# Patient Record
Sex: MALE | Race: WHITE | NOT HISPANIC OR LATINO | Employment: FULL TIME | URBAN - METROPOLITAN AREA
[De-identification: names, ages, dates, MRNs, and addresses within clinical notes are randomized per-mention and may not be internally consistent; named-entity substitution may affect disease eponyms.]

---

## 2020-01-01 ENCOUNTER — ANESTHESIA EVENT (OUTPATIENT)
Dept: SURGERY | Facility: HOSPITAL | Age: 55
DRG: 834 | End: 2020-01-01
Payer: COMMERCIAL

## 2020-01-01 ENCOUNTER — TELEPHONE (OUTPATIENT)
Dept: HEMATOLOGY/ONCOLOGY | Facility: CLINIC | Age: 55
End: 2020-01-01

## 2020-01-01 ENCOUNTER — OFFICE VISIT (OUTPATIENT)
Dept: HEMATOLOGY/ONCOLOGY | Facility: CLINIC | Age: 55
End: 2020-01-01
Payer: COMMERCIAL

## 2020-01-01 ENCOUNTER — SPECIALTY PHARMACY (OUTPATIENT)
Dept: PHARMACY | Facility: CLINIC | Age: 55
End: 2020-01-01

## 2020-01-01 ENCOUNTER — LAB VISIT (OUTPATIENT)
Dept: LAB | Facility: HOSPITAL | Age: 55
End: 2020-01-01
Attending: NURSE PRACTITIONER
Payer: COMMERCIAL

## 2020-01-01 ENCOUNTER — INFUSION (OUTPATIENT)
Dept: INFUSION THERAPY | Facility: HOSPITAL | Age: 55
End: 2020-01-01
Payer: COMMERCIAL

## 2020-01-01 ENCOUNTER — TELEPHONE (OUTPATIENT)
Dept: INFECTIOUS DISEASES | Facility: CLINIC | Age: 55
End: 2020-01-01

## 2020-01-01 ENCOUNTER — DOCUMENTATION ONLY (OUTPATIENT)
Dept: TRANSFUSION MEDICINE | Facility: HOSPITAL | Age: 55
End: 2020-01-01
Payer: COMMERCIAL

## 2020-01-01 ENCOUNTER — EXTERNAL HOME HEALTH (OUTPATIENT)
Dept: HOME HEALTH SERVICES | Facility: HOSPITAL | Age: 55
End: 2020-01-01
Payer: COMMERCIAL

## 2020-01-01 ENCOUNTER — HOSPITAL ENCOUNTER (INPATIENT)
Facility: HOSPITAL | Age: 55
LOS: 29 days | Discharge: HOME HEALTH | DRG: 834 | End: 2020-10-15
Attending: INTERNAL MEDICINE | Admitting: INTERNAL MEDICINE
Payer: COMMERCIAL

## 2020-01-01 ENCOUNTER — OFFICE VISIT (OUTPATIENT)
Dept: OPTOMETRY | Facility: CLINIC | Age: 55
End: 2020-01-01
Payer: COMMERCIAL

## 2020-01-01 ENCOUNTER — INFUSION (OUTPATIENT)
Dept: INFUSION THERAPY | Facility: HOSPITAL | Age: 55
End: 2020-01-01
Attending: INTERNAL MEDICINE
Payer: COMMERCIAL

## 2020-01-01 ENCOUNTER — PROCEDURE VISIT (OUTPATIENT)
Dept: HEMATOLOGY/ONCOLOGY | Facility: CLINIC | Age: 55
End: 2020-01-01
Payer: COMMERCIAL

## 2020-01-01 ENCOUNTER — OFFICE VISIT (OUTPATIENT)
Dept: OPHTHALMOLOGY | Facility: CLINIC | Age: 55
End: 2020-01-01
Payer: COMMERCIAL

## 2020-01-01 ENCOUNTER — OFFICE VISIT (OUTPATIENT)
Dept: INFECTIOUS DISEASES | Facility: CLINIC | Age: 55
End: 2020-01-01
Payer: COMMERCIAL

## 2020-01-01 ENCOUNTER — ANESTHESIA (OUTPATIENT)
Dept: SURGERY | Facility: HOSPITAL | Age: 55
DRG: 834 | End: 2020-01-01
Payer: COMMERCIAL

## 2020-01-01 ENCOUNTER — TELEPHONE (OUTPATIENT)
Dept: CARDIOLOGY | Facility: CLINIC | Age: 55
End: 2020-01-01

## 2020-01-01 ENCOUNTER — LAB VISIT (OUTPATIENT)
Dept: LAB | Facility: HOSPITAL | Age: 55
End: 2020-01-01
Payer: COMMERCIAL

## 2020-01-01 ENCOUNTER — TELEPHONE (OUTPATIENT)
Dept: PHARMACY | Facility: CLINIC | Age: 55
End: 2020-01-01

## 2020-01-01 ENCOUNTER — INFUSION (OUTPATIENT)
Dept: INFUSION THERAPY | Facility: HOSPITAL | Age: 55
End: 2020-01-01
Attending: NURSE PRACTITIONER
Payer: COMMERCIAL

## 2020-01-01 ENCOUNTER — DOCUMENTATION ONLY (OUTPATIENT)
Dept: HEMATOLOGY/ONCOLOGY | Facility: CLINIC | Age: 55
End: 2020-01-01

## 2020-01-01 ENCOUNTER — TELEPHONE (OUTPATIENT)
Dept: TRANSPLANT | Facility: HOSPITAL | Age: 55
End: 2020-01-01

## 2020-01-01 VITALS
SYSTOLIC BLOOD PRESSURE: 139 MMHG | HEART RATE: 70 BPM | RESPIRATION RATE: 18 BRPM | HEART RATE: 73 BPM | DIASTOLIC BLOOD PRESSURE: 81 MMHG | SYSTOLIC BLOOD PRESSURE: 140 MMHG | HEART RATE: 91 BPM | WEIGHT: 273.38 LBS | OXYGEN SATURATION: 97 % | RESPIRATION RATE: 18 BRPM | TEMPERATURE: 99 F | HEART RATE: 78 BPM | OXYGEN SATURATION: 99 % | TEMPERATURE: 98 F | DIASTOLIC BLOOD PRESSURE: 74 MMHG | RESPIRATION RATE: 17 BRPM | DIASTOLIC BLOOD PRESSURE: 83 MMHG | OXYGEN SATURATION: 95 % | DIASTOLIC BLOOD PRESSURE: 77 MMHG | HEIGHT: 75 IN | TEMPERATURE: 98 F | BODY MASS INDEX: 33.99 KG/M2 | RESPIRATION RATE: 16 BRPM | TEMPERATURE: 99 F | SYSTOLIC BLOOD PRESSURE: 120 MMHG | SYSTOLIC BLOOD PRESSURE: 130 MMHG | OXYGEN SATURATION: 100 %

## 2020-01-01 VITALS
OXYGEN SATURATION: 100 % | HEART RATE: 72 BPM | BODY MASS INDEX: 34.76 KG/M2 | OXYGEN SATURATION: 98 % | OXYGEN SATURATION: 100 % | BODY MASS INDEX: 33.71 KG/M2 | RESPIRATION RATE: 16 BRPM | RESPIRATION RATE: 18 BRPM | HEIGHT: 74 IN | DIASTOLIC BLOOD PRESSURE: 76 MMHG | SYSTOLIC BLOOD PRESSURE: 142 MMHG | SYSTOLIC BLOOD PRESSURE: 125 MMHG | WEIGHT: 276.69 LBS | WEIGHT: 276.81 LBS | HEIGHT: 75 IN | RESPIRATION RATE: 18 BRPM | TEMPERATURE: 99 F | SYSTOLIC BLOOD PRESSURE: 124 MMHG | WEIGHT: 279.56 LBS | BODY MASS INDEX: 35.51 KG/M2 | HEIGHT: 76 IN | TEMPERATURE: 99 F | HEART RATE: 78 BPM | TEMPERATURE: 98 F | DIASTOLIC BLOOD PRESSURE: 82 MMHG | DIASTOLIC BLOOD PRESSURE: 84 MMHG | HEART RATE: 72 BPM

## 2020-01-01 VITALS
TEMPERATURE: 98 F | HEART RATE: 67 BPM | RESPIRATION RATE: 17 BRPM | SYSTOLIC BLOOD PRESSURE: 134 MMHG | OXYGEN SATURATION: 100 % | HEART RATE: 71 BPM | SYSTOLIC BLOOD PRESSURE: 124 MMHG | DIASTOLIC BLOOD PRESSURE: 72 MMHG | DIASTOLIC BLOOD PRESSURE: 77 MMHG | OXYGEN SATURATION: 99 % | RESPIRATION RATE: 18 BRPM | TEMPERATURE: 98 F

## 2020-01-01 VITALS
HEIGHT: 75 IN | HEART RATE: 92 BPM | DIASTOLIC BLOOD PRESSURE: 79 MMHG | SYSTOLIC BLOOD PRESSURE: 143 MMHG | OXYGEN SATURATION: 100 % | BODY MASS INDEX: 33.43 KG/M2 | RESPIRATION RATE: 18 BRPM | TEMPERATURE: 99 F | WEIGHT: 268.88 LBS

## 2020-01-01 VITALS
SYSTOLIC BLOOD PRESSURE: 118 MMHG | OXYGEN SATURATION: 100 % | RESPIRATION RATE: 16 BRPM | HEART RATE: 96 BPM | OXYGEN SATURATION: 100 % | SYSTOLIC BLOOD PRESSURE: 110 MMHG | HEIGHT: 76 IN | TEMPERATURE: 98 F | WEIGHT: 269.38 LBS | HEART RATE: 85 BPM | TEMPERATURE: 99 F | DIASTOLIC BLOOD PRESSURE: 71 MMHG | BODY MASS INDEX: 32.05 KG/M2 | HEIGHT: 76 IN | SYSTOLIC BLOOD PRESSURE: 97 MMHG | DIASTOLIC BLOOD PRESSURE: 63 MMHG | HEIGHT: 76 IN | TEMPERATURE: 98 F | BODY MASS INDEX: 32.85 KG/M2 | RESPIRATION RATE: 17 BRPM | RESPIRATION RATE: 16 BRPM | DIASTOLIC BLOOD PRESSURE: 58 MMHG | HEART RATE: 85 BPM | SYSTOLIC BLOOD PRESSURE: 104 MMHG | HEIGHT: 76 IN | WEIGHT: 269.81 LBS | WEIGHT: 262.38 LBS | BODY MASS INDEX: 32.8 KG/M2 | DIASTOLIC BLOOD PRESSURE: 61 MMHG | HEART RATE: 80 BPM | WEIGHT: 263.19 LBS | OXYGEN SATURATION: 100 % | TEMPERATURE: 98 F | BODY MASS INDEX: 31.95 KG/M2

## 2020-01-01 VITALS
BODY MASS INDEX: 33.43 KG/M2 | SYSTOLIC BLOOD PRESSURE: 113 MMHG | HEART RATE: 93 BPM | DIASTOLIC BLOOD PRESSURE: 68 MMHG | TEMPERATURE: 98 F | OXYGEN SATURATION: 100 % | HEIGHT: 75 IN | WEIGHT: 271.81 LBS | DIASTOLIC BLOOD PRESSURE: 77 MMHG | RESPIRATION RATE: 16 BRPM | OXYGEN SATURATION: 99 % | TEMPERATURE: 98 F | HEART RATE: 78 BPM | SYSTOLIC BLOOD PRESSURE: 137 MMHG | RESPIRATION RATE: 17 BRPM | WEIGHT: 268.88 LBS | HEIGHT: 75 IN | BODY MASS INDEX: 33.8 KG/M2

## 2020-01-01 VITALS
OXYGEN SATURATION: 100 % | DIASTOLIC BLOOD PRESSURE: 52 MMHG | RESPIRATION RATE: 18 BRPM | HEIGHT: 74 IN | BODY MASS INDEX: 33.31 KG/M2 | BODY MASS INDEX: 35.51 KG/M2 | TEMPERATURE: 98 F | DIASTOLIC BLOOD PRESSURE: 61 MMHG | HEART RATE: 84 BPM | HEART RATE: 83 BPM | TEMPERATURE: 98 F | WEIGHT: 273.56 LBS | HEIGHT: 76 IN | RESPIRATION RATE: 18 BRPM | SYSTOLIC BLOOD PRESSURE: 102 MMHG | WEIGHT: 276.69 LBS | SYSTOLIC BLOOD PRESSURE: 93 MMHG

## 2020-01-01 VITALS
DIASTOLIC BLOOD PRESSURE: 77 MMHG | OXYGEN SATURATION: 99 % | WEIGHT: 262.38 LBS | SYSTOLIC BLOOD PRESSURE: 134 MMHG | RESPIRATION RATE: 16 BRPM | BODY MASS INDEX: 31.95 KG/M2 | TEMPERATURE: 98 F | HEART RATE: 71 BPM | HEIGHT: 76 IN

## 2020-01-01 VITALS
BODY MASS INDEX: 34.46 KG/M2 | DIASTOLIC BLOOD PRESSURE: 85 MMHG | WEIGHT: 277.13 LBS | SYSTOLIC BLOOD PRESSURE: 145 MMHG | RESPIRATION RATE: 16 BRPM | OXYGEN SATURATION: 100 % | HEART RATE: 82 BPM | HEIGHT: 75 IN

## 2020-01-01 VITALS
HEIGHT: 75 IN | SYSTOLIC BLOOD PRESSURE: 138 MMHG | WEIGHT: 276.69 LBS | HEART RATE: 78 BPM | RESPIRATION RATE: 17 BRPM | TEMPERATURE: 98 F | OXYGEN SATURATION: 100 % | DIASTOLIC BLOOD PRESSURE: 83 MMHG | BODY MASS INDEX: 34.4 KG/M2

## 2020-01-01 VITALS
RESPIRATION RATE: 18 BRPM | DIASTOLIC BLOOD PRESSURE: 92 MMHG | SYSTOLIC BLOOD PRESSURE: 148 MMHG | OXYGEN SATURATION: 99 % | HEART RATE: 70 BPM | TEMPERATURE: 98 F

## 2020-01-01 VITALS
RESPIRATION RATE: 16 BRPM | DIASTOLIC BLOOD PRESSURE: 70 MMHG | SYSTOLIC BLOOD PRESSURE: 128 MMHG | OXYGEN SATURATION: 99 % | TEMPERATURE: 98 F | HEART RATE: 61 BPM

## 2020-01-01 VITALS
HEART RATE: 80 BPM | RESPIRATION RATE: 16 BRPM | OXYGEN SATURATION: 99 % | TEMPERATURE: 98 F | DIASTOLIC BLOOD PRESSURE: 73 MMHG | SYSTOLIC BLOOD PRESSURE: 131 MMHG

## 2020-01-01 VITALS
RESPIRATION RATE: 16 BRPM | BODY MASS INDEX: 34.46 KG/M2 | DIASTOLIC BLOOD PRESSURE: 77 MMHG | HEART RATE: 76 BPM | SYSTOLIC BLOOD PRESSURE: 132 MMHG | TEMPERATURE: 98 F | OXYGEN SATURATION: 99 % | WEIGHT: 277.13 LBS | HEIGHT: 75 IN

## 2020-01-01 VITALS
HEART RATE: 75 BPM | DIASTOLIC BLOOD PRESSURE: 80 MMHG | SYSTOLIC BLOOD PRESSURE: 126 MMHG | HEART RATE: 78 BPM | SYSTOLIC BLOOD PRESSURE: 141 MMHG | OXYGEN SATURATION: 100 % | SYSTOLIC BLOOD PRESSURE: 139 MMHG | HEART RATE: 73 BPM | RESPIRATION RATE: 18 BRPM | BODY MASS INDEX: 33.29 KG/M2 | HEIGHT: 76 IN | TEMPERATURE: 98 F | TEMPERATURE: 98 F | RESPIRATION RATE: 18 BRPM | DIASTOLIC BLOOD PRESSURE: 79 MMHG | DIASTOLIC BLOOD PRESSURE: 84 MMHG | TEMPERATURE: 98 F | WEIGHT: 273.38 LBS | RESPIRATION RATE: 18 BRPM | OXYGEN SATURATION: 99 %

## 2020-01-01 VITALS
BODY MASS INDEX: 35.72 KG/M2 | OXYGEN SATURATION: 99 % | SYSTOLIC BLOOD PRESSURE: 143 MMHG | DIASTOLIC BLOOD PRESSURE: 89 MMHG | HEIGHT: 74 IN | TEMPERATURE: 98 F | WEIGHT: 278.31 LBS | RESPIRATION RATE: 16 BRPM | HEART RATE: 68 BPM

## 2020-01-01 VITALS
RESPIRATION RATE: 18 BRPM | OXYGEN SATURATION: 100 % | HEART RATE: 70 BPM | DIASTOLIC BLOOD PRESSURE: 80 MMHG | DIASTOLIC BLOOD PRESSURE: 31 MMHG | RESPIRATION RATE: 18 BRPM | SYSTOLIC BLOOD PRESSURE: 138 MMHG | TEMPERATURE: 99 F | TEMPERATURE: 98 F | HEART RATE: 61 BPM | SYSTOLIC BLOOD PRESSURE: 159 MMHG

## 2020-01-01 VITALS
OXYGEN SATURATION: 99 % | HEART RATE: 73 BPM | RESPIRATION RATE: 16 BRPM | TEMPERATURE: 99 F | DIASTOLIC BLOOD PRESSURE: 83 MMHG | SYSTOLIC BLOOD PRESSURE: 135 MMHG

## 2020-01-01 VITALS — RESPIRATION RATE: 18 BRPM | DIASTOLIC BLOOD PRESSURE: 62 MMHG | SYSTOLIC BLOOD PRESSURE: 105 MMHG | HEART RATE: 63 BPM

## 2020-01-01 DIAGNOSIS — D61.810 PANCYTOPENIA DUE TO ANTINEOPLASTIC CHEMOTHERAPY: ICD-10-CM

## 2020-01-01 DIAGNOSIS — I10 ESSENTIAL HYPERTENSION: ICD-10-CM

## 2020-01-01 DIAGNOSIS — C92.01 ACUTE MYELOID LEUKEMIA IN REMISSION: Primary | ICD-10-CM

## 2020-01-01 DIAGNOSIS — D64.9 ANEMIA: Primary | ICD-10-CM

## 2020-01-01 DIAGNOSIS — K57.92 DIVERTICULITIS: Primary | ICD-10-CM

## 2020-01-01 DIAGNOSIS — I48.21 PERMANENT ATRIAL FIBRILLATION: ICD-10-CM

## 2020-01-01 DIAGNOSIS — E11.9 TYPE 2 DIABETES MELLITUS WITHOUT COMPLICATION, WITHOUT LONG-TERM CURRENT USE OF INSULIN: ICD-10-CM

## 2020-01-01 DIAGNOSIS — C92.00 ACUTE MYELOID LEUKEMIA NOT HAVING ACHIEVED REMISSION: Primary | ICD-10-CM

## 2020-01-01 DIAGNOSIS — Z76.82 BONE MARROW TRANSPLANT CANDIDATE: ICD-10-CM

## 2020-01-01 DIAGNOSIS — H53.8 BLURRY VISION, RIGHT EYE: ICD-10-CM

## 2020-01-01 DIAGNOSIS — C92.00 ACUTE MYELOID LEUKEMIA NOT HAVING ACHIEVED REMISSION: ICD-10-CM

## 2020-01-01 DIAGNOSIS — T45.1X5A PANCYTOPENIA DUE TO ANTINEOPLASTIC CHEMOTHERAPY: ICD-10-CM

## 2020-01-01 DIAGNOSIS — Z76.82 STEM CELL TRANSPLANT CANDIDATE: Primary | ICD-10-CM

## 2020-01-01 DIAGNOSIS — J94.8 NEOPLASTIC PLEURAL EFFUSION: ICD-10-CM

## 2020-01-01 DIAGNOSIS — C92.00 AML (ACUTE MYELOID LEUKEMIA): ICD-10-CM

## 2020-01-01 DIAGNOSIS — D63.0 ANEMIA IN NEOPLASTIC DISEASE: Primary | ICD-10-CM

## 2020-01-01 DIAGNOSIS — Z51.11 ENCOUNTER FOR ANTINEOPLASTIC CHEMOTHERAPY: Primary | ICD-10-CM

## 2020-01-01 DIAGNOSIS — E80.6 HYPERBILIRUBINEMIA: ICD-10-CM

## 2020-01-01 DIAGNOSIS — I48.91 ATRIAL FIBRILLATION, UNSPECIFIED TYPE: ICD-10-CM

## 2020-01-01 DIAGNOSIS — I95.9 HYPOTENSION, UNSPECIFIED HYPOTENSION TYPE: ICD-10-CM

## 2020-01-01 DIAGNOSIS — H35.00 RETINOPATHY OF BOTH EYES: ICD-10-CM

## 2020-01-01 DIAGNOSIS — C92.01 ACUTE MYELOID LEUKEMIA IN REMISSION: ICD-10-CM

## 2020-01-01 DIAGNOSIS — N17.9 AKI (ACUTE KIDNEY INJURY): ICD-10-CM

## 2020-01-01 DIAGNOSIS — D61.818 PANCYTOPENIA: Primary | ICD-10-CM

## 2020-01-01 DIAGNOSIS — D63.0 ANEMIA IN NEOPLASTIC DISEASE: ICD-10-CM

## 2020-01-01 DIAGNOSIS — B95.7 COAGULASE NEGATIVE STAPHYLOCOCCUS BACTEREMIA: ICD-10-CM

## 2020-01-01 DIAGNOSIS — D75.9 CYTOPENIA: ICD-10-CM

## 2020-01-01 DIAGNOSIS — T45.1X5A CHEMOTHERAPY-INDUCED NEUTROPENIA: ICD-10-CM

## 2020-01-01 DIAGNOSIS — I48.0 PAROXYSMAL ATRIAL FIBRILLATION: Primary | ICD-10-CM

## 2020-01-01 DIAGNOSIS — D64.9 ANEMIA, UNSPECIFIED TYPE: Primary | ICD-10-CM

## 2020-01-01 DIAGNOSIS — T45.1X5A PANCYTOPENIA DUE TO ANTINEOPLASTIC CHEMOTHERAPY: Primary | ICD-10-CM

## 2020-01-01 DIAGNOSIS — H35.371 EPIRETINAL MEMBRANE, RIGHT: Primary | ICD-10-CM

## 2020-01-01 DIAGNOSIS — R78.81 COAGULASE NEGATIVE STAPHYLOCOCCUS BACTEREMIA: ICD-10-CM

## 2020-01-01 DIAGNOSIS — B95.7 COAGULASE NEGATIVE STAPHYLOCOCCUS BACTEREMIA: Primary | ICD-10-CM

## 2020-01-01 DIAGNOSIS — T45.1X5A ANEMIA DUE TO CHEMOTHERAPY: Primary | ICD-10-CM

## 2020-01-01 DIAGNOSIS — D61.810 PANCYTOPENIA DUE TO ANTINEOPLASTIC CHEMOTHERAPY: Primary | ICD-10-CM

## 2020-01-01 DIAGNOSIS — D61.82 MYELOPHTHISIC ANEMIA: ICD-10-CM

## 2020-01-01 DIAGNOSIS — I48.91 ATRIAL FIBRILLATION, UNSPECIFIED TYPE: Primary | ICD-10-CM

## 2020-01-01 DIAGNOSIS — D69.6 THROMBOCYTOPENIA: Primary | ICD-10-CM

## 2020-01-01 DIAGNOSIS — H43.11 VITREOUS HEMORRHAGE OF RIGHT EYE: Primary | ICD-10-CM

## 2020-01-01 DIAGNOSIS — H25.041 POSTERIOR SUBCAPSULAR AGE-RELATED CATARACT OF RIGHT EYE: ICD-10-CM

## 2020-01-01 DIAGNOSIS — C80.1 MALIGNANCY: ICD-10-CM

## 2020-01-01 DIAGNOSIS — Z76.82 STEM CELL TRANSPLANT CANDIDATE: ICD-10-CM

## 2020-01-01 DIAGNOSIS — C95.00 ACUTE LEUKEMIA: ICD-10-CM

## 2020-01-01 DIAGNOSIS — H35.371 EPIRETINAL MEMBRANE (ERM) OF RIGHT EYE: ICD-10-CM

## 2020-01-01 DIAGNOSIS — D61.818 PANCYTOPENIA: ICD-10-CM

## 2020-01-01 DIAGNOSIS — D64.81 ANEMIA DUE TO CHEMOTHERAPY: ICD-10-CM

## 2020-01-01 DIAGNOSIS — D75.9 CYTOPENIA: Primary | ICD-10-CM

## 2020-01-01 DIAGNOSIS — E78.5 HYPERLIPIDEMIA, UNSPECIFIED HYPERLIPIDEMIA TYPE: ICD-10-CM

## 2020-01-01 DIAGNOSIS — D64.81 ANEMIA DUE TO CHEMOTHERAPY: Primary | ICD-10-CM

## 2020-01-01 DIAGNOSIS — D70.1 CHEMOTHERAPY-INDUCED NEUTROPENIA: ICD-10-CM

## 2020-01-01 DIAGNOSIS — T45.1X5A ANEMIA DUE TO CHEMOTHERAPY: ICD-10-CM

## 2020-01-01 DIAGNOSIS — Z12.11 SCREENING FOR COLON CANCER: Primary | ICD-10-CM

## 2020-01-01 DIAGNOSIS — E61.2 MAGNESIUM DEFICIENCY: ICD-10-CM

## 2020-01-01 DIAGNOSIS — H43.11 VITREOUS HEMORRHAGE, RIGHT: ICD-10-CM

## 2020-01-01 DIAGNOSIS — C95.00 ACUTE LEUKEMIA NOT HAVING ACHIEVED REMISSION: ICD-10-CM

## 2020-01-01 DIAGNOSIS — R78.81 COAGULASE NEGATIVE STAPHYLOCOCCUS BACTEREMIA: Primary | ICD-10-CM

## 2020-01-01 DIAGNOSIS — H43.11 VITREOUS HEMORRHAGE OF RIGHT EYE: ICD-10-CM

## 2020-01-01 DIAGNOSIS — I48.91 ATRIAL FIBRILLATION: ICD-10-CM

## 2020-01-01 DIAGNOSIS — H35.61 RETINAL HEMORRHAGE, RIGHT: ICD-10-CM

## 2020-01-01 LAB
ABO + RH BLD: NORMAL
ACID FAST MOD KINY STN SPEC: NORMAL
ALBUMIN FLD-MCNC: 1.9 G/DL
ALBUMIN SERPL BCP-MCNC: 2.2 G/DL (ref 3.5–5.2)
ALBUMIN SERPL BCP-MCNC: 2.4 G/DL (ref 3.5–5.2)
ALBUMIN SERPL BCP-MCNC: 2.5 G/DL (ref 3.5–5.2)
ALBUMIN SERPL BCP-MCNC: 2.6 G/DL (ref 3.5–5.2)
ALBUMIN SERPL BCP-MCNC: 2.7 G/DL (ref 3.5–5.2)
ALBUMIN SERPL BCP-MCNC: 2.8 G/DL (ref 3.5–5.2)
ALBUMIN SERPL BCP-MCNC: 2.8 G/DL (ref 3.5–5.2)
ALBUMIN SERPL BCP-MCNC: 2.9 G/DL (ref 3.5–5.2)
ALBUMIN SERPL BCP-MCNC: 2.9 G/DL (ref 3.5–5.2)
ALBUMIN SERPL BCP-MCNC: 3 G/DL (ref 3.5–5.2)
ALBUMIN SERPL BCP-MCNC: 3.1 G/DL (ref 3.5–5.2)
ALBUMIN SERPL BCP-MCNC: 3.2 G/DL (ref 3.5–5.2)
ALBUMIN SERPL BCP-MCNC: 3.2 G/DL (ref 3.5–5.2)
ALBUMIN SERPL BCP-MCNC: 3.4 G/DL (ref 3.5–5.2)
ALBUMIN SERPL BCP-MCNC: 3.5 G/DL (ref 3.5–5.2)
ALBUMIN SERPL BCP-MCNC: 3.6 G/DL (ref 3.5–5.2)
ALBUMIN SERPL BCP-MCNC: 3.7 G/DL (ref 3.5–5.2)
ALBUMIN SERPL BCP-MCNC: 3.8 G/DL (ref 3.5–5.2)
ALP SERPL-CCNC: 100 U/L (ref 55–135)
ALP SERPL-CCNC: 105 U/L (ref 55–135)
ALP SERPL-CCNC: 107 U/L (ref 55–135)
ALP SERPL-CCNC: 107 U/L (ref 55–135)
ALP SERPL-CCNC: 109 U/L (ref 55–135)
ALP SERPL-CCNC: 110 U/L (ref 55–135)
ALP SERPL-CCNC: 46 U/L (ref 55–135)
ALP SERPL-CCNC: 48 U/L (ref 55–135)
ALP SERPL-CCNC: 49 U/L (ref 55–135)
ALP SERPL-CCNC: 49 U/L (ref 55–135)
ALP SERPL-CCNC: 51 U/L (ref 55–135)
ALP SERPL-CCNC: 51 U/L (ref 55–135)
ALP SERPL-CCNC: 52 U/L (ref 55–135)
ALP SERPL-CCNC: 53 U/L (ref 55–135)
ALP SERPL-CCNC: 54 U/L (ref 55–135)
ALP SERPL-CCNC: 55 U/L (ref 55–135)
ALP SERPL-CCNC: 56 U/L (ref 55–135)
ALP SERPL-CCNC: 58 U/L (ref 55–135)
ALP SERPL-CCNC: 58 U/L (ref 55–135)
ALP SERPL-CCNC: 62 U/L (ref 55–135)
ALP SERPL-CCNC: 63 U/L (ref 55–135)
ALP SERPL-CCNC: 64 U/L (ref 55–135)
ALP SERPL-CCNC: 64 U/L (ref 55–135)
ALP SERPL-CCNC: 66 U/L (ref 55–135)
ALP SERPL-CCNC: 66 U/L (ref 55–135)
ALP SERPL-CCNC: 67 U/L (ref 55–135)
ALP SERPL-CCNC: 68 U/L (ref 55–135)
ALP SERPL-CCNC: 69 U/L (ref 55–135)
ALP SERPL-CCNC: 76 U/L (ref 55–135)
ALP SERPL-CCNC: 77 U/L (ref 55–135)
ALP SERPL-CCNC: 81 U/L (ref 55–135)
ALP SERPL-CCNC: 84 U/L (ref 55–135)
ALP SERPL-CCNC: 86 U/L (ref 55–135)
ALP SERPL-CCNC: 87 U/L (ref 55–135)
ALP SERPL-CCNC: 89 U/L (ref 55–135)
ALP SERPL-CCNC: 89 U/L (ref 55–135)
ALP SERPL-CCNC: 91 U/L (ref 55–135)
ALP SERPL-CCNC: 92 U/L (ref 55–135)
ALP SERPL-CCNC: 92 U/L (ref 55–135)
ALP SERPL-CCNC: 93 U/L (ref 55–135)
ALP SERPL-CCNC: 93 U/L (ref 55–135)
ALP SERPL-CCNC: 94 U/L (ref 55–135)
ALP SERPL-CCNC: 95 U/L (ref 55–135)
ALP SERPL-CCNC: 96 U/L (ref 55–135)
ALP SERPL-CCNC: 97 U/L (ref 55–135)
ALP SERPL-CCNC: 98 U/L (ref 55–135)
ALP SERPL-CCNC: 98 U/L (ref 55–135)
ALT SERPL W/O P-5'-P-CCNC: 10 U/L (ref 10–44)
ALT SERPL W/O P-5'-P-CCNC: 12 U/L (ref 10–44)
ALT SERPL W/O P-5'-P-CCNC: 13 U/L (ref 10–44)
ALT SERPL W/O P-5'-P-CCNC: 14 U/L (ref 10–44)
ALT SERPL W/O P-5'-P-CCNC: 15 U/L (ref 10–44)
ALT SERPL W/O P-5'-P-CCNC: 16 U/L (ref 10–44)
ALT SERPL W/O P-5'-P-CCNC: 17 U/L (ref 10–44)
ALT SERPL W/O P-5'-P-CCNC: 18 U/L (ref 10–44)
ALT SERPL W/O P-5'-P-CCNC: 19 U/L (ref 10–44)
ALT SERPL W/O P-5'-P-CCNC: 21 U/L (ref 10–44)
ALT SERPL W/O P-5'-P-CCNC: 21 U/L (ref 10–44)
ALT SERPL W/O P-5'-P-CCNC: 22 U/L (ref 10–44)
ALT SERPL W/O P-5'-P-CCNC: 23 U/L (ref 10–44)
ALT SERPL W/O P-5'-P-CCNC: 24 U/L (ref 10–44)
ALT SERPL W/O P-5'-P-CCNC: 25 U/L (ref 10–44)
ALT SERPL W/O P-5'-P-CCNC: 26 U/L (ref 10–44)
ALT SERPL W/O P-5'-P-CCNC: 29 U/L (ref 10–44)
ALT SERPL W/O P-5'-P-CCNC: 31 U/L (ref 10–44)
ALT SERPL W/O P-5'-P-CCNC: 33 U/L (ref 10–44)
ALT SERPL W/O P-5'-P-CCNC: 36 U/L (ref 10–44)
ALT SERPL W/O P-5'-P-CCNC: 40 U/L (ref 10–44)
ALT SERPL W/O P-5'-P-CCNC: 46 U/L (ref 10–44)
ALT SERPL W/O P-5'-P-CCNC: 48 U/L (ref 10–44)
ALT SERPL W/O P-5'-P-CCNC: 49 U/L (ref 10–44)
ALT SERPL W/O P-5'-P-CCNC: 49 U/L (ref 10–44)
ALT SERPL W/O P-5'-P-CCNC: 57 U/L (ref 10–44)
ALT SERPL W/O P-5'-P-CCNC: 57 U/L (ref 10–44)
ALT SERPL W/O P-5'-P-CCNC: 59 U/L (ref 10–44)
ALT SERPL W/O P-5'-P-CCNC: 60 U/L (ref 10–44)
ALT SERPL W/O P-5'-P-CCNC: 65 U/L (ref 10–44)
ALT SERPL W/O P-5'-P-CCNC: 8 U/L (ref 10–44)
ALT SERPL W/O P-5'-P-CCNC: 9 U/L (ref 10–44)
AML FISH ADDITIONAL INFORMATION (BM): NORMAL
AML FISH ADDITIONAL INFORMATION (BM): NORMAL
AML FISH DISCLAIMER (BM): NORMAL
AML FISH DISCLAIMER (BM): NORMAL
AML FISH REASON FOR REFERRAL (BM): NORMAL
AML FISH REASON FOR REFERRAL (BM): NORMAL
AML FISH RELEASED BY (BM): NORMAL
AML FISH RELEASED BY (BM): NORMAL
AML FISH RESULT (BM): NORMAL
AML FISH RESULT (BM): NORMAL
AML FISH RESULT SUMMARY (BM): NORMAL
AML FISH RESULT SUMMARY (BM): NORMAL
AML FISH RESULT TABLE (BM): NORMAL
AML FISH RESULT TABLE (BM): NORMAL
AMYLASE, BODY FLUID: 13 U/L
ANION GAP SERPL CALC-SCNC: 10 MMOL/L (ref 8–16)
ANION GAP SERPL CALC-SCNC: 11 MMOL/L (ref 8–16)
ANION GAP SERPL CALC-SCNC: 11 MMOL/L (ref 8–16)
ANION GAP SERPL CALC-SCNC: 12 MMOL/L (ref 8–16)
ANION GAP SERPL CALC-SCNC: 4 MMOL/L (ref 8–16)
ANION GAP SERPL CALC-SCNC: 5 MMOL/L (ref 8–16)
ANION GAP SERPL CALC-SCNC: 5 MMOL/L (ref 8–16)
ANION GAP SERPL CALC-SCNC: 6 MMOL/L (ref 8–16)
ANION GAP SERPL CALC-SCNC: 7 MMOL/L (ref 8–16)
ANION GAP SERPL CALC-SCNC: 8 MMOL/L (ref 8–16)
ANION GAP SERPL CALC-SCNC: 9 MMOL/L (ref 8–16)
ANISOCYTOSIS BLD QL SMEAR: ABNORMAL
ANISOCYTOSIS BLD QL SMEAR: SLIGHT
ANNOTATION COMMENT IMP: NORMAL
APPEARANCE FLD: ABNORMAL
APTT BLDCRRT: 23.7 SEC (ref 21–32)
APTT BLDCRRT: 24.8 SEC (ref 21–32)
APTT BLDCRRT: 25.4 SEC (ref 21–32)
APTT BLDCRRT: 25.4 SEC (ref 21–32)
APTT BLDCRRT: 25.9 SEC (ref 21–32)
APTT BLDCRRT: 26.2 SEC (ref 21–32)
APTT BLDCRRT: 26.2 SEC (ref 21–32)
APTT BLDCRRT: 26.5 SEC (ref 21–32)
APTT BLDCRRT: 26.5 SEC (ref 21–32)
APTT BLDCRRT: 26.8 SEC (ref 21–32)
APTT BLDCRRT: 26.8 SEC (ref 21–32)
APTT BLDCRRT: 27.3 SEC (ref 21–32)
APTT BLDCRRT: 27.3 SEC (ref 21–32)
APTT BLDCRRT: 27.4 SEC (ref 21–32)
APTT BLDCRRT: 27.4 SEC (ref 21–32)
APTT BLDCRRT: 27.5 SEC (ref 21–32)
APTT BLDCRRT: 27.6 SEC (ref 21–32)
APTT BLDCRRT: 27.7 SEC (ref 21–32)
APTT BLDCRRT: 28.2 SEC (ref 21–32)
APTT BLDCRRT: 28.5 SEC (ref 21–32)
APTT BLDCRRT: 28.5 SEC (ref 21–32)
APTT BLDCRRT: 29.1 SEC (ref 21–32)
APTT BLDCRRT: 29.4 SEC (ref 21–32)
APTT BLDCRRT: 30.4 SEC (ref 21–32)
APTT BLDCRRT: 30.8 SEC (ref 21–32)
APTT BLDCRRT: 33.2 SEC (ref 21–32)
APTT BLDCRRT: 37.3 SEC (ref 21–32)
ASCENDING AORTA: 3.6 CM
ASCENDING AORTA: 3.92 CM
AST SERPL-CCNC: 10 U/L (ref 10–40)
AST SERPL-CCNC: 11 U/L (ref 10–40)
AST SERPL-CCNC: 12 U/L (ref 10–40)
AST SERPL-CCNC: 13 U/L (ref 10–40)
AST SERPL-CCNC: 14 U/L (ref 10–40)
AST SERPL-CCNC: 14 U/L (ref 10–40)
AST SERPL-CCNC: 15 U/L (ref 10–40)
AST SERPL-CCNC: 17 U/L (ref 10–40)
AST SERPL-CCNC: 17 U/L (ref 10–40)
AST SERPL-CCNC: 18 U/L (ref 10–40)
AST SERPL-CCNC: 18 U/L (ref 10–40)
AST SERPL-CCNC: 22 U/L (ref 10–40)
AST SERPL-CCNC: 23 U/L (ref 10–40)
AST SERPL-CCNC: 24 U/L (ref 10–40)
AST SERPL-CCNC: 25 U/L (ref 10–40)
AST SERPL-CCNC: 29 U/L (ref 10–40)
AST SERPL-CCNC: 30 U/L (ref 10–40)
AST SERPL-CCNC: 33 U/L (ref 10–40)
AST SERPL-CCNC: 39 U/L (ref 10–40)
AST SERPL-CCNC: 53 U/L (ref 10–40)
AST SERPL-CCNC: 58 U/L (ref 10–40)
AST SERPL-CCNC: 6 U/L (ref 10–40)
AST SERPL-CCNC: 7 U/L (ref 10–40)
AST SERPL-CCNC: 8 U/L (ref 10–40)
AST SERPL-CCNC: 9 U/L (ref 10–40)
AUER BODIES BLD QL SMEAR: PRESENT
AV INDEX (PROSTH): 0.62
AV INDEX (PROSTH): 0.85
AV MEAN GRADIENT: 6 MMHG
AV MEAN GRADIENT: 8 MMHG
AV PEAK GRADIENT: 10 MMHG
AV PEAK GRADIENT: 14 MMHG
AV VALVE AREA: 3.44 CM2
AV VALVE AREA: 4.4 CM2
AV VELOCITY RATIO: 0.69
AV VELOCITY RATIO: 0.81
BACTERIA #/AREA URNS AUTO: NORMAL /HPF
BACTERIA BLD CULT: ABNORMAL
BACTERIA BLD CULT: NORMAL
BACTERIA FLD CULT: NORMAL
BASO STIPL BLD QL SMEAR: ABNORMAL
BASOPHILS # BLD AUTO: 0 K/UL (ref 0–0.2)
BASOPHILS # BLD AUTO: 0.01 K/UL (ref 0–0.2)
BASOPHILS # BLD AUTO: 0.01 K/UL (ref 0–0.2)
BASOPHILS # BLD AUTO: ABNORMAL K/UL (ref 0–0.2)
BASOPHILS NFR BLD: 0 % (ref 0–1.9)
BASOPHILS NFR BLD: 1.5 % (ref 0–1.9)
BASOPHILS NFR BLD: 1.6 % (ref 0–1.9)
BILIRUB DIRECT SERPL-MCNC: 0.4 MG/DL (ref 0.1–0.3)
BILIRUB SERPL-MCNC: 0.5 MG/DL (ref 0.1–1)
BILIRUB SERPL-MCNC: 0.5 MG/DL (ref 0.1–1)
BILIRUB SERPL-MCNC: 0.6 MG/DL (ref 0.1–1)
BILIRUB SERPL-MCNC: 0.7 MG/DL (ref 0.1–1)
BILIRUB SERPL-MCNC: 0.8 MG/DL (ref 0.1–1)
BILIRUB SERPL-MCNC: 0.9 MG/DL (ref 0.1–1)
BILIRUB SERPL-MCNC: 1 MG/DL (ref 0.1–1)
BILIRUB SERPL-MCNC: 1.1 MG/DL (ref 0.1–1)
BILIRUB SERPL-MCNC: 1.2 MG/DL (ref 0.1–1)
BILIRUB SERPL-MCNC: 1.3 MG/DL (ref 0.1–1)
BILIRUB SERPL-MCNC: 1.4 MG/DL (ref 0.1–1)
BILIRUB SERPL-MCNC: 1.5 MG/DL (ref 0.1–1)
BILIRUB SERPL-MCNC: 1.5 MG/DL (ref 0.1–1)
BILIRUB SERPL-MCNC: 1.9 MG/DL (ref 0.1–1)
BILIRUB SERPL-MCNC: 1.9 MG/DL (ref 0.1–1)
BILIRUB SERPL-MCNC: 2 MG/DL (ref 0.1–1)
BILIRUB UR QL STRIP: NEGATIVE
BLASTS NFR BLD MANUAL: 0 %
BLASTS NFR BLD MANUAL: 28 %
BLASTS NFR BLD MANUAL: 29.9 %
BLASTS NFR BLD MANUAL: 33 %
BLASTS NFR BLD MANUAL: 33.3 %
BLASTS NFR BLD MANUAL: 38 %
BLASTS NFR BLD MANUAL: 39.4 %
BLASTS NFR BLD MANUAL: 41 %
BLASTS NFR BLD MANUAL: 51 %
BLASTS NFR BLD MANUAL: 56 %
BLASTS NFR BLD MANUAL: 6.7 %
BLASTS NFR BLD MANUAL: 7.1 %
BLASTS NFR BLD MANUAL: 71 %
BLASTS NFR BLD MANUAL: 78 %
BLASTS NFR BLD MANUAL: 88 %
BLD GP AB SCN CELLS X3 SERPL QL: NORMAL
BLD PROD TYP BPU: NORMAL
BLOOD UNIT EXPIRATION DATE: NORMAL
BLOOD UNIT TYPE CODE: 5100
BLOOD UNIT TYPE CODE: 600
BLOOD UNIT TYPE CODE: 6200
BLOOD UNIT TYPE CODE: 8400
BLOOD UNIT TYPE CODE: 8400
BLOOD UNIT TYPE CODE: 9500
BLOOD UNIT TYPE: NORMAL
BNP SERPL-MCNC: 203 PG/ML (ref 0–99)
BODY FLD TYPE: ABNORMAL
BODY FLUID SOURCE AMYLASE: NORMAL
BODY FLUID SOURCE, LDH: NORMAL
BODY SITE - BONE MARROW: NORMAL
BSA FOR ECHO PROCEDURE: 2.57 M2
BSA FOR ECHO PROCEDURE: 2.67 M2
BSA FOR ECHO PROCEDURE: 2.67 M2
BUN SERPL-MCNC: 10 MG/DL (ref 6–20)
BUN SERPL-MCNC: 10 MG/DL (ref 6–20)
BUN SERPL-MCNC: 11 MG/DL (ref 6–20)
BUN SERPL-MCNC: 12 MG/DL (ref 6–20)
BUN SERPL-MCNC: 12 MG/DL (ref 6–20)
BUN SERPL-MCNC: 13 MG/DL (ref 6–20)
BUN SERPL-MCNC: 14 MG/DL (ref 6–20)
BUN SERPL-MCNC: 15 MG/DL (ref 6–20)
BUN SERPL-MCNC: 16 MG/DL (ref 6–20)
BUN SERPL-MCNC: 17 MG/DL (ref 6–20)
BUN SERPL-MCNC: 17 MG/DL (ref 6–20)
BUN SERPL-MCNC: 18 MG/DL (ref 6–20)
BUN SERPL-MCNC: 19 MG/DL (ref 6–20)
BUN SERPL-MCNC: 19 MG/DL (ref 6–20)
BUN SERPL-MCNC: 20 MG/DL (ref 6–20)
BUN SERPL-MCNC: 21 MG/DL (ref 6–20)
BUN SERPL-MCNC: 22 MG/DL (ref 6–20)
BUN SERPL-MCNC: 24 MG/DL (ref 6–20)
BUN SERPL-MCNC: 25 MG/DL (ref 6–20)
BUN SERPL-MCNC: 25 MG/DL (ref 6–20)
BUN SERPL-MCNC: 27 MG/DL (ref 6–20)
BUN SERPL-MCNC: 28 MG/DL (ref 6–20)
BUN SERPL-MCNC: 29 MG/DL (ref 6–20)
BUN SERPL-MCNC: 29 MG/DL (ref 6–20)
BUN SERPL-MCNC: 30 MG/DL (ref 6–20)
BUN SERPL-MCNC: 36 MG/DL (ref 6–20)
BUN SERPL-MCNC: 39 MG/DL (ref 6–20)
BUN SERPL-MCNC: 45 MG/DL (ref 6–20)
BUN SERPL-MCNC: 9 MG/DL (ref 6–20)
CALCIUM SERPL-MCNC: 7.2 MG/DL (ref 8.7–10.5)
CALCIUM SERPL-MCNC: 7.2 MG/DL (ref 8.7–10.5)
CALCIUM SERPL-MCNC: 7.3 MG/DL (ref 8.7–10.5)
CALCIUM SERPL-MCNC: 7.3 MG/DL (ref 8.7–10.5)
CALCIUM SERPL-MCNC: 7.4 MG/DL (ref 8.7–10.5)
CALCIUM SERPL-MCNC: 7.4 MG/DL (ref 8.7–10.5)
CALCIUM SERPL-MCNC: 7.5 MG/DL (ref 8.7–10.5)
CALCIUM SERPL-MCNC: 7.6 MG/DL (ref 8.7–10.5)
CALCIUM SERPL-MCNC: 7.7 MG/DL (ref 8.7–10.5)
CALCIUM SERPL-MCNC: 7.8 MG/DL (ref 8.7–10.5)
CALCIUM SERPL-MCNC: 7.8 MG/DL (ref 8.7–10.5)
CALCIUM SERPL-MCNC: 7.9 MG/DL (ref 8.7–10.5)
CALCIUM SERPL-MCNC: 8 MG/DL (ref 8.7–10.5)
CALCIUM SERPL-MCNC: 8.1 MG/DL (ref 8.7–10.5)
CALCIUM SERPL-MCNC: 8.2 MG/DL (ref 8.7–10.5)
CALCIUM SERPL-MCNC: 8.3 MG/DL (ref 8.7–10.5)
CALCIUM SERPL-MCNC: 8.4 MG/DL (ref 8.7–10.5)
CALCIUM SERPL-MCNC: 8.5 MG/DL (ref 8.7–10.5)
CALCIUM SERPL-MCNC: 8.6 MG/DL (ref 8.7–10.5)
CALCIUM SERPL-MCNC: 8.7 MG/DL (ref 8.7–10.5)
CALCIUM SERPL-MCNC: 9 MG/DL (ref 8.7–10.5)
CALCIUM SERPL-MCNC: 9.2 MG/DL (ref 8.7–10.5)
CAOX CRY UR QL COMP ASSIST: NORMAL
CHLORIDE SERPL-SCNC: 100 MMOL/L (ref 95–110)
CHLORIDE SERPL-SCNC: 101 MMOL/L (ref 95–110)
CHLORIDE SERPL-SCNC: 102 MMOL/L (ref 95–110)
CHLORIDE SERPL-SCNC: 103 MMOL/L (ref 95–110)
CHLORIDE SERPL-SCNC: 104 MMOL/L (ref 95–110)
CHLORIDE SERPL-SCNC: 105 MMOL/L (ref 95–110)
CHLORIDE SERPL-SCNC: 106 MMOL/L (ref 95–110)
CHLORIDE SERPL-SCNC: 107 MMOL/L (ref 95–110)
CHLORIDE SERPL-SCNC: 108 MMOL/L (ref 95–110)
CHLORIDE SERPL-SCNC: 109 MMOL/L (ref 95–110)
CHLORIDE SERPL-SCNC: 109 MMOL/L (ref 95–110)
CHOLEST FLD-MCNC: 44 MG/DL
CHROM BANDING METHOD: NORMAL
CHROMOSOME ANALYSIS BM ADDITIONAL INFORMATION: NORMAL
CHROMOSOME ANALYSIS BM RELEASED BY: NORMAL
CHROMOSOME ANALYSIS BM RESULT SUMMARY: NORMAL
CLARITY UR REFRACT.AUTO: ABNORMAL
CLINICAL CYTOGENETICIST REVIEW: NORMAL
CLINICAL DIAGNOSIS - BONE MARROW: NORMAL
CO2 SERPL-SCNC: 21 MMOL/L (ref 23–29)
CO2 SERPL-SCNC: 21 MMOL/L (ref 23–29)
CO2 SERPL-SCNC: 22 MMOL/L (ref 23–29)
CO2 SERPL-SCNC: 23 MMOL/L (ref 23–29)
CO2 SERPL-SCNC: 24 MMOL/L (ref 23–29)
CO2 SERPL-SCNC: 25 MMOL/L (ref 23–29)
CO2 SERPL-SCNC: 26 MMOL/L (ref 23–29)
CO2 SERPL-SCNC: 27 MMOL/L (ref 23–29)
CO2 SERPL-SCNC: 28 MMOL/L (ref 23–29)
CO2 SERPL-SCNC: 29 MMOL/L (ref 23–29)
CODING SYSTEM: NORMAL
COLOR FLD: ABNORMAL
COLOR UR AUTO: YELLOW
COMMENT: NORMAL
CREAT SERPL-MCNC: 0.7 MG/DL (ref 0.5–1.4)
CREAT SERPL-MCNC: 0.8 MG/DL (ref 0.5–1.4)
CREAT SERPL-MCNC: 0.9 MG/DL (ref 0.5–1.4)
CREAT SERPL-MCNC: 1 MG/DL (ref 0.5–1.4)
CREAT SERPL-MCNC: 1.1 MG/DL (ref 0.5–1.4)
CREAT SERPL-MCNC: 1.2 MG/DL (ref 0.5–1.4)
CREAT SERPL-MCNC: 1.3 MG/DL (ref 0.5–1.4)
CREAT SERPL-MCNC: 1.4 MG/DL (ref 0.5–1.4)
CREAT SERPL-MCNC: 1.5 MG/DL (ref 0.5–1.4)
CREAT SERPL-MCNC: 1.6 MG/DL (ref 0.5–1.4)
CREAT SERPL-MCNC: 1.6 MG/DL (ref 0.5–1.4)
CREAT SERPL-MCNC: 1.8 MG/DL (ref 0.5–1.4)
CREAT SERPL-MCNC: 1.9 MG/DL (ref 0.5–1.4)
CREAT SERPL-MCNC: 2 MG/DL (ref 0.5–1.4)
CREAT SERPL-MCNC: 2 MG/DL (ref 0.5–1.4)
CREAT SERPL-MCNC: 2.1 MG/DL (ref 0.5–1.4)
CREAT SERPL-MCNC: 2.2 MG/DL (ref 0.5–1.4)
CREAT SERPL-MCNC: 2.3 MG/DL (ref 0.5–1.4)
CREAT SERPL-MCNC: 2.3 MG/DL (ref 0.5–1.4)
CREAT UR-MCNC: 56 MG/DL (ref 23–375)
CV ECHO LV RWT: 0.27 CM
CV ECHO LV RWT: 0.44 CM
DACRYOCYTES BLD QL SMEAR: ABNORMAL
DIFFERENTIAL METHOD: ABNORMAL
DIGOXIN SERPL-MCNC: 0.1 NG/ML (ref 0.8–2)
DIGOXIN SERPL-MCNC: 0.2 NG/ML (ref 0.8–2)
DIGOXIN SERPL-MCNC: 0.3 NG/ML (ref 0.8–2)
DIGOXIN SERPL-MCNC: 0.4 NG/ML (ref 0.8–2)
DIGOXIN SERPL-MCNC: 0.5 NG/ML (ref 0.8–2)
DIGOXIN SERPL-MCNC: 0.6 NG/ML (ref 0.8–2)
DIGOXIN SERPL-MCNC: 0.9 NG/ML (ref 0.8–2)
DIGOXIN SERPL-MCNC: 1 NG/ML (ref 0.8–2)
DIGOXIN SERPL-MCNC: 1.3 NG/ML (ref 0.8–2)
DISPENSE STATUS: NORMAL
DNA/RNA EXTRACT AND HOLD RESULT: NORMAL
DNA/RNA EXTRACT AND HOLD RESULT: NORMAL
DNA/RNA EXTRACTION: NORMAL
DNA/RNA EXTRACTION: NORMAL
DOP CALC AO PEAK VEL: 1.62 M/S
DOP CALC AO PEAK VEL: 1.85 M/S
DOP CALC AO VTI: 28.79 CM
DOP CALC AO VTI: 30.36 CM
DOP CALC LVOT AREA: 5.2 CM2
DOP CALC LVOT AREA: 5.5 CM2
DOP CALC LVOT DIAMETER: 2.57 CM
DOP CALC LVOT DIAMETER: 2.65 CM
DOP CALC LVOT PEAK VEL: 1.11 M/S
DOP CALC LVOT PEAK VEL: 1.49 M/S
DOP CALC LVOT STROKE VOLUME: 104.52 CM3
DOP CALC LVOT STROKE VOLUME: 126.67 CM3
DOP CALCLVOT PEAK VEL VTI: 18.96 CM
DOP CALCLVOT PEAK VEL VTI: 24.43 CM
ECHO LV POSTERIOR WALL: 0.9 CM (ref 0.6–1.1)
ECHO LV POSTERIOR WALL: 1.28 CM (ref 0.6–1.1)
EOSINOPHIL # BLD AUTO: 0 K/UL (ref 0–0.5)
EOSINOPHIL # BLD AUTO: ABNORMAL K/UL (ref 0–0.5)
EOSINOPHIL NFR BLD: 0 % (ref 0–8)
EOSINOPHIL NFR BLD: 0.9 % (ref 0–8)
EOSINOPHIL NFR BLD: 1 % (ref 0–8)
EOSINOPHIL NFR BLD: 1 % (ref 0–8)
ERYTHROCYTE [DISTWIDTH] IN BLOOD BY AUTOMATED COUNT: 11.9 % (ref 11.5–14.5)
ERYTHROCYTE [DISTWIDTH] IN BLOOD BY AUTOMATED COUNT: 12.1 % (ref 11.5–14.5)
ERYTHROCYTE [DISTWIDTH] IN BLOOD BY AUTOMATED COUNT: 12.1 % (ref 11.5–14.5)
ERYTHROCYTE [DISTWIDTH] IN BLOOD BY AUTOMATED COUNT: 12.2 % (ref 11.5–14.5)
ERYTHROCYTE [DISTWIDTH] IN BLOOD BY AUTOMATED COUNT: 12.2 % (ref 11.5–14.5)
ERYTHROCYTE [DISTWIDTH] IN BLOOD BY AUTOMATED COUNT: 12.3 % (ref 11.5–14.5)
ERYTHROCYTE [DISTWIDTH] IN BLOOD BY AUTOMATED COUNT: 12.4 % (ref 11.5–14.5)
ERYTHROCYTE [DISTWIDTH] IN BLOOD BY AUTOMATED COUNT: 12.4 % (ref 11.5–14.5)
ERYTHROCYTE [DISTWIDTH] IN BLOOD BY AUTOMATED COUNT: 12.5 % (ref 11.5–14.5)
ERYTHROCYTE [DISTWIDTH] IN BLOOD BY AUTOMATED COUNT: 12.9 % (ref 11.5–14.5)
ERYTHROCYTE [DISTWIDTH] IN BLOOD BY AUTOMATED COUNT: 13 % (ref 11.5–14.5)
ERYTHROCYTE [DISTWIDTH] IN BLOOD BY AUTOMATED COUNT: 13.2 % (ref 11.5–14.5)
ERYTHROCYTE [DISTWIDTH] IN BLOOD BY AUTOMATED COUNT: 13.4 % (ref 11.5–14.5)
ERYTHROCYTE [DISTWIDTH] IN BLOOD BY AUTOMATED COUNT: 13.6 % (ref 11.5–14.5)
ERYTHROCYTE [DISTWIDTH] IN BLOOD BY AUTOMATED COUNT: 13.7 % (ref 11.5–14.5)
ERYTHROCYTE [DISTWIDTH] IN BLOOD BY AUTOMATED COUNT: 14 % (ref 11.5–14.5)
ERYTHROCYTE [DISTWIDTH] IN BLOOD BY AUTOMATED COUNT: 14 % (ref 11.5–14.5)
ERYTHROCYTE [DISTWIDTH] IN BLOOD BY AUTOMATED COUNT: 14.1 % (ref 11.5–14.5)
ERYTHROCYTE [DISTWIDTH] IN BLOOD BY AUTOMATED COUNT: 14.3 % (ref 11.5–14.5)
ERYTHROCYTE [DISTWIDTH] IN BLOOD BY AUTOMATED COUNT: 14.4 % (ref 11.5–14.5)
ERYTHROCYTE [DISTWIDTH] IN BLOOD BY AUTOMATED COUNT: 14.6 % (ref 11.5–14.5)
ERYTHROCYTE [DISTWIDTH] IN BLOOD BY AUTOMATED COUNT: 14.8 % (ref 11.5–14.5)
ERYTHROCYTE [DISTWIDTH] IN BLOOD BY AUTOMATED COUNT: 14.9 % (ref 11.5–14.5)
ERYTHROCYTE [DISTWIDTH] IN BLOOD BY AUTOMATED COUNT: 15.2 % (ref 11.5–14.5)
ERYTHROCYTE [DISTWIDTH] IN BLOOD BY AUTOMATED COUNT: 15.8 % (ref 11.5–14.5)
ERYTHROCYTE [DISTWIDTH] IN BLOOD BY AUTOMATED COUNT: 15.9 % (ref 11.5–14.5)
ERYTHROCYTE [DISTWIDTH] IN BLOOD BY AUTOMATED COUNT: 16.3 % (ref 11.5–14.5)
ERYTHROCYTE [DISTWIDTH] IN BLOOD BY AUTOMATED COUNT: 16.3 % (ref 11.5–14.5)
ERYTHROCYTE [DISTWIDTH] IN BLOOD BY AUTOMATED COUNT: 16.6 % (ref 11.5–14.5)
ERYTHROCYTE [DISTWIDTH] IN BLOOD BY AUTOMATED COUNT: 16.8 % (ref 11.5–14.5)
ERYTHROCYTE [DISTWIDTH] IN BLOOD BY AUTOMATED COUNT: 16.9 % (ref 11.5–14.5)
ERYTHROCYTE [DISTWIDTH] IN BLOOD BY AUTOMATED COUNT: 16.9 % (ref 11.5–14.5)
ERYTHROCYTE [DISTWIDTH] IN BLOOD BY AUTOMATED COUNT: 17.2 % (ref 11.5–14.5)
ERYTHROCYTE [DISTWIDTH] IN BLOOD BY AUTOMATED COUNT: 17.3 % (ref 11.5–14.5)
ERYTHROCYTE [DISTWIDTH] IN BLOOD BY AUTOMATED COUNT: 17.5 % (ref 11.5–14.5)
ERYTHROCYTE [DISTWIDTH] IN BLOOD BY AUTOMATED COUNT: 17.8 % (ref 11.5–14.5)
ERYTHROCYTE [DISTWIDTH] IN BLOOD BY AUTOMATED COUNT: 18.1 % (ref 11.5–14.5)
ERYTHROCYTE [DISTWIDTH] IN BLOOD BY AUTOMATED COUNT: 18.2 % (ref 11.5–14.5)
ERYTHROCYTE [DISTWIDTH] IN BLOOD BY AUTOMATED COUNT: 18.3 % (ref 11.5–14.5)
ERYTHROCYTE [DISTWIDTH] IN BLOOD BY AUTOMATED COUNT: 18.3 % (ref 11.5–14.5)
ERYTHROCYTE [DISTWIDTH] IN BLOOD BY AUTOMATED COUNT: 18.6 % (ref 11.5–14.5)
ERYTHROCYTE [DISTWIDTH] IN BLOOD BY AUTOMATED COUNT: 18.7 % (ref 11.5–14.5)
ERYTHROCYTE [DISTWIDTH] IN BLOOD BY AUTOMATED COUNT: 19.2 % (ref 11.5–14.5)
ERYTHROCYTE [DISTWIDTH] IN BLOOD BY AUTOMATED COUNT: 20.4 % (ref 11.5–14.5)
ERYTHROCYTE [DISTWIDTH] IN BLOOD BY AUTOMATED COUNT: 20.9 % (ref 11.5–14.5)
ERYTHROCYTE [DISTWIDTH] IN BLOOD BY AUTOMATED COUNT: 23.1 % (ref 11.5–14.5)
EST. GFR  (AFRICAN AMERICAN): 35.6 ML/MIN/1.73 M^2
EST. GFR  (AFRICAN AMERICAN): 35.6 ML/MIN/1.73 M^2
EST. GFR  (AFRICAN AMERICAN): 37.6 ML/MIN/1.73 M^2
EST. GFR  (AFRICAN AMERICAN): 39.8 ML/MIN/1.73 M^2
EST. GFR  (AFRICAN AMERICAN): 42.2 ML/MIN/1.73 M^2
EST. GFR  (AFRICAN AMERICAN): 42.2 ML/MIN/1.73 M^2
EST. GFR  (AFRICAN AMERICAN): 44.9 ML/MIN/1.73 M^2
EST. GFR  (AFRICAN AMERICAN): 47.9 ML/MIN/1.73 M^2
EST. GFR  (AFRICAN AMERICAN): 55.2 ML/MIN/1.73 M^2
EST. GFR  (AFRICAN AMERICAN): 55.2 ML/MIN/1.73 M^2
EST. GFR  (AFRICAN AMERICAN): 59.7 ML/MIN/1.73 M^2
EST. GFR  (AFRICAN AMERICAN): >60 ML/MIN/1.73 M^2
EST. GFR  (NON AFRICAN AMERICAN): 30.8 ML/MIN/1.73 M^2
EST. GFR  (NON AFRICAN AMERICAN): 30.8 ML/MIN/1.73 M^2
EST. GFR  (NON AFRICAN AMERICAN): 32.5 ML/MIN/1.73 M^2
EST. GFR  (NON AFRICAN AMERICAN): 34.4 ML/MIN/1.73 M^2
EST. GFR  (NON AFRICAN AMERICAN): 36.5 ML/MIN/1.73 M^2
EST. GFR  (NON AFRICAN AMERICAN): 36.5 ML/MIN/1.73 M^2
EST. GFR  (NON AFRICAN AMERICAN): 38.8 ML/MIN/1.73 M^2
EST. GFR  (NON AFRICAN AMERICAN): 41.4 ML/MIN/1.73 M^2
EST. GFR  (NON AFRICAN AMERICAN): 47.8 ML/MIN/1.73 M^2
EST. GFR  (NON AFRICAN AMERICAN): 47.8 ML/MIN/1.73 M^2
EST. GFR  (NON AFRICAN AMERICAN): 51.7 ML/MIN/1.73 M^2
EST. GFR  (NON AFRICAN AMERICAN): 56.2 ML/MIN/1.73 M^2
EST. GFR  (NON AFRICAN AMERICAN): >60 ML/MIN/1.73 M^2
ESTIMATED AVG GLUCOSE: 183 MG/DL (ref 68–131)
EXHR SPECIMEN TYPE: NORMAL
EXHR SPECIMEN TYPE: NORMAL
FAMLB SPECIMEN: NORMAL
FAMLB SPECIMEN: NORMAL
FIBRINOGEN PPP-MCNC: 219 MG/DL (ref 182–366)
FIBRINOGEN PPP-MCNC: 253 MG/DL (ref 182–366)
FIBRINOGEN PPP-MCNC: 270 MG/DL (ref 182–366)
FIBRINOGEN PPP-MCNC: 283 MG/DL (ref 182–366)
FIBRINOGEN PPP-MCNC: 293 MG/DL (ref 182–366)
FIBRINOGEN PPP-MCNC: 293 MG/DL (ref 182–366)
FIBRINOGEN PPP-MCNC: 299 MG/DL (ref 182–366)
FIBRINOGEN PPP-MCNC: 315 MG/DL (ref 182–366)
FIBRINOGEN PPP-MCNC: 321 MG/DL (ref 182–366)
FIBRINOGEN PPP-MCNC: 341 MG/DL (ref 182–366)
FIBRINOGEN PPP-MCNC: 348 MG/DL (ref 182–366)
FIBRINOGEN PPP-MCNC: 387 MG/DL (ref 182–366)
FIBRINOGEN PPP-MCNC: 416 MG/DL (ref 182–366)
FIBRINOGEN PPP-MCNC: 423 MG/DL (ref 182–366)
FIBRINOGEN PPP-MCNC: 433 MG/DL (ref 182–366)
FIBRINOGEN PPP-MCNC: 434 MG/DL (ref 182–366)
FIBRINOGEN PPP-MCNC: 434 MG/DL (ref 182–366)
FIBRINOGEN PPP-MCNC: 439 MG/DL (ref 182–366)
FIBRINOGEN PPP-MCNC: 445 MG/DL (ref 182–366)
FIBRINOGEN PPP-MCNC: 451 MG/DL (ref 182–366)
FIBRINOGEN PPP-MCNC: 451 MG/DL (ref 182–366)
FIBRINOGEN PPP-MCNC: 457 MG/DL (ref 182–366)
FIBRINOGEN PPP-MCNC: 464 MG/DL (ref 182–366)
FIBRINOGEN PPP-MCNC: 506 MG/DL (ref 182–366)
FIBRINOGEN PPP-MCNC: 530 MG/DL (ref 182–366)
FIBRINOGEN PPP-MCNC: 530 MG/DL (ref 182–366)
FIBRINOGEN PPP-MCNC: 608 MG/DL (ref 182–366)
FIBRINOGEN PPP-MCNC: 643 MG/DL (ref 182–366)
FIBRINOGEN PPP-MCNC: 655 MG/DL (ref 182–366)
FINAL PATHOLOGIC DIAGNOSIS: ABNORMAL
FINAL PATHOLOGIC DIAGNOSIS: NORMAL
FLOW CYTOMETRY ANTIBODIES ANALYZED - BONE MARROW: NORMAL
FLOW CYTOMETRY ANTIBODIES ANALYZED - FLUID: NORMAL
FLOW CYTOMETRY COMMENT - BONE MARROW: NORMAL
FLOW CYTOMETRY COMMENT - FLUID: NORMAL
FLOW CYTOMETRY INTERPRETATION - BONE MARROW: NORMAL
FLOW CYTOMETRY INTERPRETATION - FLUID: NORMAL
FLT3 RESULT: NORMAL
FLUID TYPE: NORMAL
FRACTIONAL SHORTENING: 30 % (ref 28–44)
FRACTIONAL SHORTENING: 31 % (ref 28–44)
FUNGUS SPEC CULT: NORMAL
GLUCOSE FLD-MCNC: 159 MG/DL
GLUCOSE SERPL-MCNC: 111 MG/DL (ref 70–110)
GLUCOSE SERPL-MCNC: 111 MG/DL (ref 70–110)
GLUCOSE SERPL-MCNC: 116 MG/DL (ref 70–110)
GLUCOSE SERPL-MCNC: 117 MG/DL (ref 70–110)
GLUCOSE SERPL-MCNC: 119 MG/DL (ref 70–110)
GLUCOSE SERPL-MCNC: 121 MG/DL (ref 70–110)
GLUCOSE SERPL-MCNC: 121 MG/DL (ref 70–110)
GLUCOSE SERPL-MCNC: 122 MG/DL (ref 70–110)
GLUCOSE SERPL-MCNC: 122 MG/DL (ref 70–110)
GLUCOSE SERPL-MCNC: 123 MG/DL (ref 70–110)
GLUCOSE SERPL-MCNC: 126 MG/DL (ref 70–110)
GLUCOSE SERPL-MCNC: 127 MG/DL (ref 70–110)
GLUCOSE SERPL-MCNC: 128 MG/DL (ref 70–110)
GLUCOSE SERPL-MCNC: 129 MG/DL (ref 70–110)
GLUCOSE SERPL-MCNC: 132 MG/DL (ref 70–110)
GLUCOSE SERPL-MCNC: 132 MG/DL (ref 70–110)
GLUCOSE SERPL-MCNC: 133 MG/DL (ref 70–110)
GLUCOSE SERPL-MCNC: 133 MG/DL (ref 70–110)
GLUCOSE SERPL-MCNC: 134 MG/DL (ref 70–110)
GLUCOSE SERPL-MCNC: 135 MG/DL (ref 70–110)
GLUCOSE SERPL-MCNC: 135 MG/DL (ref 70–110)
GLUCOSE SERPL-MCNC: 136 MG/DL (ref 70–110)
GLUCOSE SERPL-MCNC: 142 MG/DL (ref 70–110)
GLUCOSE SERPL-MCNC: 143 MG/DL (ref 70–110)
GLUCOSE SERPL-MCNC: 146 MG/DL (ref 70–110)
GLUCOSE SERPL-MCNC: 147 MG/DL (ref 70–110)
GLUCOSE SERPL-MCNC: 148 MG/DL (ref 70–110)
GLUCOSE SERPL-MCNC: 148 MG/DL (ref 70–110)
GLUCOSE SERPL-MCNC: 151 MG/DL (ref 70–110)
GLUCOSE SERPL-MCNC: 154 MG/DL (ref 70–110)
GLUCOSE SERPL-MCNC: 154 MG/DL (ref 70–110)
GLUCOSE SERPL-MCNC: 155 MG/DL (ref 70–110)
GLUCOSE SERPL-MCNC: 155 MG/DL (ref 70–110)
GLUCOSE SERPL-MCNC: 157 MG/DL (ref 70–110)
GLUCOSE SERPL-MCNC: 162 MG/DL (ref 70–110)
GLUCOSE SERPL-MCNC: 163 MG/DL (ref 70–110)
GLUCOSE SERPL-MCNC: 164 MG/DL (ref 70–110)
GLUCOSE SERPL-MCNC: 165 MG/DL (ref 70–110)
GLUCOSE SERPL-MCNC: 176 MG/DL (ref 70–110)
GLUCOSE SERPL-MCNC: 178 MG/DL (ref 70–110)
GLUCOSE SERPL-MCNC: 178 MG/DL (ref 70–110)
GLUCOSE SERPL-MCNC: 179 MG/DL (ref 70–110)
GLUCOSE SERPL-MCNC: 179 MG/DL (ref 70–110)
GLUCOSE SERPL-MCNC: 181 MG/DL (ref 70–110)
GLUCOSE SERPL-MCNC: 182 MG/DL (ref 70–110)
GLUCOSE SERPL-MCNC: 190 MG/DL (ref 70–110)
GLUCOSE SERPL-MCNC: 194 MG/DL (ref 70–110)
GLUCOSE SERPL-MCNC: 195 MG/DL (ref 70–110)
GLUCOSE SERPL-MCNC: 200 MG/DL (ref 70–110)
GLUCOSE SERPL-MCNC: 205 MG/DL (ref 70–110)
GLUCOSE SERPL-MCNC: 210 MG/DL (ref 70–110)
GLUCOSE SERPL-MCNC: 210 MG/DL (ref 70–110)
GLUCOSE SERPL-MCNC: 221 MG/DL (ref 70–110)
GLUCOSE SERPL-MCNC: 242 MG/DL (ref 70–110)
GLUCOSE SERPL-MCNC: 245 MG/DL (ref 70–110)
GLUCOSE SERPL-MCNC: 264 MG/DL (ref 70–110)
GLUCOSE UR QL STRIP: NEGATIVE
GRAM STN SPEC: NORMAL
GRAM STN SPEC: NORMAL
GROSS: NORMAL
HAV IGM SERPL QL IA: NEGATIVE
HBA1C MFR BLD HPLC: 8 % (ref 4–5.6)
HBV CORE IGM SERPL QL IA: NEGATIVE
HBV SURFACE AG SERPL QL IA: NEGATIVE
HCT VFR BLD AUTO: 17.9 % (ref 40–54)
HCT VFR BLD AUTO: 18.2 % (ref 40–54)
HCT VFR BLD AUTO: 18.3 % (ref 40–54)
HCT VFR BLD AUTO: 18.4 % (ref 40–54)
HCT VFR BLD AUTO: 18.5 % (ref 40–54)
HCT VFR BLD AUTO: 18.7 % (ref 40–54)
HCT VFR BLD AUTO: 18.8 % (ref 40–54)
HCT VFR BLD AUTO: 19 % (ref 40–54)
HCT VFR BLD AUTO: 19.1 % (ref 40–54)
HCT VFR BLD AUTO: 19.4 % (ref 40–54)
HCT VFR BLD AUTO: 19.5 % (ref 40–54)
HCT VFR BLD AUTO: 19.5 % (ref 40–54)
HCT VFR BLD AUTO: 19.7 % (ref 40–54)
HCT VFR BLD AUTO: 19.8 % (ref 40–54)
HCT VFR BLD AUTO: 19.8 % (ref 40–54)
HCT VFR BLD AUTO: 19.9 % (ref 40–54)
HCT VFR BLD AUTO: 19.9 % (ref 40–54)
HCT VFR BLD AUTO: 20 % (ref 40–54)
HCT VFR BLD AUTO: 20 % (ref 40–54)
HCT VFR BLD AUTO: 20.1 % (ref 40–54)
HCT VFR BLD AUTO: 20.1 % (ref 40–54)
HCT VFR BLD AUTO: 20.3 % (ref 40–54)
HCT VFR BLD AUTO: 20.3 % (ref 40–54)
HCT VFR BLD AUTO: 20.4 % (ref 40–54)
HCT VFR BLD AUTO: 20.8 % (ref 40–54)
HCT VFR BLD AUTO: 20.8 % (ref 40–54)
HCT VFR BLD AUTO: 21 % (ref 40–54)
HCT VFR BLD AUTO: 21.2 % (ref 40–54)
HCT VFR BLD AUTO: 21.3 % (ref 40–54)
HCT VFR BLD AUTO: 21.5 % (ref 40–54)
HCT VFR BLD AUTO: 21.6 % (ref 40–54)
HCT VFR BLD AUTO: 21.6 % (ref 40–54)
HCT VFR BLD AUTO: 21.8 % (ref 40–54)
HCT VFR BLD AUTO: 21.8 % (ref 40–54)
HCT VFR BLD AUTO: 21.9 % (ref 40–54)
HCT VFR BLD AUTO: 22.1 % (ref 40–54)
HCT VFR BLD AUTO: 22.3 % (ref 40–54)
HCT VFR BLD AUTO: 22.6 % (ref 40–54)
HCT VFR BLD AUTO: 23 % (ref 40–54)
HCT VFR BLD AUTO: 23.8 % (ref 40–54)
HCT VFR BLD AUTO: 23.9 % (ref 40–54)
HCT VFR BLD AUTO: 24 % (ref 40–54)
HCT VFR BLD AUTO: 24.1 % (ref 40–54)
HCT VFR BLD AUTO: 24.8 % (ref 40–54)
HCT VFR BLD AUTO: 25.2 % (ref 40–54)
HCT VFR BLD AUTO: 26.4 % (ref 40–54)
HCT VFR BLD AUTO: 26.6 % (ref 40–54)
HCT VFR BLD AUTO: 26.8 % (ref 40–54)
HCT VFR BLD AUTO: 27.2 % (ref 40–54)
HCT VFR BLD AUTO: 28 % (ref 40–54)
HCT VFR BLD AUTO: 28.4 % (ref 40–54)
HCT VFR BLD AUTO: 30.1 % (ref 40–54)
HCT VFR BLD AUTO: 30.6 % (ref 40–54)
HCV AB SERPL QL IA: NEGATIVE
HGB BLD-MCNC: 6.2 G/DL (ref 14–18)
HGB BLD-MCNC: 6.3 G/DL (ref 14–18)
HGB BLD-MCNC: 6.4 G/DL (ref 14–18)
HGB BLD-MCNC: 6.5 G/DL (ref 14–18)
HGB BLD-MCNC: 6.6 G/DL (ref 14–18)
HGB BLD-MCNC: 6.7 G/DL (ref 14–18)
HGB BLD-MCNC: 6.7 G/DL (ref 14–18)
HGB BLD-MCNC: 6.8 G/DL (ref 14–18)
HGB BLD-MCNC: 6.9 G/DL (ref 14–18)
HGB BLD-MCNC: 7 G/DL (ref 14–18)
HGB BLD-MCNC: 7.1 G/DL (ref 14–18)
HGB BLD-MCNC: 7.2 G/DL (ref 14–18)
HGB BLD-MCNC: 7.3 G/DL (ref 14–18)
HGB BLD-MCNC: 7.4 G/DL (ref 14–18)
HGB BLD-MCNC: 7.5 G/DL (ref 14–18)
HGB BLD-MCNC: 7.5 G/DL (ref 14–18)
HGB BLD-MCNC: 7.6 G/DL (ref 14–18)
HGB BLD-MCNC: 7.8 G/DL (ref 14–18)
HGB BLD-MCNC: 7.9 G/DL (ref 14–18)
HGB BLD-MCNC: 8 G/DL (ref 14–18)
HGB BLD-MCNC: 8.1 G/DL (ref 14–18)
HGB BLD-MCNC: 8.1 G/DL (ref 14–18)
HGB BLD-MCNC: 8.7 G/DL (ref 14–18)
HGB BLD-MCNC: 8.8 G/DL (ref 14–18)
HGB BLD-MCNC: 9 G/DL (ref 14–18)
HGB BLD-MCNC: 9.1 G/DL (ref 14–18)
HGB BLD-MCNC: 9.8 G/DL (ref 14–18)
HGB BLD-MCNC: 9.9 G/DL (ref 14–18)
HGB UR QL STRIP: ABNORMAL
HIV 1+2 AB+HIV1 P24 AG SERPL QL IA: NEGATIVE
HLA DQA1 1: NORMAL
HLA DQA1 2: NORMAL
HLA DRB4 1: NORMAL
HLA HI RES SEQUNCE BASED TYPING TEST DATE: NORMAL
HLA HR DPA1: NORMAL
HLA HR DPA2: NORMAL
HLA HR DPB1: NORMAL
HLA HR DPB2: NORMAL
HLA HR DQA1: NORMAL
HLA HR DQA2: NORMAL
HLA-A 1 SERO. EQUIV: 2
HLA-A 1: NORMAL
HLA-A 2 SERO. EQUIV: 32
HLA-A 2: NORMAL
HLA-A1 HI RES: NORMAL
HLA-A2 HI RES: NORMAL
HLA-B 1 SERO. EQUIV: 39
HLA-B 1: NORMAL
HLA-B 2 SERO. EQUIV: 60
HLA-B 2: NORMAL
HLA-B1 HI RES: NORMAL
HLA-B2 HI RES: NORMAL
HLA-BW 1 SERO. EQUIV: 6
HLA-BW 2 SERO. EQUIV: NORMAL
HLA-C 1: NORMAL
HLA-C 2: NORMAL
HLA-C1 HI RES: NORMAL
HLA-C2 HI RES: NORMAL
HLA-CW 1 SERO. EQUIV: 10
HLA-CW 2 SERO. EQUIV: 12
HLA-DPA1 1: NORMAL
HLA-DPA1 2: NORMAL
HLA-DPB1 1: NORMAL
HLA-DPB1 2: NORMAL
HLA-DQ 1 SERO. EQUIV: 8
HLA-DQ 2 SERO. EQUIV: 6
HLA-DQB1 1 HI RES: NORMAL
HLA-DQB1 1: NORMAL
HLA-DQB1 2 HI RES: NORMAL
HLA-DQB1 2: NORMAL
HLA-DRB1 1 HI RES: NORMAL
HLA-DRB1 1 SERO. EQUIV: 4
HLA-DRB1 1: NORMAL
HLA-DRB1 2 HI RES: NORMAL
HLA-DRB1 2 SERO. EQUIV: 13
HLA-DRB1 2: NORMAL
HLA-DRB3 1 HI RES: NORMAL
HLA-DRB3 1: NORMAL
HLA-DRB3 2 HI RES: NORMAL
HLA-DRB3 2: NORMAL
HLA-DRB345 1 SERO. EQUIV: 52
HLA-DRB345 2 SERO. EQUIV: 53
HLA-DRB4 1 HI RES: NORMAL
HLA-DRB4 2 HI RES: NORMAL
HLA-DRB4 2: NORMAL
HLA-DRB5 1 HI RES: NORMAL
HLA-DRB5 1: NORMAL
HLA-DRB5 2 HI RES: NORMAL
HLA-DRB5 2: NORMAL
HLATY INTERPRETATION: NORMAL
HYALINE CASTS UR QL AUTO: 0 /LPF
HYPOCHROMIA BLD QL SMEAR: ABNORMAL
IMM GRANULOCYTES # BLD AUTO: 0 K/UL (ref 0–0.04)
IMM GRANULOCYTES # BLD AUTO: 0.01 K/UL (ref 0–0.04)
IMM GRANULOCYTES # BLD AUTO: 0.01 K/UL (ref 0–0.04)
IMM GRANULOCYTES # BLD AUTO: 0.02 K/UL (ref 0–0.04)
IMM GRANULOCYTES # BLD AUTO: ABNORMAL K/UL (ref 0–0.04)
IMM GRANULOCYTES NFR BLD AUTO: 0 % (ref 0–0.5)
IMM GRANULOCYTES NFR BLD AUTO: 0.2 % (ref 0–0.5)
IMM GRANULOCYTES NFR BLD AUTO: 0.9 % (ref 0–0.5)
IMM GRANULOCYTES NFR BLD AUTO: 1.5 % (ref 0–0.5)
IMM GRANULOCYTES NFR BLD AUTO: ABNORMAL %
IMM GRANULOCYTES NFR BLD AUTO: ABNORMAL % (ref 0–0.5)
INR PPP: 1.1 (ref 0.8–1.2)
INR PPP: 1.2 (ref 0.8–1.2)
INTERVENTRICULAR SEPTUM: 1.1 CM (ref 0.6–1.1)
INTERVENTRICULAR SEPTUM: 1.14 CM (ref 0.6–1.1)
KARYOTYP MAR: NORMAL
KETONES UR QL STRIP: ABNORMAL
LA MAJOR: 7.49 CM
LA MAJOR: 7.68 CM
LA MINOR: 7.48 CM
LA MINOR: 7.5 CM
LA WIDTH: 4.75 CM
LA WIDTH: 6.25 CM
LDH FLD L TO P-CCNC: 318 U/L
LDH SERPL L TO P-CCNC: 157 U/L (ref 110–260)
LDH SERPL L TO P-CCNC: 173 U/L (ref 110–260)
LDH SERPL L TO P-CCNC: 174 U/L (ref 110–260)
LDH SERPL L TO P-CCNC: 179 U/L (ref 110–260)
LDH SERPL L TO P-CCNC: 187 U/L (ref 110–260)
LDH SERPL L TO P-CCNC: 196 U/L (ref 110–260)
LDH SERPL L TO P-CCNC: 196 U/L (ref 110–260)
LDH SERPL L TO P-CCNC: 200 U/L (ref 110–260)
LDH SERPL L TO P-CCNC: 207 U/L (ref 110–260)
LDH SERPL L TO P-CCNC: 212 U/L (ref 110–260)
LDH SERPL L TO P-CCNC: 220 U/L (ref 110–260)
LDH SERPL L TO P-CCNC: 230 U/L (ref 110–260)
LDH SERPL L TO P-CCNC: 249 U/L (ref 110–260)
LDH SERPL L TO P-CCNC: 265 U/L (ref 110–260)
LDH SERPL L TO P-CCNC: 301 U/L (ref 110–260)
LDH SERPL L TO P-CCNC: 307 U/L (ref 110–260)
LDH SERPL L TO P-CCNC: 315 U/L (ref 110–260)
LDH SERPL L TO P-CCNC: 333 U/L (ref 110–260)
LDH SERPL L TO P-CCNC: 366 U/L (ref 110–260)
LDH SERPL L TO P-CCNC: 479 U/L (ref 110–260)
LDH SERPL L TO P-CCNC: 664 U/L (ref 110–260)
LDH SERPL L TO P-CCNC: 669 U/L (ref 110–260)
LDH SERPL L TO P-CCNC: 743 U/L (ref 110–260)
LDH SERPL L TO P-CCNC: 775 U/L (ref 110–260)
LEFT ATRIUM SIZE: 5.36 CM
LEFT ATRIUM SIZE: 5.62 CM
LEFT ATRIUM VOLUME INDEX: 66.4 ML/M2
LEFT ATRIUM VOLUME INDEX: 84.7 ML/M2
LEFT ATRIUM VOLUME: 172.2 CM3
LEFT ATRIUM VOLUME: 213.14 CM3
LEFT INTERNAL DIMENSION IN SYSTOLE: 3.99 CM (ref 2.1–4)
LEFT INTERNAL DIMENSION IN SYSTOLE: 4.6 CM (ref 2.1–4)
LEFT VENTRICLE DIASTOLIC VOLUME INDEX: 64.53 ML/M2
LEFT VENTRICLE DIASTOLIC VOLUME INDEX: 71.32 ML/M2
LEFT VENTRICLE DIASTOLIC VOLUME: 167.28 ML
LEFT VENTRICLE DIASTOLIC VOLUME: 179.45 ML
LEFT VENTRICLE MASS INDEX: 115 G/M2
LEFT VENTRICLE MASS INDEX: 116 G/M2
LEFT VENTRICLE SYSTOLIC VOLUME INDEX: 26.9 ML/M2
LEFT VENTRICLE SYSTOLIC VOLUME INDEX: 27.2 ML/M2
LEFT VENTRICLE SYSTOLIC VOLUME: 68.5 ML
LEFT VENTRICLE SYSTOLIC VOLUME: 69.62 ML
LEFT VENTRICULAR INTERNAL DIMENSION IN DIASTOLE: 5.81 CM (ref 3.5–6)
LEFT VENTRICULAR INTERNAL DIMENSION IN DIASTOLE: 6.6 CM (ref 3.5–6)
LEFT VENTRICULAR MASS: 290.6 G
LEFT VENTRICULAR MASS: 301.22 G
LEUKOCYTE ESTERASE UR QL STRIP: NEGATIVE
LYMPHOCYTES # BLD AUTO: 0.3 K/UL (ref 1–4.8)
LYMPHOCYTES # BLD AUTO: 0.4 K/UL (ref 1–4.8)
LYMPHOCYTES # BLD AUTO: 0.5 K/UL (ref 1–4.8)
LYMPHOCYTES # BLD AUTO: 0.6 K/UL (ref 1–4.8)
LYMPHOCYTES # BLD AUTO: 0.7 K/UL (ref 1–4.8)
LYMPHOCYTES # BLD AUTO: 2.5 K/UL (ref 1–4.8)
LYMPHOCYTES # BLD AUTO: ABNORMAL K/UL (ref 1–4.8)
LYMPHOCYTES NFR BLD: 10 % (ref 18–48)
LYMPHOCYTES NFR BLD: 10 % (ref 18–48)
LYMPHOCYTES NFR BLD: 100 % (ref 18–48)
LYMPHOCYTES NFR BLD: 22 % (ref 18–48)
LYMPHOCYTES NFR BLD: 24.9 % (ref 18–48)
LYMPHOCYTES NFR BLD: 26 % (ref 18–48)
LYMPHOCYTES NFR BLD: 28 % (ref 18–48)
LYMPHOCYTES NFR BLD: 44 % (ref 18–48)
LYMPHOCYTES NFR BLD: 49 % (ref 18–48)
LYMPHOCYTES NFR BLD: 5 % (ref 18–48)
LYMPHOCYTES NFR BLD: 54.7 % (ref 18–48)
LYMPHOCYTES NFR BLD: 55 % (ref 18–48)
LYMPHOCYTES NFR BLD: 59 % (ref 18–48)
LYMPHOCYTES NFR BLD: 6 % (ref 18–48)
LYMPHOCYTES NFR BLD: 6 % (ref 18–48)
LYMPHOCYTES NFR BLD: 60.9 % (ref 18–48)
LYMPHOCYTES NFR BLD: 62.5 % (ref 18–48)
LYMPHOCYTES NFR BLD: 62.8 % (ref 18–48)
LYMPHOCYTES NFR BLD: 66 % (ref 18–48)
LYMPHOCYTES NFR BLD: 66.2 % (ref 18–48)
LYMPHOCYTES NFR BLD: 66.7 % (ref 18–48)
LYMPHOCYTES NFR BLD: 67 % (ref 18–48)
LYMPHOCYTES NFR BLD: 67 % (ref 18–48)
LYMPHOCYTES NFR BLD: 7 % (ref 18–48)
LYMPHOCYTES NFR BLD: 72.9 % (ref 18–48)
LYMPHOCYTES NFR BLD: 75 % (ref 18–48)
LYMPHOCYTES NFR BLD: 78.4 % (ref 18–48)
LYMPHOCYTES NFR BLD: 79.7 % (ref 18–48)
LYMPHOCYTES NFR BLD: 8 % (ref 18–48)
LYMPHOCYTES NFR BLD: 80.9 % (ref 18–48)
LYMPHOCYTES NFR BLD: 82.1 % (ref 18–48)
LYMPHOCYTES NFR BLD: 83.3 % (ref 18–48)
LYMPHOCYTES NFR BLD: 83.7 % (ref 18–48)
LYMPHOCYTES NFR BLD: 84 % (ref 18–48)
LYMPHOCYTES NFR BLD: 84.1 % (ref 18–48)
LYMPHOCYTES NFR BLD: 86.4 % (ref 18–48)
LYMPHOCYTES NFR BLD: 88.4 % (ref 18–48)
LYMPHOCYTES NFR BLD: 88.6 % (ref 18–48)
LYMPHOCYTES NFR BLD: 88.6 % (ref 18–48)
LYMPHOCYTES NFR BLD: 89.5 % (ref 18–48)
LYMPHOCYTES NFR BLD: 92.5 % (ref 18–48)
LYMPHOCYTES NFR BLD: 93 % (ref 18–48)
LYMPHOCYTES NFR BLD: 94.4 % (ref 18–48)
LYMPHOCYTES NFR BLD: 96.7 % (ref 18–48)
LYMPHOCYTES NFR BLD: 97 % (ref 18–48)
LYMPHOCYTES NFR BLD: 97.2 % (ref 18–48)
LYMPHOCYTES NFR BLD: 97.2 % (ref 18–48)
LYMPHOCYTES NFR BLD: 97.3 % (ref 18–48)
LYMPHOCYTES NFR BLD: 97.5 % (ref 18–48)
LYMPHOCYTES NFR BLD: 97.6 % (ref 18–48)
LYMPHOCYTES NFR BLD: 97.7 % (ref 18–48)
LYMPHOCYTES NFR BLD: 97.9 % (ref 18–48)
LYMPHOCYTES NFR BLD: 98.1 % (ref 18–48)
LYMPHOCYTES NFR FLD MANUAL: 27 %
MAGNESIUM SERPL-MCNC: 1.5 MG/DL (ref 1.6–2.6)
MAGNESIUM SERPL-MCNC: 1.5 MG/DL (ref 1.6–2.6)
MAGNESIUM SERPL-MCNC: 1.6 MG/DL (ref 1.6–2.6)
MAGNESIUM SERPL-MCNC: 1.6 MG/DL (ref 1.6–2.6)
MAGNESIUM SERPL-MCNC: 1.7 MG/DL (ref 1.6–2.6)
MAGNESIUM SERPL-MCNC: 1.8 MG/DL (ref 1.6–2.6)
MAGNESIUM SERPL-MCNC: 1.9 MG/DL (ref 1.6–2.6)
MAGNESIUM SERPL-MCNC: 2 MG/DL (ref 1.6–2.6)
MAGNESIUM SERPL-MCNC: 2.1 MG/DL (ref 1.6–2.6)
MAGNESIUM SERPL-MCNC: 2.1 MG/DL (ref 1.6–2.6)
MAGNESIUM SERPL-MCNC: 2.2 MG/DL (ref 1.6–2.6)
MAGNESIUM SERPL-MCNC: 2.3 MG/DL (ref 1.6–2.6)
MAGNESIUM SERPL-MCNC: 2.3 MG/DL (ref 1.6–2.6)
MAGNESIUM SERPL-MCNC: 2.4 MG/DL (ref 1.6–2.6)
MCH RBC QN AUTO: 29 PG (ref 27–31)
MCH RBC QN AUTO: 29.1 PG (ref 27–31)
MCH RBC QN AUTO: 29.1 PG (ref 27–31)
MCH RBC QN AUTO: 29.4 PG (ref 27–31)
MCH RBC QN AUTO: 29.5 PG (ref 27–31)
MCH RBC QN AUTO: 29.5 PG (ref 27–31)
MCH RBC QN AUTO: 29.6 PG (ref 27–31)
MCH RBC QN AUTO: 29.6 PG (ref 27–31)
MCH RBC QN AUTO: 29.7 PG (ref 27–31)
MCH RBC QN AUTO: 29.9 PG (ref 27–31)
MCH RBC QN AUTO: 29.9 PG (ref 27–31)
MCH RBC QN AUTO: 30.1 PG (ref 27–31)
MCH RBC QN AUTO: 30.2 PG (ref 27–31)
MCH RBC QN AUTO: 30.3 PG (ref 27–31)
MCH RBC QN AUTO: 30.5 PG (ref 27–31)
MCH RBC QN AUTO: 30.5 PG (ref 27–31)
MCH RBC QN AUTO: 30.6 PG (ref 27–31)
MCH RBC QN AUTO: 30.7 PG (ref 27–31)
MCH RBC QN AUTO: 30.8 PG (ref 27–31)
MCH RBC QN AUTO: 30.8 PG (ref 27–31)
MCH RBC QN AUTO: 31 PG (ref 27–31)
MCH RBC QN AUTO: 31.1 PG (ref 27–31)
MCH RBC QN AUTO: 31.1 PG (ref 27–31)
MCH RBC QN AUTO: 31.2 PG (ref 27–31)
MCH RBC QN AUTO: 31.3 PG (ref 27–31)
MCH RBC QN AUTO: 31.4 PG (ref 27–31)
MCH RBC QN AUTO: 31.5 PG (ref 27–31)
MCH RBC QN AUTO: 31.6 PG (ref 27–31)
MCH RBC QN AUTO: 31.7 PG (ref 27–31)
MCH RBC QN AUTO: 31.8 PG (ref 27–31)
MCH RBC QN AUTO: 31.8 PG (ref 27–31)
MCH RBC QN AUTO: 31.9 PG (ref 27–31)
MCH RBC QN AUTO: 32 PG (ref 27–31)
MCH RBC QN AUTO: 32.1 PG (ref 27–31)
MCH RBC QN AUTO: 32.2 PG (ref 27–31)
MCH RBC QN AUTO: 32.4 PG (ref 27–31)
MCH RBC QN AUTO: 32.4 PG (ref 27–31)
MCH RBC QN AUTO: 32.5 PG (ref 27–31)
MCH RBC QN AUTO: 32.7 PG (ref 27–31)
MCH RBC QN AUTO: 33 PG (ref 27–31)
MCHC RBC AUTO-ENTMCNC: 31.5 G/DL (ref 32–36)
MCHC RBC AUTO-ENTMCNC: 31.7 G/DL (ref 32–36)
MCHC RBC AUTO-ENTMCNC: 31.9 G/DL (ref 32–36)
MCHC RBC AUTO-ENTMCNC: 32 G/DL (ref 32–36)
MCHC RBC AUTO-ENTMCNC: 32.1 G/DL (ref 32–36)
MCHC RBC AUTO-ENTMCNC: 32.1 G/DL (ref 32–36)
MCHC RBC AUTO-ENTMCNC: 32.5 G/DL (ref 32–36)
MCHC RBC AUTO-ENTMCNC: 32.6 G/DL (ref 32–36)
MCHC RBC AUTO-ENTMCNC: 32.7 G/DL (ref 32–36)
MCHC RBC AUTO-ENTMCNC: 32.8 G/DL (ref 32–36)
MCHC RBC AUTO-ENTMCNC: 32.8 G/DL (ref 32–36)
MCHC RBC AUTO-ENTMCNC: 32.9 G/DL (ref 32–36)
MCHC RBC AUTO-ENTMCNC: 33 G/DL (ref 32–36)
MCHC RBC AUTO-ENTMCNC: 33.2 G/DL (ref 32–36)
MCHC RBC AUTO-ENTMCNC: 33.3 G/DL (ref 32–36)
MCHC RBC AUTO-ENTMCNC: 33.5 G/DL (ref 32–36)
MCHC RBC AUTO-ENTMCNC: 33.7 G/DL (ref 32–36)
MCHC RBC AUTO-ENTMCNC: 33.7 G/DL (ref 32–36)
MCHC RBC AUTO-ENTMCNC: 33.8 G/DL (ref 32–36)
MCHC RBC AUTO-ENTMCNC: 33.9 G/DL (ref 32–36)
MCHC RBC AUTO-ENTMCNC: 34 G/DL (ref 32–36)
MCHC RBC AUTO-ENTMCNC: 34.1 G/DL (ref 32–36)
MCHC RBC AUTO-ENTMCNC: 34.2 G/DL (ref 32–36)
MCHC RBC AUTO-ENTMCNC: 34.3 G/DL (ref 32–36)
MCHC RBC AUTO-ENTMCNC: 34.3 G/DL (ref 32–36)
MCHC RBC AUTO-ENTMCNC: 34.6 G/DL (ref 32–36)
MCHC RBC AUTO-ENTMCNC: 34.7 G/DL (ref 32–36)
MCHC RBC AUTO-ENTMCNC: 34.7 G/DL (ref 32–36)
MCHC RBC AUTO-ENTMCNC: 34.8 G/DL (ref 32–36)
MCHC RBC AUTO-ENTMCNC: 34.8 G/DL (ref 32–36)
MCHC RBC AUTO-ENTMCNC: 34.9 G/DL (ref 32–36)
MCHC RBC AUTO-ENTMCNC: 34.9 G/DL (ref 32–36)
MCHC RBC AUTO-ENTMCNC: 35 G/DL (ref 32–36)
MCHC RBC AUTO-ENTMCNC: 35.1 G/DL (ref 32–36)
MCHC RBC AUTO-ENTMCNC: 35.1 G/DL (ref 32–36)
MCHC RBC AUTO-ENTMCNC: 35.2 G/DL (ref 32–36)
MCHC RBC AUTO-ENTMCNC: 35.3 G/DL (ref 32–36)
MCHC RBC AUTO-ENTMCNC: 35.3 G/DL (ref 32–36)
MCHC RBC AUTO-ENTMCNC: 35.4 G/DL (ref 32–36)
MCHC RBC AUTO-ENTMCNC: 35.7 G/DL (ref 32–36)
MCV RBC AUTO: 101 FL (ref 82–98)
MCV RBC AUTO: 84 FL (ref 82–98)
MCV RBC AUTO: 85 FL (ref 82–98)
MCV RBC AUTO: 86 FL (ref 82–98)
MCV RBC AUTO: 87 FL (ref 82–98)
MCV RBC AUTO: 88 FL (ref 82–98)
MCV RBC AUTO: 89 FL (ref 82–98)
MCV RBC AUTO: 89 FL (ref 82–98)
MCV RBC AUTO: 90 FL (ref 82–98)
MCV RBC AUTO: 91 FL (ref 82–98)
MCV RBC AUTO: 92 FL (ref 82–98)
MCV RBC AUTO: 93 FL (ref 82–98)
MCV RBC AUTO: 94 FL (ref 82–98)
MCV RBC AUTO: 95 FL (ref 82–98)
MCV RBC AUTO: 95 FL (ref 82–98)
MCV RBC AUTO: 96 FL (ref 82–98)
MCV RBC AUTO: 97 FL (ref 82–98)
MCV RBC AUTO: 98 FL (ref 82–98)
MCV RBC AUTO: 99 FL (ref 82–98)
METAMYELOCYTES NFR BLD MANUAL: 2 %
MICROSCOPIC COMMENT: NORMAL
MICROSCOPIC EXAM: NORMAL
MONOCYTES # BLD AUTO: 0 K/UL (ref 0.3–1)
MONOCYTES # BLD AUTO: 0.1 K/UL (ref 0.3–1)
MONOCYTES # BLD AUTO: 7.6 K/UL (ref 0.3–1)
MONOCYTES # BLD AUTO: ABNORMAL K/UL (ref 0.3–1)
MONOCYTES NFR BLD: 0 % (ref 4–15)
MONOCYTES NFR BLD: 1 % (ref 4–15)
MONOCYTES NFR BLD: 1.3 % (ref 4–15)
MONOCYTES NFR BLD: 1.3 % (ref 4–15)
MONOCYTES NFR BLD: 1.4 % (ref 4–15)
MONOCYTES NFR BLD: 1.4 % (ref 4–15)
MONOCYTES NFR BLD: 1.5 % (ref 4–15)
MONOCYTES NFR BLD: 1.5 % (ref 4–15)
MONOCYTES NFR BLD: 1.6 % (ref 4–15)
MONOCYTES NFR BLD: 2 % (ref 4–15)
MONOCYTES NFR BLD: 2.3 % (ref 4–15)
MONOCYTES NFR BLD: 2.4 % (ref 4–15)
MONOCYTES NFR BLD: 2.5 % (ref 4–15)
MONOCYTES NFR BLD: 2.7 % (ref 4–15)
MONOCYTES NFR BLD: 2.8 % (ref 4–15)
MONOCYTES NFR BLD: 2.9 % (ref 4–15)
MONOCYTES NFR BLD: 3 % (ref 4–15)
MONOCYTES NFR BLD: 3.1 % (ref 4–15)
MONOCYTES NFR BLD: 4.6 % (ref 4–15)
MONOCYTES NFR BLD: 5.2 % (ref 4–15)
MONOCYTES NFR BLD: 74.7 % (ref 4–15)
MONOCYTES NFR BLD: 9 % (ref 4–15)
MONOS+MACROS NFR FLD MANUAL: 44 %
MV PEAK E VEL: 1.03 M/S
MYCOBACTERIUM SPEC QL CULT: NORMAL
MYELOCYTES NFR BLD MANUAL: 1 %
MYELOCYTES NFR BLD MANUAL: 1 %
NEUTROPHILS # BLD AUTO: 0 K/UL (ref 1.8–7.7)
NEUTROPHILS # BLD AUTO: 0.1 K/UL (ref 1.8–7.7)
NEUTROPHILS # BLD AUTO: 0.2 K/UL (ref 1.8–7.7)
NEUTROPHILS # BLD AUTO: 0.4 K/UL (ref 1.8–7.7)
NEUTROPHILS # BLD AUTO: ABNORMAL K/UL (ref 1.8–7.7)
NEUTROPHILS NFR BLD: 0 % (ref 38–73)
NEUTROPHILS NFR BLD: 0.2 % (ref 38–73)
NEUTROPHILS NFR BLD: 1 % (ref 38–73)
NEUTROPHILS NFR BLD: 1.9 % (ref 38–73)
NEUTROPHILS NFR BLD: 10 % (ref 38–73)
NEUTROPHILS NFR BLD: 10.5 % (ref 38–73)
NEUTROPHILS NFR BLD: 10.6 % (ref 38–73)
NEUTROPHILS NFR BLD: 11.4 % (ref 38–73)
NEUTROPHILS NFR BLD: 11.6 % (ref 38–73)
NEUTROPHILS NFR BLD: 13.6 % (ref 38–73)
NEUTROPHILS NFR BLD: 14.3 % (ref 38–73)
NEUTROPHILS NFR BLD: 14.7 % (ref 38–73)
NEUTROPHILS NFR BLD: 17.6 % (ref 38–73)
NEUTROPHILS NFR BLD: 17.9 % (ref 38–73)
NEUTROPHILS NFR BLD: 17.9 % (ref 38–73)
NEUTROPHILS NFR BLD: 18.9 % (ref 38–73)
NEUTROPHILS NFR BLD: 18.9 % (ref 38–73)
NEUTROPHILS NFR BLD: 2.1 % (ref 38–73)
NEUTROPHILS NFR BLD: 2.3 % (ref 38–73)
NEUTROPHILS NFR BLD: 2.5 % (ref 38–73)
NEUTROPHILS NFR BLD: 2.7 % (ref 38–73)
NEUTROPHILS NFR BLD: 2.8 % (ref 38–73)
NEUTROPHILS NFR BLD: 25.7 % (ref 38–73)
NEUTROPHILS NFR BLD: 27.7 % (ref 38–73)
NEUTROPHILS NFR BLD: 3 % (ref 38–73)
NEUTROPHILS NFR BLD: 3.3 % (ref 38–73)
NEUTROPHILS NFR BLD: 3.3 % (ref 38–73)
NEUTROPHILS NFR BLD: 32.1 % (ref 38–73)
NEUTROPHILS NFR BLD: 34.3 % (ref 38–73)
NEUTROPHILS NFR BLD: 37.2 % (ref 38–73)
NEUTROPHILS NFR BLD: 4 % (ref 38–73)
NEUTROPHILS NFR BLD: 4 % (ref 38–73)
NEUTROPHILS NFR BLD: 5 % (ref 38–73)
NEUTROPHILS NFR BLD: 60 % (ref 38–73)
NEUTROPHILS NFR BLD: 7 % (ref 38–73)
NEUTROPHILS NFR BLD: 8.5 % (ref 38–73)
NEUTS BAND NFR BLD MANUAL: 1 %
NEUTS BAND NFR BLD MANUAL: 1.3 %
NEUTS BAND NFR BLD MANUAL: 4 %
NGS CLINCIAL TRIALS: NORMAL
NGS INDICATION OF TEST: NORMAL
NGS INTERPRETATION: NORMAL
NGS ONCOHEME PANEL GENE LIST: NORMAL
NGS PATHOGENIC MUTATIONS DETECTED: NORMAL
NGS REVIEWED BY:: NORMAL
NGS VARIANTS OF UNKNOWN SIGNIFICANCE: NORMAL
NGSHM RESULT, BONE MARROW: NORMAL
NITRITE UR QL STRIP: NEGATIVE
NRBC BLD-RTO: 0 /100 WBC
NRBC BLD-RTO: 1 /100 WBC
NRBC BLD-RTO: 2 /100 WBC
NRBC BLD-RTO: 3 /100 WBC
NRBC BLD-RTO: 3 /100 WBC
NRBC BLD-RTO: 4 /100 WBC
NRBC BLD-RTO: 5 /100 WBC
NRBC BLD-RTO: 6 /100 WBC
NUM UNITS TRANS PACKED RBC: NORMAL
NUM UNITS TRANS WBC-POOR PLATPHERESIS: NORMAL
OTHER CELLS FLD MANUAL: 29 %
OVALOCYTES BLD QL SMEAR: ABNORMAL
PATH INTERP FLD-IMP: NORMAL
PATH REPORT.FINAL DX SPEC: NORMAL
PATH REV BLD -IMP: NORMAL
PATH REV BLD -IMP: NORMAL
PH UR STRIP: 5 [PH] (ref 5–8)
PHOSPHATE SERPL-MCNC: 1.4 MG/DL (ref 2.7–4.5)
PHOSPHATE SERPL-MCNC: 2.8 MG/DL (ref 2.7–4.5)
PHOSPHATE SERPL-MCNC: 2.9 MG/DL (ref 2.7–4.5)
PHOSPHATE SERPL-MCNC: 2.9 MG/DL (ref 2.7–4.5)
PHOSPHATE SERPL-MCNC: 3 MG/DL (ref 2.7–4.5)
PHOSPHATE SERPL-MCNC: 3.1 MG/DL (ref 2.7–4.5)
PHOSPHATE SERPL-MCNC: 3.2 MG/DL (ref 2.7–4.5)
PHOSPHATE SERPL-MCNC: 3.2 MG/DL (ref 2.7–4.5)
PHOSPHATE SERPL-MCNC: 3.3 MG/DL (ref 2.7–4.5)
PHOSPHATE SERPL-MCNC: 3.4 MG/DL (ref 2.7–4.5)
PHOSPHATE SERPL-MCNC: 3.5 MG/DL (ref 2.7–4.5)
PHOSPHATE SERPL-MCNC: 3.6 MG/DL (ref 2.7–4.5)
PHOSPHATE SERPL-MCNC: 3.6 MG/DL (ref 2.7–4.5)
PHOSPHATE SERPL-MCNC: 3.7 MG/DL (ref 2.7–4.5)
PHOSPHATE SERPL-MCNC: 3.7 MG/DL (ref 2.7–4.5)
PHOSPHATE SERPL-MCNC: 3.9 MG/DL (ref 2.7–4.5)
PHOSPHATE SERPL-MCNC: 4 MG/DL (ref 2.7–4.5)
PHOSPHATE SERPL-MCNC: 4.1 MG/DL (ref 2.7–4.5)
PHOSPHATE SERPL-MCNC: 4.2 MG/DL (ref 2.7–4.5)
PHOSPHATE SERPL-MCNC: 4.2 MG/DL (ref 2.7–4.5)
PHOSPHATE SERPL-MCNC: 4.7 MG/DL (ref 2.7–4.5)
PHOSPHATE SERPL-MCNC: 4.8 MG/DL (ref 2.7–4.5)
PHOSPHATE SERPL-MCNC: 4.9 MG/DL (ref 2.7–4.5)
PHOSPHATE SERPL-MCNC: 5.3 MG/DL (ref 2.7–4.5)
PHOSPHATE SERPL-MCNC: 5.4 MG/DL (ref 2.7–4.5)
PHOSPHATE SERPL-MCNC: 5.5 MG/DL (ref 2.7–4.5)
PHOSPHATE SERPL-MCNC: 6.6 MG/DL (ref 2.7–4.5)
PHOSPHATE SERPL-MCNC: 7 MG/DL (ref 2.7–4.5)
PISA TR MAX VEL: 2.83 M/S
PISA TR MAX VEL: 2.93 M/S
PLATELET # BLD AUTO: 10 K/UL (ref 150–350)
PLATELET # BLD AUTO: 10 K/UL (ref 150–350)
PLATELET # BLD AUTO: 11 K/UL (ref 150–350)
PLATELET # BLD AUTO: 11 K/UL (ref 150–350)
PLATELET # BLD AUTO: 12 K/UL (ref 150–350)
PLATELET # BLD AUTO: 124 K/UL (ref 150–350)
PLATELET # BLD AUTO: 13 K/UL (ref 150–350)
PLATELET # BLD AUTO: 140 K/UL (ref 150–350)
PLATELET # BLD AUTO: 15 K/UL (ref 150–350)
PLATELET # BLD AUTO: 16 K/UL (ref 150–350)
PLATELET # BLD AUTO: 16 K/UL (ref 150–350)
PLATELET # BLD AUTO: 17 K/UL (ref 150–350)
PLATELET # BLD AUTO: 18 K/UL (ref 150–350)
PLATELET # BLD AUTO: 19 K/UL (ref 150–350)
PLATELET # BLD AUTO: 20 K/UL (ref 150–350)
PLATELET # BLD AUTO: 20 K/UL (ref 150–350)
PLATELET # BLD AUTO: 21 K/UL (ref 150–350)
PLATELET # BLD AUTO: 23 K/UL (ref 150–350)
PLATELET # BLD AUTO: 26 K/UL (ref 150–350)
PLATELET # BLD AUTO: 27 K/UL (ref 150–350)
PLATELET # BLD AUTO: 28 K/UL (ref 150–350)
PLATELET # BLD AUTO: 30 K/UL (ref 150–350)
PLATELET # BLD AUTO: 30 K/UL (ref 150–350)
PLATELET # BLD AUTO: 31 K/UL (ref 150–350)
PLATELET # BLD AUTO: 31 K/UL (ref 150–350)
PLATELET # BLD AUTO: 40 K/UL (ref 150–350)
PLATELET # BLD AUTO: 40 K/UL (ref 150–350)
PLATELET # BLD AUTO: 41 K/UL (ref 150–350)
PLATELET # BLD AUTO: 42 K/UL (ref 150–350)
PLATELET # BLD AUTO: 45 K/UL (ref 150–350)
PLATELET # BLD AUTO: 49 K/UL (ref 150–350)
PLATELET # BLD AUTO: 54 K/UL (ref 150–350)
PLATELET # BLD AUTO: 58 K/UL (ref 150–350)
PLATELET # BLD AUTO: 6 K/UL (ref 150–350)
PLATELET # BLD AUTO: 6 K/UL (ref 150–350)
PLATELET # BLD AUTO: 7 K/UL (ref 150–350)
PLATELET # BLD AUTO: 8 K/UL (ref 150–350)
PLATELET # BLD AUTO: 9 K/UL (ref 150–350)
PLATELET # BLD AUTO: 92 K/UL (ref 150–350)
PLATELET # BLD AUTO: 93 K/UL (ref 150–350)
PLATELET # BLD AUTO: 99 K/UL (ref 150–350)
PLATELET BLD QL SMEAR: ABNORMAL
PMV BLD AUTO: 10 FL (ref 9.2–12.9)
PMV BLD AUTO: 10 FL (ref 9.2–12.9)
PMV BLD AUTO: 10.1 FL (ref 9.2–12.9)
PMV BLD AUTO: 10.1 FL (ref 9.2–12.9)
PMV BLD AUTO: 10.2 FL (ref 9.2–12.9)
PMV BLD AUTO: 10.2 FL (ref 9.2–12.9)
PMV BLD AUTO: 10.3 FL (ref 9.2–12.9)
PMV BLD AUTO: 10.4 FL (ref 9.2–12.9)
PMV BLD AUTO: 10.4 FL (ref 9.2–12.9)
PMV BLD AUTO: 10.5 FL (ref 9.2–12.9)
PMV BLD AUTO: 10.5 FL (ref 9.2–12.9)
PMV BLD AUTO: 10.6 FL (ref 9.2–12.9)
PMV BLD AUTO: 10.6 FL (ref 9.2–12.9)
PMV BLD AUTO: 10.7 FL (ref 9.2–12.9)
PMV BLD AUTO: 10.8 FL (ref 9.2–12.9)
PMV BLD AUTO: 10.9 FL (ref 9.2–12.9)
PMV BLD AUTO: 11 FL (ref 9.2–12.9)
PMV BLD AUTO: 11.1 FL (ref 9.2–12.9)
PMV BLD AUTO: 11.2 FL (ref 9.2–12.9)
PMV BLD AUTO: 11.4 FL (ref 9.2–12.9)
PMV BLD AUTO: 11.5 FL (ref 9.2–12.9)
PMV BLD AUTO: 11.7 FL (ref 9.2–12.9)
PMV BLD AUTO: 11.8 FL (ref 9.2–12.9)
PMV BLD AUTO: 11.9 FL (ref 9.2–12.9)
PMV BLD AUTO: 12 FL (ref 9.2–12.9)
PMV BLD AUTO: 12.1 FL (ref 9.2–12.9)
PMV BLD AUTO: 12.2 FL (ref 9.2–12.9)
PMV BLD AUTO: 12.2 FL (ref 9.2–12.9)
PMV BLD AUTO: 12.3 FL (ref 9.2–12.9)
PMV BLD AUTO: 12.4 FL (ref 9.2–12.9)
PMV BLD AUTO: 12.5 FL (ref 9.2–12.9)
PMV BLD AUTO: 12.6 FL (ref 9.2–12.9)
PMV BLD AUTO: 12.7 FL (ref 9.2–12.9)
PMV BLD AUTO: 12.8 FL (ref 9.2–12.9)
PMV BLD AUTO: 13.5 FL (ref 9.2–12.9)
PMV BLD AUTO: 9.3 FL (ref 9.2–12.9)
PMV BLD AUTO: 9.4 FL (ref 9.2–12.9)
PMV BLD AUTO: 9.5 FL (ref 9.2–12.9)
PMV BLD AUTO: 9.5 FL (ref 9.2–12.9)
PMV BLD AUTO: 9.6 FL (ref 9.2–12.9)
PMV BLD AUTO: 9.7 FL (ref 9.2–12.9)
PMV BLD AUTO: 9.7 FL (ref 9.2–12.9)
PMV BLD AUTO: 9.8 FL (ref 9.2–12.9)
PMV BLD AUTO: ABNORMAL FL (ref 9.2–12.9)
PMV BLD AUTO: ABNORMAL FL (ref 9.2–12.9)
POCT GLUCOSE: 100 MG/DL (ref 70–110)
POCT GLUCOSE: 103 MG/DL (ref 70–110)
POCT GLUCOSE: 104 MG/DL (ref 70–110)
POCT GLUCOSE: 105 MG/DL (ref 70–110)
POCT GLUCOSE: 107 MG/DL (ref 70–110)
POCT GLUCOSE: 110 MG/DL (ref 70–110)
POCT GLUCOSE: 110 MG/DL (ref 70–110)
POCT GLUCOSE: 111 MG/DL (ref 70–110)
POCT GLUCOSE: 112 MG/DL (ref 70–110)
POCT GLUCOSE: 112 MG/DL (ref 70–110)
POCT GLUCOSE: 113 MG/DL (ref 70–110)
POCT GLUCOSE: 114 MG/DL (ref 70–110)
POCT GLUCOSE: 114 MG/DL (ref 70–110)
POCT GLUCOSE: 115 MG/DL (ref 70–110)
POCT GLUCOSE: 116 MG/DL (ref 70–110)
POCT GLUCOSE: 121 MG/DL (ref 70–110)
POCT GLUCOSE: 121 MG/DL (ref 70–110)
POCT GLUCOSE: 122 MG/DL (ref 70–110)
POCT GLUCOSE: 122 MG/DL (ref 70–110)
POCT GLUCOSE: 123 MG/DL (ref 70–110)
POCT GLUCOSE: 124 MG/DL (ref 70–110)
POCT GLUCOSE: 125 MG/DL (ref 70–110)
POCT GLUCOSE: 125 MG/DL (ref 70–110)
POCT GLUCOSE: 126 MG/DL (ref 70–110)
POCT GLUCOSE: 127 MG/DL (ref 70–110)
POCT GLUCOSE: 128 MG/DL (ref 70–110)
POCT GLUCOSE: 129 MG/DL (ref 70–110)
POCT GLUCOSE: 129 MG/DL (ref 70–110)
POCT GLUCOSE: 130 MG/DL (ref 70–110)
POCT GLUCOSE: 130 MG/DL (ref 70–110)
POCT GLUCOSE: 131 MG/DL (ref 70–110)
POCT GLUCOSE: 132 MG/DL (ref 70–110)
POCT GLUCOSE: 137 MG/DL (ref 70–110)
POCT GLUCOSE: 137 MG/DL (ref 70–110)
POCT GLUCOSE: 138 MG/DL (ref 70–110)
POCT GLUCOSE: 139 MG/DL (ref 70–110)
POCT GLUCOSE: 140 MG/DL (ref 70–110)
POCT GLUCOSE: 141 MG/DL (ref 70–110)
POCT GLUCOSE: 142 MG/DL (ref 70–110)
POCT GLUCOSE: 142 MG/DL (ref 70–110)
POCT GLUCOSE: 145 MG/DL (ref 70–110)
POCT GLUCOSE: 148 MG/DL (ref 70–110)
POCT GLUCOSE: 149 MG/DL (ref 70–110)
POCT GLUCOSE: 149 MG/DL (ref 70–110)
POCT GLUCOSE: 150 MG/DL (ref 70–110)
POCT GLUCOSE: 153 MG/DL (ref 70–110)
POCT GLUCOSE: 154 MG/DL (ref 70–110)
POCT GLUCOSE: 154 MG/DL (ref 70–110)
POCT GLUCOSE: 160 MG/DL (ref 70–110)
POCT GLUCOSE: 162 MG/DL (ref 70–110)
POCT GLUCOSE: 164 MG/DL (ref 70–110)
POCT GLUCOSE: 165 MG/DL (ref 70–110)
POCT GLUCOSE: 165 MG/DL (ref 70–110)
POCT GLUCOSE: 166 MG/DL (ref 70–110)
POCT GLUCOSE: 167 MG/DL (ref 70–110)
POCT GLUCOSE: 169 MG/DL (ref 70–110)
POCT GLUCOSE: 173 MG/DL (ref 70–110)
POCT GLUCOSE: 173 MG/DL (ref 70–110)
POCT GLUCOSE: 175 MG/DL (ref 70–110)
POCT GLUCOSE: 176 MG/DL (ref 70–110)
POCT GLUCOSE: 177 MG/DL (ref 70–110)
POCT GLUCOSE: 178 MG/DL (ref 70–110)
POCT GLUCOSE: 179 MG/DL (ref 70–110)
POCT GLUCOSE: 181 MG/DL (ref 70–110)
POCT GLUCOSE: 182 MG/DL (ref 70–110)
POCT GLUCOSE: 184 MG/DL (ref 70–110)
POCT GLUCOSE: 188 MG/DL (ref 70–110)
POCT GLUCOSE: 199 MG/DL (ref 70–110)
POCT GLUCOSE: 200 MG/DL (ref 70–110)
POCT GLUCOSE: 201 MG/DL (ref 70–110)
POCT GLUCOSE: 206 MG/DL (ref 70–110)
POCT GLUCOSE: 211 MG/DL (ref 70–110)
POCT GLUCOSE: 211 MG/DL (ref 70–110)
POCT GLUCOSE: 218 MG/DL (ref 70–110)
POCT GLUCOSE: 219 MG/DL (ref 70–110)
POCT GLUCOSE: 229 MG/DL (ref 70–110)
POCT GLUCOSE: 244 MG/DL (ref 70–110)
POCT GLUCOSE: 244 MG/DL (ref 70–110)
POCT GLUCOSE: 245 MG/DL (ref 70–110)
POCT GLUCOSE: 248 MG/DL (ref 70–110)
POCT GLUCOSE: 250 MG/DL (ref 70–110)
POCT GLUCOSE: 262 MG/DL (ref 70–110)
POCT GLUCOSE: 270 MG/DL (ref 70–110)
POCT GLUCOSE: 271 MG/DL (ref 70–110)
POCT GLUCOSE: 90 MG/DL (ref 70–110)
POCT GLUCOSE: 98 MG/DL (ref 70–110)
POCT GLUCOSE: 98 MG/DL (ref 70–110)
POCT GLUCOSE: 99 MG/DL (ref 70–110)
POIKILOCYTOSIS BLD QL SMEAR: SLIGHT
POLYCHROMASIA BLD QL SMEAR: ABNORMAL
POTASSIUM SERPL-SCNC: 3.1 MMOL/L (ref 3.5–5.1)
POTASSIUM SERPL-SCNC: 3.1 MMOL/L (ref 3.5–5.1)
POTASSIUM SERPL-SCNC: 3.2 MMOL/L (ref 3.5–5.1)
POTASSIUM SERPL-SCNC: 3.3 MMOL/L (ref 3.5–5.1)
POTASSIUM SERPL-SCNC: 3.4 MMOL/L (ref 3.5–5.1)
POTASSIUM SERPL-SCNC: 3.5 MMOL/L (ref 3.5–5.1)
POTASSIUM SERPL-SCNC: 3.6 MMOL/L (ref 3.5–5.1)
POTASSIUM SERPL-SCNC: 3.7 MMOL/L (ref 3.5–5.1)
POTASSIUM SERPL-SCNC: 3.8 MMOL/L (ref 3.5–5.1)
POTASSIUM SERPL-SCNC: 3.9 MMOL/L (ref 3.5–5.1)
POTASSIUM SERPL-SCNC: 4 MMOL/L (ref 3.5–5.1)
POTASSIUM SERPL-SCNC: 4.1 MMOL/L (ref 3.5–5.1)
POTASSIUM SERPL-SCNC: 4.2 MMOL/L (ref 3.5–5.1)
POTASSIUM SERPL-SCNC: 4.3 MMOL/L (ref 3.5–5.1)
POTASSIUM SERPL-SCNC: 4.4 MMOL/L (ref 3.5–5.1)
POTASSIUM SERPL-SCNC: 4.5 MMOL/L (ref 3.5–5.1)
POTASSIUM SERPL-SCNC: 4.7 MMOL/L (ref 3.5–5.1)
PROT FLD-MCNC: 3.2 G/DL
PROT SERPL-MCNC: 5 G/DL (ref 6–8.4)
PROT SERPL-MCNC: 5 G/DL (ref 6–8.4)
PROT SERPL-MCNC: 5.1 G/DL (ref 6–8.4)
PROT SERPL-MCNC: 5.2 G/DL (ref 6–8.4)
PROT SERPL-MCNC: 5.3 G/DL (ref 6–8.4)
PROT SERPL-MCNC: 5.4 G/DL (ref 6–8.4)
PROT SERPL-MCNC: 5.5 G/DL (ref 6–8.4)
PROT SERPL-MCNC: 5.6 G/DL (ref 6–8.4)
PROT SERPL-MCNC: 5.6 G/DL (ref 6–8.4)
PROT SERPL-MCNC: 5.7 G/DL (ref 6–8.4)
PROT SERPL-MCNC: 5.8 G/DL (ref 6–8.4)
PROT SERPL-MCNC: 5.9 G/DL (ref 6–8.4)
PROT SERPL-MCNC: 6 G/DL (ref 6–8.4)
PROT SERPL-MCNC: 6.1 G/DL (ref 6–8.4)
PROT SERPL-MCNC: 6.2 G/DL (ref 6–8.4)
PROT SERPL-MCNC: 6.3 G/DL (ref 6–8.4)
PROT SERPL-MCNC: 6.4 G/DL (ref 6–8.4)
PROT SERPL-MCNC: 6.5 G/DL (ref 6–8.4)
PROT SERPL-MCNC: 6.5 G/DL (ref 6–8.4)
PROT SERPL-MCNC: 6.6 G/DL (ref 6–8.4)
PROT SERPL-MCNC: 6.7 G/DL (ref 6–8.4)
PROT UR QL STRIP: ABNORMAL
PROT UR-MCNC: 10 MG/DL (ref 0–15)
PROT/CREAT UR: 0.18 MG/G{CREAT} (ref 0–0.2)
PROTHROMBIN TIME: 11.8 SEC (ref 9–12.5)
PROTHROMBIN TIME: 11.9 SEC (ref 9–12.5)
PROTHROMBIN TIME: 12 SEC (ref 9–12.5)
PROTHROMBIN TIME: 12.1 SEC (ref 9–12.5)
PROTHROMBIN TIME: 12.2 SEC (ref 9–12.5)
PROTHROMBIN TIME: 12.3 SEC (ref 9–12.5)
PROTHROMBIN TIME: 12.3 SEC (ref 9–12.5)
PROTHROMBIN TIME: 12.4 SEC (ref 9–12.5)
PROTHROMBIN TIME: 12.4 SEC (ref 9–12.5)
PROTHROMBIN TIME: 12.5 SEC (ref 9–12.5)
PROTHROMBIN TIME: 12.6 SEC (ref 9–12.5)
PROTHROMBIN TIME: 12.6 SEC (ref 9–12.5)
PROTHROMBIN TIME: 12.7 SEC (ref 9–12.5)
PROTHROMBIN TIME: 12.8 SEC (ref 9–12.5)
PROTHROMBIN TIME: 12.9 SEC (ref 9–12.5)
PROTHROMBIN TIME: 13 SEC (ref 9–12.5)
PROTHROMBIN TIME: 13.2 SEC (ref 9–12.5)
PROTHROMBIN TIME: 13.3 SEC (ref 9–12.5)
PROTHROMBIN TIME: 13.3 SEC (ref 9–12.5)
RA MAJOR: 6.05 CM
RA MAJOR: 6.17 CM
RA PRESSURE: 15 MMHG
RA PRESSURE: 3 MMHG
RA WIDTH: 5.4 CM
RA WIDTH: 5.96 CM
RBC # BLD AUTO: 2.02 M/UL (ref 4.6–6.2)
RBC # BLD AUTO: 2.04 M/UL (ref 4.6–6.2)
RBC # BLD AUTO: 2.04 M/UL (ref 4.6–6.2)
RBC # BLD AUTO: 2.08 M/UL (ref 4.6–6.2)
RBC # BLD AUTO: 2.08 M/UL (ref 4.6–6.2)
RBC # BLD AUTO: 2.09 M/UL (ref 4.6–6.2)
RBC # BLD AUTO: 2.13 M/UL (ref 4.6–6.2)
RBC # BLD AUTO: 2.14 M/UL (ref 4.6–6.2)
RBC # BLD AUTO: 2.14 M/UL (ref 4.6–6.2)
RBC # BLD AUTO: 2.15 M/UL (ref 4.6–6.2)
RBC # BLD AUTO: 2.15 M/UL (ref 4.6–6.2)
RBC # BLD AUTO: 2.17 M/UL (ref 4.6–6.2)
RBC # BLD AUTO: 2.17 M/UL (ref 4.6–6.2)
RBC # BLD AUTO: 2.18 M/UL (ref 4.6–6.2)
RBC # BLD AUTO: 2.19 M/UL (ref 4.6–6.2)
RBC # BLD AUTO: 2.19 M/UL (ref 4.6–6.2)
RBC # BLD AUTO: 2.2 M/UL (ref 4.6–6.2)
RBC # BLD AUTO: 2.21 M/UL (ref 4.6–6.2)
RBC # BLD AUTO: 2.22 M/UL (ref 4.6–6.2)
RBC # BLD AUTO: 2.23 M/UL (ref 4.6–6.2)
RBC # BLD AUTO: 2.25 M/UL (ref 4.6–6.2)
RBC # BLD AUTO: 2.28 M/UL (ref 4.6–6.2)
RBC # BLD AUTO: 2.29 M/UL (ref 4.6–6.2)
RBC # BLD AUTO: 2.29 M/UL (ref 4.6–6.2)
RBC # BLD AUTO: 2.3 M/UL (ref 4.6–6.2)
RBC # BLD AUTO: 2.31 M/UL (ref 4.6–6.2)
RBC # BLD AUTO: 2.32 M/UL (ref 4.6–6.2)
RBC # BLD AUTO: 2.34 M/UL (ref 4.6–6.2)
RBC # BLD AUTO: 2.34 M/UL (ref 4.6–6.2)
RBC # BLD AUTO: 2.36 M/UL (ref 4.6–6.2)
RBC # BLD AUTO: 2.39 M/UL (ref 4.6–6.2)
RBC # BLD AUTO: 2.39 M/UL (ref 4.6–6.2)
RBC # BLD AUTO: 2.46 M/UL (ref 4.6–6.2)
RBC # BLD AUTO: 2.48 M/UL (ref 4.6–6.2)
RBC # BLD AUTO: 2.49 M/UL (ref 4.6–6.2)
RBC # BLD AUTO: 2.52 M/UL (ref 4.6–6.2)
RBC # BLD AUTO: 2.52 M/UL (ref 4.6–6.2)
RBC # BLD AUTO: 2.54 M/UL (ref 4.6–6.2)
RBC # BLD AUTO: 2.54 M/UL (ref 4.6–6.2)
RBC # BLD AUTO: 2.56 M/UL (ref 4.6–6.2)
RBC # BLD AUTO: 2.56 M/UL (ref 4.6–6.2)
RBC # BLD AUTO: 2.72 M/UL (ref 4.6–6.2)
RBC # BLD AUTO: 2.72 M/UL (ref 4.6–6.2)
RBC # BLD AUTO: 2.75 M/UL (ref 4.6–6.2)
RBC # BLD AUTO: 2.77 M/UL (ref 4.6–6.2)
RBC # BLD AUTO: 2.88 M/UL (ref 4.6–6.2)
RBC # BLD AUTO: 2.89 M/UL (ref 4.6–6.2)
RBC # BLD AUTO: 3.12 M/UL (ref 4.6–6.2)
RBC # BLD AUTO: 3.13 M/UL (ref 4.6–6.2)
RBC #/AREA URNS AUTO: 1 /HPF (ref 0–4)
REASON FOR REFERRAL (NARRATIVE): NORMAL
REF LAB TEST METHOD: NORMAL
RIGHT VENTRICULAR END-DIASTOLIC DIMENSION: 4.1 CM
RIGHT VENTRICULAR END-DIASTOLIC DIMENSION: 4.43 CM
RV TISSUE DOPPLER FREE WALL SYSTOLIC VELOCITY 1 (APICAL 4 CHAMBER VIEW): 11.26 CM/S
RV TISSUE DOPPLER FREE WALL SYSTOLIC VELOCITY 1 (APICAL 4 CHAMBER VIEW): 17.49 CM/S
SARS-COV-2 RDRP RESP QL NAA+PROBE: NEGATIVE
SINUS: 3.51 CM
SINUS: 4.26 CM
SMUDGE CELLS BLD QL SMEAR: PRESENT
SODIUM SERPL-SCNC: 132 MMOL/L (ref 136–145)
SODIUM SERPL-SCNC: 133 MMOL/L (ref 136–145)
SODIUM SERPL-SCNC: 133 MMOL/L (ref 136–145)
SODIUM SERPL-SCNC: 134 MMOL/L (ref 136–145)
SODIUM SERPL-SCNC: 135 MMOL/L (ref 136–145)
SODIUM SERPL-SCNC: 136 MMOL/L (ref 136–145)
SODIUM SERPL-SCNC: 137 MMOL/L (ref 136–145)
SODIUM SERPL-SCNC: 138 MMOL/L (ref 136–145)
SODIUM SERPL-SCNC: 139 MMOL/L (ref 136–145)
SODIUM SERPL-SCNC: 140 MMOL/L (ref 136–145)
SODIUM SERPL-SCNC: 141 MMOL/L (ref 136–145)
SODIUM SERPL-SCNC: 142 MMOL/L (ref 136–145)
SP GR UR STRIP: 1.02 (ref 1–1.03)
SPECIMEN SOURCE: NORMAL
SPECIMEN TYPE: NORMAL
SPECIMEN: NORMAL
SPHEROCYTES BLD QL SMEAR: ABNORMAL
SPHEROCYTES BLD QL SMEAR: ABNORMAL
SSALL INTERPRETATION: NORMAL
SSALL TESTING DATE: NORMAL
SSDPA TESTING DATE: NORMAL
SSDPB TESTING DATE: NORMAL
SSDQA TESTING DATE: NORMAL
SSDQB TESTING DATE: NORMAL
SSDRB TESTING DATE: NORMAL
SSOA TESTING DATE: NORMAL
SSOB TESTING DATE: NORMAL
SSOC TESTING DATE: NORMAL
SSODR TESTING DATE: NORMAL
STJ: 3.25 CM
STJ: 3.34 CM
SUPPLEMENTAL DIAGNOSIS: NORMAL
TDI LATERAL: 0.11 M/S
TDI SEPTAL: 0.07 M/S
TDI: 0.09 M/S
TEST PERFORMANCE INFO SPEC: NORMAL
TR MAX PG: 32 MMHG
TR MAX PG: 34 MMHG
TRICUSPID ANNULAR PLANE SYSTOLIC EXCURSION: 1.84 CM
TRICUSPID ANNULAR PLANE SYSTOLIC EXCURSION: 1.93 CM
TV REST PULMONARY ARTERY PRESSURE: 35 MMHG
TV REST PULMONARY ARTERY PRESSURE: 49 MMHG
URATE SERPL-MCNC: 1.5 MG/DL (ref 3.4–7)
URATE SERPL-MCNC: 1.6 MG/DL (ref 3.4–7)
URATE SERPL-MCNC: 1.7 MG/DL (ref 3.4–7)
URATE SERPL-MCNC: 1.8 MG/DL (ref 3.4–7)
URATE SERPL-MCNC: 1.9 MG/DL (ref 3.4–7)
URATE SERPL-MCNC: 1.9 MG/DL (ref 3.4–7)
URATE SERPL-MCNC: 2 MG/DL (ref 3.4–7)
URATE SERPL-MCNC: 2 MG/DL (ref 3.4–7)
URATE SERPL-MCNC: 2.2 MG/DL (ref 3.4–7)
URATE SERPL-MCNC: 2.3 MG/DL (ref 3.4–7)
URATE SERPL-MCNC: 2.3 MG/DL (ref 3.4–7)
URATE SERPL-MCNC: 2.4 MG/DL (ref 3.4–7)
URATE SERPL-MCNC: 2.5 MG/DL (ref 3.4–7)
URATE SERPL-MCNC: 2.7 MG/DL (ref 3.4–7)
URATE SERPL-MCNC: 2.8 MG/DL (ref 3.4–7)
URATE SERPL-MCNC: 2.8 MG/DL (ref 3.4–7)
URATE SERPL-MCNC: 2.9 MG/DL (ref 3.4–7)
URATE SERPL-MCNC: 2.9 MG/DL (ref 3.4–7)
URATE SERPL-MCNC: 3 MG/DL (ref 3.4–7)
URATE SERPL-MCNC: 3.2 MG/DL (ref 3.4–7)
URATE SERPL-MCNC: 3.4 MG/DL (ref 3.4–7)
URATE SERPL-MCNC: 3.4 MG/DL (ref 3.4–7)
URATE SERPL-MCNC: 3.5 MG/DL (ref 3.4–7)
URATE SERPL-MCNC: 3.6 MG/DL (ref 3.4–7)
URATE SERPL-MCNC: 3.6 MG/DL (ref 3.4–7)
URATE SERPL-MCNC: 3.8 MG/DL (ref 3.4–7)
URATE SERPL-MCNC: 3.9 MG/DL (ref 3.4–7)
URATE SERPL-MCNC: 3.9 MG/DL (ref 3.4–7)
URATE SERPL-MCNC: 4 MG/DL (ref 3.4–7)
URATE SERPL-MCNC: 4.2 MG/DL (ref 3.4–7)
URATE SERPL-MCNC: 4.3 MG/DL (ref 3.4–7)
URATE SERPL-MCNC: 4.4 MG/DL (ref 3.4–7)
URATE SERPL-MCNC: 4.4 MG/DL (ref 3.4–7)
URATE SERPL-MCNC: 4.6 MG/DL (ref 3.4–7)
URATE SERPL-MCNC: 4.8 MG/DL (ref 3.4–7)
URATE SERPL-MCNC: 4.9 MG/DL (ref 3.4–7)
URATE SERPL-MCNC: 4.9 MG/DL (ref 3.4–7)
URATE SERPL-MCNC: 5 MG/DL (ref 3.4–7)
URATE SERPL-MCNC: 5 MG/DL (ref 3.4–7)
URATE SERPL-MCNC: 5.1 MG/DL (ref 3.4–7)
URATE SERPL-MCNC: 5.5 MG/DL (ref 3.4–7)
URATE SERPL-MCNC: 5.9 MG/DL (ref 3.4–7)
URATE SERPL-MCNC: 6.8 MG/DL (ref 3.4–7)
URATE SERPL-MCNC: 6.9 MG/DL (ref 3.4–7)
URN SPEC COLLECT METH UR: ABNORMAL
VANCOMYCIN TROUGH SERPL-MCNC: 11.1 UG/ML (ref 10–22)
VANCOMYCIN TROUGH SERPL-MCNC: 11.1 UG/ML (ref 10–22)
VANCOMYCIN TROUGH SERPL-MCNC: 15.3 UG/ML (ref 10–22)
VANCOMYCIN TROUGH SERPL-MCNC: 18 UG/ML (ref 10–22)
VANCOMYCIN TROUGH SERPL-MCNC: 18.1 UG/ML (ref 10–22)
VANCOMYCIN TROUGH SERPL-MCNC: 19 UG/ML (ref 10–22)
VANCOMYCIN TROUGH SERPL-MCNC: 19.7 UG/ML (ref 10–22)
VANCOMYCIN TROUGH SERPL-MCNC: 19.8 UG/ML (ref 10–22)
VANCOMYCIN TROUGH SERPL-MCNC: 23.9 UG/ML (ref 10–22)
VORICONAZOLE SERPL-MCNC: 1.2 MCG/ML (ref 1–5.5)
WBC # BLD AUTO: 0.33 K/UL (ref 3.9–12.7)
WBC # BLD AUTO: 0.34 K/UL (ref 3.9–12.7)
WBC # BLD AUTO: 0.35 K/UL (ref 3.9–12.7)
WBC # BLD AUTO: 0.35 K/UL (ref 3.9–12.7)
WBC # BLD AUTO: 0.36 K/UL (ref 3.9–12.7)
WBC # BLD AUTO: 0.37 K/UL (ref 3.9–12.7)
WBC # BLD AUTO: 0.38 K/UL (ref 3.9–12.7)
WBC # BLD AUTO: 0.39 K/UL (ref 3.9–12.7)
WBC # BLD AUTO: 0.39 K/UL (ref 3.9–12.7)
WBC # BLD AUTO: 0.4 K/UL (ref 3.9–12.7)
WBC # BLD AUTO: 0.42 K/UL (ref 3.9–12.7)
WBC # BLD AUTO: 0.43 K/UL (ref 3.9–12.7)
WBC # BLD AUTO: 0.44 K/UL (ref 3.9–12.7)
WBC # BLD AUTO: 0.46 K/UL (ref 3.9–12.7)
WBC # BLD AUTO: 0.47 K/UL (ref 3.9–12.7)
WBC # BLD AUTO: 0.49 K/UL (ref 3.9–12.7)
WBC # BLD AUTO: 0.53 K/UL (ref 3.9–12.7)
WBC # BLD AUTO: 0.61 K/UL (ref 3.9–12.7)
WBC # BLD AUTO: 0.64 K/UL (ref 3.9–12.7)
WBC # BLD AUTO: 0.65 K/UL (ref 3.9–12.7)
WBC # BLD AUTO: 0.66 K/UL (ref 3.9–12.7)
WBC # BLD AUTO: 0.68 K/UL (ref 3.9–12.7)
WBC # BLD AUTO: 0.69 K/UL (ref 3.9–12.7)
WBC # BLD AUTO: 0.7 K/UL (ref 3.9–12.7)
WBC # BLD AUTO: 0.74 K/UL (ref 3.9–12.7)
WBC # BLD AUTO: 0.75 K/UL (ref 3.9–12.7)
WBC # BLD AUTO: 1.15 K/UL (ref 3.9–12.7)
WBC # BLD AUTO: 1.71 K/UL (ref 3.9–12.7)
WBC # BLD AUTO: 1.76 K/UL (ref 3.9–12.7)
WBC # BLD AUTO: 1.85 K/UL (ref 3.9–12.7)
WBC # BLD AUTO: 10.22 K/UL (ref 3.9–12.7)
WBC # BLD AUTO: 109.3 K/UL (ref 3.9–12.7)
WBC # BLD AUTO: 113.5 K/UL (ref 3.9–12.7)
WBC # BLD AUTO: 126.2 K/UL (ref 3.9–12.7)
WBC # BLD AUTO: 129.8 K/UL (ref 3.9–12.7)
WBC # BLD AUTO: 17.12 K/UL (ref 3.9–12.7)
WBC # BLD AUTO: 2.01 K/UL (ref 3.9–12.7)
WBC # BLD AUTO: 2.24 K/UL (ref 3.9–12.7)
WBC # BLD AUTO: 2.33 K/UL (ref 3.9–12.7)
WBC # BLD AUTO: 2.39 K/UL (ref 3.9–12.7)
WBC # BLD AUTO: 2.76 K/UL (ref 3.9–12.7)
WBC # BLD AUTO: 2.99 K/UL (ref 3.9–12.7)
WBC # BLD AUTO: 35.62 K/UL (ref 3.9–12.7)
WBC # BLD AUTO: 4.97 K/UL (ref 3.9–12.7)
WBC # BLD AUTO: 55.25 K/UL (ref 3.9–12.7)
WBC # BLD AUTO: 6.68 K/UL (ref 3.9–12.7)
WBC # BLD AUTO: 82.22 K/UL (ref 3.9–12.7)
WBC # FLD: 4255 /CU MM
WBC #/AREA URNS AUTO: 1 /HPF (ref 0–5)
WBC OTHER NFR BLD MANUAL: 87 %
WBC OTHER NFR BLD MANUAL: 87 %
WBC OTHER NFR BLD MANUAL: 88 %
WBC OTHER NFR BLD MANUAL: 93 %
WBC OTHER NFR BLD MANUAL: 94 %
WBC OTHER NFR BLD MANUAL: 95 %

## 2020-01-01 PROCEDURE — 80053 COMPREHEN METABOLIC PANEL: CPT

## 2020-01-01 PROCEDURE — 25000003 PHARM REV CODE 250: Performed by: INTERNAL MEDICINE

## 2020-01-01 PROCEDURE — 83735 ASSAY OF MAGNESIUM: CPT

## 2020-01-01 PROCEDURE — 99900035 HC TECH TIME PER 15 MIN (STAT)

## 2020-01-01 PROCEDURE — 99223 1ST HOSP IP/OBS HIGH 75: CPT | Mod: ,,, | Performed by: INTERNAL MEDICINE

## 2020-01-01 PROCEDURE — 86901 BLOOD TYPING SEROLOGIC RH(D): CPT

## 2020-01-01 PROCEDURE — 85610 PROTHROMBIN TIME: CPT

## 2020-01-01 PROCEDURE — 83615 LACTATE (LD) (LDH) ENZYME: CPT

## 2020-01-01 PROCEDURE — A4216 STERILE WATER/SALINE, 10 ML: HCPCS | Performed by: INTERNAL MEDICINE

## 2020-01-01 PROCEDURE — C1750 CATH, HEMODIALYSIS,LONG-TERM: HCPCS | Performed by: SURGERY

## 2020-01-01 PROCEDURE — P9040 RBC LEUKOREDUCED IRRADIATED: HCPCS

## 2020-01-01 PROCEDURE — 88264 CHROMOSOME ANALYSIS 20-25: CPT

## 2020-01-01 PROCEDURE — 63600175 PHARM REV CODE 636 W HCPCS: Performed by: STUDENT IN AN ORGANIZED HEALTH CARE EDUCATION/TRAINING PROGRAM

## 2020-01-01 PROCEDURE — 85730 THROMBOPLASTIN TIME PARTIAL: CPT

## 2020-01-01 PROCEDURE — 85097 PR  BONE MARROW,SMEAR INTERPRETATION: ICD-10-PCS | Mod: ,,, | Performed by: PATHOLOGY

## 2020-01-01 PROCEDURE — 84100 ASSAY OF PHOSPHORUS: CPT

## 2020-01-01 PROCEDURE — 99999 PR PBB SHADOW E&M-EST. PATIENT-LVL IV: CPT | Mod: PBBFAC,,, | Performed by: NURSE PRACTITIONER

## 2020-01-01 PROCEDURE — 99999 PR PBB SHADOW E&M-EST. PATIENT-LVL IV: ICD-10-PCS | Mod: PBBFAC,,, | Performed by: NURSE PRACTITIONER

## 2020-01-01 PROCEDURE — 99233 PR SUBSEQUENT HOSPITAL CARE,LEVL III: ICD-10-PCS | Mod: ,,, | Performed by: INTERNAL MEDICINE

## 2020-01-01 PROCEDURE — 99999 PR PBB SHADOW E&M-EST. PATIENT-LVL V: CPT | Mod: PBBFAC,,, | Performed by: NURSE PRACTITIONER

## 2020-01-01 PROCEDURE — 25000003 PHARM REV CODE 250: Performed by: STUDENT IN AN ORGANIZED HEALTH CARE EDUCATION/TRAINING PROGRAM

## 2020-01-01 PROCEDURE — 99231 PR SUBSEQUENT HOSPITAL CARE,LEVL I: ICD-10-PCS | Mod: 57,,, | Performed by: SURGERY

## 2020-01-01 PROCEDURE — 85025 COMPLETE CBC W/AUTO DIFF WBC: CPT

## 2020-01-01 PROCEDURE — 25000003 PHARM REV CODE 250: Performed by: NURSE ANESTHETIST, CERTIFIED REGISTERED

## 2020-01-01 PROCEDURE — 81376 HLA II TYPING 1 LOCUS LR: CPT

## 2020-01-01 PROCEDURE — 81376 HLA II TYPING 1 LOCUS LR: CPT | Mod: 91

## 2020-01-01 PROCEDURE — 63600175 PHARM REV CODE 636 W HCPCS: Performed by: INTERNAL MEDICINE

## 2020-01-01 PROCEDURE — 84550 ASSAY OF BLOOD/URIC ACID: CPT

## 2020-01-01 PROCEDURE — 99999 PR PBB SHADOW E&M-EST. PATIENT-LVL IV: ICD-10-PCS | Mod: PBBFAC,,, | Performed by: OPHTHALMOLOGY

## 2020-01-01 PROCEDURE — 99215 PR OFFICE/OUTPT VISIT, EST, LEVL V, 40-54 MIN: ICD-10-PCS | Mod: S$GLB,,, | Performed by: NURSE PRACTITIONER

## 2020-01-01 PROCEDURE — 85007 BL SMEAR W/DIFF WBC COUNT: CPT

## 2020-01-01 PROCEDURE — 99232 PR SUBSEQUENT HOSPITAL CARE,LEVL II: ICD-10-PCS | Mod: ,,, | Performed by: INTERNAL MEDICINE

## 2020-01-01 PROCEDURE — 85384 FIBRINOGEN ACTIVITY: CPT | Mod: 91

## 2020-01-01 PROCEDURE — 36430 TRANSFUSION BLD/BLD COMPNT: CPT

## 2020-01-01 PROCEDURE — 25000003 PHARM REV CODE 250: Performed by: NURSE PRACTITIONER

## 2020-01-01 PROCEDURE — 86920 COMPATIBILITY TEST SPIN: CPT

## 2020-01-01 PROCEDURE — 92134 CPTRZ OPH DX IMG PST SGM RTA: CPT | Mod: S$GLB,,, | Performed by: OPTOMETRIST

## 2020-01-01 PROCEDURE — 88313 SPECIAL STAINS GROUP 2: CPT | Mod: 59 | Performed by: PATHOLOGY

## 2020-01-01 PROCEDURE — 99233 SBSQ HOSP IP/OBS HIGH 50: CPT | Mod: ,,, | Performed by: INTERNAL MEDICINE

## 2020-01-01 PROCEDURE — 85384 FIBRINOGEN ACTIVITY: CPT

## 2020-01-01 PROCEDURE — 20600001 HC STEP DOWN PRIVATE ROOM

## 2020-01-01 PROCEDURE — 86900 BLOOD TYPING SEROLOGIC ABO: CPT

## 2020-01-01 PROCEDURE — 36415 COLL VENOUS BLD VENIPUNCTURE: CPT | Mod: PO

## 2020-01-01 PROCEDURE — 99223 PR INITIAL HOSPITAL CARE,LEVL III: ICD-10-PCS | Mod: ,,, | Performed by: INTERNAL MEDICINE

## 2020-01-01 PROCEDURE — 36415 COLL VENOUS BLD VENIPUNCTURE: CPT

## 2020-01-01 PROCEDURE — 94761 N-INVAS EAR/PLS OXIMETRY MLT: CPT

## 2020-01-01 PROCEDURE — 87206 SMEAR FLUORESCENT/ACID STAI: CPT

## 2020-01-01 PROCEDURE — 85610 PROTHROMBIN TIME: CPT | Mod: 91

## 2020-01-01 PROCEDURE — 92250 FUNDUS PHOTOGRAPHY W/I&R: CPT | Mod: S$GLB,,, | Performed by: OPHTHALMOLOGY

## 2020-01-01 PROCEDURE — 92202 PR OPHTHALMOSCOPY, EXT, W/DRAW OPTIC NERVE/MACULA, I&R, UNI/BI: ICD-10-PCS | Mod: S$GLB,,, | Performed by: OPHTHALMOLOGY

## 2020-01-01 PROCEDURE — 80053 COMPREHEN METABOLIC PANEL: CPT | Mod: 91

## 2020-01-01 PROCEDURE — 99214 PR OFFICE/OUTPT VISIT, EST, LEVL IV, 30-39 MIN: ICD-10-PCS | Mod: S$GLB,,, | Performed by: NURSE PRACTITIONER

## 2020-01-01 PROCEDURE — 97530 THERAPEUTIC ACTIVITIES: CPT

## 2020-01-01 PROCEDURE — 27201040 HC RC 50 FILTER

## 2020-01-01 PROCEDURE — 88189 PR  FLOWCYTOMETRY/READ, 16 & > MARKERS: ICD-10-PCS | Mod: ,,, | Performed by: PATHOLOGY

## 2020-01-01 PROCEDURE — 85730 THROMBOPLASTIN TIME PARTIAL: CPT | Mod: 91

## 2020-01-01 PROCEDURE — 85027 COMPLETE CBC AUTOMATED: CPT

## 2020-01-01 PROCEDURE — 27000221 HC OXYGEN, UP TO 24 HOURS

## 2020-01-01 PROCEDURE — P9038 RBC IRRADIATED: HCPCS

## 2020-01-01 PROCEDURE — P9037 PLATE PHERES LEUKOREDU IRRAD: HCPCS

## 2020-01-01 PROCEDURE — 82150 ASSAY OF AMYLASE: CPT

## 2020-01-01 PROCEDURE — 81373 HLA I TYPING 1 LOCUS LR: CPT | Mod: 91

## 2020-01-01 PROCEDURE — 99999 PR PBB SHADOW E&M-EST. PATIENT-LVL IV: CPT | Mod: PBBFAC,,, | Performed by: INTERNAL MEDICINE

## 2020-01-01 PROCEDURE — 99255 IP/OBS CONSLTJ NEW/EST HI 80: CPT | Mod: ,,, | Performed by: INTERNAL MEDICINE

## 2020-01-01 PROCEDURE — 84157 ASSAY OF PROTEIN OTHER: CPT

## 2020-01-01 PROCEDURE — 88185 FLOWCYTOMETRY/TC ADD-ON: CPT | Performed by: PATHOLOGY

## 2020-01-01 PROCEDURE — 81382 HLA II TYPING 1 LOC HR: CPT | Mod: 91

## 2020-01-01 PROCEDURE — 87116 MYCOBACTERIA CULTURE: CPT

## 2020-01-01 PROCEDURE — 86850 RBC ANTIBODY SCREEN: CPT

## 2020-01-01 PROCEDURE — 63600175 PHARM REV CODE 636 W HCPCS: Mod: JG | Performed by: INTERNAL MEDICINE

## 2020-01-01 PROCEDURE — 85027 COMPLETE CBC AUTOMATED: CPT | Mod: PO

## 2020-01-01 PROCEDURE — 88305 TISSUE EXAM BY PATHOLOGIST: ICD-10-PCS | Mod: 26,,, | Performed by: PATHOLOGY

## 2020-01-01 PROCEDURE — 88313 SPECIAL STAINS GROUP 2: CPT | Mod: 26,,, | Performed by: PATHOLOGY

## 2020-01-01 PROCEDURE — 71000033 HC RECOVERY, INTIAL HOUR: Performed by: SURGERY

## 2020-01-01 PROCEDURE — 80502 PR  LAB PATHOLOGY CONSULT-COMPLETE: CPT | Mod: ,,, | Performed by: PATHOLOGY

## 2020-01-01 PROCEDURE — U0002 COVID-19 LAB TEST NON-CDC: HCPCS

## 2020-01-01 PROCEDURE — 80202 ASSAY OF VANCOMYCIN: CPT

## 2020-01-01 PROCEDURE — 88237 TISSUE CULTURE BONE MARROW: CPT

## 2020-01-01 PROCEDURE — 88311 PR  DECALCIFY TISSUE: ICD-10-PCS | Mod: 26,,, | Performed by: PATHOLOGY

## 2020-01-01 PROCEDURE — 88313 SPECIAL STAINS GROUP 2: CPT | Performed by: PATHOLOGY

## 2020-01-01 PROCEDURE — 63600175 PHARM REV CODE 636 W HCPCS: Performed by: NURSE PRACTITIONER

## 2020-01-01 PROCEDURE — 88271 CYTOGENETICS DNA PROBE: CPT | Mod: 59

## 2020-01-01 PROCEDURE — 84550 ASSAY OF BLOOD/URIC ACID: CPT | Mod: 91

## 2020-01-01 PROCEDURE — 83880 ASSAY OF NATRIURETIC PEPTIDE: CPT

## 2020-01-01 PROCEDURE — 80162 ASSAY OF DIGOXIN TOTAL: CPT

## 2020-01-01 PROCEDURE — 87102 FUNGUS ISOLATION CULTURE: CPT

## 2020-01-01 PROCEDURE — 86644 CMV ANTIBODY: CPT

## 2020-01-01 PROCEDURE — G0180 PR HOME HEALTH MD CERTIFICATION: ICD-10-PCS | Mod: ,,, | Performed by: INTERNAL MEDICINE

## 2020-01-01 PROCEDURE — 85060 PATHOLOGIST REVIEW: ICD-10-PCS | Mod: ,,, | Performed by: PATHOLOGY

## 2020-01-01 PROCEDURE — 99215 OFFICE O/P EST HI 40 MIN: CPT | Mod: S$GLB,,, | Performed by: INTERNAL MEDICINE

## 2020-01-01 PROCEDURE — 99215 OFFICE O/P EST HI 40 MIN: CPT | Mod: S$GLB,,, | Performed by: NURSE PRACTITIONER

## 2020-01-01 PROCEDURE — 92134 POSTERIOR SEGMENT OCT RETINA (OCULAR COHERENCE TOMOGRAPHY)-BOTH EYES: ICD-10-PCS | Mod: S$GLB,,, | Performed by: OPTOMETRIST

## 2020-01-01 PROCEDURE — 99214 OFFICE O/P EST MOD 30 MIN: CPT | Mod: S$GLB,,, | Performed by: NURSE PRACTITIONER

## 2020-01-01 PROCEDURE — 81382 HLA II TYPING 1 LOC HR: CPT

## 2020-01-01 PROCEDURE — 99999 PR PBB SHADOW E&M-EST. PATIENT-LVL V: ICD-10-PCS | Mod: PBBFAC,,, | Performed by: NURSE PRACTITIONER

## 2020-01-01 PROCEDURE — 85007 BL SMEAR W/DIFF WBC COUNT: CPT | Mod: NCS,PO

## 2020-01-01 PROCEDURE — 81479 UNLISTED MOLECULAR PATHOLOGY: CPT

## 2020-01-01 PROCEDURE — 87040 BLOOD CULTURE FOR BACTERIA: CPT | Mod: 59

## 2020-01-01 PROCEDURE — 88311 DECALCIFY TISSUE: CPT | Performed by: PATHOLOGY

## 2020-01-01 PROCEDURE — 99233 PR SUBSEQUENT HOSPITAL CARE,LEVL III: ICD-10-PCS | Mod: ,,, | Performed by: HOSPITALIST

## 2020-01-01 PROCEDURE — 77001 CHG FLUOROGUIDE CNTRL VEN ACCESS,PLACE,REPLACE,REMOVE: ICD-10-PCS | Mod: 26,,, | Performed by: SURGERY

## 2020-01-01 PROCEDURE — 36592 COLLECT BLOOD FROM PICC: CPT

## 2020-01-01 PROCEDURE — 88305 TISSUE EXAM BY PATHOLOGIST: CPT | Performed by: PATHOLOGY

## 2020-01-01 PROCEDURE — 37000009 HC ANESTHESIA EA ADD 15 MINS: Performed by: SURGERY

## 2020-01-01 PROCEDURE — 88184 FLOWCYTOMETRY/ TC 1 MARKER: CPT | Performed by: PATHOLOGY

## 2020-01-01 PROCEDURE — 99232 SBSQ HOSP IP/OBS MODERATE 35: CPT | Mod: ,,, | Performed by: INTERNAL MEDICINE

## 2020-01-01 PROCEDURE — 87040 BLOOD CULTURE FOR BACTERIA: CPT

## 2020-01-01 PROCEDURE — 99213 PR OFFICE/OUTPT VISIT, EST, LEVL III, 20-29 MIN: ICD-10-PCS | Mod: S$GLB,,, | Performed by: INTERNAL MEDICINE

## 2020-01-01 PROCEDURE — 92250 COLOR FUNDUS PHOTOGRAPHY - OU - BOTH EYES: ICD-10-PCS | Mod: S$GLB,,, | Performed by: OPHTHALMOLOGY

## 2020-01-01 PROCEDURE — 88305 TISSUE EXAM BY PATHOLOGIST: CPT | Mod: 26,,, | Performed by: PATHOLOGY

## 2020-01-01 PROCEDURE — 88342 IMHCHEM/IMCYTCHM 1ST ANTB: CPT | Mod: 26,59,, | Performed by: PATHOLOGY

## 2020-01-01 PROCEDURE — 97165 OT EVAL LOW COMPLEX 30 MIN: CPT

## 2020-01-01 PROCEDURE — 81001 URINALYSIS AUTO W/SCOPE: CPT

## 2020-01-01 PROCEDURE — 99223 1ST HOSP IP/OBS HIGH 75: CPT | Mod: 25,,, | Performed by: EMERGENCY MEDICINE

## 2020-01-01 PROCEDURE — 96413 CHEMO IV INFUSION 1 HR: CPT

## 2020-01-01 PROCEDURE — 82042 OTHER SOURCE ALBUMIN QUAN EA: CPT

## 2020-01-01 PROCEDURE — 85097 BONE MARROW INTERPRETATION: CPT | Mod: ,,, | Performed by: PATHOLOGY

## 2020-01-01 PROCEDURE — 88342 IMHCHEM/IMCYTCHM 1ST ANTB: CPT | Mod: 91 | Performed by: PATHOLOGY

## 2020-01-01 PROCEDURE — 99239 PR HOSPITAL DISCHARGE DAY,>30 MIN: ICD-10-PCS | Mod: ,,, | Performed by: INTERNAL MEDICINE

## 2020-01-01 PROCEDURE — 88341 IMHCHEM/IMCYTCHM EA ADD ANTB: CPT | Mod: 59 | Performed by: PATHOLOGY

## 2020-01-01 PROCEDURE — 99255 PR INITIAL INPATIENT CONSULT,LEVL V: ICD-10-PCS | Mod: ,,, | Performed by: INTERNAL MEDICINE

## 2020-01-01 PROCEDURE — 71000015 HC POSTOP RECOV 1ST HR: Performed by: SURGERY

## 2020-01-01 PROCEDURE — 85060 BLOOD SMEAR INTERPRETATION: CPT | Mod: ,,, | Performed by: PATHOLOGY

## 2020-01-01 PROCEDURE — 88313 PR  SPECIAL STAINS,GROUP II: ICD-10-PCS | Mod: 26,,, | Performed by: PATHOLOGY

## 2020-01-01 PROCEDURE — 88189 FLOWCYTOMETRY/READ 16 & >: CPT | Mod: ,,, | Performed by: PATHOLOGY

## 2020-01-01 PROCEDURE — C9399 UNCLASSIFIED DRUGS OR BIOLOG: HCPCS | Performed by: STUDENT IN AN ORGANIZED HEALTH CARE EDUCATION/TRAINING PROGRAM

## 2020-01-01 PROCEDURE — 92004 COMPRE OPH EXAM NEW PT 1/>: CPT | Mod: S$GLB,,, | Performed by: OPHTHALMOLOGY

## 2020-01-01 PROCEDURE — 88311 DECALCIFY TISSUE: CPT | Mod: 26,,, | Performed by: PATHOLOGY

## 2020-01-01 PROCEDURE — 99999 PR PBB SHADOW E&M-EST. PATIENT-LVL III: ICD-10-PCS | Mod: PBBFAC,,, | Performed by: NURSE PRACTITIONER

## 2020-01-01 PROCEDURE — 99223 PR INITIAL HOSPITAL CARE,LEVL III: ICD-10-PCS | Mod: 25,,, | Performed by: EMERGENCY MEDICINE

## 2020-01-01 PROCEDURE — 87077 CULTURE AEROBIC IDENTIFY: CPT | Mod: 59

## 2020-01-01 PROCEDURE — 81380 HLA I TYPING 1 LOCUS HR: CPT

## 2020-01-01 PROCEDURE — 37000008 HC ANESTHESIA 1ST 15 MINUTES: Performed by: SURGERY

## 2020-01-01 PROCEDURE — 99215 PR OFFICE/OUTPT VISIT, EST, LEVL V, 40-54 MIN: ICD-10-PCS | Mod: S$GLB,,, | Performed by: INTERNAL MEDICINE

## 2020-01-01 PROCEDURE — 87205 SMEAR GRAM STAIN: CPT

## 2020-01-01 PROCEDURE — 83615 LACTATE (LD) (LDH) ENZYME: CPT | Mod: 91

## 2020-01-01 PROCEDURE — 89051 BODY FLUID CELL COUNT: CPT

## 2020-01-01 PROCEDURE — 93010 ELECTROCARDIOGRAM REPORT: CPT | Mod: 76,,, | Performed by: INTERNAL MEDICINE

## 2020-01-01 PROCEDURE — D9220A PRA ANESTHESIA: Mod: ,,, | Performed by: ANESTHESIOLOGY

## 2020-01-01 PROCEDURE — 99999 PR PBB SHADOW E&M-EST. PATIENT-LVL IV: ICD-10-PCS | Mod: PBBFAC,,, | Performed by: INTERNAL MEDICINE

## 2020-01-01 PROCEDURE — 88342 IMHCHEM/IMCYTCHM 1ST ANTB: CPT | Performed by: PATHOLOGY

## 2020-01-01 PROCEDURE — 80074 ACUTE HEPATITIS PANEL: CPT

## 2020-01-01 PROCEDURE — P9021 RED BLOOD CELLS UNIT: HCPCS

## 2020-01-01 PROCEDURE — 86703 HIV-1/HIV-2 1 RESULT ANTBDY: CPT

## 2020-01-01 PROCEDURE — 92202 OPSCPY EXTND ON/MAC DRAW: CPT | Mod: S$GLB,,, | Performed by: OPHTHALMOLOGY

## 2020-01-01 PROCEDURE — 97161 PT EVAL LOW COMPLEX 20 MIN: CPT

## 2020-01-01 PROCEDURE — 85025 COMPLETE CBC W/AUTO DIFF WBC: CPT | Mod: PO

## 2020-01-01 PROCEDURE — 38222 DX BONE MARROW BX & ASPIR: CPT | Mod: RT,,, | Performed by: NURSE PRACTITIONER

## 2020-01-01 PROCEDURE — 97803 MED NUTRITION INDIV SUBSEQ: CPT

## 2020-01-01 PROCEDURE — 36558 INSERT TUNNELED CV CATH: CPT | Mod: RT,,, | Performed by: SURGERY

## 2020-01-01 PROCEDURE — 38222 PR BONE MARROW BIOPSY(IES) W/ASPIRATION(S); DIAGNOSTIC: ICD-10-PCS | Mod: RT,,, | Performed by: NURSE PRACTITIONER

## 2020-01-01 PROCEDURE — 93005 ELECTROCARDIOGRAM TRACING: CPT

## 2020-01-01 PROCEDURE — 92004 PR EYE EXAM, NEW PATIENT,COMPREHESV: ICD-10-PCS | Mod: S$GLB,,, | Performed by: OPTOMETRIST

## 2020-01-01 PROCEDURE — 99231 SBSQ HOSP IP/OBS SF/LOW 25: CPT | Mod: 57,,, | Performed by: SURGERY

## 2020-01-01 PROCEDURE — 38222 PR BONE MARROW BIOPSY(IES) W/ASPIRATION(S); DIAGNOSTIC: ICD-10-PCS | Mod: LT,S$GLB,, | Performed by: NURSE PRACTITIONER

## 2020-01-01 PROCEDURE — 80285 DRUG ASSAY VORICONAZOLE: CPT

## 2020-01-01 PROCEDURE — 84100 ASSAY OF PHOSPHORUS: CPT | Mod: 91

## 2020-01-01 PROCEDURE — 99999 PR PBB SHADOW E&M-EST. PATIENT-LVL IV: CPT | Mod: PBBFAC,,, | Performed by: OPHTHALMOLOGY

## 2020-01-01 PROCEDURE — 88342 CHG IMMUNOCYTOCHEMISTRY: ICD-10-PCS | Mod: 26,59,, | Performed by: PATHOLOGY

## 2020-01-01 PROCEDURE — 82945 GLUCOSE OTHER FLUID: CPT

## 2020-01-01 PROCEDURE — 99999 PR PBB SHADOW E&M-EST. PATIENT-LVL III: ICD-10-PCS | Mod: PBBFAC,,, | Performed by: OPTOMETRIST

## 2020-01-01 PROCEDURE — 92004 COMPRE OPH EXAM NEW PT 1/>: CPT | Mod: S$GLB,,, | Performed by: OPTOMETRIST

## 2020-01-01 PROCEDURE — 25500020 PHARM REV CODE 255: Performed by: INTERNAL MEDICINE

## 2020-01-01 PROCEDURE — 77001 FLUOROGUIDE FOR VEIN DEVICE: CPT | Mod: 26,,, | Performed by: SURGERY

## 2020-01-01 PROCEDURE — 96374 THER/PROPH/DIAG INJ IV PUSH: CPT

## 2020-01-01 PROCEDURE — 97116 GAIT TRAINING THERAPY: CPT

## 2020-01-01 PROCEDURE — 97110 THERAPEUTIC EXERCISES: CPT

## 2020-01-01 PROCEDURE — D9220A PRA ANESTHESIA: ICD-10-PCS | Mod: ,,, | Performed by: ANESTHESIOLOGY

## 2020-01-01 PROCEDURE — 88299 UNLISTED CYTOGENETIC STUDY: CPT

## 2020-01-01 PROCEDURE — 80048 BASIC METABOLIC PNL TOTAL CA: CPT

## 2020-01-01 PROCEDURE — 36558 PR INSERT TUNNELED CV CATH W/O PORT OR PUMP: ICD-10-PCS | Mod: RT,,, | Performed by: SURGERY

## 2020-01-01 PROCEDURE — 88275 CYTOGENETICS 100-300: CPT

## 2020-01-01 PROCEDURE — 87186 SC STD MICRODIL/AGAR DIL: CPT | Mod: 59

## 2020-01-01 PROCEDURE — 85027 COMPLETE CBC AUTOMATED: CPT | Mod: 91

## 2020-01-01 PROCEDURE — 32555 ASPIRATE PLEURA W/ IMAGING: CPT

## 2020-01-01 PROCEDURE — 81373 HLA I TYPING 1 LOCUS LR: CPT

## 2020-01-01 PROCEDURE — 83036 HEMOGLOBIN GLYCOSYLATED A1C: CPT

## 2020-01-01 PROCEDURE — 99213 OFFICE O/P EST LOW 20 MIN: CPT | Mod: S$GLB,,, | Performed by: INTERNAL MEDICINE

## 2020-01-01 PROCEDURE — 99999 PR PBB SHADOW E&M-EST. PATIENT-LVL III: ICD-10-PCS | Mod: PBBFAC,,, | Performed by: INTERNAL MEDICINE

## 2020-01-01 PROCEDURE — 88112 CYTOPATH CELL ENHANCE TECH: CPT | Performed by: PATHOLOGY

## 2020-01-01 PROCEDURE — 80502 PR  LAB PATHOLOGY CONSULT-COMPLETE: ICD-10-PCS | Mod: ,,, | Performed by: PATHOLOGY

## 2020-01-01 PROCEDURE — 88341 PR IHC OR ICC EACH ADD'L SINGLE ANTIBODY  STAINPR: ICD-10-PCS | Mod: 26,59,, | Performed by: PATHOLOGY

## 2020-01-01 PROCEDURE — 99239 HOSP IP/OBS DSCHRG MGMT >30: CPT | Mod: ,,, | Performed by: INTERNAL MEDICINE

## 2020-01-01 PROCEDURE — 88112 CYTOPATH CELL ENHANCE TECH: CPT | Mod: 26,,, | Performed by: PATHOLOGY

## 2020-01-01 PROCEDURE — A4216 STERILE WATER/SALINE, 10 ML: HCPCS | Performed by: NURSE ANESTHETIST, CERTIFIED REGISTERED

## 2020-01-01 PROCEDURE — 63600175 PHARM REV CODE 636 W HCPCS: Performed by: NURSE ANESTHETIST, CERTIFIED REGISTERED

## 2020-01-01 PROCEDURE — 88185 FLOWCYTOMETRY/TC ADD-ON: CPT | Mod: 59 | Performed by: PATHOLOGY

## 2020-01-01 PROCEDURE — 99233 SBSQ HOSP IP/OBS HIGH 50: CPT | Mod: ,,, | Performed by: HOSPITALIST

## 2020-01-01 PROCEDURE — 81450 HL NEO GSAP 5-50DNA/DNA&RNA: CPT

## 2020-01-01 PROCEDURE — 38222 DX BONE MARROW BX & ASPIR: CPT | Mod: LT,S$GLB,, | Performed by: NURSE PRACTITIONER

## 2020-01-01 PROCEDURE — 88341 IMHCHEM/IMCYTCHM EA ADD ANTB: CPT | Mod: 26,59,, | Performed by: PATHOLOGY

## 2020-01-01 PROCEDURE — 84311 SPECTROPHOTOMETRY: CPT

## 2020-01-01 PROCEDURE — 85007 BL SMEAR W/DIFF WBC COUNT: CPT | Mod: PO

## 2020-01-01 PROCEDURE — 93010 EKG 12-LEAD: ICD-10-PCS | Mod: 76,,, | Performed by: INTERNAL MEDICINE

## 2020-01-01 PROCEDURE — 88112 PR  CYTOPATH, CELL ENHANCE TECH: ICD-10-PCS | Mod: 26,,, | Performed by: PATHOLOGY

## 2020-01-01 PROCEDURE — 99999 PR PBB SHADOW E&M-EST. PATIENT-LVL III: CPT | Mod: PBBFAC,,, | Performed by: INTERNAL MEDICINE

## 2020-01-01 PROCEDURE — 92004 PR EYE EXAM, NEW PATIENT,COMPREHESV: ICD-10-PCS | Mod: S$GLB,,, | Performed by: OPHTHALMOLOGY

## 2020-01-01 PROCEDURE — 32554 PR THORACEN W/O IMAG GUIDANC: ICD-10-PCS | Mod: LT,,, | Performed by: EMERGENCY MEDICINE

## 2020-01-01 PROCEDURE — 82570 ASSAY OF URINE CREATININE: CPT

## 2020-01-01 PROCEDURE — 32554 ASPIRATE PLEURA W/O IMAGING: CPT | Mod: LT,,, | Performed by: EMERGENCY MEDICINE

## 2020-01-01 PROCEDURE — 36000706: Performed by: SURGERY

## 2020-01-01 PROCEDURE — 63600175 PHARM REV CODE 636 W HCPCS: Performed by: SURGERY

## 2020-01-01 PROCEDURE — 87070 CULTURE OTHR SPECIMN AEROBIC: CPT

## 2020-01-01 PROCEDURE — 81380 HLA I TYPING 1 LOCUS HR: CPT | Mod: 91

## 2020-01-01 PROCEDURE — 36000707: Performed by: SURGERY

## 2020-01-01 PROCEDURE — 38221 DX BONE MARROW BIOPSIES: CPT

## 2020-01-01 PROCEDURE — 94799 UNLISTED PULMONARY SVC/PX: CPT

## 2020-01-01 PROCEDURE — 88341 IMHCHEM/IMCYTCHM EA ADD ANTB: CPT | Performed by: PATHOLOGY

## 2020-01-01 PROCEDURE — G0180 MD CERTIFICATION HHA PATIENT: HCPCS | Mod: ,,, | Performed by: INTERNAL MEDICINE

## 2020-01-01 PROCEDURE — 99999 PR PBB SHADOW E&M-EST. PATIENT-LVL III: CPT | Mod: PBBFAC,,, | Performed by: OPTOMETRIST

## 2020-01-01 PROCEDURE — 88341 PR IHC OR ICC EACH ADD'L SINGLE ANTIBODY  STAINPR: ICD-10-PCS | Mod: 26,,, | Performed by: PATHOLOGY

## 2020-01-01 PROCEDURE — 82248 BILIRUBIN DIRECT: CPT

## 2020-01-01 PROCEDURE — 99999 PR PBB SHADOW E&M-EST. PATIENT-LVL III: CPT | Mod: PBBFAC,,, | Performed by: NURSE PRACTITIONER

## 2020-01-01 PROCEDURE — 88341 IMHCHEM/IMCYTCHM EA ADD ANTB: CPT | Mod: 26,,, | Performed by: PATHOLOGY

## 2020-01-01 PROCEDURE — 93010 ELECTROCARDIOGRAM REPORT: CPT | Mod: ,,, | Performed by: INTERNAL MEDICINE

## 2020-01-01 DEVICE — CATH HEMOSPLIT 14.5FR 19CM: Type: IMPLANTABLE DEVICE | Site: NECK | Status: FUNCTIONAL

## 2020-01-01 RX ORDER — HEPARIN SODIUM 1000 [USP'U]/ML
1600 INJECTION, SOLUTION INTRAVENOUS; SUBCUTANEOUS
Status: COMPLETED | OUTPATIENT
Start: 2020-01-01 | End: 2020-01-01

## 2020-01-01 RX ORDER — ACETAMINOPHEN 325 MG/1
650 TABLET ORAL
Status: CANCELLED | OUTPATIENT
Start: 2020-01-01

## 2020-01-01 RX ORDER — VORICONAZOLE 200 MG/1
100 TABLET, FILM COATED ORAL 2 TIMES DAILY
COMMUNITY
End: 2020-01-01 | Stop reason: SDUPTHER

## 2020-01-01 RX ORDER — POTASSIUM CHLORIDE 1.5 G/1.58G
40 POWDER, FOR SOLUTION ORAL ONCE
Status: COMPLETED | OUTPATIENT
Start: 2020-01-01 | End: 2020-01-01

## 2020-01-01 RX ORDER — HEPARIN 100 UNIT/ML
500 SYRINGE INTRAVENOUS
Status: DISCONTINUED | OUTPATIENT
Start: 2020-01-01 | End: 2020-01-01 | Stop reason: HOSPADM

## 2020-01-01 RX ORDER — DIPHENHYDRAMINE HCL 25 MG
25 CAPSULE ORAL EVERY 6 HOURS PRN
Status: DISCONTINUED | OUTPATIENT
Start: 2020-01-01 | End: 2020-01-01 | Stop reason: HOSPADM

## 2020-01-01 RX ORDER — SODIUM CHLORIDE 0.9 % (FLUSH) 0.9 %
10 SYRINGE (ML) INJECTION
Status: DISCONTINUED | OUTPATIENT
Start: 2020-01-01 | End: 2020-01-01 | Stop reason: HOSPADM

## 2020-01-01 RX ORDER — ACETAMINOPHEN 325 MG/1
650 TABLET ORAL EVERY 6 HOURS PRN
Status: DISCONTINUED | OUTPATIENT
Start: 2020-01-01 | End: 2020-01-01 | Stop reason: HOSPADM

## 2020-01-01 RX ORDER — DIPHENHYDRAMINE HYDROCHLORIDE 50 MG/ML
25 INJECTION INTRAMUSCULAR; INTRAVENOUS
Status: CANCELLED | OUTPATIENT
Start: 2020-01-01

## 2020-01-01 RX ORDER — DIPHENHYDRAMINE HCL 25 MG
25 CAPSULE ORAL
Status: COMPLETED | OUTPATIENT
Start: 2020-01-01 | End: 2020-01-01

## 2020-01-01 RX ORDER — SODIUM CHLORIDE 0.9 % (FLUSH) 0.9 %
10 SYRINGE (ML) INJECTION
Status: CANCELLED | OUTPATIENT
Start: 2020-01-01

## 2020-01-01 RX ORDER — POTASSIUM CHLORIDE 20 MEQ/1
40 TABLET, EXTENDED RELEASE ORAL DAILY
Qty: 60 TABLET | Refills: 1 | Status: SHIPPED | OUTPATIENT
Start: 2020-01-01 | End: 2020-01-01 | Stop reason: SDUPTHER

## 2020-01-01 RX ORDER — POTASSIUM CHLORIDE 20 MEQ/1
40 TABLET, EXTENDED RELEASE ORAL DAILY
Qty: 60 TABLET | Refills: 1 | Status: SHIPPED | OUTPATIENT
Start: 2020-01-01 | End: 2021-01-01

## 2020-01-01 RX ORDER — LIDOCAINE HYDROCHLORIDE 20 MG/ML
10 INJECTION, SOLUTION EPIDURAL; INFILTRATION; INTRACAUDAL; PERINEURAL ONCE
Status: COMPLETED | OUTPATIENT
Start: 2020-01-01 | End: 2020-01-01

## 2020-01-01 RX ORDER — DIPHENHYDRAMINE HCL 25 MG
25 CAPSULE ORAL ONCE
Status: COMPLETED | OUTPATIENT
Start: 2020-01-01 | End: 2020-01-01

## 2020-01-01 RX ORDER — HEPARIN 100 UNIT/ML
500 SYRINGE INTRAVENOUS
Status: CANCELLED | OUTPATIENT
Start: 2020-01-01

## 2020-01-01 RX ORDER — AMLODIPINE BESYLATE 5 MG/1
5 TABLET ORAL DAILY
COMMUNITY
End: 2020-01-01

## 2020-01-01 RX ORDER — LANOLIN ALCOHOL/MO/W.PET/CERES
800 CREAM (GRAM) TOPICAL ONCE
Status: DISCONTINUED | OUTPATIENT
Start: 2020-01-01 | End: 2020-01-01

## 2020-01-01 RX ORDER — METHOCARBAMOL 500 MG/1
TABLET, FILM COATED ORAL
COMMUNITY
End: 2020-01-01

## 2020-01-01 RX ORDER — HYDROCORTISONE 25 MG/G
CREAM TOPICAL 2 TIMES DAILY PRN
Status: DISCONTINUED | OUTPATIENT
Start: 2020-01-01 | End: 2020-01-01 | Stop reason: HOSPADM

## 2020-01-01 RX ORDER — ACETAMINOPHEN 325 MG/1
650 TABLET ORAL
Status: COMPLETED | OUTPATIENT
Start: 2020-01-01 | End: 2020-01-01

## 2020-01-01 RX ORDER — LIDOCAINE HYDROCHLORIDE 20 MG/ML
10 INJECTION, SOLUTION EPIDURAL; INFILTRATION; INTRACAUDAL; PERINEURAL ONCE AS NEEDED
Status: COMPLETED | OUTPATIENT
Start: 2020-01-01 | End: 2020-01-01

## 2020-01-01 RX ORDER — HYDROCODONE BITARTRATE AND ACETAMINOPHEN 500; 5 MG/1; MG/1
TABLET ORAL
Status: DISCONTINUED | OUTPATIENT
Start: 2020-01-01 | End: 2020-01-01

## 2020-01-01 RX ORDER — DIGOXIN 250 MCG
250 TABLET ORAL DAILY
COMMUNITY

## 2020-01-01 RX ORDER — SODIUM CHLORIDE, SODIUM LACTATE, POTASSIUM CHLORIDE, CALCIUM CHLORIDE 600; 310; 30; 20 MG/100ML; MG/100ML; MG/100ML; MG/100ML
INJECTION, SOLUTION INTRAVENOUS CONTINUOUS
Status: DISCONTINUED | OUTPATIENT
Start: 2020-01-01 | End: 2020-01-01

## 2020-01-01 RX ORDER — HEPARIN 100 UNIT/ML
500 SYRINGE INTRAVENOUS
Status: DISCONTINUED | OUTPATIENT
Start: 2020-01-01 | End: 2020-01-01

## 2020-01-01 RX ORDER — ALLOPURINOL 300 MG/1
300 TABLET ORAL DAILY
Status: DISCONTINUED | OUTPATIENT
Start: 2020-01-01 | End: 2020-01-01

## 2020-01-01 RX ORDER — LEVOFLOXACIN 500 MG/1
500 TABLET, FILM COATED ORAL DAILY
Qty: 30 TABLET | Refills: 2
Start: 2020-01-01 | End: 2020-01-01

## 2020-01-01 RX ORDER — POTASSIUM CHLORIDE 20 MEQ/1
20 TABLET, EXTENDED RELEASE ORAL
Status: DISCONTINUED | OUTPATIENT
Start: 2020-01-01 | End: 2020-01-01 | Stop reason: HOSPADM

## 2020-01-01 RX ORDER — POTASSIUM CHLORIDE 20 MEQ/1
40 TABLET, EXTENDED RELEASE ORAL ONCE
Status: COMPLETED | OUTPATIENT
Start: 2020-01-01 | End: 2020-01-01

## 2020-01-01 RX ORDER — POTASSIUM CHLORIDE 20 MEQ/1
20 TABLET, EXTENDED RELEASE ORAL ONCE
Status: DISCONTINUED | OUTPATIENT
Start: 2020-01-01 | End: 2020-01-01

## 2020-01-01 RX ORDER — SODIUM CHLORIDE 9 MG/ML
INJECTION, SOLUTION INTRAVENOUS CONTINUOUS
Status: DISCONTINUED | OUTPATIENT
Start: 2020-01-01 | End: 2020-01-01

## 2020-01-01 RX ORDER — SODIUM,POTASSIUM PHOSPHATES 280-250MG
1 POWDER IN PACKET (EA) ORAL EVERY 4 HOURS PRN
Status: DISCONTINUED | OUTPATIENT
Start: 2020-01-01 | End: 2020-01-01 | Stop reason: HOSPADM

## 2020-01-01 RX ORDER — LOSARTAN POTASSIUM 50 MG/1
TABLET ORAL
COMMUNITY
End: 2020-01-01

## 2020-01-01 RX ORDER — ACYCLOVIR 200 MG/1
400 CAPSULE ORAL 2 TIMES DAILY
Qty: 60 CAPSULE | Refills: 1
Start: 2020-01-01 | End: 2020-01-01

## 2020-01-01 RX ORDER — DIPHENHYDRAMINE HCL 25 MG
25 CAPSULE ORAL
Status: CANCELLED | OUTPATIENT
Start: 2020-01-01

## 2020-01-01 RX ORDER — ACYCLOVIR 200 MG/1
400 CAPSULE ORAL 2 TIMES DAILY
Qty: 60 CAPSULE | Refills: 1 | Status: SHIPPED | OUTPATIENT
Start: 2020-01-01 | End: 2020-01-01 | Stop reason: SDUPTHER

## 2020-01-01 RX ORDER — HYDROXYUREA 500 MG/1
2000 CAPSULE ORAL 2 TIMES DAILY
Status: DISCONTINUED | OUTPATIENT
Start: 2020-01-01 | End: 2020-01-01

## 2020-01-01 RX ORDER — POTASSIUM CHLORIDE 20 MEQ/1
40 TABLET, EXTENDED RELEASE ORAL EVERY 4 HOURS
Status: DISCONTINUED | OUTPATIENT
Start: 2020-01-01 | End: 2020-01-01

## 2020-01-01 RX ORDER — VORICONAZOLE 50 MG/1
200 TABLET, FILM COATED ORAL 2 TIMES DAILY
Status: DISCONTINUED | OUTPATIENT
Start: 2020-01-01 | End: 2020-01-01 | Stop reason: HOSPADM

## 2020-01-01 RX ORDER — HEPARIN 100 UNIT/ML
500 SYRINGE INTRAVENOUS
Status: COMPLETED | OUTPATIENT
Start: 2020-01-01 | End: 2020-01-01

## 2020-01-01 RX ORDER — METFORMIN HYDROCHLORIDE 500 MG/1
500 TABLET ORAL 2 TIMES DAILY WITH MEALS
COMMUNITY

## 2020-01-01 RX ORDER — PROCHLORPERAZINE EDISYLATE 5 MG/ML
10 INJECTION INTRAMUSCULAR; INTRAVENOUS EVERY 6 HOURS PRN
Status: DISCONTINUED | OUTPATIENT
Start: 2020-01-01 | End: 2020-01-01

## 2020-01-01 RX ORDER — HYDROCODONE BITARTRATE AND ACETAMINOPHEN 500; 5 MG/1; MG/1
TABLET ORAL ONCE
Status: CANCELLED | OUTPATIENT
Start: 2020-01-01 | End: 2020-01-01

## 2020-01-01 RX ORDER — IBUPROFEN 200 MG
16 TABLET ORAL
Status: DISCONTINUED | OUTPATIENT
Start: 2020-01-01 | End: 2020-01-01 | Stop reason: HOSPADM

## 2020-01-01 RX ORDER — METOPROLOL SUCCINATE 50 MG/1
100 TABLET, EXTENDED RELEASE ORAL DAILY
Status: DISCONTINUED | OUTPATIENT
Start: 2020-01-01 | End: 2020-01-01

## 2020-01-01 RX ORDER — DIPHENHYDRAMINE HYDROCHLORIDE 50 MG/ML
25 INJECTION INTRAMUSCULAR; INTRAVENOUS
Status: DISCONTINUED | OUTPATIENT
Start: 2020-01-01 | End: 2020-01-01 | Stop reason: HOSPADM

## 2020-01-01 RX ORDER — METOPROLOL TARTRATE 1 MG/ML
5 INJECTION, SOLUTION INTRAVENOUS EVERY 5 MIN PRN
Status: DISCONTINUED | OUTPATIENT
Start: 2020-01-01 | End: 2020-01-01 | Stop reason: HOSPADM

## 2020-01-01 RX ORDER — SODIUM CHLORIDE 450 MG/100ML
INJECTION, SOLUTION INTRAVENOUS CONTINUOUS
Status: DISCONTINUED | OUTPATIENT
Start: 2020-01-01 | End: 2020-01-01 | Stop reason: HOSPADM

## 2020-01-01 RX ORDER — BRIMONIDINE TARTRATE, TIMOLOL MALEATE 2; 5 MG/ML; MG/ML
SOLUTION/ DROPS OPHTHALMIC
COMMUNITY
End: 2020-01-01

## 2020-01-01 RX ORDER — HYDROCODONE BITARTRATE AND ACETAMINOPHEN 500; 5 MG/1; MG/1
TABLET ORAL ONCE
Status: COMPLETED | OUTPATIENT
Start: 2020-01-01 | End: 2020-01-01

## 2020-01-01 RX ORDER — LANOLIN ALCOHOL/MO/W.PET/CERES
800 CREAM (GRAM) TOPICAL EVERY 4 HOURS PRN
Status: DISCONTINUED | OUTPATIENT
Start: 2020-01-01 | End: 2020-01-01 | Stop reason: HOSPADM

## 2020-01-01 RX ORDER — LANOLIN ALCOHOL/MO/W.PET/CERES
800 CREAM (GRAM) TOPICAL ONCE
Status: COMPLETED | OUTPATIENT
Start: 2020-01-01 | End: 2020-01-01

## 2020-01-01 RX ORDER — ONDANSETRON 4 MG/1
16 TABLET, FILM COATED ORAL
Status: COMPLETED | OUTPATIENT
Start: 2020-01-01 | End: 2020-01-01

## 2020-01-01 RX ORDER — GLUCAGON 1 MG
1 KIT INJECTION
Status: DISCONTINUED | OUTPATIENT
Start: 2020-01-01 | End: 2020-01-01 | Stop reason: HOSPADM

## 2020-01-01 RX ORDER — IBUPROFEN 200 MG
24 TABLET ORAL
Status: DISCONTINUED | OUTPATIENT
Start: 2020-01-01 | End: 2020-01-01

## 2020-01-01 RX ORDER — TERAZOSIN 5 MG/1
5 CAPSULE ORAL NIGHTLY
Status: DISCONTINUED | OUTPATIENT
Start: 2020-01-01 | End: 2020-01-01 | Stop reason: HOSPADM

## 2020-01-01 RX ORDER — LOSARTAN POTASSIUM 50 MG/1
50 TABLET ORAL DAILY
Status: ON HOLD | COMMUNITY
End: 2020-01-01 | Stop reason: HOSPADM

## 2020-01-01 RX ORDER — HEPARIN 100 UNIT/ML
300 SYRINGE INTRAVENOUS
Status: DISCONTINUED | OUTPATIENT
Start: 2020-01-01 | End: 2020-01-01

## 2020-01-01 RX ORDER — LEVOFLOXACIN 500 MG/1
500 TABLET, FILM COATED ORAL DAILY
Status: DISCONTINUED | OUTPATIENT
Start: 2020-01-01 | End: 2020-01-01

## 2020-01-01 RX ORDER — VORICONAZOLE 50 MG/1
200 TABLET, FILM COATED ORAL 2 TIMES DAILY
Status: DISCONTINUED | OUTPATIENT
Start: 2020-01-01 | End: 2020-01-01

## 2020-01-01 RX ORDER — LOSARTAN POTASSIUM 25 MG/1
25 TABLET ORAL DAILY
Qty: 30 TABLET | Refills: 1 | Status: SHIPPED | OUTPATIENT
Start: 2020-01-01 | End: 2020-01-01 | Stop reason: SDUPTHER

## 2020-01-01 RX ORDER — ESMOLOL HYDROCHLORIDE 10 MG/ML
INJECTION INTRAVENOUS
Status: DISCONTINUED | OUTPATIENT
Start: 2020-01-01 | End: 2020-01-01

## 2020-01-01 RX ORDER — HEPARIN 100 UNIT/ML
SYRINGE INTRAVENOUS
Status: DISCONTINUED | OUTPATIENT
Start: 2020-01-01 | End: 2020-01-01 | Stop reason: HOSPADM

## 2020-01-01 RX ORDER — VORICONAZOLE 200 MG/1
200 TABLET, FILM COATED ORAL 2 TIMES DAILY
COMMUNITY
Start: 2020-01-01 | End: 2021-01-01

## 2020-01-01 RX ORDER — ONDANSETRON 8 MG/1
8 TABLET, ORALLY DISINTEGRATING ORAL EVERY 8 HOURS PRN
Qty: 30 TABLET | Refills: 0 | Status: CANCELLED
Start: 2020-01-01

## 2020-01-01 RX ORDER — GUAIFENESIN 100 MG/5ML
200 SOLUTION ORAL EVERY 4 HOURS PRN
Status: DISCONTINUED | OUTPATIENT
Start: 2020-01-01 | End: 2020-01-01 | Stop reason: HOSPADM

## 2020-01-01 RX ORDER — HYDROMORPHONE HYDROCHLORIDE 1 MG/ML
0.5 INJECTION, SOLUTION INTRAMUSCULAR; INTRAVENOUS; SUBCUTANEOUS ONCE AS NEEDED
Status: COMPLETED | OUTPATIENT
Start: 2020-01-01 | End: 2020-01-01

## 2020-01-01 RX ORDER — GLUCAGON 1 MG
1 KIT INJECTION
Status: DISCONTINUED | OUTPATIENT
Start: 2020-01-01 | End: 2020-01-01

## 2020-01-01 RX ORDER — DIPHENHYDRAMINE HCL 25 MG
25 CAPSULE ORAL ONCE AS NEEDED
Status: COMPLETED | OUTPATIENT
Start: 2020-01-01 | End: 2020-01-01

## 2020-01-01 RX ORDER — HYDROXYUREA 500 MG/1
4000 CAPSULE ORAL 2 TIMES DAILY
Status: DISCONTINUED | OUTPATIENT
Start: 2020-01-01 | End: 2020-01-01

## 2020-01-01 RX ORDER — DIPHENHYDRAMINE HYDROCHLORIDE 50 MG/ML
25 INJECTION INTRAMUSCULAR; INTRAVENOUS
Status: COMPLETED | OUTPATIENT
Start: 2020-01-01 | End: 2020-01-01

## 2020-01-01 RX ORDER — HYDROXYUREA 500 MG/1
500 CAPSULE ORAL 2 TIMES DAILY
Status: DISCONTINUED | OUTPATIENT
Start: 2020-01-01 | End: 2020-01-01

## 2020-01-01 RX ORDER — IBUPROFEN 200 MG
24 TABLET ORAL
Status: DISCONTINUED | OUTPATIENT
Start: 2020-01-01 | End: 2020-01-01 | Stop reason: HOSPADM

## 2020-01-01 RX ORDER — SEVELAMER CARBONATE 800 MG/1
800 TABLET, FILM COATED ORAL
Status: DISCONTINUED | OUTPATIENT
Start: 2020-01-01 | End: 2020-01-01

## 2020-01-01 RX ORDER — ACYCLOVIR 200 MG/1
400 CAPSULE ORAL 2 TIMES DAILY
Status: DISCONTINUED | OUTPATIENT
Start: 2020-01-01 | End: 2020-01-01 | Stop reason: HOSPADM

## 2020-01-01 RX ORDER — LOSARTAN POTASSIUM 25 MG/1
25 TABLET ORAL DAILY
Status: DISCONTINUED | OUTPATIENT
Start: 2020-01-01 | End: 2020-01-01 | Stop reason: HOSPADM

## 2020-01-01 RX ORDER — ONDANSETRON 8 MG/1
8 TABLET, ORALLY DISINTEGRATING ORAL EVERY 8 HOURS PRN
Status: DISCONTINUED | OUTPATIENT
Start: 2020-01-01 | End: 2020-01-01

## 2020-01-01 RX ORDER — CEFEPIME HYDROCHLORIDE 2 G/1
2 INJECTION, POWDER, FOR SOLUTION INTRAVENOUS
Status: DISCONTINUED | OUTPATIENT
Start: 2020-01-01 | End: 2020-01-01

## 2020-01-01 RX ORDER — LANOLIN ALCOHOL/MO/W.PET/CERES
800 CREAM (GRAM) TOPICAL DAILY
Qty: 30 TABLET | Refills: 1 | Status: SHIPPED | OUTPATIENT
Start: 2020-01-01 | End: 2020-01-01

## 2020-01-01 RX ORDER — PHENYLEPHRINE HYDROCHLORIDE 10 MG/ML
INJECTION INTRAVENOUS
Status: DISCONTINUED | OUTPATIENT
Start: 2020-01-01 | End: 2020-01-01

## 2020-01-01 RX ORDER — HYDROCODONE BITARTRATE AND ACETAMINOPHEN 500; 5 MG/1; MG/1
TABLET ORAL ONCE
Status: DISCONTINUED | OUTPATIENT
Start: 2020-01-01 | End: 2020-01-01 | Stop reason: HOSPADM

## 2020-01-01 RX ORDER — MUPIROCIN 20 MG/G
OINTMENT TOPICAL 2 TIMES DAILY
Status: COMPLETED | OUTPATIENT
Start: 2020-01-01 | End: 2020-01-01

## 2020-01-01 RX ORDER — FUROSEMIDE 10 MG/ML
20 INJECTION INTRAMUSCULAR; INTRAVENOUS ONCE
Status: COMPLETED | OUTPATIENT
Start: 2020-01-01 | End: 2020-01-01

## 2020-01-01 RX ORDER — ACYCLOVIR 200 MG/1
400 CAPSULE ORAL 2 TIMES DAILY
Qty: 60 CAPSULE | Refills: 5 | Status: SHIPPED | OUTPATIENT
Start: 2020-01-01 | End: 2021-12-21

## 2020-01-01 RX ORDER — VORICONAZOLE 50 MG/1
100 TABLET, FILM COATED ORAL 2 TIMES DAILY
Qty: 60 TABLET | Refills: 3 | Status: SHIPPED | OUTPATIENT
Start: 2020-01-01 | End: 2021-01-01 | Stop reason: SDUPTHER

## 2020-01-01 RX ORDER — SODIUM,POTASSIUM PHOSPHATES 280-250MG
2 POWDER IN PACKET (EA) ORAL EVERY 4 HOURS PRN
Status: DISCONTINUED | OUTPATIENT
Start: 2020-01-01 | End: 2020-01-01 | Stop reason: HOSPADM

## 2020-01-01 RX ORDER — LANOLIN ALCOHOL/MO/W.PET/CERES
400 CREAM (GRAM) TOPICAL EVERY 4 HOURS PRN
Status: DISCONTINUED | OUTPATIENT
Start: 2020-01-01 | End: 2020-01-01 | Stop reason: HOSPADM

## 2020-01-01 RX ORDER — MIDAZOLAM HYDROCHLORIDE 1 MG/ML
INJECTION, SOLUTION INTRAMUSCULAR; INTRAVENOUS
Status: DISCONTINUED | OUTPATIENT
Start: 2020-01-01 | End: 2020-01-01

## 2020-01-01 RX ORDER — ALLOPURINOL 100 MG/1
300 TABLET ORAL DAILY
Status: DISCONTINUED | OUTPATIENT
Start: 2020-01-01 | End: 2020-01-01

## 2020-01-01 RX ORDER — CEFDINIR 300 MG/1
300 CAPSULE ORAL 2 TIMES DAILY
Status: ON HOLD | COMMUNITY
End: 2020-01-01 | Stop reason: HOSPADM

## 2020-01-01 RX ORDER — LOSARTAN POTASSIUM 25 MG/1
25 TABLET ORAL DAILY
Qty: 90 TABLET | Refills: 3
Start: 2020-01-01 | End: 2020-01-01

## 2020-01-01 RX ORDER — VORICONAZOLE 200 MG/1
200 TABLET, FILM COATED ORAL 2 TIMES DAILY
Qty: 60 TABLET | Refills: 2
Start: 2020-01-01 | End: 2020-01-01

## 2020-01-01 RX ORDER — POTASSIUM CHLORIDE 20 MEQ/1
40 TABLET, EXTENDED RELEASE ORAL EVERY 4 HOURS
Status: COMPLETED | OUTPATIENT
Start: 2020-01-01 | End: 2020-01-01

## 2020-01-01 RX ORDER — TERAZOSIN 5 MG/1
5 CAPSULE ORAL NIGHTLY
COMMUNITY

## 2020-01-01 RX ORDER — SODIUM CHLORIDE 0.9 % (FLUSH) 0.9 %
3 SYRINGE (ML) INJECTION
Status: DISCONTINUED | OUTPATIENT
Start: 2020-01-01 | End: 2020-01-01

## 2020-01-01 RX ORDER — LEVOFLOXACIN 500 MG/1
500 TABLET, FILM COATED ORAL DAILY
Status: DISCONTINUED | OUTPATIENT
Start: 2020-01-01 | End: 2020-01-01 | Stop reason: HOSPADM

## 2020-01-01 RX ORDER — BISMUTH SUBSALICYLATE 525 MG/30ML
30 LIQUID ORAL EVERY 6 HOURS PRN
Status: DISCONTINUED | OUTPATIENT
Start: 2020-01-01 | End: 2020-01-01

## 2020-01-01 RX ORDER — HYDROCODONE BITARTRATE AND ACETAMINOPHEN 500; 5 MG/1; MG/1
TABLET ORAL
Status: DISCONTINUED | OUTPATIENT
Start: 2020-01-01 | End: 2020-01-01 | Stop reason: HOSPADM

## 2020-01-01 RX ORDER — SODIUM CHLORIDE 0.9 % (FLUSH) 0.9 %
10 SYRINGE (ML) INJECTION
Status: DISCONTINUED | OUTPATIENT
Start: 2020-01-01 | End: 2020-01-01

## 2020-01-01 RX ORDER — ATORVASTATIN CALCIUM 40 MG/1
40 TABLET, FILM COATED ORAL DAILY
COMMUNITY

## 2020-01-01 RX ORDER — BENZONATATE 100 MG/1
CAPSULE ORAL
COMMUNITY
End: 2020-01-01

## 2020-01-01 RX ORDER — LIDOCAINE HYDROCHLORIDE 10 MG/ML
10 INJECTION INFILTRATION; PERINEURAL ONCE
Status: COMPLETED | OUTPATIENT
Start: 2020-01-01 | End: 2020-01-01

## 2020-01-01 RX ORDER — HEPARIN SODIUM 1000 [USP'U]/ML
1000 INJECTION, SOLUTION INTRAVENOUS; SUBCUTANEOUS
Status: DISCONTINUED | OUTPATIENT
Start: 2020-01-01 | End: 2020-01-01 | Stop reason: HOSPADM

## 2020-01-01 RX ORDER — INSULIN ASPART 100 [IU]/ML
2 INJECTION, SOLUTION INTRAVENOUS; SUBCUTANEOUS
Status: DISCONTINUED | OUTPATIENT
Start: 2020-01-01 | End: 2020-01-01

## 2020-01-01 RX ORDER — POTASSIUM CHLORIDE 20 MEQ/1
20 TABLET, EXTENDED RELEASE ORAL 2 TIMES DAILY
Status: CANCELLED | OUTPATIENT
Start: 2020-01-01 | End: 2020-01-01

## 2020-01-01 RX ORDER — ACETAMINOPHEN 325 MG/1
650 TABLET ORAL ONCE AS NEEDED
Status: COMPLETED | OUTPATIENT
Start: 2020-01-01 | End: 2020-01-01

## 2020-01-01 RX ORDER — LANOLIN ALCOHOL/MO/W.PET/CERES
800 CREAM (GRAM) TOPICAL DAILY
Qty: 30 TABLET | Refills: 0 | COMMUNITY
Start: 2020-01-01 | End: 2020-01-01

## 2020-01-01 RX ORDER — FENTANYL CITRATE 50 UG/ML
INJECTION, SOLUTION INTRAMUSCULAR; INTRAVENOUS
Status: DISCONTINUED | OUTPATIENT
Start: 2020-01-01 | End: 2020-01-01

## 2020-01-01 RX ORDER — HYDROCODONE BITARTRATE AND ACETAMINOPHEN 500; 5 MG/1; MG/1
TABLET ORAL ONCE
Status: DISCONTINUED | OUTPATIENT
Start: 2020-01-01 | End: 2021-01-01

## 2020-01-01 RX ORDER — LANOLIN ALCOHOL/MO/W.PET/CERES
800 CREAM (GRAM) TOPICAL DAILY
Qty: 60 TABLET | Refills: 1 | Status: SHIPPED | OUTPATIENT
Start: 2020-01-01 | End: 2020-01-01

## 2020-01-01 RX ORDER — ALLOPURINOL 100 MG/1
300 TABLET ORAL 2 TIMES DAILY
Status: DISCONTINUED | OUTPATIENT
Start: 2020-01-01 | End: 2020-01-01

## 2020-01-01 RX ORDER — LOSARTAN POTASSIUM 25 MG/1
50 TABLET ORAL DAILY
Status: DISCONTINUED | OUTPATIENT
Start: 2020-01-01 | End: 2020-01-01

## 2020-01-01 RX ORDER — DEXAMETHASONE 4 MG/1
12 TABLET ORAL
Status: COMPLETED | OUTPATIENT
Start: 2020-01-01 | End: 2020-01-01

## 2020-01-01 RX ORDER — IDARUBICIN HYDROCHLORIDE 1 MG/ML
30 INJECTION, SOLUTION INTRAVENOUS
Status: COMPLETED | OUTPATIENT
Start: 2020-01-01 | End: 2020-01-01

## 2020-01-01 RX ORDER — LEVOFLOXACIN 500 MG/1
500 TABLET, FILM COATED ORAL DAILY
Qty: 30 TABLET | Refills: 1
Start: 2020-01-01 | End: 2020-01-01

## 2020-01-01 RX ORDER — POLYETHYLENE GLYCOL 3350 17 G/17G
17 POWDER, FOR SOLUTION ORAL 2 TIMES DAILY PRN
Status: DISCONTINUED | OUTPATIENT
Start: 2020-01-01 | End: 2020-01-01 | Stop reason: HOSPADM

## 2020-01-01 RX ORDER — ACETAMINOPHEN 325 MG/1
650 TABLET ORAL ONCE AS NEEDED
Status: DISCONTINUED | OUTPATIENT
Start: 2020-01-01 | End: 2020-01-01

## 2020-01-01 RX ORDER — METOPROLOL TARTRATE 1 MG/ML
5 INJECTION, SOLUTION INTRAVENOUS ONCE
Status: COMPLETED | OUTPATIENT
Start: 2020-01-01 | End: 2020-01-01

## 2020-01-01 RX ORDER — IBUPROFEN 200 MG
16 TABLET ORAL
Status: DISCONTINUED | OUTPATIENT
Start: 2020-01-01 | End: 2020-01-01

## 2020-01-01 RX ORDER — METOPROLOL TARTRATE 25 MG/1
25 TABLET, FILM COATED ORAL 2 TIMES DAILY
Status: ON HOLD | COMMUNITY
End: 2020-01-01

## 2020-01-01 RX ORDER — ACYCLOVIR 200 MG/1
400 CAPSULE ORAL 2 TIMES DAILY
Status: DISCONTINUED | OUTPATIENT
Start: 2020-01-01 | End: 2020-01-01

## 2020-01-01 RX ORDER — ACYCLOVIR 200 MG/1
400 CAPSULE ORAL 2 TIMES DAILY
Qty: 120 CAPSULE | Refills: 11
Start: 2020-01-01 | End: 2020-01-01

## 2020-01-01 RX ORDER — ACETAMINOPHEN 325 MG/1
650 TABLET ORAL EVERY 6 HOURS PRN
Status: DISCONTINUED | OUTPATIENT
Start: 2020-01-01 | End: 2020-01-01

## 2020-01-01 RX ORDER — HEPARIN SODIUM 1000 [USP'U]/ML
1600 INJECTION, SOLUTION INTRAVENOUS; SUBCUTANEOUS ONCE
Status: COMPLETED | OUTPATIENT
Start: 2020-01-01 | End: 2020-01-01

## 2020-01-01 RX ORDER — POTASSIUM CHLORIDE 20 MEQ/1
40 TABLET, EXTENDED RELEASE ORAL DAILY
Qty: 60 TABLET | Refills: 0
Start: 2020-01-01 | End: 2020-01-01

## 2020-01-01 RX ORDER — LOSARTAN POTASSIUM 25 MG/1
25 TABLET ORAL DAILY
Qty: 30 TABLET | Refills: 1 | Status: SHIPPED | OUTPATIENT
Start: 2020-01-01 | End: 2021-01-01 | Stop reason: SDUPTHER

## 2020-01-01 RX ORDER — METOPROLOL SUCCINATE 50 MG/1
50 TABLET, EXTENDED RELEASE ORAL DAILY
Status: DISCONTINUED | OUTPATIENT
Start: 2020-01-01 | End: 2020-01-01

## 2020-01-01 RX ORDER — PROPOFOL 10 MG/ML
VIAL (ML) INTRAVENOUS
Status: DISCONTINUED | OUTPATIENT
Start: 2020-01-01 | End: 2020-01-01

## 2020-01-01 RX ORDER — METOPROLOL SUCCINATE 50 MG/1
50 TABLET, EXTENDED RELEASE ORAL DAILY
COMMUNITY

## 2020-01-01 RX ORDER — VORICONAZOLE 200 MG/1
200 TABLET, FILM COATED ORAL 2 TIMES DAILY
Qty: 60 TABLET | Refills: 1 | Status: SHIPPED | OUTPATIENT
Start: 2020-01-01 | End: 2020-01-01

## 2020-01-01 RX ORDER — SODIUM CHLORIDE 9 MG/ML
INJECTION, SOLUTION INTRAVENOUS CONTINUOUS PRN
Status: DISCONTINUED | OUTPATIENT
Start: 2020-01-01 | End: 2020-01-01

## 2020-01-01 RX ORDER — LIDOCAINE HYDROCHLORIDE 10 MG/ML
INJECTION, SOLUTION EPIDURAL; INFILTRATION; INTRACAUDAL; PERINEURAL
Status: DISCONTINUED | OUTPATIENT
Start: 2020-01-01 | End: 2020-01-01 | Stop reason: HOSPADM

## 2020-01-01 RX ORDER — INSULIN ASPART 100 [IU]/ML
0-5 INJECTION, SOLUTION INTRAVENOUS; SUBCUTANEOUS
Status: DISCONTINUED | OUTPATIENT
Start: 2020-01-01 | End: 2020-01-01 | Stop reason: HOSPADM

## 2020-01-01 RX ORDER — METHYLPREDNISOLONE 4 MG/1
4 TABLET ORAL DAILY
Status: ON HOLD | COMMUNITY
End: 2020-01-01 | Stop reason: HOSPADM

## 2020-01-01 RX ORDER — PROPOFOL 10 MG/ML
VIAL (ML) INTRAVENOUS CONTINUOUS PRN
Status: DISCONTINUED | OUTPATIENT
Start: 2020-01-01 | End: 2020-01-01

## 2020-01-01 RX ORDER — LEVOFLOXACIN 500 MG/1
500 TABLET, FILM COATED ORAL DAILY
Qty: 30 TABLET | Refills: 1 | Status: SHIPPED | OUTPATIENT
Start: 2020-01-01 | End: 2021-01-01 | Stop reason: SDUPTHER

## 2020-01-01 RX ORDER — VORICONAZOLE 200 MG/1
200 TABLET, FILM COATED ORAL 2 TIMES DAILY
Qty: 60 TABLET | Refills: 0 | Status: SHIPPED | OUTPATIENT
Start: 2020-01-01 | End: 2020-01-01

## 2020-01-01 RX ORDER — METOPROLOL SUCCINATE 50 MG/1
50 TABLET, EXTENDED RELEASE ORAL DAILY
Status: DISCONTINUED | OUTPATIENT
Start: 2020-01-01 | End: 2020-01-01 | Stop reason: HOSPADM

## 2020-01-01 RX ORDER — METOPROLOL TARTRATE 50 MG/1
TABLET ORAL
COMMUNITY
End: 2020-01-01

## 2020-01-01 RX ORDER — CALCIUM CARBONATE 500(1250)
500 TABLET ORAL ONCE
Status: COMPLETED | OUTPATIENT
Start: 2020-01-01 | End: 2020-01-01

## 2020-01-01 RX ORDER — HEPARIN 100 UNIT/ML
500 SYRINGE INTRAVENOUS
Status: ACTIVE | OUTPATIENT
Start: 2020-01-01

## 2020-01-01 RX ORDER — VORICONAZOLE 200 MG/1
200 TABLET, FILM COATED ORAL 2 TIMES DAILY
Qty: 60 TABLET | Refills: 0 | Status: SHIPPED | OUTPATIENT
Start: 2020-01-01 | End: 2020-01-01 | Stop reason: SDUPTHER

## 2020-01-01 RX ORDER — DIGOXIN 250 MCG
250 TABLET ORAL DAILY
Status: DISCONTINUED | OUTPATIENT
Start: 2020-01-01 | End: 2020-01-01 | Stop reason: HOSPADM

## 2020-01-01 RX ORDER — OXYCODONE HYDROCHLORIDE 5 MG/1
5 TABLET ORAL EVERY 8 HOURS PRN
Status: DISCONTINUED | OUTPATIENT
Start: 2020-01-01 | End: 2020-01-01 | Stop reason: HOSPADM

## 2020-01-01 RX ORDER — METRONIDAZOLE 500 MG/1
500 TABLET ORAL EVERY 8 HOURS
Qty: 21 TABLET | Refills: 0 | Status: SHIPPED | OUTPATIENT
Start: 2020-01-01 | End: 2020-01-01

## 2020-01-01 RX ADMIN — SEVELAMER CARBONATE 800 MG: 800 TABLET, FILM COATED ORAL at 08:09

## 2020-01-01 RX ADMIN — Medication 400 MG: at 09:10

## 2020-01-01 RX ADMIN — HEPARIN 500 UNITS: 100 SYRINGE at 04:11

## 2020-01-01 RX ADMIN — LEVOFLOXACIN 500 MG: 500 TABLET, FILM COATED ORAL at 08:10

## 2020-01-01 RX ADMIN — LEVOFLOXACIN 500 MG: 500 TABLET, FILM COATED ORAL at 08:09

## 2020-01-01 RX ADMIN — LIDOCAINE HYDROCHLORIDE 100 MG: 20 INJECTION, SOLUTION EPIDURAL; INFILTRATION; INTRACAUDAL; PERINEURAL at 02:10

## 2020-01-01 RX ADMIN — MUPIROCIN: 20 OINTMENT TOPICAL at 05:10

## 2020-01-01 RX ADMIN — METOPROLOL SUCCINATE 50 MG: 50 TABLET, EXTENDED RELEASE ORAL at 08:10

## 2020-01-01 RX ADMIN — DIPHENHYDRAMINE HYDROCHLORIDE 25 MG: 25 CAPSULE ORAL at 10:09

## 2020-01-01 RX ADMIN — ACYCLOVIR 400 MG: 200 CAPSULE ORAL at 08:09

## 2020-01-01 RX ADMIN — PROPOFOL 30 MG: 10 INJECTION, EMULSION INTRAVENOUS at 08:09

## 2020-01-01 RX ADMIN — HYDROXYUREA 2000 MG: 500 CAPSULE ORAL at 08:09

## 2020-01-01 RX ADMIN — DIGOXIN 250 MCG: 250 TABLET ORAL at 08:09

## 2020-01-01 RX ADMIN — OXYCODONE 5 MG: 5 TABLET ORAL at 10:09

## 2020-01-01 RX ADMIN — SODIUM CHLORIDE: 0.9 INJECTION, SOLUTION INTRAVENOUS at 12:11

## 2020-01-01 RX ADMIN — OXYCODONE 5 MG: 5 TABLET ORAL at 04:09

## 2020-01-01 RX ADMIN — Medication 400 MG: at 12:10

## 2020-01-01 RX ADMIN — HEPARIN 500 UNITS: 100 SYRINGE at 12:11

## 2020-01-01 RX ADMIN — SODIUM CHLORIDE: 0.9 INJECTION, SOLUTION INTRAVENOUS at 01:10

## 2020-01-01 RX ADMIN — PIPERACILLIN SODIUM AND TAZOBACTAM SODIUM 4.5 G: 4; .5 INJECTION, POWDER, LYOPHILIZED, FOR SOLUTION INTRAVENOUS at 11:09

## 2020-01-01 RX ADMIN — DIPHENHYDRAMINE HYDROCHLORIDE 25 MG: 25 CAPSULE ORAL at 05:09

## 2020-01-01 RX ADMIN — INSULIN ASPART 2 UNITS: 100 INJECTION, SOLUTION INTRAVENOUS; SUBCUTANEOUS at 12:09

## 2020-01-01 RX ADMIN — Medication 10 ML: at 11:12

## 2020-01-01 RX ADMIN — ONDANSETRON 8 MG: 8 TABLET, ORALLY DISINTEGRATING ORAL at 07:10

## 2020-01-01 RX ADMIN — DIGOXIN 250 MCG: 250 TABLET ORAL at 08:10

## 2020-01-01 RX ADMIN — INSULIN ASPART 1 UNITS: 100 INJECTION, SOLUTION INTRAVENOUS; SUBCUTANEOUS at 08:09

## 2020-01-01 RX ADMIN — VANCOMYCIN HYDROCHLORIDE 2000 MG: 10 INJECTION, POWDER, LYOPHILIZED, FOR SOLUTION INTRAVENOUS at 08:10

## 2020-01-01 RX ADMIN — ACYCLOVIR 400 MG: 200 CAPSULE ORAL at 08:10

## 2020-01-01 RX ADMIN — HEPARIN 500 UNITS: 100 SYRINGE at 10:11

## 2020-01-01 RX ADMIN — ALLOPURINOL 300 MG: 300 TABLET ORAL at 08:09

## 2020-01-01 RX ADMIN — ACETAMINOPHEN 650 MG: 325 TABLET ORAL at 04:09

## 2020-01-01 RX ADMIN — VORICONAZOLE 200 MG: 50 TABLET ORAL at 09:10

## 2020-01-01 RX ADMIN — ONDANSETRON HYDROCHLORIDE 16 MG: 4 TABLET, FILM COATED ORAL at 03:09

## 2020-01-01 RX ADMIN — SODIUM CHLORIDE: 450 INJECTION, SOLUTION INTRAVENOUS at 12:10

## 2020-01-01 RX ADMIN — DIGOXIN 250 MCG: 250 TABLET ORAL at 09:10

## 2020-01-01 RX ADMIN — ACETAMINOPHEN 650 MG: 325 TABLET ORAL at 12:11

## 2020-01-01 RX ADMIN — SODIUM CHLORIDE: 0.9 INJECTION, SOLUTION INTRAVENOUS at 11:10

## 2020-01-01 RX ADMIN — VANCOMYCIN HYDROCHLORIDE 1750 MG: 10 INJECTION, POWDER, LYOPHILIZED, FOR SOLUTION INTRAVENOUS at 01:10

## 2020-01-01 RX ADMIN — ACETAMINOPHEN 650 MG: 325 TABLET ORAL at 03:10

## 2020-01-01 RX ADMIN — ACETAMINOPHEN 650 MG: 325 TABLET ORAL at 05:10

## 2020-01-01 RX ADMIN — ACYCLOVIR 400 MG: 200 CAPSULE ORAL at 09:09

## 2020-01-01 RX ADMIN — Medication 400 MG: at 04:10

## 2020-01-01 RX ADMIN — Medication 800 MG: at 11:09

## 2020-01-01 RX ADMIN — TERAZOSIN HYDROCHLORIDE 5 MG: 5 CAPSULE ORAL at 08:10

## 2020-01-01 RX ADMIN — IDARUBICIN HYDROCHLORIDE 30 MG: 1 INJECTION, SOLUTION INTRAVENOUS at 04:09

## 2020-01-01 RX ADMIN — PIPERACILLIN SODIUM AND TAZOBACTAM SODIUM 4.5 G: 4; .5 INJECTION, POWDER, LYOPHILIZED, FOR SOLUTION INTRAVENOUS at 07:09

## 2020-01-01 RX ADMIN — POTASSIUM CHLORIDE 40 MEQ: 1500 TABLET, EXTENDED RELEASE ORAL at 12:09

## 2020-01-01 RX ADMIN — VANCOMYCIN HYDROCHLORIDE 2500 MG: 1.5 INJECTION, POWDER, LYOPHILIZED, FOR SOLUTION INTRAVENOUS at 04:09

## 2020-01-01 RX ADMIN — METOPROLOL SUCCINATE 100 MG: 100 TABLET, EXTENDED RELEASE ORAL at 08:09

## 2020-01-01 RX ADMIN — VORICONAZOLE 200 MG: 50 TABLET ORAL at 08:09

## 2020-01-01 RX ADMIN — Medication 400 MG: at 06:10

## 2020-01-01 RX ADMIN — VANCOMYCIN HYDROCHLORIDE 2000 MG: 10 INJECTION, POWDER, LYOPHILIZED, FOR SOLUTION INTRAVENOUS at 05:09

## 2020-01-01 RX ADMIN — PIPERACILLIN SODIUM AND TAZOBACTAM SODIUM 4.5 G: 4; .5 INJECTION, POWDER, LYOPHILIZED, FOR SOLUTION INTRAVENOUS at 05:09

## 2020-01-01 RX ADMIN — ALLOPURINOL 300 MG: 100 TABLET ORAL at 08:09

## 2020-01-01 RX ADMIN — SEVELAMER CARBONATE 800 MG: 800 TABLET, FILM COATED ORAL at 12:09

## 2020-01-01 RX ADMIN — HEPARIN 500 UNITS: 100 SYRINGE at 01:10

## 2020-01-01 RX ADMIN — METOPROLOL SUCCINATE 50 MG: 50 TABLET, EXTENDED RELEASE ORAL at 09:10

## 2020-01-01 RX ADMIN — OXYCODONE 5 MG: 5 TABLET ORAL at 09:09

## 2020-01-01 RX ADMIN — DIPHENHYDRAMINE HYDROCHLORIDE 25 MG: 25 CAPSULE ORAL at 01:11

## 2020-01-01 RX ADMIN — HEPARIN 500 UNITS: 100 SYRINGE at 01:11

## 2020-01-01 RX ADMIN — ACYCLOVIR 400 MG: 200 CAPSULE ORAL at 10:09

## 2020-01-01 RX ADMIN — TERAZOSIN HYDROCHLORIDE 5 MG: 5 CAPSULE ORAL at 10:09

## 2020-01-01 RX ADMIN — HEPARIN SODIUM 1600 UNITS: 1000 INJECTION, SOLUTION INTRAVENOUS; SUBCUTANEOUS at 04:09

## 2020-01-01 RX ADMIN — SODIUM CHLORIDE: 9 INJECTION, SOLUTION INTRAVENOUS at 11:11

## 2020-01-01 RX ADMIN — LOSARTAN POTASSIUM 50 MG: 25 TABLET, FILM COATED ORAL at 08:09

## 2020-01-01 RX ADMIN — INSULIN DETEMIR 6 UNITS: 100 INJECTION, SOLUTION SUBCUTANEOUS at 09:09

## 2020-01-01 RX ADMIN — DECITABINE 50 MG: 50 INJECTION, POWDER, LYOPHILIZED, FOR SOLUTION INTRAVENOUS at 03:11

## 2020-01-01 RX ADMIN — VANCOMYCIN HYDROCHLORIDE 1500 MG: 1.5 INJECTION, POWDER, LYOPHILIZED, FOR SOLUTION INTRAVENOUS at 04:09

## 2020-01-01 RX ADMIN — HEPARIN 500 UNITS: 100 SYRINGE at 08:10

## 2020-01-01 RX ADMIN — FUROSEMIDE 20 MG: 10 INJECTION, SOLUTION INTRAMUSCULAR; INTRAVENOUS at 09:09

## 2020-01-01 RX ADMIN — OXYCODONE 5 MG: 5 TABLET ORAL at 12:09

## 2020-01-01 RX ADMIN — TERAZOSIN HYDROCHLORIDE 5 MG: 5 CAPSULE ORAL at 09:09

## 2020-01-01 RX ADMIN — ACETAMINOPHEN 650 MG: 325 TABLET ORAL at 07:10

## 2020-01-01 RX ADMIN — IOHEXOL 75 ML: 350 INJECTION, SOLUTION INTRAVENOUS at 01:09

## 2020-01-01 RX ADMIN — VANCOMYCIN HYDROCHLORIDE 1750 MG: 10 INJECTION, POWDER, LYOPHILIZED, FOR SOLUTION INTRAVENOUS at 12:10

## 2020-01-01 RX ADMIN — POTASSIUM CHLORIDE 20 MEQ: 1500 TABLET, EXTENDED RELEASE ORAL at 05:10

## 2020-01-01 RX ADMIN — VORICONAZOLE 200 MG: 50 TABLET ORAL at 08:10

## 2020-01-01 RX ADMIN — HEPARIN 500 UNITS: 100 SYRINGE at 05:11

## 2020-01-01 RX ADMIN — PIPERACILLIN SODIUM AND TAZOBACTAM SODIUM 4.5 G: 4; .5 INJECTION, POWDER, LYOPHILIZED, FOR SOLUTION INTRAVENOUS at 01:09

## 2020-01-01 RX ADMIN — METOPROLOL SUCCINATE 100 MG: 100 TABLET, EXTENDED RELEASE ORAL at 09:09

## 2020-01-01 RX ADMIN — DIPHENHYDRAMINE HYDROCHLORIDE 25 MG: 25 CAPSULE ORAL at 10:10

## 2020-01-01 RX ADMIN — ACETAMINOPHEN 650 MG: 325 TABLET ORAL at 01:11

## 2020-01-01 RX ADMIN — SODIUM CHLORIDE, SODIUM LACTATE, POTASSIUM CHLORIDE, AND CALCIUM CHLORIDE: 600; 310; 30; 20 INJECTION, SOLUTION INTRAVENOUS at 10:10

## 2020-01-01 RX ADMIN — DIPHENHYDRAMINE HYDROCHLORIDE 25 MG: 25 CAPSULE ORAL at 09:10

## 2020-01-01 RX ADMIN — SODIUM CHLORIDE: 0.9 INJECTION, SOLUTION INTRAVENOUS at 11:12

## 2020-01-01 RX ADMIN — MUPIROCIN: 20 OINTMENT TOPICAL at 10:10

## 2020-01-01 RX ADMIN — ACYCLOVIR 400 MG: 200 CAPSULE ORAL at 09:10

## 2020-01-01 RX ADMIN — ONDANSETRON 8 MG: 8 TABLET, ORALLY DISINTEGRATING ORAL at 11:10

## 2020-01-01 RX ADMIN — DIPHENHYDRAMINE HYDROCHLORIDE 25 MG: 25 CAPSULE ORAL at 11:10

## 2020-01-01 RX ADMIN — Medication 400 MG: at 01:10

## 2020-01-01 RX ADMIN — DIPHENHYDRAMINE HYDROCHLORIDE 25 MG: 25 CAPSULE ORAL at 06:10

## 2020-01-01 RX ADMIN — POTASSIUM CHLORIDE 20 MEQ: 1500 TABLET, EXTENDED RELEASE ORAL at 08:10

## 2020-01-01 RX ADMIN — HEPARIN SODIUM 1600 UNITS: 1000 INJECTION, SOLUTION INTRAVENOUS; SUBCUTANEOUS at 08:09

## 2020-01-01 RX ADMIN — DIGOXIN 250 MCG: 250 TABLET ORAL at 09:09

## 2020-01-01 RX ADMIN — SODIUM CHLORIDE: 0.9 INJECTION, SOLUTION INTRAVENOUS at 04:11

## 2020-01-01 RX ADMIN — LOSARTAN POTASSIUM 25 MG: 25 TABLET, FILM COATED ORAL at 08:10

## 2020-01-01 RX ADMIN — SODIUM CHLORIDE: 450 INJECTION, SOLUTION INTRAVENOUS at 01:10

## 2020-01-01 RX ADMIN — PIPERACILLIN SODIUM AND TAZOBACTAM SODIUM 4.5 G: 4; .5 INJECTION, POWDER, LYOPHILIZED, FOR SOLUTION INTRAVENOUS at 09:09

## 2020-01-01 RX ADMIN — SODIUM CHLORIDE: 450 INJECTION, SOLUTION INTRAVENOUS at 05:10

## 2020-01-01 RX ADMIN — SEVELAMER CARBONATE 800 MG: 800 TABLET, FILM COATED ORAL at 08:10

## 2020-01-01 RX ADMIN — INSULIN DETEMIR 8 UNITS: 100 INJECTION, SOLUTION SUBCUTANEOUS at 08:09

## 2020-01-01 RX ADMIN — Medication 10 ML: at 07:10

## 2020-01-01 RX ADMIN — CEFEPIME 2 G: 2 INJECTION, POWDER, FOR SOLUTION INTRAVENOUS at 08:10

## 2020-01-01 RX ADMIN — Medication 500 UNITS: at 04:11

## 2020-01-01 RX ADMIN — Medication 400 MG: at 08:10

## 2020-01-01 RX ADMIN — ALLOPURINOL 300 MG: 100 TABLET ORAL at 09:09

## 2020-01-01 RX ADMIN — VANCOMYCIN HYDROCHLORIDE 2500 MG: 1.5 INJECTION, POWDER, LYOPHILIZED, FOR SOLUTION INTRAVENOUS at 03:09

## 2020-01-01 RX ADMIN — POTASSIUM CHLORIDE 40 MEQ: 1500 TABLET, EXTENDED RELEASE ORAL at 09:09

## 2020-01-01 RX ADMIN — Medication 10 ML: at 10:11

## 2020-01-01 RX ADMIN — MUPIROCIN: 20 OINTMENT TOPICAL at 08:10

## 2020-01-01 RX ADMIN — DIPHENHYDRAMINE HYDROCHLORIDE 25 MG: 25 CAPSULE ORAL at 06:09

## 2020-01-01 RX ADMIN — CALCIUM 500 MG: 500 TABLET ORAL at 02:10

## 2020-01-01 RX ADMIN — DIPHENHYDRAMINE HYDROCHLORIDE 25 MG: 25 CAPSULE ORAL at 12:11

## 2020-01-01 RX ADMIN — DIPHENHYDRAMINE HYDROCHLORIDE 25 MG: 25 CAPSULE ORAL at 02:12

## 2020-01-01 RX ADMIN — ACYCLOVIR 400 MG: 200 CAPSULE ORAL at 11:09

## 2020-01-01 RX ADMIN — SODIUM CHLORIDE 100 ML/HR: 450 INJECTION, SOLUTION INTRAVENOUS at 04:10

## 2020-01-01 RX ADMIN — POTASSIUM CHLORIDE 40 MEQ: 1500 TABLET, EXTENDED RELEASE ORAL at 08:09

## 2020-01-01 RX ADMIN — Medication 400 MG: at 05:10

## 2020-01-01 RX ADMIN — HYDROXYUREA 4000 MG: 500 CAPSULE ORAL at 08:09

## 2020-01-01 RX ADMIN — Medication 10 ML: at 05:10

## 2020-01-01 RX ADMIN — DIGOXIN 250 MCG: 250 TABLET ORAL at 11:09

## 2020-01-01 RX ADMIN — HEPARIN 500 UNITS: 100 SYRINGE at 11:12

## 2020-01-01 RX ADMIN — VANCOMYCIN HYDROCHLORIDE 2000 MG: 10 INJECTION, POWDER, LYOPHILIZED, FOR SOLUTION INTRAVENOUS at 10:10

## 2020-01-01 RX ADMIN — VANCOMYCIN HYDROCHLORIDE 2500 MG: 1.5 INJECTION, POWDER, LYOPHILIZED, FOR SOLUTION INTRAVENOUS at 05:09

## 2020-01-01 RX ADMIN — HEPARIN SODIUM 1000 UNITS: 1000 INJECTION, SOLUTION INTRAVENOUS; SUBCUTANEOUS at 10:10

## 2020-01-01 RX ADMIN — PIPERACILLIN SODIUM AND TAZOBACTAM SODIUM 4.5 G: 4; .5 INJECTION, POWDER, LYOPHILIZED, FOR SOLUTION INTRAVENOUS at 12:09

## 2020-01-01 RX ADMIN — ACETAMINOPHEN 650 MG: 325 TABLET ORAL at 02:12

## 2020-01-01 RX ADMIN — SODIUM CHLORIDE: 0.9 INJECTION, SOLUTION INTRAVENOUS at 12:09

## 2020-01-01 RX ADMIN — TERAZOSIN HYDROCHLORIDE 5 MG: 5 CAPSULE ORAL at 08:09

## 2020-01-01 RX ADMIN — HYDROMORPHONE HYDROCHLORIDE 0.5 MG: 1 INJECTION, SOLUTION INTRAMUSCULAR; INTRAVENOUS; SUBCUTANEOUS at 01:10

## 2020-01-01 RX ADMIN — DEXMEDETOMIDINE HYDROCHLORIDE 0.5 MCG/KG/HR: 100 INJECTION, SOLUTION, CONCENTRATE INTRAVENOUS at 08:09

## 2020-01-01 RX ADMIN — INSULIN ASPART 2 UNITS: 100 INJECTION, SOLUTION INTRAVENOUS; SUBCUTANEOUS at 08:09

## 2020-01-01 RX ADMIN — SODIUM CHLORIDE: 9 INJECTION, SOLUTION INTRAVENOUS at 08:09

## 2020-01-01 RX ADMIN — SODIUM CHLORIDE: 450 INJECTION, SOLUTION INTRAVENOUS at 07:10

## 2020-01-01 RX ADMIN — CYTARABINE 265 MG: 100 INJECTION, SOLUTION INTRATHECAL; INTRAVENOUS; SUBCUTANEOUS at 05:09

## 2020-01-01 RX ADMIN — DIPHENHYDRAMINE HYDROCHLORIDE 25 MG: 25 CAPSULE ORAL at 01:10

## 2020-01-01 RX ADMIN — SODIUM CHLORIDE: 0.9 INJECTION, SOLUTION INTRAVENOUS at 05:09

## 2020-01-01 RX ADMIN — HEPARIN SODIUM 1000 UNITS: 1000 INJECTION, SOLUTION INTRAVENOUS; SUBCUTANEOUS at 09:10

## 2020-01-01 RX ADMIN — FUROSEMIDE 20 MG: 10 INJECTION, SOLUTION INTRAMUSCULAR; INTRAVENOUS at 03:09

## 2020-01-01 RX ADMIN — SODIUM CHLORIDE: 9 INJECTION, SOLUTION INTRAVENOUS at 02:10

## 2020-01-01 RX ADMIN — DIGOXIN 250 MCG: 250 TABLET ORAL at 03:09

## 2020-01-01 RX ADMIN — POTASSIUM CHLORIDE 40 MEQ: 1.5 POWDER, FOR SOLUTION ORAL at 06:09

## 2020-01-01 RX ADMIN — HYDROMORPHONE HYDROCHLORIDE 0.5 MG: 1 INJECTION, SOLUTION INTRAMUSCULAR; INTRAVENOUS; SUBCUTANEOUS at 01:09

## 2020-01-01 RX ADMIN — SODIUM CHLORIDE: 0.9 INJECTION, SOLUTION INTRAVENOUS at 02:12

## 2020-01-01 RX ADMIN — PHENYLEPHRINE HYDROCHLORIDE 100 MCG: 10 INJECTION INTRAVENOUS at 09:09

## 2020-01-01 RX ADMIN — INSULIN ASPART 2 UNITS: 100 INJECTION, SOLUTION INTRAVENOUS; SUBCUTANEOUS at 05:09

## 2020-01-01 RX ADMIN — DECITABINE 50 MG: 50 INJECTION, POWDER, LYOPHILIZED, FOR SOLUTION INTRAVENOUS at 11:11

## 2020-01-01 RX ADMIN — HEPARIN SODIUM 1600 UNITS: 1000 INJECTION INTRAVENOUS; SUBCUTANEOUS at 05:11

## 2020-01-01 RX ADMIN — DIPHENHYDRAMINE HYDROCHLORIDE 25 MG: 25 CAPSULE ORAL at 08:10

## 2020-01-01 RX ADMIN — CEFEPIME 2 G: 2 INJECTION, POWDER, FOR SOLUTION INTRAVENOUS at 04:10

## 2020-01-01 RX ADMIN — ONDANSETRON HYDROCHLORIDE 16 MG: 4 TABLET, FILM COATED ORAL at 04:10

## 2020-01-01 RX ADMIN — Medication 10 ML: at 01:11

## 2020-01-01 RX ADMIN — SODIUM CHLORIDE: 0.9 INJECTION, SOLUTION INTRAVENOUS at 09:09

## 2020-01-01 RX ADMIN — SODIUM CHLORIDE: 0.9 INJECTION, SOLUTION INTRAVENOUS at 01:11

## 2020-01-01 RX ADMIN — ONDANSETRON 8 MG: 8 TABLET, ORALLY DISINTEGRATING ORAL at 01:10

## 2020-01-01 RX ADMIN — ALLOPURINOL 300 MG: 300 TABLET ORAL at 11:09

## 2020-01-01 RX ADMIN — HEPARIN 500 UNITS: 100 SYRINGE at 12:10

## 2020-01-01 RX ADMIN — SODIUM CHLORIDE: 9 INJECTION, SOLUTION INTRAVENOUS at 10:10

## 2020-01-01 RX ADMIN — ALLOPURINOL 300 MG: 300 TABLET ORAL at 09:10

## 2020-01-01 RX ADMIN — CEFEPIME 2 G: 2 INJECTION, POWDER, FOR SOLUTION INTRAVENOUS at 11:10

## 2020-01-01 RX ADMIN — TERAZOSIN HYDROCHLORIDE 5 MG: 5 CAPSULE ORAL at 09:10

## 2020-01-01 RX ADMIN — VANCOMYCIN HYDROCHLORIDE 1500 MG: 1.5 INJECTION, POWDER, LYOPHILIZED, FOR SOLUTION INTRAVENOUS at 03:09

## 2020-01-01 RX ADMIN — CEFEPIME 2 G: 2 INJECTION, POWDER, FOR SOLUTION INTRAVENOUS at 09:10

## 2020-01-01 RX ADMIN — PIPERACILLIN SODIUM AND TAZOBACTAM SODIUM 4.5 G: 4; .5 INJECTION, POWDER, LYOPHILIZED, FOR SOLUTION INTRAVENOUS at 06:09

## 2020-01-01 RX ADMIN — ESMOLOL HYDROCHLORIDE 10 MG: 10 INJECTION INTRAVENOUS at 09:09

## 2020-01-01 RX ADMIN — CEFEPIME 2 G: 2 INJECTION, POWDER, FOR SOLUTION INTRAVENOUS at 07:10

## 2020-01-01 RX ADMIN — LIDOCAINE HYDROCHLORIDE 10 ML: 10 INJECTION, SOLUTION INFILTRATION; PERINEURAL at 09:09

## 2020-01-01 RX ADMIN — SODIUM CHLORIDE: 0.9 INJECTION, SOLUTION INTRAVENOUS at 03:09

## 2020-01-01 RX ADMIN — HEPARIN SODIUM 1500 UNITS: 1000 INJECTION, SOLUTION INTRAVENOUS; SUBCUTANEOUS at 04:09

## 2020-01-01 RX ADMIN — HEPARIN SODIUM 1000 UNITS: 1000 INJECTION, SOLUTION INTRAVENOUS; SUBCUTANEOUS at 11:10

## 2020-01-01 RX ADMIN — ACETAMINOPHEN 650 MG: 325 TABLET ORAL at 09:09

## 2020-01-01 RX ADMIN — PIPERACILLIN SODIUM AND TAZOBACTAM SODIUM 4.5 G: 4; .5 INJECTION, POWDER, LYOPHILIZED, FOR SOLUTION INTRAVENOUS at 04:09

## 2020-01-01 RX ADMIN — INSULIN ASPART 1 UNITS: 100 INJECTION, SOLUTION INTRAVENOUS; SUBCUTANEOUS at 11:09

## 2020-01-01 RX ADMIN — SODIUM CHLORIDE: 450 INJECTION, SOLUTION INTRAVENOUS at 08:10

## 2020-01-01 RX ADMIN — HYDROXYUREA 4000 MG: 500 CAPSULE ORAL at 09:09

## 2020-01-01 RX ADMIN — DIPHENHYDRAMINE HYDROCHLORIDE 25 MG: 25 CAPSULE ORAL at 03:10

## 2020-01-01 RX ADMIN — DECITABINE 50 MG: 50 INJECTION, POWDER, LYOPHILIZED, FOR SOLUTION INTRAVENOUS at 02:10

## 2020-01-01 RX ADMIN — GUAIFENESIN 200 MG: 200 SOLUTION ORAL at 03:09

## 2020-01-01 RX ADMIN — SODIUM CHLORIDE: 450 INJECTION, SOLUTION INTRAVENOUS at 04:10

## 2020-01-01 RX ADMIN — POTASSIUM CHLORIDE 20 MEQ: 1500 TABLET, EXTENDED RELEASE ORAL at 06:10

## 2020-01-01 RX ADMIN — HEPARIN 500 UNITS: 100 SYRINGE at 08:11

## 2020-01-01 RX ADMIN — ACETAMINOPHEN 650 MG: 325 TABLET ORAL at 01:10

## 2020-01-01 RX ADMIN — SODIUM CHLORIDE: 0.9 INJECTION, SOLUTION INTRAVENOUS at 11:09

## 2020-01-01 RX ADMIN — Medication 10 ML: at 08:11

## 2020-01-01 RX ADMIN — VANCOMYCIN HYDROCHLORIDE 2000 MG: 10 INJECTION, POWDER, LYOPHILIZED, FOR SOLUTION INTRAVENOUS at 09:10

## 2020-01-01 RX ADMIN — ACETAMINOPHEN 650 MG: 325 TABLET ORAL at 10:10

## 2020-01-01 RX ADMIN — ACETAMINOPHEN 650 MG: 325 TABLET ORAL at 09:10

## 2020-01-01 RX ADMIN — ALLOPURINOL 300 MG: 300 TABLET ORAL at 08:10

## 2020-01-01 RX ADMIN — CEFEPIME 2 G: 2 INJECTION, POWDER, FOR SOLUTION INTRAVENOUS at 05:10

## 2020-01-01 RX ADMIN — Medication 10 ML: at 05:11

## 2020-01-01 RX ADMIN — ACETAMINOPHEN 650 MG: 325 TABLET ORAL at 08:10

## 2020-01-01 RX ADMIN — GUAIFENESIN 200 MG: 200 SOLUTION ORAL at 09:09

## 2020-01-01 RX ADMIN — Medication 10 ML: at 04:11

## 2020-01-01 RX ADMIN — VORICONAZOLE 200 MG: 50 TABLET ORAL at 09:09

## 2020-01-01 RX ADMIN — DEXAMETHASONE 12 MG: 4 TABLET ORAL at 03:09

## 2020-01-01 RX ADMIN — ONDANSETRON HYDROCHLORIDE 16 MG: 4 TABLET, FILM COATED ORAL at 05:09

## 2020-01-01 RX ADMIN — VANCOMYCIN HYDROCHLORIDE 2500 MG: 1.25 INJECTION, POWDER, LYOPHILIZED, FOR SOLUTION INTRAVENOUS at 03:09

## 2020-01-01 RX ADMIN — Medication 10 ML: at 01:10

## 2020-01-01 RX ADMIN — ESMOLOL HYDROCHLORIDE 20 MG: 10 INJECTION INTRAVENOUS at 09:09

## 2020-01-01 RX ADMIN — SODIUM CHLORIDE: 450 INJECTION, SOLUTION INTRAVENOUS at 09:10

## 2020-01-01 RX ADMIN — HEPARIN SODIUM 1000 UNITS: 1000 INJECTION, SOLUTION INTRAVENOUS; SUBCUTANEOUS at 06:10

## 2020-01-01 RX ADMIN — PHENYLEPHRINE HYDROCHLORIDE 150 MCG: 10 INJECTION INTRAVENOUS at 09:09

## 2020-01-01 RX ADMIN — DIPHENHYDRAMINE HYDROCHLORIDE 25 MG: 25 CAPSULE ORAL at 11:12

## 2020-01-01 RX ADMIN — POTASSIUM CHLORIDE 20 MEQ: 1500 TABLET, EXTENDED RELEASE ORAL at 11:10

## 2020-01-01 RX ADMIN — POTASSIUM CHLORIDE 20 MEQ: 1500 TABLET, EXTENDED RELEASE ORAL at 10:10

## 2020-01-01 RX ADMIN — FENTANYL CITRATE 25 MCG: 50 INJECTION, SOLUTION INTRAMUSCULAR; INTRAVENOUS at 08:09

## 2020-01-01 RX ADMIN — INSULIN DETEMIR 8 UNITS: 100 INJECTION, SOLUTION SUBCUTANEOUS at 09:09

## 2020-01-01 RX ADMIN — LEVOFLOXACIN 500 MG: 500 TABLET, FILM COATED ORAL at 09:10

## 2020-01-01 RX ADMIN — Medication 10 ML: at 08:12

## 2020-01-01 RX ADMIN — Medication 400 MG: at 10:10

## 2020-01-01 RX ADMIN — MIDAZOLAM HYDROCHLORIDE 2 MG: 1 INJECTION, SOLUTION INTRAMUSCULAR; INTRAVENOUS at 08:09

## 2020-01-01 RX ADMIN — HEPARIN 500 UNITS: 100 SYRINGE at 03:10

## 2020-01-01 RX ADMIN — CYTARABINE 265 MG: 100 INJECTION, SOLUTION INTRATHECAL; INTRAVENOUS; SUBCUTANEOUS at 05:10

## 2020-01-01 RX ADMIN — HEPARIN SODIUM 1600 UNITS: 1000 INJECTION, SOLUTION INTRAVENOUS; SUBCUTANEOUS at 05:11

## 2020-01-01 RX ADMIN — SODIUM CHLORIDE: 0.9 INJECTION, SOLUTION INTRAVENOUS at 10:09

## 2020-01-01 RX ADMIN — SODIUM CHLORIDE: 450 INJECTION, SOLUTION INTRAVENOUS at 06:10

## 2020-01-01 RX ADMIN — VANCOMYCIN HYDROCHLORIDE 2500 MG: 1 INJECTION, POWDER, LYOPHILIZED, FOR SOLUTION INTRAVENOUS at 09:10

## 2020-01-01 RX ADMIN — Medication 400 MG: at 07:10

## 2020-01-01 RX ADMIN — ACETAMINOPHEN 650 MG: 325 TABLET ORAL at 11:10

## 2020-01-01 RX ADMIN — ACETAMINOPHEN 650 MG: 325 TABLET ORAL at 03:09

## 2020-01-01 RX ADMIN — DIPHENHYDRAMINE HYDROCHLORIDE 25 MG: 25 CAPSULE ORAL at 05:10

## 2020-01-01 RX ADMIN — FUROSEMIDE 20 MG: 10 INJECTION, SOLUTION INTRAMUSCULAR; INTRAVENOUS at 08:09

## 2020-01-01 RX ADMIN — HEPARIN 500 UNITS: 100 SYRINGE at 02:11

## 2020-01-01 RX ADMIN — PIPERACILLIN SODIUM AND TAZOBACTAM SODIUM 4.5 G: 4; .5 INJECTION, POWDER, LYOPHILIZED, FOR SOLUTION INTRAVENOUS at 03:09

## 2020-01-01 RX ADMIN — LEVOFLOXACIN 500 MG: 500 TABLET, FILM COATED ORAL at 09:09

## 2020-01-01 RX ADMIN — ACETAMINOPHEN 650 MG: 325 TABLET, FILM COATED ORAL at 01:11

## 2020-01-01 RX ADMIN — POTASSIUM CHLORIDE 40 MEQ: 1500 TABLET, EXTENDED RELEASE ORAL at 06:09

## 2020-01-01 RX ADMIN — HEPARIN SODIUM 1000 UNITS: 1000 INJECTION, SOLUTION INTRAVENOUS; SUBCUTANEOUS at 01:10

## 2020-01-01 RX ADMIN — METOROPROLOL TARTRATE 5 MG: 5 INJECTION, SOLUTION INTRAVENOUS at 11:09

## 2020-01-01 RX ADMIN — PIPERACILLIN SODIUM AND TAZOBACTAM SODIUM 4.5 G: 4; .5 INJECTION, POWDER, LYOPHILIZED, FOR SOLUTION INTRAVENOUS at 08:09

## 2020-01-01 RX ADMIN — SODIUM CHLORIDE: 0.9 INJECTION, SOLUTION INTRAVENOUS at 03:11

## 2020-01-01 RX ADMIN — GUAIFENESIN 200 MG: 200 SOLUTION ORAL at 08:09

## 2020-01-01 RX ADMIN — POTASSIUM CHLORIDE 40 MEQ: 1500 TABLET, EXTENDED RELEASE ORAL at 11:09

## 2020-01-01 RX ADMIN — ACETAMINOPHEN 650 MG: 325 TABLET ORAL at 08:09

## 2020-01-01 RX ADMIN — ONDANSETRON HYDROCHLORIDE 16 MG: 4 TABLET, FILM COATED ORAL at 04:09

## 2020-01-01 RX ADMIN — ACETAMINOPHEN 650 MG: 325 TABLET ORAL at 11:12

## 2020-01-01 RX ADMIN — HEPARIN SODIUM 1600 UNITS: 1000 INJECTION, SOLUTION INTRAVENOUS; SUBCUTANEOUS at 09:10

## 2020-01-01 RX ADMIN — PROPOFOL 75 MCG/KG/MIN: 10 INJECTION, EMULSION INTRAVENOUS at 08:09

## 2020-01-01 RX ADMIN — VANCOMYCIN HYDROCHLORIDE 1500 MG: 1.5 INJECTION, POWDER, LYOPHILIZED, FOR SOLUTION INTRAVENOUS at 06:09

## 2020-01-01 RX ADMIN — HEPARIN 500 UNITS: 100 SYRINGE at 07:10

## 2020-01-01 RX ADMIN — POTASSIUM CHLORIDE 20 MEQ: 1500 TABLET, EXTENDED RELEASE ORAL at 12:10

## 2020-01-01 RX ADMIN — VORICONAZOLE 200 MG: 50 TABLET ORAL at 11:10

## 2020-01-01 RX ADMIN — POTASSIUM CHLORIDE 20 MEQ: 1500 TABLET, EXTENDED RELEASE ORAL at 07:10

## 2020-01-01 RX ADMIN — DIPHENHYDRAMINE HYDROCHLORIDE 25 MG: 25 CAPSULE ORAL at 04:09

## 2020-01-01 RX ADMIN — Medication 10 ML: at 04:10

## 2020-01-01 RX ADMIN — ALLOPURINOL 300 MG: 100 TABLET ORAL at 12:09

## 2020-01-01 RX ADMIN — CEFEPIME 2 G: 2 INJECTION, POWDER, FOR SOLUTION INTRAVENOUS at 12:10

## 2020-01-01 RX ADMIN — HEPARIN SODIUM 1600 UNITS: 1000 INJECTION INTRAVENOUS; SUBCUTANEOUS at 09:10

## 2020-01-01 RX ADMIN — VANCOMYCIN HYDROCHLORIDE 2000 MG: 10 INJECTION, POWDER, LYOPHILIZED, FOR SOLUTION INTRAVENOUS at 04:09

## 2020-01-01 RX ADMIN — DIPHENHYDRAMINE HYDROCHLORIDE 25 MG: 25 CAPSULE ORAL at 04:11

## 2020-01-01 RX ADMIN — HEPARIN 500 UNITS: 100 SYRINGE at 08:12

## 2020-01-01 RX ADMIN — ACETAMINOPHEN 650 MG: 325 TABLET ORAL at 06:09

## 2020-01-01 RX ADMIN — VANCOMYCIN HYDROCHLORIDE 1500 MG: 1.5 INJECTION, POWDER, LYOPHILIZED, FOR SOLUTION INTRAVENOUS at 05:09

## 2020-01-01 RX ADMIN — Medication 10 ML: at 12:11

## 2020-01-01 RX ADMIN — DIPHENHYDRAMINE HYDROCHLORIDE 25 MG: 50 INJECTION, SOLUTION INTRAMUSCULAR; INTRAVENOUS at 03:11

## 2020-01-01 RX ADMIN — METOPROLOL SUCCINATE 50 MG: 50 TABLET, EXTENDED RELEASE ORAL at 09:09

## 2020-01-01 RX ADMIN — SEVELAMER CARBONATE 800 MG: 800 TABLET, FILM COATED ORAL at 04:09

## 2020-01-01 RX ADMIN — SEVELAMER CARBONATE 800 MG: 800 TABLET, FILM COATED ORAL at 05:09

## 2020-01-01 RX ADMIN — ACETAMINOPHEN 650 MG: 325 TABLET ORAL at 04:10

## 2020-01-01 RX ADMIN — INSULIN ASPART 2 UNITS: 100 INJECTION, SOLUTION INTRAVENOUS; SUBCUTANEOUS at 12:10

## 2020-01-01 RX ADMIN — HYDROXYUREA 2000 MG: 500 CAPSULE ORAL at 09:09

## 2020-01-01 RX ADMIN — VORICONAZOLE 200 MG: 50 TABLET ORAL at 10:10

## 2020-01-01 RX ADMIN — ACETAMINOPHEN 650 MG: 325 TABLET ORAL at 04:11

## 2020-01-01 RX ADMIN — METOPROLOL SUCCINATE 50 MG: 50 TABLET, EXTENDED RELEASE ORAL at 03:09

## 2020-01-01 RX ADMIN — POTASSIUM CHLORIDE 40 MEQ: 1500 TABLET, EXTENDED RELEASE ORAL at 02:09

## 2020-01-01 RX ADMIN — ALLOPURINOL 300 MG: 300 TABLET ORAL at 09:09

## 2020-01-01 RX ADMIN — GUAIFENESIN 200 MG: 200 SOLUTION ORAL at 04:09

## 2020-01-01 RX ADMIN — DECITABINE 50 MG: 50 INJECTION, POWDER, LYOPHILIZED, FOR SOLUTION INTRAVENOUS at 11:10

## 2020-01-01 RX ADMIN — DECITABINE 50 MG: 50 INJECTION, POWDER, LYOPHILIZED, FOR SOLUTION INTRAVENOUS at 10:11

## 2020-01-01 RX ADMIN — MUPIROCIN: 20 OINTMENT TOPICAL at 09:10

## 2020-01-01 RX ADMIN — DIPHENHYDRAMINE HYDROCHLORIDE 25 MG: 25 CAPSULE ORAL at 07:10

## 2020-01-01 RX ADMIN — HEPARIN 500 UNITS: 100 SYRINGE at 01:12

## 2020-01-01 RX ADMIN — CYTARABINE 265 MG: 100 INJECTION, SOLUTION INTRATHECAL; INTRAVENOUS; SUBCUTANEOUS at 04:09

## 2020-01-01 RX ADMIN — DIPHENHYDRAMINE HYDROCHLORIDE 25 MG: 25 CAPSULE ORAL at 09:09

## 2020-01-01 RX ADMIN — HEPARIN 500 UNITS: 100 SYRINGE at 05:10

## 2020-01-01 RX ADMIN — DIPHENHYDRAMINE HYDROCHLORIDE 25 MG: 25 CAPSULE ORAL at 03:09

## 2020-01-01 RX ADMIN — INSULIN ASPART 1 UNITS: 100 INJECTION, SOLUTION INTRAVENOUS; SUBCUTANEOUS at 09:09

## 2020-01-01 RX ADMIN — LOSARTAN POTASSIUM 25 MG: 25 TABLET, FILM COATED ORAL at 04:10

## 2020-01-01 RX ADMIN — DIPHENHYDRAMINE HYDROCHLORIDE 25 MG: 25 CAPSULE ORAL at 03:11

## 2020-01-01 RX ADMIN — ACETAMINOPHEN 650 MG: 325 TABLET ORAL at 03:11

## 2020-01-01 RX ADMIN — Medication 10 ML: at 08:10

## 2020-01-01 RX ADMIN — GUAIFENESIN 200 MG: 200 SOLUTION ORAL at 10:09

## 2020-01-01 RX ADMIN — PROPOFOL 30 MG: 10 INJECTION, EMULSION INTRAVENOUS at 09:09

## 2020-01-01 RX ADMIN — ACETAMINOPHEN 650 MG: 325 TABLET ORAL at 10:09

## 2020-01-01 RX ADMIN — HEPARIN SODIUM (PORCINE) LOCK FLUSH IV SOLN 100 UNIT/ML 500 UNITS: 100 SOLUTION at 09:12

## 2020-01-01 RX ADMIN — DIPHENHYDRAMINE HYDROCHLORIDE 25 MG: 25 CAPSULE ORAL at 11:09

## 2020-01-01 RX ADMIN — INSULIN ASPART 1 UNITS: 100 INJECTION, SOLUTION INTRAVENOUS; SUBCUTANEOUS at 10:09

## 2020-01-01 RX ADMIN — Medication 10 ML: at 09:12

## 2020-01-01 RX ADMIN — LOSARTAN POTASSIUM 50 MG: 25 TABLET, FILM COATED ORAL at 09:09

## 2020-01-01 RX ADMIN — POTASSIUM CHLORIDE 20 MEQ: 1500 TABLET, EXTENDED RELEASE ORAL at 09:10

## 2020-01-01 RX ADMIN — SODIUM CHLORIDE: 450 INJECTION, SOLUTION INTRAVENOUS at 10:10

## 2020-01-01 RX ADMIN — HEPARIN 500 UNITS: 100 SYRINGE at 11:11

## 2020-01-01 RX ADMIN — ACETAMINOPHEN 650 MG: 325 TABLET ORAL at 06:10

## 2020-01-01 RX ADMIN — DIPHENHYDRAMINE HYDROCHLORIDE 25 MG: 50 INJECTION INTRAMUSCULAR; INTRAVENOUS at 12:11

## 2020-01-01 RX ADMIN — HEPARIN SODIUM 1500 UNITS: 1000 INJECTION, SOLUTION INTRAVENOUS; SUBCUTANEOUS at 12:09

## 2020-01-01 RX ADMIN — LOSARTAN POTASSIUM 50 MG: 25 TABLET, FILM COATED ORAL at 11:09

## 2020-01-01 RX ADMIN — METOPROLOL SUCCINATE 50 MG: 100 TABLET, EXTENDED RELEASE ORAL at 09:09

## 2020-01-01 RX ADMIN — ACETAMINOPHEN 650 MG: 325 TABLET ORAL at 11:09

## 2020-01-01 RX ADMIN — METOPROLOL SUCCINATE 100 MG: 100 TABLET, EXTENDED RELEASE ORAL at 11:09

## 2020-01-01 RX ADMIN — METOROPROLOL TARTRATE 5 MG: 5 INJECTION, SOLUTION INTRAVENOUS at 03:09

## 2020-01-01 RX ADMIN — DIPHENHYDRAMINE HYDROCHLORIDE 25 MG: 25 CAPSULE ORAL at 04:10

## 2020-01-01 RX ADMIN — HEPARIN 500 UNITS: 100 SYRINGE at 04:10

## 2020-01-01 RX ADMIN — FENTANYL CITRATE 25 MCG: 50 INJECTION, SOLUTION INTRAMUSCULAR; INTRAVENOUS at 09:09

## 2020-01-01 RX ADMIN — LIDOCAINE HYDROCHLORIDE 200 MG: 20 INJECTION, SOLUTION EPIDURAL; INFILTRATION; INTRACAUDAL; PERINEURAL at 01:09

## 2020-01-01 RX ADMIN — DECITABINE 50 MG: 50 INJECTION, POWDER, LYOPHILIZED, FOR SOLUTION INTRAVENOUS at 08:10

## 2020-01-01 RX ADMIN — ACETAMINOPHEN 650 MG: 325 TABLET ORAL at 05:09

## 2020-01-01 RX ADMIN — Medication 10 ML: at 12:10

## 2020-01-01 RX ADMIN — Medication 400 MG: at 11:10

## 2020-01-01 RX ADMIN — DECITABINE 50 MG: 50 INJECTION, POWDER, LYOPHILIZED, FOR SOLUTION INTRAVENOUS at 03:10

## 2020-09-11 NOTE — TELEPHONE ENCOUNTER
Will have him look at it , called patient to let him know I will call him next week with         ----- Message from Martha Krueger sent at 9/11/2020  1:21 PM CDT -----  Regarding: FU for results  Call with results or move appt up before November   Pt: [phone]  Wife [phone]

## 2020-09-16 PROBLEM — C95.00 ACUTE LEUKEMIA: Status: ACTIVE | Noted: 2020-01-01

## 2020-09-16 PROBLEM — D64.9 ANEMIA: Status: ACTIVE | Noted: 2020-01-01

## 2020-09-16 PROBLEM — I10 ESSENTIAL HYPERTENSION: Status: ACTIVE | Noted: 2020-01-01

## 2020-09-16 PROBLEM — I48.0 PAROXYSMAL ATRIAL FIBRILLATION: Status: ACTIVE | Noted: 2020-01-01

## 2020-09-16 PROBLEM — R50.9 FEVER: Status: ACTIVE | Noted: 2020-01-01

## 2020-09-16 PROBLEM — D69.6 THROMBOCYTOPENIA: Status: ACTIVE | Noted: 2020-01-01

## 2020-09-16 NOTE — ASSESSMENT & PLAN NOTE
Patient states that he had pacemaker placed for this.   Hold Eliquis due to low platelet count. Resume when appropriate.  Will get EKG today

## 2020-09-16 NOTE — ASSESSMENT & PLAN NOTE
Patient is a 55 year old man who presents as a transfer for possible leukemia after manual differential demonstrated blasts.     Plan:   - Will need bone marrow biopsy to confirm diagnosis  - CBC, CMP, PT/INR, fibrinogen BID   - Uric acid daily   - Follow up peripheral smear   - Follow up 2D ECHO    - Continue allopurinol 300mg BID, hydrea 4g BID, IVF  - Transfuse cyroglobulin if fibrinogen <150  - Transfuse if platelets <10, Hgb<7 and/or patient actively bleeding  - Most likely will receive chemotherapy pending bone marrow biopsy

## 2020-09-16 NOTE — ASSESSMENT & PLAN NOTE
Platelet count 12K upon admission.  Transfuse if platelet count<10. If patient develops active bleed, transfuse for goal >50.  Will give plts today due to hemoptysis

## 2020-09-16 NOTE — ASSESSMENT & PLAN NOTE
Patient presented with fevers, fatigue, concerning due to acute leukemoid process as significant leukocytosis and blasts. Fevers likely related to this but infection workup in process.     - Continue vancomycin and zosyn until infection can be ruled out  - CXR at OSH did not show findings concerning for PNA. Will get chest CT today due to hemoptysis  - Follow up UA, blood cultures

## 2020-09-16 NOTE — SUBJECTIVE & OBJECTIVE
Subjective:     Interval History: 09/16/2020: Transferred over night for [place] due to suspected leukemia. Receiving Hydrea 4 grams bid and allopurinol 300 mg bid. No evidence of TLS or DIC at this time. WBC 129K this am. plt 12K. Patient with hemoptysis. Will give 1 unit plt and check CT of chest. Will get echo and EKG and perform BMBx today. Having fevers. Continue vanc and zosyn. Blood and urine cx pending.    Objective:     Vital Signs (Most Recent):  Temp: 99.5 °F (37.5 °C) (09/16/20 0927)  Pulse: 102 (09/16/20 0751)  Resp: (!) 22 (09/16/20 0751)  BP: 128/84 (09/16/20 0751)  SpO2: (!) 91 % (09/16/20 0927) Vital Signs (24h Range):  Temp:  [99 °F (37.2 °C)-101.6 °F (38.7 °C)] 99.5 °F (37.5 °C)  Pulse:  [] 102  Resp:  [17-24] 22  SpO2:  [90 %-96 %] 91 %  BP: (114-128)/(75-84) 128/84     Weight: 134.6 kg (296 lb 13.6 oz)  Body mass index is 37.1 kg/m².  Body surface area is 2.67 meters squared.    ECOG SCORE         [unfilled]    Intake/Output - Last 3 Shifts       09/14 0700 - 09/15 0659 09/15 0700 - 09/16 0659 09/16 0700 - 09/17 0659    I.V. (mL/kg)  431.7 (3.2)     IV Piggyback  500     Total Intake(mL/kg)  931.7 (6.8)     Urine (mL/kg/hr)  350     Total Output  350     Net  +581.7                  Physical Exam  Constitutional:       Appearance: He is well-developed.   HENT:      Head: Normocephalic and atraumatic.      Mouth/Throat:      Pharynx: No oropharyngeal exudate.   Eyes:      General:         Right eye: No discharge.         Left eye: No discharge.      Conjunctiva/sclera: Conjunctivae normal.      Pupils: Pupils are equal, round, and reactive to light.   Neck:      Musculoskeletal: Normal range of motion and neck supple.   Cardiovascular:      Rate and Rhythm: Normal rate and regular rhythm.      Heart sounds: Normal heart sounds. No murmur.   Pulmonary:      Effort: Pulmonary effort is normal. No respiratory distress.      Breath sounds: Normal breath sounds. No wheezing or rales.    Abdominal:      General: Bowel sounds are normal. There is no distension.      Palpations: Abdomen is soft.      Tenderness: There is no abdominal tenderness.   Musculoskeletal: Normal range of motion.         General: No deformity.   Skin:     General: Skin is warm and dry.      Findings: No erythema or rash.   Neurological:      Mental Status: He is alert and oriented to person, place, and time.   Psychiatric:         Behavior: Behavior normal.         Thought Content: Thought content normal.         Judgment: Judgment normal.         Significant Labs:   CBC:   Recent Labs   Lab 09/16/20 0130 09/16/20  0746   .50* 129.80*   HGB 9.9* 9.8*   HCT 30.1* 30.6*   PLT 11* 12*    and CMP:   Recent Labs   Lab 09/16/20 0130 09/16/20  0746   * 133*   K 3.1* 3.3*    101   CO2 22* 22*   * 151*   BUN 21* 25*   CREATININE 1.3 1.4   CALCIUM 8.0* 8.0*   PROT 6.3 6.4   ALBUMIN 3.1* 3.1*   BILITOT 1.0 0.9   ALKPHOS 67 63   AST 18 22   ALT 14 14   ANIONGAP 12 10   EGFRNONAA >60.0 56.2*       Diagnostic Results:  I have reviewed all pertinent imaging results/findings within the past 24 hours.

## 2020-09-16 NOTE — ASSESSMENT & PLAN NOTE
Likely related to underlying hematologic process. Hgb stable and patient reports no active bleeding.   Transfuse if Hgb<7.

## 2020-09-16 NOTE — PLAN OF CARE
POC reviewed with patient; understanding verbalized. Pt remains on tele; in a. Fib; team aware. 2L O2 nasal cannula sating 92%. NS @100. TMax this shift 101.6; Tylenol given and temp back down to 99.5. 1 U platelets administered this shift. BMB done today. CT outstanding. Cardiac diet; poor appetite. Pt. with nonskid footwear on, bed in lowest position, and locked with bed rails up x2. Pt. has call light and personal items within reach. Patient ambulates in room independently. All questions and concerns addressed at this time. Will continue to monitor.

## 2020-09-16 NOTE — H&P
"[facility]  Hematology  Bone Marrow Transplant  H&P    Subjective:     Principal Problem: Acute leukemia    HPI: Mr. Diaz is a 55 year old man with A.fib (pacemaker in place, on Eliquis), cardiomyopathy, HTN who presented to [facility] with fevers and fatigue. Patient says that the fevers started on Saturday night and were as high as 102F. He took Tylenol, which improved the fevers. He then went to urgent care and tested negative for CoVID. He received antibiotics and was sent home. He started coughing up blood a couple of nights ago. When he mentioned this to a family member who is a healthcare worker, he was advised to go to the ED. Patient says that he has been feeling weak to the point that he "can't walk". He has associated chills, night sweats, and occasional pain in his left shoulder that he attributes to positional sleeping. He also has had diarrhea since Saturday that has been occurring about twice a day but no blood in it. He has lost his sense of taste and before Saturday, he noticed that he lost his appetite as well. He has slight headaches and starting about 2 months ago, he has had "sharp spasms" from his feet to his head but has not recently experienced this. He has noticed easy bruising. He denies chest pain, SOB, abdominal pain, nausea, vomiting, urinary issues, leg swelling.     Patient cannot recall all of his medical conditions and the medications that he is currently on (says he is on 9 meds). He states that he has a pacemaker that was placed for his A.fib and says he is on Eliquis, losartan and metoprolol but does not know the dosing. His family history is notable for father having lung cancer. He is a former smoker and stopped 20 years ago. Prior to that, he smoked "on and off" for about 10 years. He currently lives in [place] with his wife and daughter.     At OSH, patient received tylenol, IVF, a dose of vancomycin, zosyn and hydrea 4g. Labs were notable " "for leukocytosis (WBC 95.7), anemia, thrombocytopenia, elevated INR, D-dimer. CXR showed "cardiomegaly with no acute pulmonary process". Urine and blood cultures were in process. Manual differential demonstrated high number of blasts (100).     Patient will be admitted to the Hematology-BMT service for further evaluation and treatment.    Patient information was obtained from patient.     Oncology History: None     Medications Prior to Admission   Medication Sig Dispense Refill Last Dose    losartan (COZAAR) 50 MG tablet Take 50 mg by mouth once daily.       metoprolol tartrate (LOPRESSOR) 25 MG tablet Take 25 mg by mouth 2 (two) times daily.          Patient has no known allergies.     Past Medical History:   Diagnosis Date    Atrial fibrillation     Hypertension      No past surgical history on file.  Family History     Problem Relation (Age of Onset)    Cancer Father    Heart disease Mother, Father        Tobacco Use    Smoking status: Former Smoker   Substance and Sexual Activity    Alcohol use: Not on file    Drug use: Not on file    Sexual activity: Not on file       Review of Systems   Constitutional: Positive for chills, fatigue and fever.   HENT: Negative for congestion and sore throat.    Eyes: Negative for photophobia and visual disturbance.   Respiratory: Positive for cough. Negative for shortness of breath.    Cardiovascular: Negative for chest pain and leg swelling.   Gastrointestinal: Positive for diarrhea. Negative for abdominal distention, abdominal pain, blood in stool and nausea.   Genitourinary: Negative for difficulty urinating and dysuria.   Musculoskeletal: Negative for arthralgias and back pain.   Skin: Negative for color change and wound.   Neurological: Positive for weakness. Negative for syncope and numbness.   Hematological: Bruises/bleeds easily.   Psychiatric/Behavioral: Negative for agitation and confusion.     Objective:     Vital Signs (Most Recent):  Temp: 99 °F (37.2 °C) " (09/15/20 2355)  Pulse: 94 (09/15/20 2355)  Resp: 20 (09/15/20 2355)  BP: 121/78 (09/15/20 2355)  SpO2: 96 % (09/15/20 2355) Vital Signs (24h Range):  Temp:  [99 °F (37.2 °C)] 99 °F (37.2 °C)  Pulse:  [] 94  Resp:  [17-20] 20  SpO2:  [94 %-96 %] 96 %  BP: (115-121)/(78) 121/78     Weight: (!) 136.1 kg (300 lb 0.7 oz)  Body mass index is 37.5 kg/m².  Body surface area is 2.68 meters squared.    ECOG SCORE         [unfilled]    Lines/Drains/Airways     None                 Physical Exam  Vitals signs reviewed.   Constitutional:       General: He is not in acute distress.     Appearance: He is obese.   HENT:      Head: Normocephalic and atraumatic.      Nose: Nose normal.   Eyes:      Extraocular Movements: Extraocular movements intact.   Neck:      Musculoskeletal: Normal range of motion and neck supple.   Cardiovascular:      Rate and Rhythm: Normal rate. Rhythm irregular.   Pulmonary:      Effort: Pulmonary effort is normal. No respiratory distress.   Abdominal:      General: Bowel sounds are normal. There is no distension.      Palpations: Abdomen is soft.      Tenderness: There is no abdominal tenderness.      Comments: No organomegaly palpated   Musculoskeletal:         General: No swelling.      Right lower leg: No edema.      Left lower leg: No edema.   Skin:     General: Skin is warm and dry.      Findings: Bruising (RUE ecchymoses noted, L>R lower extremity ecchymoses) present.   Neurological:      General: No focal deficit present.      Mental Status: He is alert and oriented to person, place, and time.   Psychiatric:         Mood and Affect: Mood normal.         Behavior: Behavior normal.         Significant Labs:   CBC:   Recent Labs   Lab 09/16/20 0130   .50*   HGB 9.9*   HCT 30.1*   PLT 11*   , CMP:   Recent Labs   Lab 09/16/20 0130   *   K 3.1*      CO2 22*   *   BUN 21*   CREATININE 1.3   CALCIUM 8.0*   PROT 6.3   ALBUMIN 3.1*   BILITOT 1.0   ALKPHOS 67   AST 18   ALT 14    ANIONGAP 12   EGFRNONAA >60.0   , Coagulation:   Recent Labs   Lab 09/16/20  0130   INR 1.2   APTT 30.4       Diagnostic Results:  I have reviewed all pertinent imaging results/findings within the past 24 hours.    Assessment/Plan:     * Acute leukemia  Patient is a 55 year old man who presents as a transfer for possible leukemia after manual differential demonstrated blasts.     Plan:   - Will need bone marrow biopsy to confirm diagnosis  - CBC, CMP, PT/INR, fibrinogen BID   - Uric acid daily   - Follow up peripheral smear   - Follow up 2D ECHO    - Continue allopurinol 300mg BID, hydrea 4g BID, IVF  - Transfuse cyroglobulin if fibrinogen <150  - Transfuse if platelets <10, Hgb<7 and/or patient actively bleeding  - Most likely will receive chemotherapy pending bone marrow biopsy    Anemia  Likely related to underlying hematologic process. Hgb stable and patient reports no active bleeding. Transfuse if Hgb<7.  Consider iron studies and further anemia workup once diagnosis more definitive and if anemia persists.    Thrombocytopenia  Platelet count 11 upon admission. Transfuse if platelet count<10. If patient develops active bleed, transfuse for goal >50.    Fever  Patient presented with fevers, fatigue, concerning due to acute leukemoid process as significant leukocytosis and blasts. Fevers likely related to this but infection workup in process.     - Continue vancomycin and zosyn until infection can be ruled out  - CXR at OSH did not show findings concerning for PNA. Consider repeat CXR if patient develops respiratory symptoms.  - Follow up UA, blood cultures    Essential hypertension  Hold home losartan, metoprolol as BP soft. Resume when appropriate.    Patient unclear with what his home medications are. He states that wife will bring his med list. Need clarification and help with pharmacy, appreciate assistance.    Paroxysmal atrial fibrillation  Patient states that he had pacemaker placed for this.   Hold  Eliquis due to low platelet count. Resume when appropriate.      Patient seen, and case to be discussed with staff. Attending attestation to follow.    VTE Risk Mitigation (From admission, onward)         Ordered     IP VTE HIGH RISK PATIENT  Once      09/16/20 0017     Place sequential compression device  Until discontinued      09/16/20 0017                Disposition: TBD      Bronwyn Aden MD  Bone Marrow Transplant  Hematology  [facility]

## 2020-09-16 NOTE — ASSESSMENT & PLAN NOTE
Hold home losartan, metoprolol as BP soft. Resume when appropriate.    Patient unclear with what his home medications are. He states that wife will bring his med list. Need clarification and help with pharmacy, appreciate assistance.

## 2020-09-16 NOTE — HOSPITAL COURSE
09/16/2020: Transferred over night for [place] due to suspected leukemia. Receiving Hydrea 4 grams bid and allopurinol 300 mg bid. No evidence of TLS or DIC at this time. WBC 129K this am. plt 12K. Patient with hemoptysis. Will give 1 unit plt and check CT of chest. Will get echo and EKG and perform BMBx today. Having fevers. Continue vanc and zosyn. Blood and urine cx pending.  09/17/2020: BMBx performed yesterday. Results pending. CT of chest suggestive of LLL PNA. Continue vanc. Also showing mild left pleural effusion and mild pericardial effusion. History of afib. Went into afib with RVR (HR 130s) yesterday. Rate-controlled since resuming home metoprolol and digoxin. Given cardiac history (afib, cardiomyopathy, pacemaker) consulted cardiology as patient may receive anthracycline, which is cardiotoxic. Will attempt to get records from home cardiologist. Echo performed yesterday with EF of 58%. Cardiology repeating echo to check for ventricle straining. Replacing K+ for K+ of 3.1. Robitussin ordered for cough. Continue oxy for left, lateral abd pain, which is likely due to PNA.  09/18/2020: BMBx showing AML. Cytogenetic pending. 13 lb weight gain since yesterday. Decreased IVF rate and ordered lasix. Was tachypnic on exam but denied feeling short of breath. Continues to be on supplemental O2. LLL more clear today. Will continue IV abx for 7 day and then switch to LVQ ppx. WBC count down to 55K today. Decreased hydrea dose to 2 grams BID. No evidence of TLS or DIC at this time. Repeat echo performed yesterday to assess for ventricle straining. 16% LV strain per echo report. Cards recs repeating complete echo post chemo completion; obtaining outside cardiology records (requested); resuming home losartan (resumed); continuing home digoxin and metoprolol (continuing both); maintaining tele throughout chemo; and resuming eliquis when feasible (contraindicated at this time due to thrombocytopenia/hemoptysis). Had a 6  beat run of v-tach this morning. Denied sensation of heart racing. Strip placed in chart, and cardiology notified via Secure Chat. Replacing K+ for K+ of 3.4. gen surg consulted for line placement. Hopeful that line will be placed on Monday morning. Will make NPO after midnight Sunday. ~13 lb weight gain since yesterday. Decreased IVF rate and diuresing with IV lasix x 1 dose.  09/19/2020 Patient remains on 2L NC, does still have hemoptysis and intermittent left pleuritic chest pain. Hydroxyurea stopped  09/20/2020 Still on 2L NC, 1U Plt today, plan for Lopes tomorrow with GS  09/21/2020: Lopes placed today. Tolerated procedure well. Day 6 of 7 of IV abx. Will switch to ppx abx after tomorrow. LLL continues to be clr/diminished, but improving. Cough persists. Chemo plan pending cytology results from BMBx. Blood cx still with NGTD. Continues to be afebrile. VSS. afib rate-controlled.  09/22/2020: FISH and NGS still pending. Per pathologist, will likely not result ([place] send-out) until late this week to early next week due to COVID delay. Considering discharging tomorrow and having him f/u with Dr. Fields early next week for possible admission for induction. Will complete IV abx for PNA today. Will discharge on ppx. ANC 0. Continues to be afebrile. VSS. A-fib rate-controlled. Central line was placed yesterday, but appears to be vas cath, not a Lopes. 10 lb weight gain since admission. Stopped IVF and gave 20 mg IV lasix.  09/23/2020: FISH and NGS still pending. Was tentatively planning for discharge today, but now has mild SEBASTIAN (creatinine 1.6; previously 0.8) so we will continue to monitor inpatient. SEBASTIAN likely 2/2 IV abx. Completed vanc and zosyn yesterday. Started ppx with LVQ today. Resumed IVF. Will need to monitor closely for fluid overload. Continues to have hemoptysis. Giving 1 unit plts today. O2 dropping to upper 80s on room air at rest. Tachycardic with exertion. Will recheck CXR.   09/24/2020: FISH  resulted. Plan is to induce with 7+3 as early as tomorrow. CXR from yesterday showing left pleural effusion. pulm consulted. thora performed at bedside today. Bloody pleural fluid removed. Sent for flow, cytology, micro, etc. Started IVF yesterday for SEBASTIAN but subsequently stopped due to concern for fluid overload. Checked BNP and 203. Creatinine up to 2.0. consulted nephrology. ANC 60 today.   09/25/2020 FISH result found adverse risk cg t(10;11). Plan to dc cancelled and pt consented for induction with 7+3, treatment to start today. Underwent thora for unilateral pleural effusion yesterday, 1L bloody pleural fluid removed and sent for studies including cytology. Nephrology consulted for sebastian.    09/26/2020 Day 2 of 7+3, Denies any symptoms. Still has SEBASTIAN but with good UOP  09/27/2020 Day 3 of 7+3, tolerating well no complaints. SEBASTIAN improving  09/28/2020: Day 4 of 7+3 for AML. Tolerating chemo well thus far. Creatinine slowly down-trending. Weight stable. Good urine output. Oral intake is fair. No evidence of mucositis on exam. Phos 7.0. started phos binder. Blood glucose in 200s. Increased levemir dose to 10 units nightly. Receiving 1 unit prbc today for hgb of 6.5. Will start vori tomorrow.  09/29/2020 Day 5 of 7+3 for AML. Plan to start Voriconazole today. Serum creatinine improved this am to 1.8, had significant uop of > 5L yesterday. Tolerating chemo well. Ordered 1 pack platelets this AM.   09/30/2020: Day 6 of 7+3 for AML. Continues to be afebrile. VSS. Continues to be on room air. Cytology from pleural fluid and flow suggestive of blasts. Micro still with NGTD. Blood glucose stable. Likely elevated 2/2 steroids. Stopped scheduled novolog. Will continue scheduled levemir for now. Creatinine down to 1.6. phos continues to be elevated at 5.4. Will continue phos binder. T-bili up to 1.5 from 0.8 today. Will monitor for now. Hgb 6.7. Transfusing 1 unit prbc. ANC today.  10/01/2020: Day 7 of 7+3. Doing well.  Afebrile. VSS. Blood glucose stable. Creatinine down to 1.4 today. IVF changed to 1/2 NS per nephrology recs. Nephrology signed off, and patient will f/u with them outpatient in 4 weeks. t-bili up to 2.0 from 1.5 today. Will continue to trend. ANC is 0.  10/02/2020: Day 8 of 7+3. Continues to be afebrile. VSS. Appetite diminished. Boost ordered with meals. ANC 10 today. Transfusing 1 unit prbc for hgb of 6.4. Creatinine down to 1.3 today. Continue 1/2 NS at 100 per nephrology rec. T-bili down to 1.3.   10/03/2020: Day #9 of 7+3. Vital signs remains WNL on room air. On bedside interview, patient had no complaints and was feeling well. On review of labs, Hgb was down to 6.7 and platelets down to 6; one unit of each ordered. Renal function WNL this morning with good urine output overnight.   10/05/2020: Day 11 of 7+3. Continues to due well. Afebrile. VSS. Oral intake fair. Will maintain IVF. SEBASTIAN resolved. Creatinine is 1.1 today. t-bili stable at 1.4. vori level collected today and is in process at this time. ANC continues to be 0. Planning for day 14 BMBx on 10/8/20.  10/06/2020: Day 12 of 7+3. Overall, reports feeling well. He is ambulating and using exercise bike in room without difficulty. His labs are significant for Hgb and platelets downtrending to 6.6 and 7; one unit of each ordered. ANC unchanged at 0.   10/07/2020: Day 13 of 7+3. Planning for BMBx tomorrow. ANC 10. Spiked temp of 102.2 yesterday. Started on vanc and cefepime. Had SEBASTIAN earlier in admission. Will need to monitor renal function closely while on vanc. Blood cx with ngtd. Will check cxr today. Had unsustained temp of 100.4 over night. K+ 3.3. replaceing. t-bili stable at 1.4.  10/08/2020: Day 14 of 7+3. Patient continues to feel well. Blood cultures from 10/06 noted to be positive for GPC's with sensitivities pending; remains on Vanc/Cefepime. Labs notable for downtrend in Hgb to 6.5 and 1 U PRBC ordered. Renal function remains WNL with Vancomycin.  Bone marrow biopsy repeated.   10/09/2020: Day 15 of 7+3. Patient continues to feel well and has no complaints. Blood cultures from 10/06 noted to be positive for STAPHYLOCOCCUS HAEMOLYTICUS with sensitivities pending; remains on Vanc/Cefepime (day #4). Labs notable for downtrend in Hgb to 6.8 and 1 U PRBC ordered. Renal function remains WNL with Vancomycin. ID consulted; recommended 14 day total course of Vancomycin until 10/21/2020. Bone marrow biopsy results from 10/08 in process.   10/10/2020: Day #16 of 7+3. No acute events overnight. This morning at bedside, patient reports pain with bowel movements that he attributes to re-occurrence of external hemorrhoids that were present on admission; PRN rectal cream ordered. Cefepime de-escalated to Levofloxacin prophylaxis as patient has remained afebrile for >72 hours. On review of labs, Hgb down to 6.6 and additional 1 U PRBC ordered. Day #14 bone marrow biopsy from 10/08 remains in process.   10/14/2020 Day #20 of 7+3 induction. Day +14 Bone Bx  Shows 34% blast. Plan for outpatient decitabine/venetoclax on 10/19 following 's f/u. Remains pancytopenic with ANC 10. Remains afebrile now. Received 1 unit of PRBC for Hgb 6.9. Replacing mg and phos.   10/15/2020 Day #21 of 7+3 induction. Day +15 Bone Bx  Shows 34% blast. Plan for outpatient decitabine/venetoclax on 10/19 following 's f/u. Remains pancytopenic with ANC 10. Remains afebrile now.  D/c home pending home health.

## 2020-09-16 NOTE — HPI
"Mr. Diaz is a 55 year old man with A.fib (pacemaker in place, on Eliquis), cardiomyopathy, HTN who presented to [facility] with fevers and fatigue. Patient says that the fevers started on Saturday night and were as high as 102F. He took Tylenol, which improved the fevers. He then went to urgent care and tested negative for CoVID. He received antibiotics and was sent home. He started coughing up blood a couple of nights ago. When he mentioned this to a family member who is a healthcare worker, he was advised to go to the ED. Patient says that he has been feeling weak to the point that he "can't walk". He has associated chills, night sweats, and occasional pain in his left shoulder that he attributes to positional sleeping. He also has had diarrhea since Saturday that has been occurring about twice a day but no blood in it. He has lost his sense of taste and before Saturday, he noticed that he lost his appetite as well. He has slight headaches and starting about 2 months ago, he has had "sharp spasms" from his feet to his head but has not recently experienced this. He has noticed easy bruising. He denies chest pain, SOB, abdominal pain, nausea, vomiting, urinary issues, leg swelling.     Patient cannot recall all of his medical conditions and the medications that he is currently on (says he is on 9 meds). He states that he has a pacemaker that was placed for his A.fib and says he is on Eliquis, losartan and metoprolol but does not know the dosing. His family history is notable for father having lung cancer. He is a former smoker and stopped 20 years ago. Prior to that, he smoked "on and off" for about 10 years. He currently lives in [place] with his wife and daughter.     At OSH, patient received tylenol, IVF, a dose of vancomycin, zosyn and hydrea 4g. Labs were notable for leukocytosis (WBC 95.7), anemia, thrombocytopenia, elevated INR, D-dimer. CXR showed "cardiomegaly with no acute pulmonary " "process". Urine and blood cultures were in process. Manual differential demonstrated high number of blasts (100).     Patient will be admitted to the Hematology-BMT service for further evaluation and treatment.  "

## 2020-09-16 NOTE — ASSESSMENT & PLAN NOTE
Likely related to underlying hematologic process. Hgb stable and patient reports no active bleeding. Transfuse if Hgb<7.  Consider iron studies and further anemia workup once diagnosis more definitive and if anemia persists.

## 2020-09-16 NOTE — TELEPHONE ENCOUNTER
Called by ED at [facility] in [place] ms. Morgan presented to ED with fevers and fatigue. Fevers up to 103.    No clear source, cultures pending  Hemogram:  Wbc-95k  hgb -11.5  plt -11k  100% blasts on smear  Cr- 1.3  LFTs, lytes ok  Coags, fibrinogen, ldh, uric acid pending.   cxr normal, urine cx and blood cx pending.   No localizing infectious symptoms.    Instructed ED there to give dose of vanc, zosyn, 1 dose 4grams hydrea, IVF.    No plts available there for transfusion.  Will transfer to [facility] when ambulance available for admit to 8th floor heme/bmt service.    On admit check cbc, cmp, ldh, uric acid, coags, fibrinogen hiv/hep, type.  Start normal saline fluid, allopurinol, hydrea 4 grams bid.  Cryo if fibrinogen <100k.    Transfuse for plt <10; <7 grams; transfuse at half rate  Hold eliquis given thrombocytopenia.  Plan for marrow, TTE and likely central line placement over coming days.  Yamil Fields MD  Hematology & Stem Cell Transplant

## 2020-09-16 NOTE — PROGRESS NOTES
PROCEDURE NOTE:  Date of Procedure: 09/16/2020  Bone Marrow Biopsy and Aspiration  Indication: Acute Leukemia  Consent: Informed consent was obtained from patient.  Timeout: Done and documented.  Position: Prone  Site: Right posterior illiac crest.  Prep: Betadine.  Needle used: 11 gauge Jamshidi needle.  Anesthetic: % lidocaine 8 cc.  Biopsy: The biopsy needle was introduced into the marrow cavity and an aspirate was obtained without complications and sent for flow cytometry and cytogenetics, AML FISH, NGS, FLT 3, and DNA/RNA hold. Core biopsy obtained without difficulty and sent for routine histologic examination.  Complications: None.  Disposition: Patient was left in the room with RN and instructed to lie on back for 20 minutes. Instructed to keep dressing dry and intact for 24 hours.  Blood loss: Minimal.     Jeni Holt, DOREENP  Hematology/Oncology/Bone Marrow Transplant

## 2020-09-16 NOTE — PROGRESS NOTES
Pharmacokinetic Initial Assessment: IV Vancomycin    Assessment/Plan:    Manually calculated estimated CrCl - 76.7 ml/min.    Initiate intravenous vancomycin with loading dose of 2500 mg once followed by a maintenance dose of vancomycin 1500mg IV every 12 hours   NOTE: AFTER LOADING DOSE WAS HUNG, I WAS INFORMED THAT PATIENT RECEIVED 2000 MG OF VANCOMYCIN AT APPROX. 2130 ON 9/15/20 PRIOR TO TRANSFER. LOADING DOSE WAS STOPPED AFTER APPROXIMATELY 700 MG INFUSED RESULTING IN TOTAL LOADING DOSE OF 2700 MG. MAINTENANCE DOSE RESCHEDULED FROM THAT POINT.    Desired empiric serum trough concentration is 10 to 15 mcg/mL  Draw vancomycin trough level 60 min prior to fourth dose on 9/17/20 at approximately 1430.  Pharmacy will continue to follow and monitor vancomycin.      Please contact pharmacy at extension [phone] with any questions regarding this assessment.     Thank you for the consult,   Oral [place]       Patient brief summary:  Shell Diaz is a 55 y.o. male initiated on antimicrobial therapy with IV Vancomycin for treatment of suspected neutropenic fever    Drug Allergies:   Review of patient's allergies indicates:  No Known Allergies    Actual Body Weight:   136.1 kg    Renal Function:   CrCl cannot be calculated (Unknown ideal weight.).,     Dialysis Method (if applicable):  N/A    CBC (last 72 hours):  Recent Labs   Lab Result Units 09/16/20  0130   WBC K/uL 113.50*   Hemoglobin g/dL 9.9*   Hematocrit % 30.1*   Platelets K/uL 11*   Gran% % 1.0*   Lymph% % 7.0*   Mono% % 1.0*   Eosinophil% % 0.0   Basophil% % 0.0   Differential Method  Manual       Metabolic Panel (last 72 hours):  Recent Labs   Lab Result Units 09/16/20  0130   Sodium mmol/L 135*   Potassium mmol/L 3.1*   Chloride mmol/L 101   CO2 mmol/L 22*   Glucose mg/dL 146*   BUN, Bld mg/dL 21*   Creatinine mg/dL 1.3   Albumin g/dL 3.1*   Total Bilirubin mg/dL 1.0   Alkaline Phosphatase U/L 67   AST U/L 18   ALT U/L 14       Drug levels (last 3  results):  No results for input(s): VANCOMYCINRA, VANCOMYCINPE, VANCOMYCINTR in the last 72 hours.    Microbiologic Results:  Microbiology Results (last 7 days)       Procedure Component Value Units Date/Time    Blood culture [[id number]] Collected: 09/16/20 0131    Order Status: Sent Specimen: Blood Updated: 09/16/20 0147    Narrative:      Collection has been rescheduled by RF at 09/16/2020 00:49 Reason: the   Doctor is putting orders in on this patient  Collection has been rescheduled by RF at 09/16/2020 00:50 Reason: the   Doctor is still putting orders on this patient  Collection has been rescheduled by RF at 09/16/2020 00:49 Reason: the   Doctor is putting orders in on this patient  Collection has been rescheduled by RF at 09/16/2020 00:50 Reason: the   Doctor is still putting orders on this patient    Blood culture [[id number]] Collected: 09/16/20 0131    Order Status: Sent Specimen: Blood Updated: 09/16/20 0147    Narrative:      Collection has been rescheduled by RF at 09/16/2020 00:49 Reason: the   Doctor is putting orders in on this patient  Collection has been rescheduled by RF at 09/16/2020 00:50 Reason: the   Doctor is still putting orders on this patient  Collection has been rescheduled by RF at 09/16/2020 00:49 Reason: the   Doctor is putting orders in on this patient  Collection has been rescheduled by RF at 09/16/2020 00:50 Reason: the   Doctor is still putting orders on this patient

## 2020-09-16 NOTE — ASSESSMENT & PLAN NOTE
Platelet count 11 upon admission. Transfuse if platelet count<10. If patient develops active bleed, transfuse for goal >50.

## 2020-09-16 NOTE — SUBJECTIVE & OBJECTIVE
Patient information was obtained from patient.     Oncology History: None     Medications Prior to Admission   Medication Sig Dispense Refill Last Dose    losartan (COZAAR) 50 MG tablet Take 50 mg by mouth once daily.       metoprolol tartrate (LOPRESSOR) 25 MG tablet Take 25 mg by mouth 2 (two) times daily.          Patient has no known allergies.     Past Medical History:   Diagnosis Date    Atrial fibrillation     Hypertension      No past surgical history on file.  Family History     Problem Relation (Age of Onset)    Cancer Father    Heart disease Mother, Father        Tobacco Use    Smoking status: Former Smoker   Substance and Sexual Activity    Alcohol use: Not on file    Drug use: Not on file    Sexual activity: Not on file       Review of Systems   Constitutional: Positive for chills, fatigue and fever.   HENT: Negative for congestion and sore throat.    Eyes: Negative for photophobia and visual disturbance.   Respiratory: Positive for cough. Negative for shortness of breath.    Cardiovascular: Negative for chest pain and leg swelling.   Gastrointestinal: Positive for diarrhea. Negative for abdominal distention, abdominal pain, blood in stool and nausea.   Genitourinary: Negative for difficulty urinating and dysuria.   Musculoskeletal: Negative for arthralgias and back pain.   Skin: Negative for color change and wound.   Neurological: Positive for weakness. Negative for syncope and numbness.   Hematological: Bruises/bleeds easily.   Psychiatric/Behavioral: Negative for agitation and confusion.     Objective:     Vital Signs (Most Recent):  Temp: 99 °F (37.2 °C) (09/15/20 2355)  Pulse: 94 (09/15/20 2355)  Resp: 20 (09/15/20 2355)  BP: 121/78 (09/15/20 2355)  SpO2: 96 % (09/15/20 2355) Vital Signs (24h Range):  Temp:  [99 °F (37.2 °C)] 99 °F (37.2 °C)  Pulse:  [] 94  Resp:  [17-20] 20  SpO2:  [94 %-96 %] 96 %  BP: (115-121)/(78) 121/78     Weight: (!) 136.1 kg (300 lb 0.7 oz)  Body mass index  is 37.5 kg/m².  Body surface area is 2.68 meters squared.    ECOG SCORE         [unfilled]    Lines/Drains/Airways     None                 Physical Exam  Vitals signs reviewed.   Constitutional:       General: He is not in acute distress.     Appearance: He is obese.   HENT:      Head: Normocephalic and atraumatic.      Nose: Nose normal.   Eyes:      Extraocular Movements: Extraocular movements intact.   Neck:      Musculoskeletal: Normal range of motion and neck supple.   Cardiovascular:      Rate and Rhythm: Normal rate. Rhythm irregular.   Pulmonary:      Effort: Pulmonary effort is normal. No respiratory distress.   Abdominal:      General: Bowel sounds are normal. There is no distension.      Palpations: Abdomen is soft.      Tenderness: There is no abdominal tenderness.      Comments: No organomegaly palpated   Musculoskeletal:         General: No swelling.      Right lower leg: No edema.      Left lower leg: No edema.   Skin:     General: Skin is warm and dry.      Findings: Bruising (RUE ecchymoses noted, L>R lower extremity ecchymoses) present.   Neurological:      General: No focal deficit present.      Mental Status: He is alert and oriented to person, place, and time.   Psychiatric:         Mood and Affect: Mood normal.         Behavior: Behavior normal.         Significant Labs:   CBC:   Recent Labs   Lab 09/16/20 0130   .50*   HGB 9.9*   HCT 30.1*   PLT 11*   , CMP:   Recent Labs   Lab 09/16/20 0130   *   K 3.1*      CO2 22*   *   BUN 21*   CREATININE 1.3   CALCIUM 8.0*   PROT 6.3   ALBUMIN 3.1*   BILITOT 1.0   ALKPHOS 67   AST 18   ALT 14   ANIONGAP 12   EGFRNONAA >60.0   , Coagulation:   Recent Labs   Lab 09/16/20 0130   INR 1.2   APTT 30.4       Diagnostic Results:  I have reviewed all pertinent imaging results/findings within the past 24 hours.

## 2020-09-16 NOTE — PROGRESS NOTES
[facility]  Hematology  Bone Marrow Transplant  Progress Note    Patient Name: Shell Diaz  Admission Date: 9/16/2020  Hospital Length of Stay: 0 days  Code Status: Full Code    Subjective:     Interval History: 09/16/2020: Transferred over night for [place] due to suspected leukemia. Receiving Hydrea 4 grams bid and allopurinol 300 mg bid. No evidence of TLS or DIC at this time. WBC 129K this am. plt 12K. Patient with hemoptysis. Will give 1 unit plt and check CT of chest. Will get echo and EKG and perform BMBx today. Having fevers. Continue vanc and zosyn. Blood and urine cx pending.    Objective:     Vital Signs (Most Recent):  Temp: 99.5 °F (37.5 °C) (09/16/20 0927)  Pulse: 102 (09/16/20 0751)  Resp: (!) 22 (09/16/20 0751)  BP: 128/84 (09/16/20 0751)  SpO2: (!) 91 % (09/16/20 0927) Vital Signs (24h Range):  Temp:  [99 °F (37.2 °C)-101.6 °F (38.7 °C)] 99.5 °F (37.5 °C)  Pulse:  [] 102  Resp:  [17-24] 22  SpO2:  [90 %-96 %] 91 %  BP: (114-128)/(75-84) 128/84     Weight: 134.6 kg (296 lb 13.6 oz)  Body mass index is 37.1 kg/m².  Body surface area is 2.67 meters squared.    ECOG SCORE         [unfilled]    Intake/Output - Last 3 Shifts       09/14 0700 - 09/15 0659 09/15 0700 - 09/16 0659 09/16 0700 - 09/17 0659    I.V. (mL/kg)  431.7 (3.2)     IV Piggyback  500     Total Intake(mL/kg)  931.7 (6.8)     Urine (mL/kg/hr)  350     Total Output  350     Net  +581.7                  Physical Exam  Constitutional:       Appearance: He is well-developed.   HENT:      Head: Normocephalic and atraumatic.      Mouth/Throat:      Pharynx: No oropharyngeal exudate.   Eyes:      General:         Right eye: No discharge.         Left eye: No discharge.      Conjunctiva/sclera: Conjunctivae normal.      Pupils: Pupils are equal, round, and reactive to light.   Neck:      Musculoskeletal: Normal range of motion and neck supple.   Cardiovascular:      Rate and Rhythm: Normal rate and regular rhythm.       Heart sounds: Normal heart sounds. No murmur.   Pulmonary:      Effort: Pulmonary effort is normal. No respiratory distress.      Breath sounds: Normal breath sounds. No wheezing or rales.   Abdominal:      General: Bowel sounds are normal. There is no distension.      Palpations: Abdomen is soft.      Tenderness: There is no abdominal tenderness.   Musculoskeletal: Normal range of motion.         General: No deformity.   Skin:     General: Skin is warm and dry.      Findings: No erythema or rash.   Neurological:      Mental Status: He is alert and oriented to person, place, and time.   Psychiatric:         Behavior: Behavior normal.         Thought Content: Thought content normal.         Judgment: Judgment normal.         Significant Labs:   CBC:   Recent Labs   Lab 09/16/20  0130 09/16/20  0746   .50* 129.80*   HGB 9.9* 9.8*   HCT 30.1* 30.6*   PLT 11* 12*    and CMP:   Recent Labs   Lab 09/16/20  0130 09/16/20  0746   * 133*   K 3.1* 3.3*    101   CO2 22* 22*   * 151*   BUN 21* 25*   CREATININE 1.3 1.4   CALCIUM 8.0* 8.0*   PROT 6.3 6.4   ALBUMIN 3.1* 3.1*   BILITOT 1.0 0.9   ALKPHOS 67 63   AST 18 22   ALT 14 14   ANIONGAP 12 10   EGFRNONAA >60.0 56.2*       Diagnostic Results:  I have reviewed all pertinent imaging results/findings within the past 24 hours.    Assessment/Plan:     * Acute leukemia  Patient is a 55 year old man who presents as a transfer for possible leukemia after manual differential demonstrated blasts.     Plan:   - will plan for BMBx today  - CBC, CMP, PT/INR, fibrinoge, uric acid, phos BID to assess for TLS and DIC. No evidence of either at this time.  - Follow up peripheral smear   - Follow up 2D ECHO    - Continue allopurinol 300mg BID, hydrea 4g BID, IVF  - Transfuse cyroglobulin if fibrinogen <150  - Transfuse if platelets <10, Hgb<7 and/or patient actively bleeding  - treatment plan pending bone marrow biopsy and echo results    Anemia  Likely related to  underlying hematologic process. Hgb stable and patient reports no active bleeding.   Transfuse if Hgb<7.      Thrombocytopenia  Platelet count 12K upon admission.  Transfuse if platelet count<10. If patient develops active bleed, transfuse for goal >50.  Will give plts today due to hemoptysis    Fever  Patient presented with fevers, fatigue, concerning due to acute leukemoid process as significant leukocytosis and blasts. Fevers likely related to this but infection workup in process.     - Continue vancomycin and zosyn until infection can be ruled out  - CXR at OSH did not show findings concerning for PNA. Will get chest CT today due to hemoptysis  - Follow up UA, blood cultures    Essential hypertension  Hold home losartan, metoprolol as BP soft. Resume when appropriate.  Patient unclear with what his home medications are. He states that wife will bring his med list. Need clarification and help with pharmacy, appreciate assistance.    Paroxysmal atrial fibrillation  Patient states that he had pacemaker placed for this.   Hold Eliquis due to low platelet count. Resume when appropriate.  Will get EKG today          VTE Risk Mitigation (From admission, onward)         Ordered     IP VTE HIGH RISK PATIENT  Once      09/16/20 0017     Place sequential compression device  Until discontinued      09/16/20 0017                Disposition: Inpatient for suspected acute leukemia.     Jeni Holt, NP  Bone Marrow Transplant  [facility]

## 2020-09-16 NOTE — ASSESSMENT & PLAN NOTE
Patient states that he had pacemaker placed for this.   Hold Eliquis due to low platelet count. Resume when appropriate.

## 2020-09-16 NOTE — PLAN OF CARE
Uneventful shift. Pt admitted to floor from [facility]. Urine collected. Pt pain controlled by po prn pain medication. Pt tmax 100.4. MD on call notified. Pt iv abx regimen initiated. EKG and blood cultures done this weekend. Pt has remained free from injury this shift. Bed in low locked position. Call light and personal belongings within reach. Side rails up x2. Nonskid socks in place. Pt instructed to call with any needs. Will continue to monitor.

## 2020-09-16 NOTE — ASSESSMENT & PLAN NOTE
Patient is a 55 year old man who presents as a transfer for possible leukemia after manual differential demonstrated blasts.     Plan:   - will plan for BMBx today  - CBC, CMP, PT/INR, fibrinoge, uric acid, phos BID to assess for TLS and DIC. No evidence of either at this time.  - Follow up peripheral smear   - Follow up 2D ECHO    - Continue allopurinol 300mg BID, hydrea 4g BID, IVF  - Transfuse cyroglobulin if fibrinogen <150  - Transfuse if platelets <10, Hgb<7 and/or patient actively bleeding  - treatment plan pending bone marrow biopsy and echo results

## 2020-09-16 NOTE — ASSESSMENT & PLAN NOTE
Patient presented with fevers, fatigue, concerning due to acute leukemoid process as significant leukocytosis and blasts. Fevers likely related to this but infection workup in process.     - Continue vancomycin and zosyn until infection can be ruled out  - CXR at OSH did not show findings concerning for PNA. Consider repeat CXR if patient develops respiratory symptoms.  - Follow up UA, blood cultures

## 2020-09-17 PROBLEM — D72.829 LEUKOCYTOSIS: Status: ACTIVE | Noted: 2020-01-01

## 2020-09-17 NOTE — PROGRESS NOTES
[facility]  Hematology  Bone Marrow Transplant  Progress Note    Patient Name: Shell Diaz  Admission Date: 9/16/2020  Hospital Length of Stay: 1 days  Code Status: Full Code    Subjective:     Interval History: BMBx performed yesterday. Results pending. CT of chest suggestive of LLL PNA. Continue vanc. Also showing mild left pleural effusion and mild pericardial effusion. History of afib. Went into afib with RVR (HR 130s) yesterday. Rate-controlled since resuming home metoprolol and digoxin. Given cardiac history (afib, cardiomyopathy, pacemaker) consulted cardiology as patient may receive anthracycline, which is cardiotoxic. Will attempt to get records from home cardiologist. Echo performed yesterday with EF of 58%. Cardiology repeating echo to check for vascular stranding. Replacing K+ for K+ of 3.1. Robitussin ordered for cough. Continue oxy for left, lateral abd pain, which is likely due to PNA.    Objective:     Vital Signs (Most Recent):  Temp: 98.2 °F (36.8 °C) (09/17/20 1110)  Pulse: 94 (09/17/20 1145)  Resp: 19 (09/17/20 1110)  BP: 125/71 (09/17/20 1110)  SpO2: (!) 94 % (09/17/20 1110) Vital Signs (24h Range):  Temp:  [98.2 °F (36.8 °C)-100.7 °F (38.2 °C)] 98.2 °F (36.8 °C)  Pulse:  [] 94  Resp:  [19-25] 19  SpO2:  [92 %-94 %] 94 %  BP: (125-149)/(70-80) 125/71     Weight: 134.3 kg (296 lb)  Body mass index is 37 kg/m².  Body surface area is 2.67 meters squared.    ECOG SCORE         [unfilled]    Intake/Output - Last 3 Shifts       09/15 0700 - 09/16 0659 09/16 0700 - 09/17 0659 09/17 0700 - 09/18 0659    P.O.  570 270    I.V. (mL/kg) 431.7 (3.2) 580 (4.3) 2255 (16.8)    Blood  286     IV Piggyback 600 800     Total Intake(mL/kg) 1031.7 (7.6) 2236 (16.6) 2525 (18.8)    Urine (mL/kg/hr) 350  475 (0.6)    Stool   0    Total Output 350  475    Net +681.7 +2236 +2050           Urine Occurrence  4 x     Stool Occurrence   2 x          Physical Exam  Constitutional:       Appearance:  He is well-developed.   HENT:      Head: Normocephalic and atraumatic.      Mouth/Throat:      Pharynx: No oropharyngeal exudate.   Eyes:      General:         Right eye: No discharge.         Left eye: No discharge.      Conjunctiva/sclera: Conjunctivae normal.      Pupils: Pupils are equal, round, and reactive to light.   Neck:      Musculoskeletal: Normal range of motion and neck supple.   Cardiovascular:      Rate and Rhythm: Normal rate and regular rhythm.      Heart sounds: Normal heart sounds. No murmur.   Pulmonary:      Effort: Pulmonary effort is normal. No respiratory distress.      Breath sounds: Normal breath sounds. No wheezing or rales.   Abdominal:      General: Bowel sounds are normal. There is no distension.      Palpations: Abdomen is soft.      Tenderness: There is no abdominal tenderness.   Musculoskeletal: Normal range of motion.         General: No deformity.   Skin:     General: Skin is warm and dry.      Findings: No erythema or rash.   Neurological:      Mental Status: He is alert and oriented to person, place, and time.   Psychiatric:         Behavior: Behavior normal.         Thought Content: Thought content normal.         Judgment: Judgment normal.         Significant Labs:   CBC:   Recent Labs   Lab 09/16/20  0746 09/16/20  1557 09/17/20  0423   .80* 126.20* 109.30*   HGB 9.8* 9.1* 8.7*   HCT 30.6* 28.0* 26.6*   PLT 12* 40* 31*    and CMP:   Recent Labs   Lab 09/16/20  0746 09/16/20  1557 09/17/20  0423   * 132* 134*   K 3.3* 3.4* 3.1*    102 103   CO2 22* 22* 21*   * 200* 176*   BUN 25* 27* 30*   CREATININE 1.4 1.4 1.4   CALCIUM 8.0* 7.9* 7.6*   PROT 6.4 6.1 5.7*   ALBUMIN 3.1* 3.0* 2.8*   BILITOT 0.9 0.7 0.6   ALKPHOS 63 62 56   AST 22 24 25   ALT 14 18 19   ANIONGAP 10 8 10   EGFRNONAA 56.2* 56.2* 56.2*       Diagnostic Results:  I have reviewed all pertinent imaging results/findings within the past 24 hours.    Assessment/Plan:     * Acute leukemia  -  Patient is a 55 year old man who presents as a transfer for possible leukemia after manual differential demonstrated blasts.   - BMBx performed 9/16/20. Results pending.  - CBC, CMP, PT/INR, fibrinoge, uric acid, phos BID to assess for TLS and DIC. No evidence of either at this time.  - Follow up peripheral smear   - 2D ECHO with EF of 58%   - Continue allopurinol 300mg BID, hydrea 4g BID, IVF  - Transfuse cyroglobulin if fibrinogen <150  - Transfuse if platelets <10, Hgb<7 and/or patient actively bleeding  - treatment plan pending bone marrow biopsy results    Leukocytosis   on admission and 129 prior to starting hydrea  Started hydrea 4 grams bid on 9/16/20  WBC 109K today  Will monitor closely for TLS. No evidence of this at this time.    Anemia  Likely related to underlying hematologic process.  Transfuse if Hgb<7  hgb 8.7 today      Thrombocytopenia  Platelet count 12K upon admission. Was given a unit of plts due to hemoptysis.  Transfuse if platelet count<10. If patient develops active bleed, transfuse for goal >50.  plts 31K today    Fever  Patient presented with fevers, fatigue, concerning due to acute leukemoid process as significant leukocytosis and blasts. Fevers likely related to this but infection workup in process.   - Continue vancomycin and zosyn until infection can be ruled out  - CXR at OSH did not show findings concerning for PNA.   - Chest CT performed 9/16/20 due to hemoptysis and suggestive of LLL PNA  - blood cx with NGTD  - urine cx pending, but u/a not suggestive of UTI    Essential hypertension  Holding home losartan as BP is stable at this time. Will resume when appropriate.      Paroxysmal atrial fibrillation  Patient states that he had pacemaker placed for this.   Holding Eliquis due to low platelet count. Resume when appropriate.  EKG from 9/16/20 showing afib with RVR  HR now controlled since starting patients home digoxin and metoprolol  Cardiology consulted due to cardiac  history as patient will receive anthracycline if he is induced for AML  Cardiology repeating ehco to assess for vascular stranding            VTE Risk Mitigation (From admission, onward)         Ordered     IP VTE HIGH RISK PATIENT  Once      09/16/20 0017     Place sequential compression device  Until discontinued      09/16/20 0017                Disposition: Inpatient.    Jeni Holt, NP  Bone Marrow Transplant  [facility]

## 2020-09-17 NOTE — ASSESSMENT & PLAN NOTE
on admission and 129 prior to starting hydrea  Started hydrea 4 grams bid on 9/16/20  WBC 109K today  Will monitor closely for TLS. No evidence of this at this time.

## 2020-09-17 NOTE — ASSESSMENT & PLAN NOTE
Patient presented with fevers, fatigue, concerning due to acute leukemoid process as significant leukocytosis and blasts. Fevers likely related to this but infection workup in process.   - Continue vancomycin and zosyn until infection can be ruled out  - CXR at OSH did not show findings concerning for PNA.   - Chest CT performed 9/16/20 due to hemoptysis and suggestive of LLL PNA  - blood cx with NGTD  - urine cx pending, but u/a not suggestive of UTI

## 2020-09-17 NOTE — SUBJECTIVE & OBJECTIVE
Subjective:     Interval History: BMBx performed yesterday. Results pending. CT of chest suggestive of LLL PNA. Continue vanc. Also showing mild left pleural effusion and mild pericardial effusion. History of afib. Went into afib with RVR (HR 130s) yesterday. Rate-controlled since resuming home metoprolol and digoxin. Given cardiac history (afib, cardiomyopathy, pacemaker) consulted cardiology as patient may receive anthracycline, which is cardiotoxic. Will attempt to get records from home cardiologist. Echo performed yesterday with EF of 58%. Cardiology repeating echo to check for vascular stranding. Replacing K+ for K+ of 3.1. Robitussin ordered for cough. Continue oxy for left, lateral abd pain, which is likely due to PNA.    Objective:     Vital Signs (Most Recent):  Temp: 98.2 °F (36.8 °C) (09/17/20 1110)  Pulse: 94 (09/17/20 1145)  Resp: 19 (09/17/20 1110)  BP: 125/71 (09/17/20 1110)  SpO2: (!) 94 % (09/17/20 1110) Vital Signs (24h Range):  Temp:  [98.2 °F (36.8 °C)-100.7 °F (38.2 °C)] 98.2 °F (36.8 °C)  Pulse:  [] 94  Resp:  [19-25] 19  SpO2:  [92 %-94 %] 94 %  BP: (125-149)/(70-80) 125/71     Weight: 134.3 kg (296 lb)  Body mass index is 37 kg/m².  Body surface area is 2.67 meters squared.    ECOG SCORE         [unfilled]    Intake/Output - Last 3 Shifts       09/15 0700 - 09/16 0659 09/16 0700 - 09/17 0659 09/17 0700 - 09/18 0659    P.O.  570 270    I.V. (mL/kg) 431.7 (3.2) 580 (4.3) 2255 (16.8)    Blood  286     IV Piggyback 600 800     Total Intake(mL/kg) 1031.7 (7.6) 2236 (16.6) 2525 (18.8)    Urine (mL/kg/hr) 350  475 (0.6)    Stool   0    Total Output 350  475    Net +681.7 +2236 +2050           Urine Occurrence  4 x     Stool Occurrence   2 x          Physical Exam  Constitutional:       Appearance: He is well-developed.   HENT:      Head: Normocephalic and atraumatic.      Mouth/Throat:      Pharynx: No oropharyngeal exudate.   Eyes:      General:         Right eye: No discharge.         Left  eye: No discharge.      Conjunctiva/sclera: Conjunctivae normal.      Pupils: Pupils are equal, round, and reactive to light.   Neck:      Musculoskeletal: Normal range of motion and neck supple.   Cardiovascular:      Rate and Rhythm: Normal rate and regular rhythm.      Heart sounds: Normal heart sounds. No murmur.   Pulmonary:      Effort: Pulmonary effort is normal. No respiratory distress.      Breath sounds: Normal breath sounds. No wheezing or rales.   Abdominal:      General: Bowel sounds are normal. There is no distension.      Palpations: Abdomen is soft.      Tenderness: There is no abdominal tenderness.   Musculoskeletal: Normal range of motion.         General: No deformity.   Skin:     General: Skin is warm and dry.      Findings: No erythema or rash.   Neurological:      Mental Status: He is alert and oriented to person, place, and time.   Psychiatric:         Behavior: Behavior normal.         Thought Content: Thought content normal.         Judgment: Judgment normal.         Significant Labs:   CBC:   Recent Labs   Lab 09/16/20  0746 09/16/20  1557 09/17/20  0423   .80* 126.20* 109.30*   HGB 9.8* 9.1* 8.7*   HCT 30.6* 28.0* 26.6*   PLT 12* 40* 31*    and CMP:   Recent Labs   Lab 09/16/20  0746 09/16/20  1557 09/17/20  0423   * 132* 134*   K 3.3* 3.4* 3.1*    102 103   CO2 22* 22* 21*   * 200* 176*   BUN 25* 27* 30*   CREATININE 1.4 1.4 1.4   CALCIUM 8.0* 7.9* 7.6*   PROT 6.4 6.1 5.7*   ALBUMIN 3.1* 3.0* 2.8*   BILITOT 0.9 0.7 0.6   ALKPHOS 63 62 56   AST 22 24 25   ALT 14 18 19   ANIONGAP 10 8 10   EGFRNONAA 56.2* 56.2* 56.2*       Diagnostic Results:  I have reviewed all pertinent imaging results/findings within the past 24 hours.

## 2020-09-17 NOTE — ASSESSMENT & PLAN NOTE
"Mr. Diaz is a 55 year old man with A.fib (pacemaker in place, on Eliquis), cardiomyopathy, HTN who presented to [facility] with fevers and fatigue. Patient says that he has been feeling weak to the point that he "can't walk". Cardiology was consulted for the patients parxysomal A fib with RVR and clearance for chemotherapy induction in the setting of acute leukemia. He was not on his home meds, him but he is now rate controlled on his home medications. Patient has limited medical records available. He also had an echocardiogram on 09/16 which demonstrated EF 58% w mild LVH and left atrial enlargement and is currently not experiencing CHF exacerbation. 2D echo shows strain of 16%.      Plan:   - Please obtain repeat echo after completion of chemotherapy   - Obtain records from pt primary cardiologist  - Continue home metoprolol and digoxin for afib rate control/cardioprotective effect  - Continue home losartan for cardioprotective effect  - Restart eliquis once feasible  -monitor volume status/telemetry with planned chemo  "

## 2020-09-17 NOTE — ASSESSMENT & PLAN NOTE
- Patient is a 55 year old man who presents as a transfer for possible leukemia after manual differential demonstrated blasts.   - BMBx performed 9/16/20. Results pending.  - CBC, CMP, PT/INR, fibrinoge, uric acid, phos BID to assess for TLS and DIC. No evidence of either at this time.  - Follow up peripheral smear   - 2D ECHO with EF of 58%   - Continue allopurinol 300mg BID, hydrea 4g BID, IVF  - Transfuse cyroglobulin if fibrinogen <150  - Transfuse if platelets <10, Hgb<7 and/or patient actively bleeding  - treatment plan pending bone marrow biopsy results

## 2020-09-17 NOTE — CONSULTS
"[facility]  Cardiology  Consult Note    Patient Name: Shell Diaz  MRN: [id number]  Admission Date: 9/16/2020  Hospital Length of Stay: 1 days  Code Status: Full Code   Attending Provider: Jm Fields MD   Consulting Provider: Murphy Bhardwaj MD  Primary Care Physician: Alva Rubio MD  Principal Problem:Acute leukemia    Patient information was obtained from patient.     Consults  Subjective:     Chief Complaint:  Cardiac evaluation for chemotherapy induction and A fib with RVR     HPI:    Mr. Diaz is a 55 year old man with A.fib (pacemaker in place, on Eliquis), cardiomyopathy, HTN who presented to [facility] with fevers and fatigue. Patient says that he has been feeling weak to the point that he "can't walk". He has associated chills, night sweats, and occasional pain in his left shoulder that he attributes to positional sleeping. Cardiology was consulted for the patients parxysomal A fib with RVR and clearance for chemotherapy induction in the setting of acute leukemia. He states that he has a pacemaker that was placed for his A.fib and says he is on Eliquis, losartan and metoprolol. Patient is seen by Dr. Durán in [place]. Patient denies chest pain, orthopnea, nocturnal dyspnea, syncope, family history of heart disease, or leg swelling but does report dyspnea on exertion that has been present since diagnosis of leukemia. CXR showed "cardiomegaly with no acute pulmonary process".     TTE: Due to irregularly irregular rhythm as well as image quality, unable to accurately report strain for all views. LV strain is -16% based on the 4 chamber view alone.Left ventricular systolic function. The estimated ejection fraction is 58%.Mild concentric left ventricular hypertrophy.Severe left atrial enlargement and small posterolateral pericardial effusion.    EKG shows A fib with RVR and TWI in lateral leads. EKG from OSH unavailable.       Past Medical History:   Diagnosis " Date    Atrial fibrillation     Hypertension        No past surgical history on file.    Review of patient's allergies indicates:  No Known Allergies    No current facility-administered medications on file prior to encounter.      Current Outpatient Medications on File Prior to Encounter   Medication Sig    amLODIPine (NORVASC) 5 MG tablet Take 5 mg by mouth once daily.    atorvastatin (LIPITOR) 40 MG tablet Take 40 mg by mouth once daily.    losartan (COZAAR) 50 MG tablet Take 50 mg by mouth once daily.    metFORMIN (GLUCOPHAGE) 500 MG tablet Take 500 mg by mouth 2 (two) times daily with meals.    methylPREDNISolone (MEDROL) 4 MG Tab Take 4 mg by mouth once daily.    metoprolol succinate (TOPROL-XL) 50 MG 24 hr tablet Take 50 mg by mouth once daily.    terazosin (HYTRIN) 5 MG capsule Take 5 mg by mouth every evening.    apixaban (ELIQUIS) 5 mg Tab Take 5 mg by mouth 2 (two) times daily.    cefdinir (OMNICEF) 300 MG capsule Take 300 mg by mouth 2 (two) times daily.    digoxin (LANOXIN) 250 mcg tablet Take 250 mcg by mouth once daily.     Family History     Problem Relation (Age of Onset)    Cancer Father    Heart disease Mother, Father        Tobacco Use    Smoking status: Former Smoker   Substance and Sexual Activity    Alcohol use: Not on file    Drug use: Not on file    Sexual activity: Not on file     Review of Systems   Constitution: Positive for chills, fever and malaise/fatigue.   Cardiovascular: Positive for dyspnea on exertion. Negative for chest pain, leg swelling, near-syncope, orthopnea, palpitations and syncope.   Respiratory: Positive for cough and shortness of breath.    Hematologic/Lymphatic: Bruises/bleeds easily.   Gastrointestinal: Positive for diarrhea. Negative for abdominal pain, hematochezia, nausea and vomiting.   Neurological: Positive for weakness.     Objective:     Vital Signs (Most Recent):  Temp: 97.3 °F (36.3 °C) (09/17/20 1546)  Pulse: 102 (09/17/20 1558)  Resp: 20  (09/17/20 1546)  BP: (!) 145/86 (09/17/20 1546)  SpO2: (!) 94 % (09/17/20 1546) Vital Signs (24h Range):  Temp:  [97.3 °F (36.3 °C)-99.8 °F (37.7 °C)] 97.3 °F (36.3 °C)  Pulse:  [] 102  Resp:  [19-24] 20  SpO2:  [93 %-94 %] 94 %  BP: (125-149)/(71-86) 145/86     Weight: 134.3 kg (296 lb)  Body mass index is 37 kg/m².    SpO2: (!) 94 %  O2 Device (Oxygen Therapy): nasal cannula      Intake/Output Summary (Last 24 hours) at 9/17/2020 1624  Last data filed at 9/17/2020 1353  Gross per 24 hour   Intake 3075 ml   Output 775 ml   Net 2300 ml       Lines/Drains/Airways     Peripheral Intravenous Line                 Peripheral IV - Single Lumen 09/15/20 20 G Right Antecubital 2 days                Physical Exam   Constitutional: He is oriented to person, place, and time. He appears well-developed and well-nourished. No distress.   obese   Eyes: Pupils are equal, round, and reactive to light. EOM are normal.   Neck: Normal range of motion. Neck supple. No JVD present.   Cardiovascular: Intact distal pulses. An irregularly irregular rhythm present. Exam reveals no gallop.   No murmur heard.  Pulmonary/Chest: Effort normal. No accessory muscle usage. Tachypnea noted.   Musculoskeletal: Normal range of motion.         General: No tenderness or edema.   Neurological: He is alert and oriented to person, place, and time.   Skin: Skin is warm and dry. No erythema.   Psychiatric: He has a normal mood and affect. His behavior is normal.       Significant Labs:   CMP   Recent Labs   Lab 09/16/20  0746 09/16/20  1557 09/17/20  0423   * 132* 134*   K 3.3* 3.4* 3.1*    102 103   CO2 22* 22* 21*   * 200* 176*   BUN 25* 27* 30*   CREATININE 1.4 1.4 1.4   CALCIUM 8.0* 7.9* 7.6*   PROT 6.4 6.1 5.7*   ALBUMIN 3.1* 3.0* 2.8*   BILITOT 0.9 0.7 0.6   ALKPHOS 63 62 56   AST 22 24 25   ALT 14 18 19   ANIONGAP 10 8 10   ESTGFRAFRICA >60.0 >60.0 >60.0   EGFRNONAA 56.2* 56.2* 56.2*   , CBC   Recent Labs   Lab 09/16/20  0711  "09/16/20  1557 09/17/20  0423 09/17/20  1600   .80* 126.20* 109.30* 82.22*   HGB 9.8* 9.1* 8.7* 9.0*   HCT 30.6* 28.0* 26.6* 28.4*   PLT 12* 40* 31*  --    , INR   Recent Labs   Lab 09/16/20  0746 09/16/20  1557 09/17/20  0423   INR 1.2 1.1 1.1         Assessment and Plan:     Paroxysmal atrial fibrillation  Mr. Diaz is a 55 year old man with A.fib (pacemaker in place, on Eliquis), cardiomyopathy, HTN who presented to [facility] with fevers and fatigue. Patient says that he has been feeling weak to the point that he "can't walk". Cardiology was consulted for the patients parxysomal A fib with RVR and clearance for chemotherapy induction in the setting of acute leukemia. He was not on his home meds, him but he is now rate controlled on his home medications. Patient has limited medical records available. He also had an echocardiogram on 09/16 which demonstrated EF 58% w mild LVH and left atrial enlargement and is currently not experiencing CHF exacerbation. 2D echo shows strain of 16%.      Plan:   - Please obtain repeat echo after completion of chemotherapy   - Obtain records from pt primary cardiologist  - Continue home metoprolol and digoxin for afib rate control/cardioprotective effect  - Continue home losartan for cardioprotective effect  - Restart eliquis once feasible  -monitor volume status/telemetry with planned chemo        VTE Risk Mitigation (From admission, onward)         Ordered     IP VTE HIGH RISK PATIENT  Once      09/16/20 0017     Place sequential compression device  Until discontinued      09/16/20 0017                Thank you for your consult. I will follow-up with patient. Please contact us if you have any additional questions.    Murphy Bhardwaj MD  Cardiology   [facility]    "

## 2020-09-17 NOTE — SUBJECTIVE & OBJECTIVE
Past Medical History:   Diagnosis Date    Atrial fibrillation     Hypertension        No past surgical history on file.    Review of patient's allergies indicates:  No Known Allergies    No current facility-administered medications on file prior to encounter.      Current Outpatient Medications on File Prior to Encounter   Medication Sig    amLODIPine (NORVASC) 5 MG tablet Take 5 mg by mouth once daily.    atorvastatin (LIPITOR) 40 MG tablet Take 40 mg by mouth once daily.    losartan (COZAAR) 50 MG tablet Take 50 mg by mouth once daily.    metFORMIN (GLUCOPHAGE) 500 MG tablet Take 500 mg by mouth 2 (two) times daily with meals.    methylPREDNISolone (MEDROL) 4 MG Tab Take 4 mg by mouth once daily.    metoprolol succinate (TOPROL-XL) 50 MG 24 hr tablet Take 50 mg by mouth once daily.    terazosin (HYTRIN) 5 MG capsule Take 5 mg by mouth every evening.    apixaban (ELIQUIS) 5 mg Tab Take 5 mg by mouth 2 (two) times daily.    cefdinir (OMNICEF) 300 MG capsule Take 300 mg by mouth 2 (two) times daily.    digoxin (LANOXIN) 250 mcg tablet Take 250 mcg by mouth once daily.     Family History     Problem Relation (Age of Onset)    Cancer Father    Heart disease Mother, Father        Tobacco Use    Smoking status: Former Smoker   Substance and Sexual Activity    Alcohol use: Not on file    Drug use: Not on file    Sexual activity: Not on file     Review of Systems   Constitution: Positive for chills, fever and malaise/fatigue.   Cardiovascular: Positive for dyspnea on exertion. Negative for chest pain, leg swelling, near-syncope, orthopnea, palpitations and syncope.   Respiratory: Positive for cough and shortness of breath.    Hematologic/Lymphatic: Bruises/bleeds easily.   Gastrointestinal: Positive for diarrhea. Negative for abdominal pain, hematochezia, nausea and vomiting.   Neurological: Positive for weakness.     Objective:     Vital Signs (Most Recent):  Temp: 97.3 °F (36.3 °C) (09/17/20 1546)  Pulse:  102 (09/17/20 1558)  Resp: 20 (09/17/20 1546)  BP: (!) 145/86 (09/17/20 1546)  SpO2: (!) 94 % (09/17/20 1546) Vital Signs (24h Range):  Temp:  [97.3 °F (36.3 °C)-99.8 °F (37.7 °C)] 97.3 °F (36.3 °C)  Pulse:  [] 102  Resp:  [19-24] 20  SpO2:  [93 %-94 %] 94 %  BP: (125-149)/(71-86) 145/86     Weight: 134.3 kg (296 lb)  Body mass index is 37 kg/m².    SpO2: (!) 94 %  O2 Device (Oxygen Therapy): nasal cannula      Intake/Output Summary (Last 24 hours) at 9/17/2020 1624  Last data filed at 9/17/2020 1353  Gross per 24 hour   Intake 3075 ml   Output 775 ml   Net 2300 ml       Lines/Drains/Airways     Peripheral Intravenous Line                 Peripheral IV - Single Lumen 09/15/20 20 G Right Antecubital 2 days                Physical Exam   Constitutional: He is oriented to person, place, and time. He appears well-developed and well-nourished. No distress.   obese   Eyes: Pupils are equal, round, and reactive to light. EOM are normal.   Neck: Normal range of motion. Neck supple. No JVD present.   Cardiovascular: Intact distal pulses. An irregularly irregular rhythm present. Exam reveals no gallop.   No murmur heard.  Pulmonary/Chest: Effort normal. No accessory muscle usage. Tachypnea noted.   Musculoskeletal: Normal range of motion.         General: No tenderness or edema.   Neurological: He is alert and oriented to person, place, and time.   Skin: Skin is warm and dry. No erythema.   Psychiatric: He has a normal mood and affect. His behavior is normal.       Significant Labs:   CMP   Recent Labs   Lab 09/16/20  0746 09/16/20  1557 09/17/20  0423   * 132* 134*   K 3.3* 3.4* 3.1*    102 103   CO2 22* 22* 21*   * 200* 176*   BUN 25* 27* 30*   CREATININE 1.4 1.4 1.4   CALCIUM 8.0* 7.9* 7.6*   PROT 6.4 6.1 5.7*   ALBUMIN 3.1* 3.0* 2.8*   BILITOT 0.9 0.7 0.6   ALKPHOS 63 62 56   AST 22 24 25   ALT 14 18 19   ANIONGAP 10 8 10   ESTGFRAFRICA >60.0 >60.0 >60.0   EGFRNONAA 56.2* 56.2* 56.2*   , CBC    Recent Labs   Lab 09/16/20  0746 09/16/20  1557 09/17/20  0423 09/17/20  1600   .80* 126.20* 109.30* 82.22*   HGB 9.8* 9.1* 8.7* 9.0*   HCT 30.6* 28.0* 26.6* 28.4*   PLT 12* 40* 31*  --    , INR   Recent Labs   Lab 09/16/20  0746 09/16/20  1557 09/17/20  0423   INR 1.2 1.1 1.1

## 2020-09-17 NOTE — HPI
" Mr. Diaz is a 55 year old man with A.fib (pacemaker in place, on Eliquis), cardiomyopathy, HTN who presented to [facility] with fevers and fatigue. Patient says that he has been feeling weak to the point that he "can't walk". He has associated chills, night sweats, and occasional pain in his left shoulder that he attributes to positional sleeping. Cardiology was consulted for the patients parxysomal A fib with RVR and clearance for chemotherapy induction in the setting of acute leukemia. He states that he has a pacemaker that was placed for his A.fib and says he is on Eliquis, losartan and metoprolol. Patient is seen by Dr. Durán in [place]. Patient denies chest pain, orthopnea, nocturnal dyspnea, syncope, family history of heart disease, or leg swelling but does report dyspnea on exertion that has been present since diagnosis of leukemia. CXR showed "cardiomegaly with no acute pulmonary process".     TTE: Due to irregularly irregular rhythm as well as image quality, unable to accurately report strain for all views. LV strain is -16% based on the 4 chamber view alone.Left ventricular systolic function. The estimated ejection fraction is 58%.Mild concentric left ventricular hypertrophy.Severe left atrial enlargement and small posterolateral pericardial effusion.    EKG shows A fib with RVR and TWI in lateral leads. EKG from OSH unavailable.     "

## 2020-09-17 NOTE — CONSULTS
[facility]  General Surgery  Consult Note    Inpatient consult to General Surgery  Consult performed by: Shira Newby MD  Consult ordered by: Jeni Holt NP        Subjective:     History of Present Illness:   Shell Diaz is a 55 y.o. male with afib (RVR yesterday to 130), cardiomyopathy, and newly diagnosed acute leukemia. Surgery consulted for line placement.  Currently having fevers, attributed to pneumonia. Blood cultures pending.     No current facility-administered medications on file prior to encounter.      Current Outpatient Medications on File Prior to Encounter   Medication Sig    amLODIPine (NORVASC) 5 MG tablet Take 5 mg by mouth once daily.    atorvastatin (LIPITOR) 40 MG tablet Take 40 mg by mouth once daily.    losartan (COZAAR) 50 MG tablet Take 50 mg by mouth once daily.    metFORMIN (GLUCOPHAGE) 500 MG tablet Take 500 mg by mouth 2 (two) times daily with meals.    methylPREDNISolone (MEDROL) 4 MG Tab Take 4 mg by mouth once daily.    metoprolol succinate (TOPROL-XL) 50 MG 24 hr tablet Take 50 mg by mouth once daily.    terazosin (HYTRIN) 5 MG capsule Take 5 mg by mouth every evening.    apixaban (ELIQUIS) 5 mg Tab Take 5 mg by mouth 2 (two) times daily.    cefdinir (OMNICEF) 300 MG capsule Take 300 mg by mouth 2 (two) times daily.    digoxin (LANOXIN) 250 mcg tablet Take 250 mcg by mouth once daily.       Review of patient's allergies indicates:  No Known Allergies    Past Medical History:   Diagnosis Date    Atrial fibrillation     Hypertension      No past surgical history on file.  Family History     Problem Relation (Age of Onset)    Cancer Father    Heart disease Mother, Father        Tobacco Use    Smoking status: Former Smoker   Substance and Sexual Activity    Alcohol use: Not on file    Drug use: Not on file    Sexual activity: Not on file     Review of Systems   Constitutional: Positive for chills, fatigue and fever. Negative for  activity change.   Respiratory: Positive for cough. Negative for shortness of breath.    Cardiovascular: Negative for chest pain and palpitations.   Gastrointestinal: Negative for abdominal distention, abdominal pain, constipation, diarrhea, nausea and vomiting.   Musculoskeletal: Negative for arthralgias and myalgias.   Neurological: Negative for dizziness and headaches.   Psychiatric/Behavioral: Negative for agitation and confusion.     Objective:     Vital Signs (Most Recent):  Temp: 97.3 °F (36.3 °C) (09/17/20 1546)  Pulse: 102 (09/17/20 1558)  Resp: 20 (09/17/20 1546)  BP: (!) 145/86 (09/17/20 1546)  SpO2: (!) 94 % (09/17/20 1546) Vital Signs (24h Range):  Temp:  [97.3 °F (36.3 °C)-99.8 °F (37.7 °C)] 97.3 °F (36.3 °C)  Pulse:  [] 102  Resp:  [19-24] 20  SpO2:  [93 %-94 %] 94 %  BP: (125-149)/(71-86) 145/86     Weight: 134.3 kg (296 lb)  Body mass index is 37 kg/m².      Intake/Output Summary (Last 24 hours) at 9/17/2020 1712  Last data filed at 9/17/2020 1353  Gross per 24 hour   Intake 3075 ml   Output 775 ml   Net 2300 ml       Physical Exam  Constitutional:       General: He is not in acute distress.     Appearance: He is well-developed.   Cardiovascular:      Rate and Rhythm: Normal rate and regular rhythm.   Pulmonary:      Effort: Pulmonary effort is normal. No respiratory distress.   Abdominal:      General: There is no distension.      Palpations: Abdomen is soft.      Tenderness: There is no abdominal tenderness.   Skin:     General: Skin is warm and dry.   Neurological:      Mental Status: He is alert and oriented to person, place, and time.   Psychiatric:         Behavior: Behavior normal.         Significant Labs:  CBC:   Recent Labs   Lab 09/17/20  1600   WBC 82.22*   RBC 2.89*   HGB 9.0*   HCT 28.4*   PLT 23*   MCV 98   MCH 31.1*   MCHC 31.7*     CMP:   Recent Labs   Lab 09/17/20  0423   *   CALCIUM 7.6*   ALBUMIN 2.8*   PROT 5.7*   *   K 3.1*   CO2 21*      BUN 30*    CREATININE 1.4   ALKPHOS 56   ALT 19   AST 25   BILITOT 0.6       Significant Diagnostics:  I have reviewed all pertinent imaging results/findings within the past 24 hours.    Assessment/Plan:     Active Diagnoses:    Diagnosis Date Noted POA    PRINCIPAL PROBLEM:  Acute leukemia [C95.00] 09/16/2020 Yes    Leukocytosis [D72.829] 09/17/2020 Yes    Paroxysmal atrial fibrillation [I48.0] 09/16/2020 Yes    Essential hypertension [I10] 09/16/2020 Yes    Fever [R50.9] 09/16/2020 Yes    Thrombocytopenia [D69.6] 09/16/2020 Yes    Anemia [D64.9] 09/16/2020 Yes      Problems Resolved During this Admission:     Plan for line placement when cultures result negative and patient fully worked up by Cardiology     Shira Newby MD  General Surgery  [facility]

## 2020-09-17 NOTE — ASSESSMENT & PLAN NOTE
Patient states that he had pacemaker placed for this.   Holding Eliquis due to low platelet count. Resume when appropriate.  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

## 2020-09-17 NOTE — PLAN OF CARE
Uneventful shift. Pt had ct chest completed. Pt pain controlled with po prn pain medication. Pt iv abx regimen maintained. Pt has remained free from injury this shift. Bed in low locked position. Call light and personal belongings within reach. Side rails up x2. Nonskid socks in place. Pt instructed to call with any needs. Will continue to monitor.

## 2020-09-17 NOTE — PLAN OF CARE
Was called for evaluation prior to chemotherapy initiation for suspected acute leukemia in this patient. He has a history of self-reported CHF but we have no records of his cardiac history. He had an episode of AF w RVR yesterday evening which is not a new problem for him. He was not on his home meds, him but he is now rate controlled on his home medications. No pas medical records available. He also had an echocardiogram on 09/16 which demonstrated EF 58% w mild LVH and left atrial enlargement and is currently not experiencing CHF exacerbation.    Plan:   -attempted to obtain strain imaging from TTE yesterday but not able to be obtained from available images.   -repeat limited echo today and will evaluate for strain for baseline prior to cardiotoxic chemo  -obtain records from pt primary cardiologist  -continue home metoprolol and digoxin for afib rate control/cardioprotective effect  -monitor volume status/telemetry with planned chemo

## 2020-09-17 NOTE — ASSESSMENT & PLAN NOTE
Platelet count 12K upon admission. Was given a unit of plts due to hemoptysis.  Transfuse if platelet count<10. If patient develops active bleed, transfuse for goal >50.  plts 31K today

## 2020-09-17 NOTE — PROGRESS NOTES
POC reviewed with patient and pt's wife; understanding verbalized. Pt remains on tele; in a. Fib; team aware. 1L O2 nasal cannula sating 93%. NS @100. Pt remained afebrile this shift. Cardiac diet; fair appetite. Pt voids in urinal; two BMs this shift. Pt. with nonskid footwear on, bed in lowest position, and locked with bed rails up x2. Pt. has call light and personal items within reach. Patient ambulates in room independently. All questions and concerns addressed at this time. Will continue to monitor.

## 2020-09-18 PROBLEM — E11.9 TYPE 2 DIABETES MELLITUS, WITHOUT LONG-TERM CURRENT USE OF INSULIN: Status: ACTIVE | Noted: 2020-01-01

## 2020-09-18 PROBLEM — I47.20 V TACH: Status: ACTIVE | Noted: 2020-01-01

## 2020-09-18 PROBLEM — C92.00 ACUTE MYELOID LEUKEMIA NOT HAVING ACHIEVED REMISSION: Status: ACTIVE | Noted: 2020-01-01

## 2020-09-18 PROBLEM — E87.70 FLUID OVERLOAD: Status: ACTIVE | Noted: 2020-01-01

## 2020-09-18 NOTE — ASSESSMENT & PLAN NOTE
on admission and 129 prior to starting hydrea  Started hydrea 4 grams bid on 9/16/20. Decreased dose to 2 grams bid on 9/18/20.  WBC 55K today  Will monitor closely for TLS. No evidence of this at this time.

## 2020-09-18 NOTE — SUBJECTIVE & OBJECTIVE
Subjective:     Interval History: BMBx showing AML. Cytogenetic pending. 13 lb weight gain since yesterday. Decreased IVF rate and ordered lasix. Was tachypnic on exam but denied feeling short of breath. Continues to be on supplemental O2. LLL more clear today. Will continue IV abx for 7 day and then switch to LVQ ppx. WBC count down to 55K today. Decreased hydrea dose to 2 grams BID. No evidence of TLS or DIC at this time. Repeat echo performed yesterday to assess for ventricle straining. 16% LV strain per echo report. Cards recs repeating complete echo post chemo completion; obtaining outside cardiology records (requested); resuming home losartan (resumed); continuing home digoxin and metoprolol (continuing both); maintaining tele throughout chemo; and resuming eliquis when feasible (contraindicated at this time due to thrombocytopenia/hemoptysis). Had a 6 beat run of v-tach this morning. Denied sensation of heart racing. Strip placed in chart, and cardiology notified via Secure Chat. Replacing K+ for K+ of 3.4. gen surg consulted for line placement. Hopeful that line will be placed on Monday morning. Will make NPO after midnight Sunday. ~13 lb weight gain since yesterday. Decreased IVF rate and diuresing with IV lasix x 1 dose.      Objective:     Vital Signs (Most Recent):  Temp: 99.1 °F (37.3 °C) (09/18/20 1217)  Pulse: 97 (09/18/20 1217)  Resp: 16 (09/18/20 1217)  BP: (!) 121/57 (09/18/20 1217)  SpO2: 98 % (09/18/20 1217) Vital Signs (24h Range):  Temp:  [97.3 °F (36.3 °C)-99.1 °F (37.3 °C)] 99.1 °F (37.3 °C)  Pulse:  [] 97  Resp:  [16-21] 16  SpO2:  [92 %-98 %] 98 %  BP: (117-160)/(57-89) 121/57     Weight: (!) 140.6 kg (309 lb 15.5 oz)  Body mass index is 38.74 kg/m².  Body surface area is 2.73 meters squared.    ECOG SCORE         [unfilled]    Intake/Output - Last 3 Shifts       09/16 0700 - 09/17 0659 09/17 0700 - 09/18 0659 09/18 0700 - 09/19 0659    P.O. 570 1290     I.V. (mL/kg) 580 (4.3) 2832.3  (28.9)     Blood 286      IV Piggyback 800 200     Total Intake(mL/kg) 2236 (16.6) 5553.3 (39.5)     Urine (mL/kg/hr)  2375 (0.7)     Stool  0     Total Output  2375     Net +2236 +3178.3            Urine Occurrence 4 x      Stool Occurrence  2 x           Physical Exam  Constitutional:       Appearance: He is well-developed.   HENT:      Head: Normocephalic and atraumatic.      Mouth/Throat:      Pharynx: No oropharyngeal exudate.   Eyes:      General:         Right eye: No discharge.         Left eye: No discharge.      Conjunctiva/sclera: Conjunctivae normal.      Pupils: Pupils are equal, round, and reactive to light.   Neck:      Musculoskeletal: Normal range of motion and neck supple.   Cardiovascular:      Rate and Rhythm: Normal rate and regular rhythm.      Heart sounds: Normal heart sounds. No murmur.   Pulmonary:      Effort: Pulmonary effort is normal. No respiratory distress.      Breath sounds: No wheezing or rales.      Comments: Diminished left base  tachypnic but denies SOB  Abdominal:      General: Bowel sounds are normal. There is no distension.      Palpations: Abdomen is soft.      Tenderness: There is no abdominal tenderness.   Musculoskeletal: Normal range of motion.         General: No deformity.   Skin:     General: Skin is warm and dry.      Findings: No erythema or rash.   Neurological:      Mental Status: He is alert and oriented to person, place, and time.   Psychiatric:         Behavior: Behavior normal.         Thought Content: Thought content normal.         Judgment: Judgment normal.         Significant Labs:   CBC:   Recent Labs   Lab 09/17/20  0423 09/17/20  1600 09/18/20  0537   .30* 82.22* 55.25*   HGB 8.7* 9.0* 8.7*   HCT 26.6* 28.4* 26.4*   PLT 31* 23* 18*    and CMP:   Recent Labs   Lab 09/17/20  0423 09/17/20  1600 09/18/20  0537   * 134* 135*   K 3.1* 3.4* 3.3*    105 106   CO2 21* 21* 22*   * 210* 179*   BUN 30* 27* 18   CREATININE 1.4 1.2 0.9    CALCIUM 7.6* 7.7* 7.3*   PROT 5.7* 5.7* 5.2*   ALBUMIN 2.8* 2.7* 2.4*   BILITOT 0.6 0.6 0.7   ALKPHOS 56 56 51*   AST 25 58* 39   ALT 19 46* 48*   ANIONGAP 10 8 7*   EGFRNONAA 56.2* >60.0 >60.0       Diagnostic Results:  I have reviewed all pertinent imaging results/findings within the past 24 hours.

## 2020-09-18 NOTE — ASSESSMENT & PLAN NOTE
Patient presented with fevers, fatigue, concerning due to acute leukemoid process as significant leukocytosis and blasts. Fevers likely related to this but infection workup in process.   - Continue vancomycin and zosyn for a 7 day course (through 9/22). Will switch to ppx LVQ upon completion of IV abx.  - CXR at OSH did not show findings concerning for PNA.   - Chest CT performed 9/16/20 due to hemoptysis and suggestive of LLL PNA  - blood cx with NGTD  - urine cx pending, but u/a not suggestive of UTI  - afebrile at this time  - will continue vanc and zosyn fora

## 2020-09-18 NOTE — ASSESSMENT & PLAN NOTE
Platelet count 12K upon admission. Was given a unit of plts due to hemoptysis.  Transfuse if platelet count<10. If patient develops active bleed, transfuse for goal >50.  plts 18K today  Not currently having hemoptysis, so will not transfuse today

## 2020-09-18 NOTE — PROGRESS NOTES
Pharmacokinetic Assessment Follow Up: IV Vancomycin    Vancomycin serum concentration assessment(s):    The trough level was drawn correctly and can be used to guide therapy at this time. The measurement is below the desired definitive target range of 15 to 20 mcg/mL.    Scr has decreased from 1.4 mg/dL (9/17 morning) to 1.2 mg/dL (9/17 evening), and this morning SCr is 0.9 mg/dL.    Vancomycin Regimen Plan:    Continue to monitor Scr and for accumulation. May need to check vancomycin random level sooner if Scr increases.     Based off of extrapolation, anticipate that next scheduled dose is due at 1500 9/18/20.    Change regimen to Vancomycin 2000 mg IV every 12 hours with next serum trough concentration measured at 0200 prior to 4th dose on 9/20/20.    Drug levels (last 3 results):  Recent Labs   Lab Result Units 09/17/20  1600   Vancomycin-Trough ug/mL 11.1       Pharmacy will continue to follow and monitor vancomycin.    Please contact pharmacy at extension [phone] for questions regarding this assessment.    Thank you for the consult,   Liv Easley       Patient brief summary:  Shell Diaz is a 55 y.o. male initiated on antimicrobial therapy with IV Vancomycin for treatment of neutropenic fever    The patient's current regimen is vancomycin 1500 mg IV Q12H.    Drug Allergies:   Review of patient's allergies indicates:  No Known Allergies    Actual Body Weight:   140.6 kg    Renal Function:   Estimated Creatinine Clearance: 140.2 mL/min (based on SCr of 0.9 mg/dL).,     Dialysis Method (if applicable):  N/A    CBC (last 72 hours):  Recent Labs   Lab Result Units 09/16/20  0130 09/16/20  0746 09/16/20  1557 09/17/20  0423 09/17/20  1600 09/18/20  0537   WBC K/uL 113.50* 129.80* 126.20* 109.30* 82.22* 55.25*   Hemoglobin g/dL 9.9* 9.8* 9.1* 8.7* 9.0* 8.7*   Hematocrit % 30.1* 30.6* 28.0* 26.6* 28.4* 26.4*   Platelets K/uL 11* 12* 40* 31* 23* 18*   Gran% % 1.0* 0.0* 0.0* 1.0* 0.0* 1.0*   Lymph% % 7.0* 5.0*  6.0* 10.0* 6.0* 8.0*   Mono% % 1.0* 0.0* 0.0* 2.0* 1.0* 3.0*   Eosinophil% % 0.0 0.0 0.0 0.0 0.0 1.0   Basophil% % 0.0 0.0 0.0 0.0 0.0 0.0   Differential Method  Manual Manual Manual Manual Manual Manual       Metabolic Panel (last 72 hours):  Recent Labs   Lab Result Units 09/16/20  0130 09/16/20  0454 09/16/20  0746 09/16/20  1557 09/17/20  0423 09/17/20  1600 09/18/20  0537   Sodium mmol/L 135*  --  133* 132* 134* 134* 135*   Potassium mmol/L 3.1*  --  3.3* 3.4* 3.1* 3.4* 3.3*   Chloride mmol/L 101  --  101 102 103 105 106   CO2 mmol/L 22*  --  22* 22* 21* 21* 22*   Glucose mg/dL 146*  --  151* 200* 176* 210* 179*   Glucose, UA   --  Negative  --   --   --   --   --    BUN, Bld mg/dL 21*  --  25* 27* 30* 27* 18   Creatinine mg/dL 1.3  --  1.4 1.4 1.4 1.2 0.9   Albumin g/dL 3.1*  --  3.1* 3.0* 2.8* 2.7* 2.4*   Total Bilirubin mg/dL 1.0  --  0.9 0.7 0.6 0.6 0.7   Alkaline Phosphatase U/L 67  --  63 62 56 56 51*   AST U/L 18  --  22 24 25 58* 39   ALT U/L 14  --  14 18 19 46* 48*   Magnesium mg/dL  --   --  1.9  --  2.2  --  2.2   Phosphorus mg/dL  --   --  1.4*  --  3.0 2.8 3.6       Vancomycin Administrations:  vancomycin given in the last 96 hours                   vancomycin 1.5 g in dextrose 5 % 250 mL IVPB (ready to mix) (mg) 1,500 mg New Bag 09/18/20 0534     1,500 mg New Bag 09/17/20 1801     1,500 mg New Bag  0419     1,500 mg New Bag 09/16/20 1520    vancomycin (VANCOCIN) 2,500 mg in dextrose 5 % 500 mL IVPB (mg) 2,500 mg New Bag 09/16/20 0356                Microbiologic Results:  Microbiology Results (last 7 days)     Procedure Component Value Units Date/Time    Blood culture [[id number]] Collected: 09/16/20 0131    Order Status: Completed Specimen: Blood Updated: 09/18/20 0614     Blood Culture, Routine No Growth to date      No Growth to date      No Growth to date    Narrative:      Collection has been rescheduled by RF at 09/16/2020 00:49 Reason: the   Doctor is putting orders in on this  patient  Collection has been rescheduled by RF at 09/16/2020 00:50 Reason: the   Doctor is still putting orders on this patient  Collection has been rescheduled by RF at 09/16/2020 00:49 Reason: the   Doctor is putting orders in on this patient  Collection has been rescheduled by RF at 09/16/2020 00:50 Reason: the   Doctor is still putting orders on this patient    Blood culture [[id number]] Collected: 09/16/20 0131    Order Status: Completed Specimen: Blood Updated: 09/18/20 0614     Blood Culture, Routine No Growth to date      No Growth to date      No Growth to date    Narrative:      Collection has been rescheduled by RF at 09/16/2020 00:49 Reason: the   Doctor is putting orders in on this patient  Collection has been rescheduled by RF at 09/16/2020 00:50 Reason: the   Doctor is still putting orders on this patient  Collection has been rescheduled by RF at 09/16/2020 00:49 Reason: the   Doctor is putting orders in on this patient  Collection has been rescheduled by RF at 09/16/2020 00:50 Reason: the   Doctor is still putting orders on this patient

## 2020-09-18 NOTE — PLAN OF CARE
Plan of care discussed with patient at start of shift. Free from falls and injuries. Resting quietly with eyes closed. Respirations even, unlabored with oxygen at 1 liter via nasal cannula. Skin warm and dry. Telemetry in use with A fib noted. Denies pain. Denies nausea. Frequent checks for pain and safety maintained. Bed in lowest position, wheels locked, side rails up x's 2, call light in reach. Instructed to call for assistance as needed, verbalizes understanding. Will continue to monitor.

## 2020-09-18 NOTE — PLAN OF CARE
POC reviewed with patient and wife, questions answered and concerns addressed. VSS with T max 99.1F, voiding adequate amount cyu, ambulating independently in room; gets very dyspneic on exertion. Tolerating cardiac diet without difficulty, IVF and ABX given as ordered. Plan is for placement of Lopes catheter Monday to initiate induction chemotherapy. In no acute distress; WCM.

## 2020-09-18 NOTE — ASSESSMENT & PLAN NOTE
- ~ 13 lb weight gain since yesterday  - IVF rate decreased  - diuresing with IV lasix x 1 dose. Can diurese further if indicated.

## 2020-09-18 NOTE — PROGRESS NOTES
Admit Assessment    Patient Identification  Shell Diaz   :  1965  Admit Date:  2020  Attending Provider:  Jm Fields MD              Referral:   Pt was admitted to  with a diagnosis of Acute myeloid leukemia not having achieved remission, and was admitted this hospital stay due to Acute leukemia [C95.00].   is involved; was referred to the Social Work Department via routine referral.  Patient presents as a 55 y.o. year old  male.    Persons interviewed: patient and wife.    Living Situation:  Lives with wife Michelle ([phone]) and daughter, 16, in own home in [place].  Independent with ADLs.    Resides at [address] [place] [address]   [place] [phone] ,   phone: [phone] (home).      (RETIRED) Functional Status Prior  Ambulation Prior: 0-->independent  Transferrin-->independent  Toiletin-->independent    Current or Past Agencies and Description of Services/Supplies    DME  Agency Name: none    Home Health  Agency Name: none    IV Infusion  Agency Name: none    Nutrition: Oral.    Outpatient Pharmacy:   No Pharmacies Listed    Patient Preference of agencies include none expressed.    Patient/Caregiver informed of right to choose providers or agencies.  Patient provides permission to release any necessary information to [facility] and to [facility] agencies as needed to facilitate patient care, treatment planning, and patient discharge planning.  Written and verbal resources provided.      Coping   Coping well with support of family.       Adjustment to Diagnosis and Treatment  Adequate.    Emotional/Behavioral/Cognitive Issues   None noted.         History/Current Symptoms of Anxiety/Depression: No:   History/Current Substance Use:   Social History     Tobacco Use    Smoking status: Former Smoker   Substance and Sexual Activity    Alcohol use: Not on file    Drug use: Not on file    Sexual activity: Not on file       Indications of Abuse/Neglect: No:    Abuse Screen (yes response referral indicated)  Feels Unsafe at Home or Work/School: no    Financial:  Payor/Plan Subscr  Sex Relation Sub. Ins. ID Effective Group Num   1. ARISTIDES SMALLS I* KRIS BARR R 1965 Male Self [id number] [id number]                                   [address]                            Other identified concerns/needs:  TBD.  Possible need for line care following placement and/or continued oxygen.  Wife may opt to stay at the [facility] to avoid the long back and forth drive; multiple family are available to supervise daughter if needed.      Plan: return home to care of wife.    Interventions/Referrals: TBD.  Wife will call for [facility] discount coupon if she opts to stay.    Patient/caregiver engaged in treatment planning process.     providing psychosocial and supportive counseling, resources, education, assistance and discharge planning as appropriate.  Patient/caregiver state understanding of  available resources,  following, remains available.  Given SWer contact info and encouraged to call with any needs or concerns.

## 2020-09-18 NOTE — ASSESSMENT & PLAN NOTE
Patient states that he had pacemaker placed for this.   Holding Eliquis due to low platelet count. Resume when appropriate.  EKG from 9/16/20 showing afib with RVR  HR now controlled since starting patients home digoxin and metoprolol  Cardiology consulted due to cardiac history as patient will receive anthracycline if he is induced for AML  Cardiology repeated ehco 9/17 to assess for ventricle straining. 16% LV straining noted on echo  Had 6 beat run of V-tach at ~ 6 am. Denies symptoms  Keep mag > or = 2 and K+ > or = 4  Continue home digoxin and metoprolol. Resumed home lostaran.

## 2020-09-18 NOTE — ASSESSMENT & PLAN NOTE
- Patient is a 55 year old man who presents as a transfer for possible leukemia after manual differential demonstrated blasts.   - BMBx performed 9/16/20. Results pending.  - CBC, CMP, PT/INR, fibrinoge, uric acid, phos BID to assess for TLS and DIC. No evidence of either at this time.  - Follow up peripheral smear   - 2D ECHO with EF of 58%   - Continue allopurinol 300mg BID. Decreased hydrea from 4 grams daily to 2 grams daily. Continue IVF for now. Can stop once cytoreduced.  - Transfuse cyroglobulin if fibrinogen <150  - Transfuse if platelets <10, Hgb<7 and/or patient actively bleeding  - BMBx showing AML. Cytogenetics pending.  - hoping for line placement on Monday. Will need to be NPO after midnight Sunday. Order placed.  - no evidence of TLS or DIC at this time  - tentatively planning induction early next week

## 2020-09-18 NOTE — NURSING
Dr. Aden notified of heart rate 130 - 150's. One time dose of metoprolol 5 mg IVP ordered and given.

## 2020-09-18 NOTE — ASSESSMENT & PLAN NOTE
- had at 6 beat run of v-tach 9/18/20 at ~ 6 am  - denied feeling tachycardic  - followed by cards. Cards notified.  - keep mag > or = 2 and K+ > or = 4

## 2020-09-18 NOTE — PROGRESS NOTES
[facility]  Hematology  Bone Marrow Transplant  Progress Note    Patient Name: Shell Diaz  Admission Date: 9/16/2020  Hospital Length of Stay: 2 days  Code Status: Full Code    Subjective:     Interval History: BMBx showing AML. Cytogenetic pending. 13 lb weight gain since yesterday. Decreased IVF rate and ordered lasix. Was tachypnic on exam but denied feeling short of breath. Continues to be on supplemental O2. LLL more clear today. Will continue IV abx for 7 day and then switch to LVQ ppx. WBC count down to 55K today. Decreased hydrea dose to 2 grams BID. No evidence of TLS or DIC at this time. Repeat echo performed yesterday to assess for ventricle straining. 16% LV strain per echo report. Cards recs repeating complete echo post chemo completion; obtaining outside cardiology records (requested); resuming home losartan (resumed); continuing home digoxin and metoprolol (continuing both); maintaining tele throughout chemo; and resuming eliquis when feasible (contraindicated at this time due to thrombocytopenia/hemoptysis). Had a 6 beat run of v-tach this morning. Denied sensation of heart racing. Strip placed in chart, and cardiology notified via Secure Chat. Replacing K+ for K+ of 3.4. gen surg consulted for line placement. Hopeful that line will be placed on Monday morning. Will make NPO after midnight Sunday. ~13 lb weight gain since yesterday. Decreased IVF rate and diuresing with IV lasix x 1 dose.      Objective:     Vital Signs (Most Recent):  Temp: 99.1 °F (37.3 °C) (09/18/20 1217)  Pulse: 97 (09/18/20 1217)  Resp: 16 (09/18/20 1217)  BP: (!) 121/57 (09/18/20 1217)  SpO2: 98 % (09/18/20 1217) Vital Signs (24h Range):  Temp:  [97.3 °F (36.3 °C)-99.1 °F (37.3 °C)] 99.1 °F (37.3 °C)  Pulse:  [] 97  Resp:  [16-21] 16  SpO2:  [92 %-98 %] 98 %  BP: (117-160)/(57-89) 121/57     Weight: (!) 140.6 kg (309 lb 15.5 oz)  Body mass index is 38.74 kg/m².  Body surface area is 2.73 meters  squared.    ECOG SCORE         [unfilled]    Intake/Output - Last 3 Shifts       09/16 0700 - 09/17 0659 09/17 0700 - 09/18 0659 09/18 0700 - 09/19 0659    P.O. 570 1290     I.V. (mL/kg) 580 (4.3) 4063.3 (28.9)     Blood 286      IV Piggyback 800 200     Total Intake(mL/kg) 2236 (16.6) 5553.3 (39.5)     Urine (mL/kg/hr)  2375 (0.7)     Stool  0     Total Output  2375     Net +2236 +3178.3            Urine Occurrence 4 x      Stool Occurrence  2 x           Physical Exam  Constitutional:       Appearance: He is well-developed.   HENT:      Head: Normocephalic and atraumatic.      Mouth/Throat:      Pharynx: No oropharyngeal exudate.   Eyes:      General:         Right eye: No discharge.         Left eye: No discharge.      Conjunctiva/sclera: Conjunctivae normal.      Pupils: Pupils are equal, round, and reactive to light.   Neck:      Musculoskeletal: Normal range of motion and neck supple.   Cardiovascular:      Rate and Rhythm: Normal rate and regular rhythm.      Heart sounds: Normal heart sounds. No murmur.   Pulmonary:      Effort: Pulmonary effort is normal. No respiratory distress.      Breath sounds: No wheezing or rales.      Comments: Diminished left base  tachypnic but denies SOB  Abdominal:      General: Bowel sounds are normal. There is no distension.      Palpations: Abdomen is soft.      Tenderness: There is no abdominal tenderness.   Musculoskeletal: Normal range of motion.         General: No deformity.   Skin:     General: Skin is warm and dry.      Findings: No erythema or rash.   Neurological:      Mental Status: He is alert and oriented to person, place, and time.   Psychiatric:         Behavior: Behavior normal.         Thought Content: Thought content normal.         Judgment: Judgment normal.         Significant Labs:   CBC:   Recent Labs   Lab 09/17/20  0423 09/17/20  1600 09/18/20  0537   .30* 82.22* 55.25*   HGB 8.7* 9.0* 8.7*   HCT 26.6* 28.4* 26.4*   PLT 31* 23* 18*    and CMP:    Recent Labs   Lab 09/17/20  0423 09/17/20  1600 09/18/20  0537   * 134* 135*   K 3.1* 3.4* 3.3*    105 106   CO2 21* 21* 22*   * 210* 179*   BUN 30* 27* 18   CREATININE 1.4 1.2 0.9   CALCIUM 7.6* 7.7* 7.3*   PROT 5.7* 5.7* 5.2*   ALBUMIN 2.8* 2.7* 2.4*   BILITOT 0.6 0.6 0.7   ALKPHOS 56 56 51*   AST 25 58* 39   ALT 19 46* 48*   ANIONGAP 10 8 7*   EGFRNONAA 56.2* >60.0 >60.0       Diagnostic Results:  I have reviewed all pertinent imaging results/findings within the past 24 hours.    Assessment/Plan:     * Acute myeloid leukemia not having achieved remission  - Patient is a 55 year old man who presents as a transfer for possible leukemia after manual differential demonstrated blasts.   - BMBx performed 9/16/20. Results pending.  - CBC, CMP, PT/INR, fibrinoge, uric acid, phos BID to assess for TLS and DIC. No evidence of either at this time.  - Follow up peripheral smear   - 2D ECHO with EF of 58%   - Continue allopurinol 300mg BID. Decreased hydrea from 4 grams daily to 2 grams daily. Continue IVF for now. Can stop once cytoreduced.  - Transfuse cyroglobulin if fibrinogen <150  - Transfuse if platelets <10, Hgb<7 and/or patient actively bleeding  - BMBx showing AML. Cytogenetics pending.  - hoping for line placement on Monday. Will need to be NPO after midnight Sunday. Order placed.  - no evidence of TLS or DIC at this time  - tentatively planning induction early next week    Fluid overload  - ~ 13 lb weight gain since yesterday  - IVF rate decreased  - diuresing with IV lasix x 1 dose. Can diurese further if indicated.    Type 2 diabetes mellitus, without long-term current use of insulin  - history of DM 2 per review of Care Everywhere chart  - on Metformin at home. Holding inpatient  - ACHS blood glucose monitoring and s/s insulin while inpatient  - currently on low dose s/s, but can be increased to moderate dosing or add basal insulin if indicated    V tach  - had at 6 beat run of v-tach  9/18/20 at ~ 6 am  - denied feeling tachycardic  - followed by cards. Cards notified.  - keep mag > or = 2 and K+ > or = 4    Leukocytosis   on admission and 129 prior to starting hydrea  Started hydrea 4 grams bid on 9/16/20. Decreased dose to 2 grams bid on 9/18/20.  WBC 55K today  Will monitor closely for TLS. No evidence of this at this time.    Anemia  Likely related to underlying hematologic process.  Transfuse if Hgb<7  hgb 8.7 today      Thrombocytopenia  Platelet count 12K upon admission. Was given a unit of plts due to hemoptysis.  Transfuse if platelet count<10. If patient develops active bleed, transfuse for goal >50.  plts 18K today  Not currently having hemoptysis, so will not transfuse today    Fever  Patient presented with fevers, fatigue, concerning due to acute leukemoid process as significant leukocytosis and blasts. Fevers likely related to this but infection workup in process.   - Continue vancomycin and zosyn for a 7 day course (through 9/22). Will switch to ppx LVQ upon completion of IV abx.  - CXR at OSH did not show findings concerning for PNA.   - Chest CT performed 9/16/20 due to hemoptysis and suggestive of LLL PNA  - blood cx with NGTD  - urine cx pending, but u/a not suggestive of UTI  - afebrile at this time  - will continue vanc and zosyn fora    Essential hypertension  Resumed home losartan 9/17/20      Paroxysmal atrial fibrillation  Patient states that he had pacemaker placed for this.   Holding Eliquis due to low platelet count. Resume when appropriate.  EKG from 9/16/20 showing afib with RVR  HR now controlled since starting patients home digoxin and metoprolol  Cardiology consulted due to cardiac history as patient will receive anthracycline if he is induced for AML  Cardiology repeated ehco 9/17 to assess for ventricle straining. 16% LV straining noted on echo  Had 6 beat run of V-tach at ~ 6 am. Denies symptoms  Keep mag > or = 2 and K+ > or = 4  Continue home digoxin  and metoprolol. Resumed home lostaran.              VTE Risk Mitigation (From admission, onward)         Ordered     IP VTE HIGH RISK PATIENT  Once      09/16/20 0017     Place sequential compression device  Until discontinued      09/16/20 0017                Disposition: Inpatient    Jeni Holt, NP  Bone Marrow Transplant  [facility]

## 2020-09-18 NOTE — ASSESSMENT & PLAN NOTE
- history of DM 2 per review of Care Everywhere chart  - on Metformin at home. Holding inpatient  - ACHS blood glucose monitoring and s/s insulin while inpatient  - currently on low dose s/s, but can be increased to moderate dosing or add basal insulin if indicated

## 2020-09-19 PROBLEM — J96.01 ACUTE RESPIRATORY FAILURE WITH HYPOXIA: Status: ACTIVE | Noted: 2020-01-01

## 2020-09-19 PROBLEM — E87.70 FLUID OVERLOAD: Status: RESOLVED | Noted: 2020-01-01 | Resolved: 2020-01-01

## 2020-09-19 PROBLEM — E87.6 HYPOKALEMIA: Status: ACTIVE | Noted: 2020-01-01

## 2020-09-19 NOTE — ASSESSMENT & PLAN NOTE
Likely secondary to pneumonia  - on NC, wean of oxygen to maintain sats >90%  - Incentive spirometry

## 2020-09-19 NOTE — PLAN OF CARE
Plan of care discussed with patient at start of shift. Free from falls and injuries. Resting quietly with eyes closed. Respirations even, unlabored with oxygen at 2 liters via nasal cannula. Skin warm and dry. Telemetry in use with A fib noted. Denies pain. Denies nausea. Frequent checks for pain and safety maintained. Bed in lowest position, wheels locked, side rails up x's 2, call light in reach. Instructed to call for assistance as needed, verbalizes understanding. Will continue to monitor.

## 2020-09-19 NOTE — PROGRESS NOTES
[facility]  Hematology  Bone Marrow Transplant  Progress Note    Patient Name: Shell Diaz  Admission Date: 9/16/2020  Hospital Length of Stay: 3 days  Code Status: Full Code    Subjective:     Interval History:   Patient on 2L NC, with intermittent left pleuritic chest pain and hemoptysis.  WBC trending down on on hydroxyurea. Does have Afib but rate controlled.       Objective:     Vital Signs (Most Recent):  Temp: 98.1 °F (36.7 °C) (09/19/20 1138)  Pulse: 81 (09/19/20 1143)  Resp: 17 (09/19/20 1138)  BP: 134/83 (09/19/20 1138)  SpO2: 95 % (09/19/20 1138) Vital Signs (24h Range):  Temp:  [98.1 °F (36.7 °C)-99.7 °F (37.6 °C)] 98.1 °F (36.7 °C)  Pulse:  [] 81  Resp:  [17-21] 17  SpO2:  [93 %-98 %] 95 %  BP: (113-157)/(67-83) 134/83     Weight: (!) 139.9 kg (308 lb 8.5 oz)  Body mass index is 38.56 kg/m².  Body surface area is 2.72 meters squared.    ECOG SCORE         [unfilled]    Intake/Output - Last 3 Shifts       09/17 0700 - 09/18 0659 09/18 0700 - 09/19 0659 09/19 0700 - 09/20 0659    P.O. 1290 360 250    I.V. (mL/kg) 4063.3 (28.9) 1030.8 (7.4)     Blood       IV Piggyback 200 1300     Total Intake(mL/kg) 5553.3 (39.5) 2690.8 (19.2) 250 (1.8)    Urine (mL/kg/hr) 2375 (0.7) 2500 (0.7) 750 (0.9)    Stool 0      Total Output 2375 2500 750    Net +3178.3 +190.8 -500           Stool Occurrence 2 x            Physical Exam  Constitutional:       Appearance: He is well-developed. He is obese.   HENT:      Head: Normocephalic and atraumatic.      Mouth/Throat:      Pharynx: No oropharyngeal exudate.   Eyes:      General:         Right eye: No discharge.         Left eye: No discharge.      Conjunctiva/sclera: Conjunctivae normal.      Pupils: Pupils are equal, round, and reactive to light.   Neck:      Musculoskeletal: Normal range of motion and neck supple.   Cardiovascular:      Rate and Rhythm: Normal rate. Rhythm irregular.      Heart sounds: Normal heart sounds. No murmur.   Pulmonary:       Effort: No respiratory distress.      Breath sounds: Rales (Left lower) present. No wheezing.   Abdominal:      General: Bowel sounds are normal. There is no distension.      Palpations: Abdomen is soft.      Tenderness: There is no abdominal tenderness.   Musculoskeletal: Normal range of motion.         General: No deformity.   Skin:     General: Skin is warm and dry.      Findings: No erythema or rash.   Neurological:      Mental Status: He is alert and oriented to person, place, and time.   Psychiatric:         Behavior: Behavior normal.         Thought Content: Thought content normal.         Judgment: Judgment normal.         Significant Labs:   CBC:   Recent Labs   Lab 09/18/20  0537 09/18/20  1550 09/19/20  0429   WBC 55.25* 35.62* 17.12*   HGB 8.7* 8.7* 8.8*   HCT 26.4* 27.2* 26.8*   PLT 18* 16* 13*    and CMP:   Recent Labs   Lab 09/18/20  0537 09/18/20  1550 09/19/20  0429   * 134* 137   K 3.3* 3.4* 3.3*    103 104   CO2 22* 25 23   * 190* 194*   BUN 18 18 13   CREATININE 0.9 1.0 0.8   CALCIUM 7.3* 7.2* 7.3*   PROT 5.2* 5.6* 5.4*   ALBUMIN 2.4* 2.5* 2.5*   BILITOT 0.7 0.5 0.8   ALKPHOS 51* 55 55   AST 39 53* 33   ALT 48* 65* 59*   ANIONGAP 7* 6* 10   EGFRNONAA >60.0 >60.0 >60.0       Diagnostic Results:  I have reviewed all pertinent imaging results/findings within the past 24 hours.    Assessment/Plan:     * Acute myeloid leukemia not having achieved remission  - Patient is a 55 year old man who presents as a transfer for possible leukemia after manual differential demonstrated blasts.   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  - CBC, CMP, PT/INR, fibrinogen, uric acid, phos BID to assess for TLS and DIC. No evidence of either at this time.  - Peripheral smear -  Hyperleukocytosis with >20%  circulating blastoid cells   - 2D ECHO with EF of 58%   - Continue allopurinol 300mg BI  - Hydrea decreased to 500 BID   - Continue IVF  - Transfuse cyroglobulin if fibrinogen <150  - Transfuse if platelets <10, Hgb<7 and/or patient actively bleeding  - Hoping for line placement on Monday. Will need to be NPO after midnight Sunday. Order placed.  - Tentatively planning induction early next week    Acute respiratory failure with hypoxia  Likely secondary to pneumonia  - on NC, wean of oxygen to maintain sats >90%  - Incentive spirometry       Hypokalemia  - Monitor and replace PRN    Type 2 diabetes mellitus, without long-term current use of insulin  - history of DM 2 per review of Care Everywhere chart  - on Metformin at home. Holding inpatient    - Insulin Detemir 6U daily   - ACHS blood glucose monitoring and s/s insulin while inpatient  - Goal -180    V tach  - had at 6 beat run of v-tach 9/18/20 at ~ 6 am. Non sustained   - keep mag > or = 2 and K+ > or = 4    Leukocytosis   on admission and 129 prior to starting hydrea  - see AML      Anemia  Likely related to underlying hematologic process.  Transfuse if Hgb<7        Thrombocytopenia  Secondary to AML  Transfuse if platelet count<10. If patient develops active bleed, transfuse for goal >50.      Pneumonia  Patient presented with fevers, fatigue, concerning due to acute leukemoid process as significant leukocytosis and blasts. Fevers likely related to this but infection workup in process.   - Continue vancomycin and zosyn for a 7 day course (through 9/22). Will switch to ppx LVQ upon completion of IV abx.  - CXR at OSH did not show findings concerning for PNA.   - Chest CT performed 9/16/20 due to hemoptysis and suggestive of LLL PNA  - Blood cx with NGTD  - u/a not suggestive of UTI  - afebrile at this time      Essential hypertension  Resumed home losartan 9/17/20  - Monitor BP      Paroxysmal atrial fibrillation  Patient states that he had  pacemaker placed for this.   Previsouly ECHO with EF 58 %  Holding Eliquis due to low platelet count. Resume when appropriate.  EKG from 9/16/20 showing afib with RVR  HR now controlled  Cardiology consulted due to cardiac history as patient will receive anthracycline if he is induced for AML  Cardiology repeated ehco 9/17 to assess for ventricle straining. 16% LV straining noted on echo  - Keep mag > or = 2 and K+ > or = 4  - Continue home digoxin and metoprolol.   - To repeat ECHO after completion of chemo  - Monitor I/O and Daily weights               VTE Risk Mitigation (From admission, onward)         Ordered     IP VTE HIGH RISK PATIENT  Once      09/16/20 0017     Place sequential compression device  Until discontinued      09/16/20 0017                Disposition: Home    Vida Guillen MD  Bone Marrow Transplant  [facility]

## 2020-09-19 NOTE — ASSESSMENT & PLAN NOTE
Patient states that he had pacemaker placed for this.   Previsouly ECHO with EF 58 %  Holding Eliquis due to low platelet count. Resume when appropriate.  EKG from 9/16/20 showing afib with RVR  HR now controlled  Cardiology consulted due to cardiac history as patient will receive anthracycline if he is induced for AML  Cardiology repeated ehco 9/17 to assess for ventricle straining. 16% LV straining noted on echo  - Keep mag > or = 2 and K+ > or = 4  - Continue home digoxin and metoprolol.   - To repeat ECHO after completion of chemo  - Monitor I/O and Daily weights

## 2020-09-19 NOTE — ASSESSMENT & PLAN NOTE
Secondary to AML  Transfuse if platelet count<10. If patient develops active bleed, transfuse for goal >50.

## 2020-09-19 NOTE — SUBJECTIVE & OBJECTIVE
Subjective:     Interval History:   Patient on 2L NC, with intermittent left pleuritic chest pain and hemoptysis.  WBC trending down on on hydroxyurea. Does have Afib but rate controlled.       Objective:     Vital Signs (Most Recent):  Temp: 98.1 °F (36.7 °C) (09/19/20 1138)  Pulse: 81 (09/19/20 1143)  Resp: 17 (09/19/20 1138)  BP: 134/83 (09/19/20 1138)  SpO2: 95 % (09/19/20 1138) Vital Signs (24h Range):  Temp:  [98.1 °F (36.7 °C)-99.7 °F (37.6 °C)] 98.1 °F (36.7 °C)  Pulse:  [] 81  Resp:  [17-21] 17  SpO2:  [93 %-98 %] 95 %  BP: (113-157)/(67-83) 134/83     Weight: (!) 139.9 kg (308 lb 8.5 oz)  Body mass index is 38.56 kg/m².  Body surface area is 2.72 meters squared.    ECOG SCORE         [unfilled]    Intake/Output - Last 3 Shifts       09/17 0700 - 09/18 0659 09/18 0700 - 09/19 0659 09/19 0700 - 09/20 0659    P.O. 1290 360 250    I.V. (mL/kg) 4063.3 (28.9) 1030.8 (7.4)     Blood       IV Piggyback 200 1300     Total Intake(mL/kg) 5553.3 (39.5) 2690.8 (19.2) 250 (1.8)    Urine (mL/kg/hr) 2375 (0.7) 2500 (0.7) 750 (0.9)    Stool 0      Total Output 2375 2500 750    Net +3178.3 +190.8 -500           Stool Occurrence 2 x            Physical Exam  Constitutional:       Appearance: He is well-developed. He is obese.   HENT:      Head: Normocephalic and atraumatic.      Mouth/Throat:      Pharynx: No oropharyngeal exudate.   Eyes:      General:         Right eye: No discharge.         Left eye: No discharge.      Conjunctiva/sclera: Conjunctivae normal.      Pupils: Pupils are equal, round, and reactive to light.   Neck:      Musculoskeletal: Normal range of motion and neck supple.   Cardiovascular:      Rate and Rhythm: Normal rate. Rhythm irregular.      Heart sounds: Normal heart sounds. No murmur.   Pulmonary:      Effort: No respiratory distress.      Breath sounds: Rales (Left lower) present. No wheezing.   Abdominal:      General: Bowel sounds are normal. There is no distension.      Palpations: Abdomen  is soft.      Tenderness: There is no abdominal tenderness.   Musculoskeletal: Normal range of motion.         General: No deformity.   Skin:     General: Skin is warm and dry.      Findings: No erythema or rash.   Neurological:      Mental Status: He is alert and oriented to person, place, and time.   Psychiatric:         Behavior: Behavior normal.         Thought Content: Thought content normal.         Judgment: Judgment normal.         Significant Labs:   CBC:   Recent Labs   Lab 09/18/20  0537 09/18/20  1550 09/19/20  0429   WBC 55.25* 35.62* 17.12*   HGB 8.7* 8.7* 8.8*   HCT 26.4* 27.2* 26.8*   PLT 18* 16* 13*    and CMP:   Recent Labs   Lab 09/18/20  0537 09/18/20  1550 09/19/20  0429   * 134* 137   K 3.3* 3.4* 3.3*    103 104   CO2 22* 25 23   * 190* 194*   BUN 18 18 13   CREATININE 0.9 1.0 0.8   CALCIUM 7.3* 7.2* 7.3*   PROT 5.2* 5.6* 5.4*   ALBUMIN 2.4* 2.5* 2.5*   BILITOT 0.7 0.5 0.8   ALKPHOS 51* 55 55   AST 39 53* 33   ALT 48* 65* 59*   ANIONGAP 7* 6* 10   EGFRNONAA >60.0 >60.0 >60.0       Diagnostic Results:  I have reviewed all pertinent imaging results/findings within the past 24 hours.

## 2020-09-19 NOTE — ASSESSMENT & PLAN NOTE
- Patient is a 55 year old man who presents as a transfer for possible leukemia after manual differential demonstrated blasts.   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  - CBC, CMP, PT/INR, fibrinogen, uric acid, phos BID to assess for TLS and DIC. No evidence of either at this time.  - Peripheral smear -  Hyperleukocytosis with >20% circulating blastoid cells   - 2D ECHO with EF of 58%   - Continue allopurinol 300mg BI  - Hydrea decreased to 500 BID   - Continue IVF  - Transfuse cyroglobulin if fibrinogen <150  - Transfuse if platelets <10, Hgb<7 and/or patient actively bleeding  - Hoping for line placement on Monday. Will need to be NPO after midnight Sunday. Order placed.  - Tentatively planning induction early next week

## 2020-09-19 NOTE — ASSESSMENT & PLAN NOTE
- history of DM 2 per review of Care Everywhere chart  - on Metformin at home. Holding inpatient    - Insulin Detemir 6U daily   - ACHS blood glucose monitoring and s/s insulin while inpatient  - Goal -180

## 2020-09-19 NOTE — ASSESSMENT & PLAN NOTE
- had at 6 beat run of v-tach 9/18/20 at ~ 6 am. Non sustained   - keep mag > or = 2 and K+ > or = 4

## 2020-09-19 NOTE — ASSESSMENT & PLAN NOTE
Patient presented with fevers, fatigue, concerning due to acute leukemoid process as significant leukocytosis and blasts. Fevers likely related to this but infection workup in process.   - Continue vancomycin and zosyn for a 7 day course (through 9/22). Will switch to ppx LVQ upon completion of IV abx.  - CXR at OSH did not show findings concerning for PNA.   - Chest CT performed 9/16/20 due to hemoptysis and suggestive of LLL PNA  - Blood cx with NGTD  - u/a not suggestive of UTI  - afebrile at this time

## 2020-09-20 PROBLEM — E87.6 HYPOKALEMIA: Status: RESOLVED | Noted: 2020-01-01 | Resolved: 2020-01-01

## 2020-09-20 NOTE — PROGRESS NOTES
Pharmacokinetic Assessment Follow Up: IV Vancomycin    Vancomycin serum concentration assessment(s):    The trough level was drawn correctly and can be used to guide therapy at this time. The measurement is below the desired definitive target range of 15 to 20 mcg/mL.    Vancomycin Regimen Plan:    Change regimen to Vancomycin 2500 mg IV every 12 hours with next serum trough concentration measured at 0330 prior to 5th dose on 9/22    Drug levels (last 3 results):  Recent Labs   Lab Result Units 09/17/20  1600 09/20/20  0518   Vancomycin-Trough ug/mL 11.1 11.1       Pharmacy will continue to follow and monitor vancomycin.      Please contact pharmacy at extension [phone] for questions regarding this assessment.    Thank you for the consult,   Ruba Harrell       Patient brief summary:  Shell Diaz is a 55 y.o. male initiated on antimicrobial therapy with IV Vancomycin for treatment of lower respiratory infection    The patient's current regimen is 2500mg q12h.    Drug Allergies:   Review of patient's allergies indicates:  No Known Allergies    Actual Body Weight:   138.9kg    Renal Function:   Estimated Creatinine Clearance: 179.3 mL/min (based on SCr of 0.7 mg/dL).,     Dialysis Method (if applicable):  N/A    CBC (last 72 hours):  Recent Labs   Lab Result Units 09/17/20  1600 09/18/20  0537 09/18/20  1550 09/19/20  0429 09/19/20  1613 09/20/20  0518   WBC K/uL 82.22* 55.25* 35.62* 17.12* 10.22 6.68   Hemoglobin g/dL 9.0* 8.7* 8.7* 8.8* 8.0* 8.1*   Hemoglobin A1C %  --   --   --   --   --  8.0*   Hematocrit % 28.4* 26.4* 27.2* 26.8* 24.0* 25.2*   Platelets K/uL 23* 18* 16* 13* 9* 7*   Gran% % 0.0* 1.0* 0.0* 0.0* 0.2* 0.0*   Lymph% % 6.0* 8.0* 10.0* 28.0 24.9 22.0   Mono% % 1.0* 3.0* 2.0* 1.0* 74.7* 0.0*   Eosinophil% % 0.0 1.0 0.0 0.0 0.0 0.0   Basophil% % 0.0 0.0 0.0 0.0 0.0 0.0   Differential Method  Manual Manual Manual Manual Automated Manual       Metabolic Panel (last 72 hours):  Recent Labs   Lab Result  Units 09/17/20  1600 09/18/20  0537 09/18/20  1550 09/19/20  0429 09/19/20  1613 09/20/20  0518   Sodium mmol/L 134* 135* 134* 137 133* 138   Potassium mmol/L 3.4* 3.3* 3.4* 3.3* 4.1 4.0   Chloride mmol/L 105 106 103 104 102 104   CO2 mmol/L 21* 22* 25 23 26 26   Glucose mg/dL 210* 179* 190* 194* 205* 155*   BUN, Bld mg/dL 27* 18 18 13 9 14   Creatinine mg/dL 1.2 0.9 1.0 0.8 0.8 0.7   Albumin g/dL 2.7* 2.4* 2.5* 2.5* 2.2* 2.4*   Total Bilirubin mg/dL 0.6 0.7 0.5 0.8 0.6 1.0   Alkaline Phosphatase U/L 56 51* 55 55 49* 49*   AST U/L 58* 39 53* 33 30 23   ALT U/L 46* 48* 65* 59* 57* 57*   Magnesium mg/dL  --  2.2  --  2.0  --  2.0   Phosphorus mg/dL 2.8 3.6 3.3 3.2 3.0 3.4       Vancomycin Administrations:  vancomycin given in the last 96 hours                   vancomycin 2 g in dextrose 5 % 500 mL IVPB (mg) 2,000 mg New Bag 09/20/20 0545     2,000 mg New Bag 09/19/20 1734     2,000 mg New Bag  0415     2,000 mg New Bag 09/18/20 1648    vancomycin 1.5 g in dextrose 5 % 250 mL IVPB (ready to mix) (mg) 1,500 mg New Bag 09/18/20 0534     1,500 mg New Bag 09/17/20 1801     1,500 mg New Bag  0419     1,500 mg New Bag 09/16/20 1520                Microbiologic Results:  Microbiology Results (last 7 days)     Procedure Component Value Units Date/Time    Blood culture [[id number]] Collected: 09/16/20 0131    Order Status: Completed Specimen: Blood Updated: 09/20/20 0612     Blood Culture, Routine No Growth to date      No Growth to date      No Growth to date      No Growth to date      No Growth to date    Narrative:      Collection has been rescheduled by RF at 09/16/2020 00:49 Reason: the   Doctor is putting orders in on this patient  Collection has been rescheduled by RF at 09/16/2020 00:50 Reason: the   Doctor is still putting orders on this patient  Collection has been rescheduled by RF at 09/16/2020 00:49 Reason: the   Doctor is putting orders in on this patient  Collection has been rescheduled by RF at 09/16/2020 00:50  Reason: the   Doctor is still putting orders on this patient    Blood culture [[id number]] Collected: 09/16/20 0131    Order Status: Completed Specimen: Blood Updated: 09/20/20 0612     Blood Culture, Routine No Growth to date      No Growth to date      No Growth to date      No Growth to date      No Growth to date    Narrative:      Collection has been rescheduled by RF at 09/16/2020 00:49 Reason: the   Doctor is putting orders in on this patient  Collection has been rescheduled by RF at 09/16/2020 00:50 Reason: the   Doctor is still putting orders on this patient  Collection has been rescheduled by RF at 09/16/2020 00:49 Reason: the   Doctor is putting orders in on this patient  Collection has been rescheduled by RF at 09/16/2020 00:50 Reason: the   Doctor is still putting orders on this patient

## 2020-09-20 NOTE — ASSESSMENT & PLAN NOTE
Secondary to AML  Transfuse if platelet count<10. If patient develops active bleed, transfuse for goal >50.  - 1U Plt today

## 2020-09-20 NOTE — PLAN OF CARE
Uneventful shift. Pt has had no c/o pain this shift. IVF maintained. Pt iv abx regimen maintained. Pt remains on cardiac monitoring. Pt blood sugar monitored ac/hs. Pt has remained free from injury this shift. Bed in low locked position. Call light and personal belongings within reach. Side rails up x2. Nonskid socks in place. Pt instructed to call with any needs. Will continue to monitor.

## 2020-09-20 NOTE — PROGRESS NOTES
[facility]  Hematology  Bone Marrow Transplant  Progress Note    Patient Name: Shell Diaz  Admission Date: 9/16/2020  Hospital Length of Stay: 4 days  Code Status: Full Code    Subjective:     Interval History:   Patient on 2L NC, feels better today. Having BMs.   1U of plt today for Plt of 7.   Plan for Lopes tomorrow with GS      Objective:     Vital Signs (Most Recent):  Temp: 98.4 °F (36.9 °C) (09/20/20 1104)  Pulse: (!) 130 (09/20/20 1108)  Resp: 19 (09/20/20 1104)  BP: (!) 113/59 (09/20/20 1104)  SpO2: 95 % (09/20/20 1104) Vital Signs (24h Range):  Temp:  [97.8 °F (36.6 °C)-98.6 °F (37 °C)] 98.4 °F (36.9 °C)  Pulse:  [] 130  Resp:  [19-24] 19  SpO2:  [95 %-98 %] 95 %  BP: (113-148)/(59-87) 113/59     Weight: (!) 138.9 kg (306 lb 3.5 oz)  Body mass index is 38.27 kg/m².  Body surface area is 2.71 meters squared.    ECOG SCORE         [unfilled]    Intake/Output - Last 3 Shifts       09/18 0700 - 09/19 0659 09/19 0700 - 09/20 0659 09/20 0700 - 09/21 0659    P.O. 360 1550     I.V. (mL/kg) 1030.8 (7.4) 1259 (9.1)     Blood   578    IV Piggyback 1400 200     Total Intake(mL/kg) 2790.8 (19.9) 3009 (21.7) 578 (4.2)    Urine (mL/kg/hr) 2500 (0.7) 4500 (1.3)     Stool       Total Output 2500 4500     Net +290.8 -1491 +578                 Physical Exam  Constitutional:       Appearance: He is well-developed. He is obese.   HENT:      Head: Normocephalic and atraumatic.      Mouth/Throat:      Pharynx: No oropharyngeal exudate.   Eyes:      General:         Right eye: No discharge.         Left eye: No discharge.      Conjunctiva/sclera: Conjunctivae normal.      Pupils: Pupils are equal, round, and reactive to light.   Neck:      Musculoskeletal: Normal range of motion and neck supple.   Cardiovascular:      Rate and Rhythm: Normal rate. Rhythm irregular.      Heart sounds: Normal heart sounds. No murmur.   Pulmonary:      Effort: No respiratory distress.      Breath sounds: Rales (Left  lower) present. No wheezing.   Abdominal:      General: Bowel sounds are normal. There is no distension.      Palpations: Abdomen is soft.      Tenderness: There is no abdominal tenderness.   Musculoskeletal: Normal range of motion.         General: No deformity.   Skin:     General: Skin is warm and dry.      Findings: No erythema or rash.   Neurological:      Mental Status: He is alert and oriented to person, place, and time.   Psychiatric:         Behavior: Behavior normal.         Thought Content: Thought content normal.         Judgment: Judgment normal.         Significant Labs:   CBC:   Recent Labs   Lab 09/19/20 0429 09/19/20  1613 09/20/20  0518   WBC 17.12* 10.22 6.68   HGB 8.8* 8.0* 8.1*   HCT 26.8* 24.0* 25.2*   PLT 13* 9* 7*    and CMP:   Recent Labs   Lab 09/19/20 0429 09/19/20  1613 09/20/20  0518    133* 138   K 3.3* 4.1 4.0    102 104   CO2 23 26 26   * 205* 155*   BUN 13 9 14   CREATININE 0.8 0.8 0.7   CALCIUM 7.3* 7.4* 7.5*   PROT 5.4* 5.1* 5.3*   ALBUMIN 2.5* 2.2* 2.4*   BILITOT 0.8 0.6 1.0   ALKPHOS 55 49* 49*   AST 33 30 23   ALT 59* 57* 57*   ANIONGAP 10 5* 8   EGFRNONAA >60.0 >60.0 >60.0       Diagnostic Results:  I have reviewed all pertinent imaging results/findings within the past 24 hours.    Assessment/Plan:     * Acute myeloid leukemia not having achieved remission  55 year old man who presents as a transfer for possible leukemia after manual differential demonstrated blasts.   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  - CBC, CMP, PT/INR, fibrinogen, uric acid, phos BID to assess for TLS and DIC. No evidence of either at this time.  - Peripheral smear -  Hyperleukocytosis with >20% circulating blastoid cells   - 2D ECHO with EF of 58%   - Continue allopurinol 300mg BID  - Hydrea  stopped on 9/19  - Continue IVF  - Transfuse cyroglobulin if fibrinogen <150  - Transfuse if platelets <10, Hgb<7 and/or patient actively bleeding  - Hoping for line placement on Monday. Will need to be NPO after midnight Sunday. Order placed.  - Tentatively planning induction early next week    Acute respiratory failure with hypoxia  Likely secondary to pneumonia  - on NC, wean of oxygen to maintain sats >90%  - Incentive spirometry       Type 2 diabetes mellitus, without long-term current use of insulin  - history of DM 2 per review of Care Everywhere chart  - on Metformin at home. Holding inpatient    - Insulin Detemir 6U daily   - ACHS blood glucose monitoring and s/s insulin while inpatient  - Goal -180    V tach  - had at 6 beat run of v-tach 9/18/20 at ~ 6 am. Non sustained   - keep mag > or = 2 and K+ > or = 4    Leukocytosis   on admission and 129 prior to starting hydrea  - see AML  - Now wnl      Anemia  Likely related to underlying hematologic process.  Transfuse if Hgb<7        Thrombocytopenia  Secondary to AML  Transfuse if platelet count<10. If patient develops active bleed, transfuse for goal >50.  - 1U Plt today       Pneumonia  Patient presented with fevers, fatigue, concerning due to acute leukemoid process as significant leukocytosis and blasts. Fevers likely related to this but infection workup in process.   - Continue vancomycin and zosyn for a 7 day course (through 9/22). Will switch to ppx LVQ upon completion of IV abx.  - CXR at OSH did not show findings concerning for PNA.   - Chest CT performed 9/16/20 due to hemoptysis and suggestive of LLL PNA  - Blood cx with NGTD  - u/a not suggestive of UTI  - afebrile at this time      Essential hypertension  Resumed home losartan 9/17/20  - Monitor BP      Paroxysmal atrial fibrillation  Patient states that he had pacemaker placed for this.   Previsouly ECHO with EF 58 %  Holding Eliquis due to low platelet count. Resume when  appropriate.  EKG from 9/16/20 showing afib with RVR  HR now controlled  Cardiology consulted due to cardiac history as patient will receive anthracycline if he is induced for AML  Cardiology repeated ehco 9/17 to assess for ventricle straining. 16% LV straining noted on echo  - Keep mag > or = 2 and K+ > or = 4  - Continue home digoxin and metoprolol.   - To repeat ECHO after completion of chemo  - Monitor I/O and Daily weights               VTE Risk Mitigation (From admission, onward)         Ordered     IP VTE HIGH RISK PATIENT  Once      09/16/20 0017     Place sequential compression device  Until discontinued      09/16/20 0017                Disposition: Home    Vida Guillen MD  Bone Marrow Transplant  [facility]

## 2020-09-20 NOTE — ANESTHESIA PREPROCEDURE EVALUATION
[facility]  Anesthesia Pre-Operative Evaluation         Patient Name: Shell Diaz  YOB: 1965  MRN: [id number]    SUBJECTIVE:     Pre-operative evaluation for Procedure(s) (LRB):  INSERTION, CATHETER, CENTRAL VENOUS, STAUFFER (N/A)     09/20/2020    Shell Diaz is a 55 y.o. male w/ a significant PMHx of a-fib on eliquis, isolated 6 beat run of Vtach on 9/18/2 , AML, HTN, DM II, pneumonia on vanc/zosyn going for stauffer placement tomorrow.     Platelets 7    Patient now presents for the above procedure(s).      LDA: None documented.       Peripheral IV - Single Lumen 09/19/20 2048 22 G Left Forearm (Active)   Site Assessment No redness;No swelling 09/20/20 0740   Line Status Infusing 09/20/20 0740   Dressing Status Clean;Dry;Intact 09/20/20 0740   Dressing Intervention Integrity maintained 09/20/20 0740   Dressing Change Due 09/23/20 09/20/20 0740   Site Change Due 09/23/20 09/20/20 0740   Reason Not Rotated Not due 09/20/20 0740   Number of days: 0            Peripheral IV - Single Lumen 09/20/20 0915 22 G;1 in Left Antecubital (Active)   Site Assessment No redness;No swelling;No warmth;No drainage 09/20/20 0938   Line Status Saline locked;Flushed 09/20/20 0938   Dressing Status Clean;Dry;Intact 09/20/20 0938   Dressing Intervention First dressing 09/20/20 0938   Dressing Change Due 09/24/20 09/20/20 0938   Site Change Due 09/24/20 09/20/20 0938   Reason Not Rotated Not due 09/20/20 0938   Number of days: 0       Prev airway: None documented.    Drips: None documented.   sodium chloride 0.9% 50 mL/hr at 09/20/20 0315       Patient Active Problem List   Diagnosis    Acute myeloid leukemia not having achieved remission    Paroxysmal atrial fibrillation    Essential hypertension    Pneumonia    Thrombocytopenia    Anemia    Leukocytosis    V tach    Type 2  diabetes mellitus, without long-term current use of insulin    Acute respiratory failure with hypoxia       Review of patient's allergies indicates:  No Known Allergies    Current Inpatient Medications:   acyclovir  400 mg Oral BID    allopurinoL  300 mg Oral BID    digoxin  250 mcg Oral Daily    [START ON 9/21/2020] insulin detemir U-100  6 Units Subcutaneous Daily    losartan  50 mg Oral Daily    metoprolol succinate  100 mg Oral Daily    piperacillin-tazobactam (ZOSYN) IVPB  4.5 g Intravenous Q8H    terazosin  5 mg Oral QHS    vancomycin (VANCOCIN) IVPB  2,500 mg Intravenous Q12H       No current facility-administered medications on file prior to encounter.      Current Outpatient Medications on File Prior to Encounter   Medication Sig Dispense Refill    amLODIPine (NORVASC) 5 MG tablet Take 5 mg by mouth once daily.      atorvastatin (LIPITOR) 40 MG tablet Take 40 mg by mouth once daily.      losartan (COZAAR) 50 MG tablet Take 50 mg by mouth once daily.      metFORMIN (GLUCOPHAGE) 500 MG tablet Take 500 mg by mouth 2 (two) times daily with meals.      methylPREDNISolone (MEDROL) 4 MG Tab Take 4 mg by mouth once daily.      metoprolol succinate (TOPROL-XL) 50 MG 24 hr tablet Take 50 mg by mouth once daily.      terazosin (HYTRIN) 5 MG capsule Take 5 mg by mouth every evening.      apixaban (ELIQUIS) 5 mg Tab Take 5 mg by mouth 2 (two) times daily.      cefdinir (OMNICEF) 300 MG capsule Take 300 mg by mouth 2 (two) times daily.      digoxin (LANOXIN) 250 mcg tablet Take 250 mcg by mouth once daily.         No past surgical history on file.    Social History     Socioeconomic History    Marital status:      Spouse name: Not on file    Number of children: Not on file    Years of education: Not on file    Highest education level: Not on file   Occupational History    Not on file   Social Needs    Financial resource strain: Not on file    Food insecurity     Worry: Not on file      Inability: Not on file    Transportation needs     Medical: Not on file     Non-medical: Not on file   Tobacco Use    Smoking status: Former Smoker   Substance and Sexual Activity    Alcohol use: Not on file    Drug use: Not on file    Sexual activity: Not on file   Lifestyle    Physical activity     Days per week: Not on file     Minutes per session: Not on file    Stress: Not on file   Relationships    Social connections     Talks on phone: Not on file     Gets together: Not on file     Attends Confucianist service: Not on file     Active member of club or organization: Not on file     Attends meetings of clubs or organizations: Not on file     Relationship status: Not on file   Other Topics Concern    Not on file   Social History Narrative    Not on file       OBJECTIVE:     Vital Signs Range (Last 24H):  Temp:  [36.6 °C (97.8 °F)-37 °C (98.6 °F)]   Pulse:  []   Resp:  [18-24]   BP: (113-148)/(59-87)   SpO2:  [95 %-98 %]       Significant Labs:  Lab Results   Component Value Date    WBC 6.68 09/20/2020    HGB 8.1 (L) 09/20/2020    HCT 25.2 (L) 09/20/2020    PLT 7 (LL) 09/20/2020    ALT 57 (H) 09/20/2020    AST 23 09/20/2020     09/20/2020    K 4.0 09/20/2020     09/20/2020    CREATININE 0.7 09/20/2020    BUN 14 09/20/2020    CO2 26 09/20/2020    INR 1.2 09/20/2020    HGBA1C 8.0 (H) 09/20/2020       Diagnostic Studies: No relevant studies.    EKG:   Results for orders placed or performed during the hospital encounter of 09/16/20   EKG 12-lead    Collection Time: 09/16/20  9:29 AM    Narrative    Test Reason :     Vent. Rate : 137 BPM     Atrial Rate : 000 BPM     P-R Int : 000 ms          QRS Dur : 108 ms      QT Int : 300 ms       P-R-T Axes : 000 -04 154 degrees     QTc Int : 453 ms    Atrial fibrillation with rapid ventricular response  Nonspecific ST and T wave abnormality  Abnormal ECG  When compared with ECG of 16-SEP-2020 02:09,  T wave inversion more evident in Lateral  leads  Confirmed by Ruperto Jacobo MD ([id number]) on 9/16/2020 10:47:52 AM    Referred By: STERLING BUCIO           Confirmed By:Ruperto Jacobo MD       2D ECHO:  TTE:  Results for orders placed or performed during the hospital encounter of 09/16/20   Echo Color Flow Doppler? Yes   Result Value Ref Range    BSA 2.67 m2    Narrative    Limited echo for evaluation of LV strain.     Due to irregularly irregular rhythm as well as image quality, unable to   accurately report strain for all views.  LV strain is -16% based on the 4 chamber view alone.       ZINA:  No results found for this or any previous visit.    ASSESSMENT/PLAN:       Anesthesia Evaluation    I have reviewed the Patient Summary Reports.    I have reviewed the Nursing Notes. I have reviewed the NPO Status.   I have reviewed the Medications.     Review of Systems  Anesthesia Hx:  History of prior surgery of interest to airway management or planning:  Denies Personal Hx of Anesthesia complications.   Hematology/Oncology:         -- Anemia: Hematology Comments: AML   Cardiovascular:   Hypertension Dysrhythmias atrial fibrillation ECG has been reviewed.    Pulmonary:   Pneumonia    Renal/:  Renal/ Normal     Hepatic/GI:  Hepatic/GI Normal    Neurological:  Neurology Normal    Endocrine:   Diabetes, well controlled, type 2        Physical Exam  General:  Well nourished    Airway/Jaw/Neck:  Airway Findings: Mouth Opening: Normal Tongue: Normal  General Airway Assessment: Adult  Mallampati: III  Improves to III with phonation.  TM Distance: Normal, at least 6 cm         Dental:  Dental Findings: Periodontal disease, Mild   Chest/Lungs:  Chest/Lungs Findings: Clear to auscultation, Normal Respiratory Rate  On 2 liters NC   Heart/Vascular:  Heart Findings: Rate: Normal  Rhythm: Irregularly Irregular  Sounds: Normal  Heart murmur: negative       Mental Status:  Mental Status Findings:  Cooperative, Alert and Oriented         Anesthesia Plan  Type of Anesthesia, risks  & benefits discussed:  Anesthesia Type:  general, MAC  Patient's Preference: MAC  Intra-op Monitoring Plan: standard ASA monitors  Intra-op Monitoring Plan Comments:   Post Op Pain Control Plan: per primary service following discharge from PACU and multimodal analgesia  Post Op Pain Control Plan Comments:   Induction:   IV  Beta Blocker:  Patient is on a Beta-Blocker and has received one dose within the past 24 hours (No further documentation required).       Informed Consent: Patient understands risks and agrees with Anesthesia plan.  Questions answered. Anesthesia consent signed with patient.  ASA Score: 3     Day of Surgery Review of History & Physical: I have interviewed and examined the patient. I have reviewed the patient's H&P dated:  There are no significant changes.  H&P update referred to the surgeon.         Ready For Surgery From Anesthesia Perspective.

## 2020-09-20 NOTE — ASSESSMENT & PLAN NOTE
55 year old man who presents as a transfer for possible leukemia after manual differential demonstrated blasts.   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  - CBC, CMP, PT/INR, fibrinogen, uric acid, phos BID to assess for TLS and DIC. No evidence of either at this time.  - Peripheral smear -  Hyperleukocytosis with >20% circulating blastoid cells   - 2D ECHO with EF of 58%   - Continue allopurinol 300mg BID  - Hydrea stopped on 9/19  - Continue IVF  - Transfuse cyroglobulin if fibrinogen <150  - Transfuse if platelets <10, Hgb<7 and/or patient actively bleeding  - Hoping for line placement on Monday. Will need to be NPO after midnight Sunday. Order placed.  - Tentatively planning induction early next week

## 2020-09-20 NOTE — SUBJECTIVE & OBJECTIVE
Subjective:     Interval History:   Patient on 2L NC, feels better today. Having BMs.   1U of plt today for Plt of 7.   Plan for Lopes tomorrow with GS      Objective:     Vital Signs (Most Recent):  Temp: 98.4 °F (36.9 °C) (09/20/20 1104)  Pulse: (!) 130 (09/20/20 1108)  Resp: 19 (09/20/20 1104)  BP: (!) 113/59 (09/20/20 1104)  SpO2: 95 % (09/20/20 1104) Vital Signs (24h Range):  Temp:  [97.8 °F (36.6 °C)-98.6 °F (37 °C)] 98.4 °F (36.9 °C)  Pulse:  [] 130  Resp:  [19-24] 19  SpO2:  [95 %-98 %] 95 %  BP: (113-148)/(59-87) 113/59     Weight: (!) 138.9 kg (306 lb 3.5 oz)  Body mass index is 38.27 kg/m².  Body surface area is 2.71 meters squared.    ECOG SCORE         [unfilled]    Intake/Output - Last 3 Shifts       09/18 0700 - 09/19 0659 09/19 0700 - 09/20 0659 09/20 0700 - 09/21 0659    P.O. 360 1550     I.V. (mL/kg) 1030.8 (7.4) 1259 (9.1)     Blood   578    IV Piggyback 1400 200     Total Intake(mL/kg) 2790.8 (19.9) 3009 (21.7) 578 (4.2)    Urine (mL/kg/hr) 2500 (0.7) 4500 (1.3)     Stool       Total Output 2500 4500     Net +290.8 -1491 +578                 Physical Exam  Constitutional:       Appearance: He is well-developed. He is obese.   HENT:      Head: Normocephalic and atraumatic.      Mouth/Throat:      Pharynx: No oropharyngeal exudate.   Eyes:      General:         Right eye: No discharge.         Left eye: No discharge.      Conjunctiva/sclera: Conjunctivae normal.      Pupils: Pupils are equal, round, and reactive to light.   Neck:      Musculoskeletal: Normal range of motion and neck supple.   Cardiovascular:      Rate and Rhythm: Normal rate. Rhythm irregular.      Heart sounds: Normal heart sounds. No murmur.   Pulmonary:      Effort: No respiratory distress.      Breath sounds: Rales (Left lower) present. No wheezing.   Abdominal:      General: Bowel sounds are normal. There is no distension.      Palpations: Abdomen is soft.      Tenderness: There is no abdominal tenderness.    Musculoskeletal: Normal range of motion.         General: No deformity.   Skin:     General: Skin is warm and dry.      Findings: No erythema or rash.   Neurological:      Mental Status: He is alert and oriented to person, place, and time.   Psychiatric:         Behavior: Behavior normal.         Thought Content: Thought content normal.         Judgment: Judgment normal.         Significant Labs:   CBC:   Recent Labs   Lab 09/19/20 0429 09/19/20  1613 09/20/20  0518   WBC 17.12* 10.22 6.68   HGB 8.8* 8.0* 8.1*   HCT 26.8* 24.0* 25.2*   PLT 13* 9* 7*    and CMP:   Recent Labs   Lab 09/19/20 0429 09/19/20  1613 09/20/20  0518    133* 138   K 3.3* 4.1 4.0    102 104   CO2 23 26 26   * 205* 155*   BUN 13 9 14   CREATININE 0.8 0.8 0.7   CALCIUM 7.3* 7.4* 7.5*   PROT 5.4* 5.1* 5.3*   ALBUMIN 2.5* 2.2* 2.4*   BILITOT 0.8 0.6 1.0   ALKPHOS 55 49* 49*   AST 33 30 23   ALT 59* 57* 57*   ANIONGAP 10 5* 8   EGFRNONAA >60.0 >60.0 >60.0       Diagnostic Results:  I have reviewed all pertinent imaging results/findings within the past 24 hours.

## 2020-09-20 NOTE — CARE UPDATE
Rapid Response Nurse Chart Check     Chart check completed, abnormal VS noted. Charge RN, Carlos contacted. No concerns verbalized at this time. Instructed to call [phone] for further concerns or assistance.

## 2020-09-21 NOTE — ASSESSMENT & PLAN NOTE
55 year old man who presents as a transfer for possible leukemia after manual differential demonstrated blasts.   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  - CBC, CMP, PT/INR, fibrinogen, uric acid, phos BID to assess for TLS and DIC. No evidence of either at this time.  - Peripheral smear -  Hyperleukocytosis with >20% circulating blastoid cells   - 2D ECHO with EF of 58%   - Continue allopurinol 300mg BID  - Hydrea stopped on 9/19  - Continue IVF  - Transfuse cyroglobulin if fibrinogen <150  - Transfuse if platelets <10, Hgb<7 and/or patient actively bleeding  - Lopes placed today (9/21/20).  - Tentatively planning induction early next week pending cytology

## 2020-09-21 NOTE — PROGRESS NOTES
General Surgery Note:     No acute events overnight.  To OR Today for Lopes catheter placement. COVID negative. Informed consent signed.    Adelina Young MD  General Surgery Resident, PGY III  Pager [phone]

## 2020-09-21 NOTE — ASSESSMENT & PLAN NOTE
- history of DM 2 per review of Care Everywhere chart  - on Metformin at home. Holding inpatient  - Insulin Detemir 6U daily   - ACHS blood glucose monitoring and s/s insulin while inpatient  - Goal -180. Stable at this time.

## 2020-09-21 NOTE — PLAN OF CARE
POC reviewed with patient, questions answered and concerns addressed. VSS, ambulating independently in room-encouraged to use I/S; continue to be HESTER. Voiding adequate amount cyu, -219. Received 1 U platelets today without any problem. Telemetry A.Fib with HR 80-90's most of the time, HR increases to 140-150 after exertion but does not sustain. Plan for Lopes placement tomorrow. In no acute distress; WCM.

## 2020-09-21 NOTE — PROGRESS NOTES
[facility]  Hematology  Bone Marrow Transplant  Progress Note    Patient Name: Shell Diaz  Admission Date: 9/16/2020  Hospital Length of Stay: 5 days  Code Status: Full Code    Subjective:     Interval History: Lopes placed today. Tolerated procedure well. Day 6 of 7 of IV abx. Will switch to ppx abx after tomorrow. LLL continues to be clr/diminished, but improving. Cough persists. Chemo plan pending cytology results from BMBx. Blood cx still with NGTD. Continues to be afebrile. VSS. afib rate-controlled.      Objective:     Vital Signs (Most Recent):  Temp: 98.7 °F (37.1 °C) (09/21/20 1100)  Pulse: 94 (09/21/20 1100)  Resp: 20 (09/21/20 1100)  BP: (!) 152/84 (09/21/20 1100)  SpO2: (!) 94 % (09/21/20 1100) Vital Signs (24h Range):  Temp:  [97.9 °F (36.6 °C)-99 °F (37.2 °C)] 98.7 °F (37.1 °C)  Pulse:  [66-95] 94  Resp:  [18-20] 20  SpO2:  [92 %-98 %] 94 %  BP: ()/(54-90) 152/84     Weight: (!) 137.5 kg (303 lb 2.1 oz)  Body mass index is 37.89 kg/m².  Body surface area is 2.7 meters squared.    ECOG SCORE         [unfilled]    Intake/Output - Last 3 Shifts       09/19 0700 - 09/20 0659 09/20 0700 - 09/21 0659 09/21 0700 - 09/22 0659    P.O. 1550 2300 180    I.V. (mL/kg) 1259 (9.1) 891 (6.5) 758 (5.5)    Blood  806     IV Piggyback 200 1200     Total Intake(mL/kg) 3009 (21.7) 5197 (37.8) 938 (6.8)    Urine (mL/kg/hr) 4500 (1.3) 6050 (1.8) 1100 (1)    Total Output 4500 6050 1100    Net -1491 -853 -162                 Physical Exam  Constitutional:       Appearance: He is well-developed.   HENT:      Head: Normocephalic and atraumatic.      Mouth/Throat:      Pharynx: No oropharyngeal exudate.   Eyes:      General:         Right eye: No discharge.         Left eye: No discharge.      Conjunctiva/sclera: Conjunctivae normal.      Pupils: Pupils are equal, round, and reactive to light.   Neck:      Musculoskeletal: Normal range of motion and neck supple.   Cardiovascular:      Rate and  Rhythm: Normal rate and regular rhythm.      Heart sounds: Normal heart sounds. No murmur.   Pulmonary:      Effort: Pulmonary effort is normal. No respiratory distress.      Breath sounds: No wheezing or rales.      Comments: Diminished left base  tachypnic but denies SOB  Abdominal:      General: Bowel sounds are normal. There is no distension.      Palpations: Abdomen is soft.      Tenderness: There is no abdominal tenderness.   Musculoskeletal: Normal range of motion.         General: No deformity.   Skin:     General: Skin is warm and dry.      Findings: No erythema or rash.   Neurological:      Mental Status: He is alert and oriented to person, place, and time.   Psychiatric:         Behavior: Behavior normal.         Thought Content: Thought content normal.         Judgment: Judgment normal.         Significant Labs:   CBC:   Recent Labs   Lab 09/20/20  0518 09/20/20  1551 09/21/20  0453   WBC 6.68 4.97 2.99*   HGB 8.1* 8.1* 7.1*   HCT 25.2* 24.8* 22.1*   PLT 7* 27* 40*    and CMP:   Recent Labs   Lab 09/20/20  0518 09/20/20  1552 09/21/20  0453    136 135*   K 4.0 3.8 3.6    101 103   CO2 26 27 23   * 182* 178*   BUN 14 13 11   CREATININE 0.7 0.8 0.8   CALCIUM 7.5* 7.9* 7.6*   PROT 5.3* 5.6* 5.4*   ALBUMIN 2.4* 2.5* 2.4*   BILITOT 1.0 0.8 1.1*   ALKPHOS 49* 55 48*   AST 23 29 18   ALT 57* 60* 49*   ANIONGAP 8 8 9   EGFRNONAA >60.0 >60.0 >60.0       Diagnostic Results:  I have reviewed all pertinent imaging results/findings within the past 24 hours.    Assessment/Plan:     * Acute myeloid leukemia not having achieved remission  55 year old man who presents as a transfer for possible leukemia after manual differential demonstrated blasts.   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  - CBC, CMP, PT/INR,  fibrinogen, uric acid, phos BID to assess for TLS and DIC. No evidence of either at this time.  - Peripheral smear -  Hyperleukocytosis with >20% circulating blastoid cells   - 2D ECHO with EF of 58%   - Continue allopurinol 300mg BID  - Hydrea stopped on 9/19  - Continue IVF  - Transfuse cyroglobulin if fibrinogen <150  - Transfuse if platelets <10, Hgb<7 and/or patient actively bleeding  - Lopes placed today (9/21/20).  - Tentatively planning induction early next week pending cytology    Acute respiratory failure with hypoxia  - Likely secondary to pneumonia  - on NC, wean of oxygen to maintain sats >90%  - Incentive spirometry       Type 2 diabetes mellitus, without long-term current use of insulin  - history of DM 2 per review of Care Everywhere chart  - on Metformin at home. Holding inpatient  - Insulin Detemir 6U daily   - ACHS blood glucose monitoring and s/s insulin while inpatient  - Goal -180. Stable at this time.    V tach  - had at 6 beat run of v-tach 9/18/20 at ~ 6 am. Non sustained   - keep mag > or = 2 and K+ > or = 4    Leukocytosis  -  on admission and 129 prior to starting hydrea  - see AML  - Now wnl. Hydrea stopped 9/20/20.      Anemia  - Likely related to underlying hematologic process.  - Transfuse if Hgb<7        Thrombocytopenia  - Secondary to AML  - Transfuse if platelet count<10. If patient develops active bleed, transfuse for goal >50.        Pneumonia  Patient presented with fevers, fatigue, concerning due to acute leukemoid process as significant leukocytosis and blasts. Fevers likely related to this but infection workup in process.   - Continue vancomycin and zosyn for a 7 day course (through 9/22). Will switch to ppx LVQ upon completion of IV abx.  - CXR at OSH did not show findings concerning for PNA.   - Chest CT performed 9/16/20 due to hemoptysis and suggestive of LLL PNA  - Blood cx with NGTD  - u/a not suggestive of UTI  - continues to be afebrile       Essential  hypertension  - Resumed home losartan 9/17/20  - Monitor BP      Paroxysmal atrial fibrillation  Patient states that he had pacemaker placed for this.   Previsouly ECHO with EF 58 %  Holding Eliquis due to low platelet count. Resume when appropriate.  EKG from 9/16/20 showing afib with RVR  HR now controlled  Cardiology consulted due to cardiac history as patient will receive anthracycline if he is induced for AML  Cardiology repeated ehco 9/17 to assess for ventricle straining. 16% LV straining noted on echo  - Keep mag > or = 2 and K+ > or = 4  - Continue home digoxin and metoprolol.   - To repeat ECHO after completion of chemo  - Monitor I/O and Daily weights   - weight up 7.7 lbs since admission. Will give 20 mg IV lasix today.              VTE Risk Mitigation (From admission, onward)         Ordered     IP VTE HIGH RISK PATIENT  Once      09/16/20 0017     Place sequential compression device  Until discontinued      09/16/20 0017                Disposition: Inpatient.    Jeni Holt NP  Bone Marrow Transplant  [facility]

## 2020-09-21 NOTE — ASSESSMENT & PLAN NOTE
Patient states that he had pacemaker placed for this.   Previsouly ECHO with EF 58 %  Holding Eliquis due to low platelet count. Resume when appropriate.  EKG from 9/16/20 showing afib with RVR  HR now controlled  Cardiology consulted due to cardiac history as patient will receive anthracycline if he is induced for AML  Cardiology repeated ehco 9/17 to assess for ventricle straining. 16% LV straining noted on echo  - Keep mag > or = 2 and K+ > or = 4  - Continue home digoxin and metoprolol.   - To repeat ECHO after completion of chemo  - Monitor I/O and Daily weights   - weight up 7.7 lbs since admission. Will give 20 mg IV lasix today.

## 2020-09-21 NOTE — ASSESSMENT & PLAN NOTE
Patient presented with fevers, fatigue, concerning due to acute leukemoid process as significant leukocytosis and blasts. Fevers likely related to this but infection workup in process.   - Continue vancomycin and zosyn for a 7 day course (through 9/22). Will switch to ppx LVQ upon completion of IV abx.  - CXR at OSH did not show findings concerning for PNA.   - Chest CT performed 9/16/20 due to hemoptysis and suggestive of LLL PNA  - Blood cx with NGTD  - u/a not suggestive of UTI  - continues to be afebrile

## 2020-09-21 NOTE — SUBJECTIVE & OBJECTIVE
Subjective:     Interval History: Lopes placed today. Tolerated procedure well. Day 6 of 7 of IV abx. Will switch to ppx abx after tomorrow. LLL continues to be clr/diminished, but improving. Cough persists. Chemo plan pending cytology results from BMBx. Blood cx still with NGTD. Continues to be afebrile. VSS. afib rate-controlled.      Objective:     Vital Signs (Most Recent):  Temp: 98.7 °F (37.1 °C) (09/21/20 1100)  Pulse: 94 (09/21/20 1100)  Resp: 20 (09/21/20 1100)  BP: (!) 152/84 (09/21/20 1100)  SpO2: (!) 94 % (09/21/20 1100) Vital Signs (24h Range):  Temp:  [97.9 °F (36.6 °C)-99 °F (37.2 °C)] 98.7 °F (37.1 °C)  Pulse:  [66-95] 94  Resp:  [18-20] 20  SpO2:  [92 %-98 %] 94 %  BP: ()/(54-90) 152/84     Weight: (!) 137.5 kg (303 lb 2.1 oz)  Body mass index is 37.89 kg/m².  Body surface area is 2.7 meters squared.    ECOG SCORE         [unfilled]    Intake/Output - Last 3 Shifts       09/19 0700 - 09/20 0659 09/20 0700 - 09/21 0659 09/21 0700 - 09/22 0659    P.O. 1550 2300 180    I.V. (mL/kg) 1259 (9.1) 891 (6.5) 758 (5.5)    Blood  806     IV Piggyback 200 1200     Total Intake(mL/kg) 3009 (21.7) 5197 (37.8) 938 (6.8)    Urine (mL/kg/hr) 4500 (1.3) 6050 (1.8) 1100 (1)    Total Output 4500 6050 1100    Net -1491 -853 -162                 Physical Exam  Constitutional:       Appearance: He is well-developed.   HENT:      Head: Normocephalic and atraumatic.      Mouth/Throat:      Pharynx: No oropharyngeal exudate.   Eyes:      General:         Right eye: No discharge.         Left eye: No discharge.      Conjunctiva/sclera: Conjunctivae normal.      Pupils: Pupils are equal, round, and reactive to light.   Neck:      Musculoskeletal: Normal range of motion and neck supple.   Cardiovascular:      Rate and Rhythm: Normal rate and regular rhythm.      Heart sounds: Normal heart sounds. No murmur.   Pulmonary:      Effort: Pulmonary effort is normal. No respiratory distress.      Breath sounds: No wheezing or  rales.      Comments: Diminished left base  tachypnic but denies SOB  Abdominal:      General: Bowel sounds are normal. There is no distension.      Palpations: Abdomen is soft.      Tenderness: There is no abdominal tenderness.   Musculoskeletal: Normal range of motion.         General: No deformity.   Skin:     General: Skin is warm and dry.      Findings: No erythema or rash.   Neurological:      Mental Status: He is alert and oriented to person, place, and time.   Psychiatric:         Behavior: Behavior normal.         Thought Content: Thought content normal.         Judgment: Judgment normal.         Significant Labs:   CBC:   Recent Labs   Lab 09/20/20  0518 09/20/20  1551 09/21/20  0453   WBC 6.68 4.97 2.99*   HGB 8.1* 8.1* 7.1*   HCT 25.2* 24.8* 22.1*   PLT 7* 27* 40*    and CMP:   Recent Labs   Lab 09/20/20  0518 09/20/20  1552 09/21/20  0453    136 135*   K 4.0 3.8 3.6    101 103   CO2 26 27 23   * 182* 178*   BUN 14 13 11   CREATININE 0.7 0.8 0.8   CALCIUM 7.5* 7.9* 7.6*   PROT 5.3* 5.6* 5.4*   ALBUMIN 2.4* 2.5* 2.4*   BILITOT 1.0 0.8 1.1*   ALKPHOS 49* 55 48*   AST 23 29 18   ALT 57* 60* 49*   ANIONGAP 8 8 9   EGFRNONAA >60.0 >60.0 >60.0       Diagnostic Results:  I have reviewed all pertinent imaging results/findings within the past 24 hours.

## 2020-09-21 NOTE — OP NOTE
DATE: 09/21/2020    PREOPERATIVE DIAGNOSIS: Acute myeloid leukemia    POSTOPERATIVE DIAGNOSIS: same    PROCEDURE PERFORMED: right Internal Jugular tunneled venous (Lopes) catheter placement.     ATTENDING SURGEON: Ti Starkey MD    HOUSESTAFF SURGEON: Sondra Garcia M.D. (RES)     ANESTHESIA: Monitored anesthesia care plus local.     ESTIMATED BLOOD LOSS: 10 mL     FINDINGS: A right internal jugular-lumen Power Lopes catheter placed with tip at junction of superior vena cava and right atrium.     SPECIMEN: None.     DRAINS: None.     COMPLICATIONS: None.     INDICATIONS: Shell Diaz is a 55 y.o. male referred to my General Surgery clinic for Lopes catheter placement. I recommended a internal jugular catheter placement and the patient agreed to proceed. The patient did sign informed consent and expressed understanding of the risks and benefits of surgery.     OPERATIVE PROCEDURE: The patient was identified in preoperative holding and transported directly to the Operating Room. He was placed supine on the operating table and padded appropriately. Monitors were applied. Monitored anesthesia care was initiated. The right neck and chest were prepped and draped in the standard sterile surgical fashion. A timeout was performed and all team members present agreed this was the correct procedure on the correct patient. We also confirmed administration of appropriate preoperative antibiotics.     After administering local anesthesia, the right internal jugular vein was cannulated with a hollow bore needle on the first attempt using ultrasound guidance. A guidewire was placed and confirmed to be in correct position using fluoroscopy. A small skin incision was made around the guidewire. An appropriately sized catheter was chosen. A counter incision inferolateral on the right chest was made after administering additional lidocaine. A subcutaneous tunnel between the lower chest incision and the cannulation site  was made with a hemostat after administration of additional local. The catheter was loaded onto the tunneling device and tunneled from the lower chest incision to the upper one. 2 dilators were used to dilate the track. Under fluoroscopic guidance, the split sheath and dilator were placed over the guidewire, and the guidewire and dilator were then removed. The catheter was placed down the split sheath and the sheath was split and peeled away. Fluoroscopy confirmed appropriate position of the catheter, which aspirated and flushed easily. Heparinized saline was instilled in the catheter. The catheter was secured at it's exit site using 3-0 Nylon suture. The skin incision at the cannulation site was closed with subcuticular 4-0 Monocryl. A sterile dressing was applied. The patient was transported to the Recovery Room in stable condition. All sponge, instrument and needle counts were correct at the end of the procedure.    Sondra Garcia MD  General Surgery PGY2  Pager: [phone]

## 2020-09-21 NOTE — ANESTHESIA PROCEDURE NOTES
Intubation  Performed by: Deborah Thomas CRNA  Authorized by: Josue Phipps MD     Intubation:     Induction:  Intravenous    Intubated:  Postinduction    Mask Ventilation:  Easy mask    Attempts:  1    Attempted By:  CRNA    Method of Intubation:  Other (see comments) (LMA)    Difficult Airway Encountered?: No      Complications:  None    Airway Device:  Supraglottic airway/LMA    Airway Device Size:  5.0    Placement Verified By:  Capnometry    Complicating Factors:  None    Findings Post-Intubation:  BS equal bilateral and atraumatic/condition of teeth unchanged

## 2020-09-21 NOTE — PROGRESS NOTES
Device clinic paged regarding L chest pacemaker. HOUSTON Paiz states it's ok to apply magnet over pacemaker if cautery is used. Patient's telemetry box taken off, and delivered to PACU. Patient placed on telemetry in DOSC8, showing Afib, rate 70-80's.No symptoms at present. Monitoring ongoing.

## 2020-09-21 NOTE — ASSESSMENT & PLAN NOTE
- Secondary to AML  - Transfuse if platelet count<10. If patient develops active bleed, transfuse for goal >50.

## 2020-09-21 NOTE — ANESTHESIA POSTPROCEDURE EVALUATION
Anesthesia Post Evaluation    Patient: Shell Diaz    Procedure(s) Performed: Procedure(s) (LRB):  INSERTION, CATHETER, CENTRAL VENOUS, STAUFFER (Right)    Final Anesthesia Type: general    Patient location during evaluation: PACU  Patient participation: Yes- Able to Participate  Level of consciousness: awake and alert  Post-procedure vital signs: reviewed and stable  Pain management: adequate  Airway patency: patent    PONV status at discharge: No PONV  Anesthetic complications: no      Cardiovascular status: blood pressure returned to baseline  Respiratory status: unassisted  Hydration status: euvolemic  Follow-up not needed.          Vitals Value Taken Time   /58 09/21/20 1033   Temp 36.6 °C (97.9 °F) 09/21/20 1002   Pulse 74 09/21/20 1040   Resp 27 09/21/20 1040   SpO2 98 % 09/21/20 1040   Vitals shown include unvalidated device data.      Event Time   Out of Recovery 09/21/2020 10:49:00         Pain/Juan Score: Pain Rating Prior to Med Admin: 4 (9/21/2020 10:34 AM)  Juan Score: 9 (9/21/2020 10:49 AM)

## 2020-09-21 NOTE — PLAN OF CARE
Plan of care reviewed with patient and family.  FAll precautions maintained, side rails up x2, call light in reach, bed in low position and locked, nonskid socks on.  Tolerating a diabetic diet without difficulty.  Voids without difficulty. Wife at the bedside.  Patient getting iv antibiotics infusing without difficulty.

## 2020-09-21 NOTE — PLAN OF CARE
Uneventful shift. Pt has had no c/o pain this shift. Pt has been NPO since Mn in anticipation of surgery this am. Pt iv abx regimen maintained. Pt remains on cardiac monitoring. Pt to receive 1 unit of plts at 0400. Pt has remained free from injury this shift. Bed in low locked position. Call light and personal belongings within reach. Side rails up x2. Nonskid socks in place. Pt instructed to call with any needs. Will continue to monitor.

## 2020-09-21 NOTE — TRANSFER OF CARE
"Anesthesia Transfer of Care Note    Patient: Shell Diaz    Procedure(s) Performed: Procedure(s) (LRB):  INSERTION, CATHETER, CENTRAL VENOUS, STAUFFER (Right)    Patient location: PACU    Anesthesia Type: general    Transport from OR: Transported from OR on 6-10 L/min O2 by face mask with adequate spontaneous ventilation    Post pain: adequate analgesia    Post assessment: no apparent anesthetic complications    Post vital signs: stable    Level of consciousness: awake    Nausea/Vomiting: no nausea/vomiting    Complications: none    Transfer of care protocol was followed      Last vitals:   Visit Vitals  BP (!) 99/54 (BP Location: Right arm, Patient Position: Lying)   Pulse 75   Temp 36.6 °C (97.9 °F) (Temporal)   Resp 18   Ht 6' 3" (1.905 m)   Wt (!) 137.5 kg (303 lb 2.1 oz)   SpO2 96%   BMI 37.89 kg/m²     "

## 2020-09-21 NOTE — ASSESSMENT & PLAN NOTE
- Likely secondary to pneumonia  - on NC, wean of oxygen to maintain sats >90%  - Incentive spirometry

## 2020-09-22 PROBLEM — D61.818 PANCYTOPENIA: Status: ACTIVE | Noted: 2020-01-01

## 2020-09-22 PROBLEM — D64.9 ANEMIA: Status: RESOLVED | Noted: 2020-01-01 | Resolved: 2020-01-01

## 2020-09-22 NOTE — PROGRESS NOTES
[facility]  Hematology  Bone Marrow Transplant  Progress Note    Patient Name: Shell Diaz  Admission Date: 9/16/2020  Hospital Length of Stay: 6 days  Code Status: Full Code    Subjective:     Interval History: FISH and NGS still pending. Per pathologist, will likely not result ([place] send-out) until late this week to early next week due to COVID delay. Considering discharging tomorrow and having him f/u with Dr. Fields early next week for possible admission for induction. Will complete IV abx for PNA today. Will discharge on ppx. ANC 0. Continues to be afebrile. VSS. A-fib rate-controlled. Central line was placed yesterday, but appears to be vas cath, not a Lopes. 10 lb weight gain since admission. Stopped IVF and gave 20 mg IV lasix.    Objective:     Vital Signs (Most Recent):  Temp: 99.4 °F (37.4 °C) (09/22/20 1126)  Pulse: 91 (09/22/20 1126)  Resp: 15 (09/22/20 1126)  BP: 139/66 (09/22/20 1126)  SpO2: (!) 91 % (09/22/20 1126) Vital Signs (24h Range):  Temp:  [96.7 °F (35.9 °C)-99.4 °F (37.4 °C)] 99.4 °F (37.4 °C)  Pulse:  [75-95] 91  Resp:  [15-20] 15  SpO2:  [91 %-98 %] 91 %  BP: (118-149)/(58-81) 139/66     Weight: (!) 139 kg (306 lb 7 oz)  Body mass index is 38.3 kg/m².  Body surface area is 2.71 meters squared.    ECOG SCORE         [unfilled]    Intake/Output - Last 3 Shifts       09/20 0700 - 09/21 0659 09/21 0700 - 09/22 0659 09/22 0700 - 09/23 0659    P.O. 2300 180 240    I.V. (mL/kg) 891 (6.5) 758 (5.5)     Blood 806      IV Piggyback 1200 500     Total Intake(mL/kg) 5197 (37.8) 1438 (10.3) 240 (1.7)    Urine (mL/kg/hr) 6050 (1.8) 3000 (0.9) 700 (0.9)    Total Output 6050 3000 700    Net -493 -4544 -032                 Physical Exam  Constitutional:       Appearance: He is well-developed.   HENT:      Head: Normocephalic and atraumatic.      Mouth/Throat:      Pharynx: No oropharyngeal exudate.   Eyes:      General:         Right eye: No discharge.         Left eye: No  discharge.      Conjunctiva/sclera: Conjunctivae normal.      Pupils: Pupils are equal, round, and reactive to light.   Neck:      Musculoskeletal: Normal range of motion and neck supple.   Cardiovascular:      Rate and Rhythm: Normal rate and regular rhythm.      Heart sounds: Normal heart sounds. No murmur.   Pulmonary:      Effort: Pulmonary effort is normal. No respiratory distress.      Breath sounds: No wheezing or rales.      Comments: Diminished left base  tachypnic but denies SOB  Abdominal:      General: Bowel sounds are normal. There is no distension.      Palpations: Abdomen is soft.      Tenderness: There is no abdominal tenderness.   Musculoskeletal: Normal range of motion.         General: No deformity.   Skin:     General: Skin is warm and dry.      Findings: No erythema or rash.   Neurological:      Mental Status: He is alert and oriented to person, place, and time.   Psychiatric:         Behavior: Behavior normal.         Thought Content: Thought content normal.         Judgment: Judgment normal.         Significant Labs:   CBC:   Recent Labs   Lab 09/20/20  1551 09/21/20  0453 09/22/20  0330   WBC 4.97 2.99* 2.39*   HGB 8.1* 7.1* 7.2*   HCT 24.8* 22.1* 21.8*   PLT 27* 40* 23*    and CMP:   Recent Labs   Lab 09/20/20  1552 09/21/20  0453 09/22/20  0330    135* 138   K 3.8 3.6 4.1    103 102   CO2 27 23 27   * 178* 132*   BUN 13 11 11   CREATININE 0.8 0.8 0.8   CALCIUM 7.9* 7.6* 7.5*   PROT 5.6* 5.4* 5.3*   ALBUMIN 2.5* 2.4* 2.5*   BILITOT 0.8 1.1* 1.1*   ALKPHOS 55 48* 48*   AST 29 18 17   ALT 60* 49* 49*   ANIONGAP 8 9 9   EGFRNONAA >60.0 >60.0 >60.0       Diagnostic Results:  I have reviewed all pertinent imaging results/findings within the past 24 hours.    Assessment/Plan:     * Acute myeloid leukemia not having achieved remission  55 year old man who presents as a transfer for possible leukemia after manual differential demonstrated blasts.   - BMBx performed 9/16/20,  showing AML with flow cytometry showingincreased population of myeloid blasts  showing expression of CD4, CD9, CD11c, CD15, CD13, CD33(97%), CD64, (partial),  HLA-DR, and cytoplasmic myeloperoxidase.  - CBC, CMP, PT/INR, fibrinogen, uric acid, phos BID to assess for TLS and DIC. No evidence of either at this time.  - Peripheral smear -  Hyperleukocytosis with >20% circulating blastoid cells   - 2D ECHO with EF of 58%   - Continue allopurinol 300mg BID  - Hydrea stopped on 9/19  - Continue IVF  - Transfuse cyroglobulin if fibrinogen <150  - Transfuse if platelets <10, Hgb<7 and/or patient actively bleeding  - Lopes placed today (9/21/20).  - Tentatively planning induction early next week pending cytology. Will likely discharge him tomorrow and readmit next week if induction is the plan.    Acute respiratory failure with hypoxia  - Likely secondary to pneumonia  - on NC, wean of oxygen to maintain sats >90%  - Incentive spirometry       Type 2 diabetes mellitus, without long-term current use of insulin  - history of DM 2 per review of Care Everywhere chart  - on Metformin at home. Holding inpatient  - Insulin Detemir 6U daily   - ACHS blood glucose monitoring and s/s insulin while inpatient  - Goal -180. Stable at this time.    V tach  - had at 6 beat run of v-tach 9/18/20 at ~ 6 am. Non sustained   - keep mag > or = 2 and K+ > or = 4    Leukocytosis  -  on admission and 129 prior to starting hydrea  - see AML  - Now wnl. Hydrea stopped 9/20/20.      Pancytopenia  - Secondary to AML  - Transfuse if platelet count<10. If patient develops active bleed, transfuse for goal >50.  - Transfuse for hgb < 7  - ANC 0 today.        Pneumonia  Patient presented with fevers, fatigue, concerning due to acute leukemoid process as significant leukocytosis and blasts. Fevers likely related to this but infection workup in process.   - Continue vancomycin and zosyn for a 7 day course (through 9/22). Will switch to  ppx LVQ upon completion of IV abx.  - CXR at OSH did not show findings concerning for PNA.   - Chest CT performed 9/16/20 due to hemoptysis and suggestive of LLL PNA  - Blood cx with NGTD  - u/a not suggestive of UTI  - continues to be afebrile       Essential hypertension  - Resumed home losartan 9/17/20  - Monitor BP      Paroxysmal atrial fibrillation  Patient states that he had pacemaker placed for this.   Previsouly ECHO with EF 58 %  Holding Eliquis due to low platelet count. Resume when appropriate.  EKG from 9/16/20 showing afib with RVR  HR now controlled  Cardiology consulted due to cardiac history as patient will receive anthracycline if he is induced for AML  Cardiology repeated ehco 9/17 to assess for ventricle straining. 16% LV straining noted on echo  - Keep mag > or = 2 and K+ > or = 4  - Continue home digoxin and metoprolol.   - To repeat ECHO after completion of chemo  - Monitor I/O and Daily weights   - weight up 10 lbs since admission. Will give 20 mg IV lasix and stop IVF today (not concerned about TLS at this time).              VTE Risk Mitigation (From admission, onward)         Ordered     heparin (porcine) injection 1,000 Units  As needed (PRN)      09/22/20 0343     IP VTE HIGH RISK PATIENT  Once      09/16/20 0017     Place sequential compression device  Until discontinued      09/16/20 0017                Disposition: Inpatient    Jeni Holt, NP  Bone Marrow Transplant  [facility]

## 2020-09-22 NOTE — ASSESSMENT & PLAN NOTE
- Secondary to AML  - Transfuse if platelet count<10. If patient develops active bleed, transfuse for goal >50.  - Transfuse for hgb < 7  - ANC 0 today.

## 2020-09-22 NOTE — PHYSICIAN QUERY
PT Name: Shell Diaz  MR #: [id number]    Hematology Clarification      CDS: Jade Al RN     Contact information: [email or web address] or (cell) [phone]    This form is a permanent document in the medical record.      Query Date: September 22, 2020    By submitting this query, we are merely seeking further clarification of documentation. Please utilize your independent clinical judgment when addressing the question(s) below.       Indicators Supporting Clinical Findings Location in Medical Record   x Anemia, Thrombocytopenia, Neutropenia, Pancytopenia documented Anemia  - Likely related to underlying hematologic process.  - Transfuse if Hgb<7      Thrombocytopenia  - Secondary to AML  - Transfuse if platelet count<10. If patient develops active bleed, transfuse for goal >50.   BMT PN 9/21   x H & H  9/21/2020 04:53 9/22/2020 03:30   Hemoglobin 7.1  7.2    Hematocrit 22.1  21.8       Lab Results    x WBC  9/21/2020   04:53 9/22/2020   03:30   WBC 2.99  2.39       Lab Results    x Neutrophils/ Granulocytes/ ANC  9/19  16:13   Gran # (ANC) 0.0       Lab Results    x Platelets  9/19/2020 04:29 9/19/2020 16:13 9/20/2020 05:18 9/20/2020 15:51 9/21/2020 04:53 9/22/2020 03:30   Platelets 13  9 7 27  40  23       Lab Results    x Transfusion(s)  9/16/2020 09:25 9/20/2020 05:18 9/20/2020 05:18   PREPARE PLATELETS (DOSE) SOFT Rpt Rpt Rpt   UNIT NUMBER [id number]   Product Code [id number]   DISPENSE STATUS TRANSFUSED TRANSFUSED TRANSFUSED      Blood Bank    Treatments:     x Acute/ Chronic illness Acute myeloid leukemia not having achieved remission  55 year old man who presents as a transfer for possible leukemia after manual differential demonstrated blasts.   - BMBx performed 9/16/20, showing AML with flow cytometry showingincreased population of myeloid blasts  showing expression of CD4, CD9, CD11c, CD15, CD13, CD33(97%), CD64, (partial),  HLA-DR,  and cytoplasmic myeloperoxidase.  - CBC, CMP, PT/INR, fibrinogen, uric acid, phos BID to assess for TLS and DIC. No evidence of either at this time.  - Peripheral smear -  Hyperleukocytosis with >20% circulating blastoid cells   - 2D ECHO with EF of 58%   - Continue allopurinol 300mg BID  - Hydrea stopped on 9/19  - Continue IVF  - Transfuse cyroglobulin if fibrinogen <150  - Transfuse if platelets <10, Hgb<7 and/or patient actively bleeding  - Lopes placed today (9/21/20).  - Tentatively planning induction early next week pending cytology BMT PN 9/21    Other:     Pancytopenia is defined by:  Hb < 12g/dL (non pregnant women) or <13g/dL (men) + ANC < 1800/microL + Platelets < 150,000/microL      Provider, please specify diagnosis or diagnoses associated with above clinical findings.    [ x  ] Pancytopenia due to leukemia     [   ] Pancytopenia, unspecified     [   ] Other Hematological Diagnosis (please specify) ________     [  ] Clinically Undetermined         Present on admission (POA) status:   [   ] Yes (Y)                          [    ] Clinically Undetermined (W)  [   ] No (N)                            [   ] Documentation insufficient to determine if condition is POA (U)     Please document in your progress notes daily for the duration of treatment, until resolved, and include in your discharge summary.

## 2020-09-22 NOTE — PLAN OF CARE
Problem: Adult Inpatient Plan of Care  Goal: Plan of Care Review  Outcome: Ongoing, Progressing  Flowsheets (Taken 9/22/2020 1600)  Plan of Care Reviewed With: patient  Patient remains free from falls and injury this shift. Bed in low, locked position with call light in reach. Plan of care reviewed with pt.  Wife at bedside. VSS.  O2 weaned to 1L to maintain sats >90%.  IVF d/c.  Right chest vas- cath dressing changed this shift.  Blood sugars monitored before meals.  Patient encouraged to call for assistance when getting out of bed.  Patient verbalized understanding. All belongings within reach.  Will continue to monitor.

## 2020-09-22 NOTE — PROGRESS NOTES
Pharmacokinetic Assessment Follow Up: IV Vancomycin    Vancomycin serum concentration assessment(s):    The trough level was drawn correctly and can be used to guide therapy at this time. The measurement is within the desired definitive target range of 15 to 20 mcg/mL.    Vancomycin Regimen Plan:    Continue regimen of Vancomycin 2500 mg IV every 12 hours with last dose of vancomycin due today 9/22 at 1630. 7 days of therapy will be complete 9/22/20.     Drug levels (last 3 results):  Recent Labs   Lab Result Units 09/20/20  0518 09/22/20  0330   Vancomycin-Trough ug/mL 11.1 18.0       Pharmacy will continue to follow and monitor vancomycin.    Please contact pharmacy at extension [phone] for questions regarding this assessment.    Thank you for the consult,   Liv Easley       Patient brief summary:  Shell Diaz is a 55 y.o. male initiated on antimicrobial therapy with IV Vancomycin for treatment of lower respiratory infection    The patient's current regimen is 2500 mg IV Q12H.    Drug Allergies:   Review of patient's allergies indicates:  No Known Allergies    Actual Body Weight:   139 kg    Renal Function:   Estimated Creatinine Clearance: 156.9 mL/min (based on SCr of 0.8 mg/dL).,     Dialysis Method (if applicable):  N/A    CBC (last 72 hours):  Recent Labs   Lab Result Units 09/19/20  1613 09/20/20  0518 09/20/20  1551 09/21/20  0453 09/22/20  0330   WBC K/uL 10.22 6.68 4.97 2.99* 2.39*   Hemoglobin g/dL 8.0* 8.1* 8.1* 7.1* 7.2*   Hemoglobin A1C %  --  8.0*  --   --   --    Hematocrit % 24.0* 25.2* 24.8* 22.1* 21.8*   Platelets K/uL 9* 7* 27* 40* 23*   Gran% % 0.2* 0.0* 0.0* 0.0* 0.0*   Lymph% % 24.9 22.0 44.0 49.0* 66.0*   Mono% % 74.7* 0.0* 0.0* 0.0* 3.1*   Eosinophil% % 0.0 0.0 0.0 0.0 0.0   Basophil% % 0.0 0.0 0.0 0.0 0.0   Differential Method  Automated Manual Automated Manual Manual       Metabolic Panel (last 72 hours):  Recent Labs   Lab Result Units 09/19/20  1613 09/20/20  0563  09/20/20  1552 09/21/20  0453 09/22/20  0330   Sodium mmol/L 133* 138 136 135* 138   Potassium mmol/L 4.1 4.0 3.8 3.6 4.1   Chloride mmol/L 102 104 101 103 102   CO2 mmol/L 26 26 27 23 27   Glucose mg/dL 205* 155* 182* 178* 132*   BUN, Bld mg/dL 9 14 13 11 11   Creatinine mg/dL 0.8 0.7 0.8 0.8 0.8   Albumin g/dL 2.2* 2.4* 2.5* 2.4* 2.5*   Total Bilirubin mg/dL 0.6 1.0 0.8 1.1* 1.1*   Alkaline Phosphatase U/L 49* 49* 55 48* 48*   AST U/L 30 23 29 18 17   ALT U/L 57* 57* 60* 49* 49*   Magnesium mg/dL  --  2.0  --  1.8 2.0   Phosphorus mg/dL 3.0 3.4 3.5 3.3 3.5       Vancomycin Administrations:  vancomycin given in the last 96 hours                   vancomycin (VANCOCIN) 2,500 mg in dextrose 5 % 500 mL IVPB (mg) 2,500 mg New Bag 09/22/20 0508     2,500 mg New Bag 09/21/20 1630     2,500 mg New Bag  0415     2,500 mg New Bag 09/20/20 1643    vancomycin 2 g in dextrose 5 % 500 mL IVPB (mg) 2,000 mg New Bag 09/20/20 0545     2,000 mg New Bag 09/19/20 1734     2,000 mg New Bag  0415     2,000 mg New Bag 09/18/20 1648                Microbiologic Results:  Microbiology Results (last 7 days)     Procedure Component Value Units Date/Time    Blood culture [[id number]] Collected: 09/16/20 0131    Order Status: Completed Specimen: Blood Updated: 09/21/20 0612     Blood Culture, Routine No growth after 5 days.    Narrative:      Collection has been rescheduled by RF at 09/16/2020 00:49 Reason: the   Doctor is putting orders in on this patient  Collection has been rescheduled by RF at 09/16/2020 00:50 Reason: the   Doctor is still putting orders on this patient  Collection has been rescheduled by RF at 09/16/2020 00:49 Reason: the   Doctor is putting orders in on this patient  Collection has been rescheduled by RF at 09/16/2020 00:50 Reason: the   Doctor is still putting orders on this patient    Blood culture [[id number]] Collected: 09/16/20 0131    Order Status: Completed Specimen: Blood Updated: 09/21/20 0612     Blood Culture,  Routine No growth after 5 days.    Narrative:      Collection has been rescheduled by RF at 09/16/2020 00:49 Reason: the   Doctor is putting orders in on this patient  Collection has been rescheduled by RF at 09/16/2020 00:50 Reason: the   Doctor is still putting orders on this patient  Collection has been rescheduled by RF at 09/16/2020 00:49 Reason: the   Doctor is putting orders in on this patient  Collection has been rescheduled by RF at 09/16/2020 00:50 Reason: the   Doctor is still putting orders on this patient

## 2020-09-22 NOTE — SUBJECTIVE & OBJECTIVE
Subjective:     Interval History: FISH and NGS still pending. Per pathologist, will likely not result ([facility] send-out) until late this week to early next week due to COVID delay. Considering discharging tomorrow and having him f/u with Dr. Fields early next week for possible admission for induction. Will complete IV abx for PNA today. Will discharge on ppx. ANC 0. Continues to be afebrile. VSS. A-fib rate-controlled. Central line was placed yesterday, but appears to be vas cath, not a Lopes. 10 lb weight gain since admission. Stopped IVF and gave 20 mg IV lasix.    Objective:     Vital Signs (Most Recent):  Temp: 99.4 °F (37.4 °C) (09/22/20 1126)  Pulse: 91 (09/22/20 1126)  Resp: 15 (09/22/20 1126)  BP: 139/66 (09/22/20 1126)  SpO2: (!) 91 % (09/22/20 1126) Vital Signs (24h Range):  Temp:  [96.7 °F (35.9 °C)-99.4 °F (37.4 °C)] 99.4 °F (37.4 °C)  Pulse:  [75-95] 91  Resp:  [15-20] 15  SpO2:  [91 %-98 %] 91 %  BP: (118-149)/(58-81) 139/66     Weight: (!) 139 kg (306 lb 7 oz)  Body mass index is 38.3 kg/m².  Body surface area is 2.71 meters squared.    ECOG SCORE         [unfilled]    Intake/Output - Last 3 Shifts       09/20 0700 - 09/21 0659 09/21 0700 - 09/22 0659 09/22 0700 - 09/23 0659    P.O. 2300 180 240    I.V. (mL/kg) 891 (6.5) 758 (5.5)     Blood 806      IV Piggyback 1200 500     Total Intake(mL/kg) 5197 (37.8) 1438 (10.3) 240 (1.7)    Urine (mL/kg/hr) 6050 (1.8) 3000 (0.9) 700 (0.9)    Total Output 6050 3000 700    Net -853 -5795 -681                 Physical Exam  Constitutional:       Appearance: He is well-developed.   HENT:      Head: Normocephalic and atraumatic.      Mouth/Throat:      Pharynx: No oropharyngeal exudate.   Eyes:      General:         Right eye: No discharge.         Left eye: No discharge.      Conjunctiva/sclera: Conjunctivae normal.      Pupils: Pupils are equal, round, and reactive to light.   Neck:      Musculoskeletal: Normal range of motion and neck supple.   Cardiovascular:       Rate and Rhythm: Normal rate and regular rhythm.      Heart sounds: Normal heart sounds. No murmur.   Pulmonary:      Effort: Pulmonary effort is normal. No respiratory distress.      Breath sounds: No wheezing or rales.      Comments: Diminished left base  tachypnic but denies SOB  Abdominal:      General: Bowel sounds are normal. There is no distension.      Palpations: Abdomen is soft.      Tenderness: There is no abdominal tenderness.   Musculoskeletal: Normal range of motion.         General: No deformity.   Skin:     General: Skin is warm and dry.      Findings: No erythema or rash.   Neurological:      Mental Status: He is alert and oriented to person, place, and time.   Psychiatric:         Behavior: Behavior normal.         Thought Content: Thought content normal.         Judgment: Judgment normal.         Significant Labs:   CBC:   Recent Labs   Lab 09/20/20  1551 09/21/20  0453 09/22/20  0330   WBC 4.97 2.99* 2.39*   HGB 8.1* 7.1* 7.2*   HCT 24.8* 22.1* 21.8*   PLT 27* 40* 23*    and CMP:   Recent Labs   Lab 09/20/20  1552 09/21/20  0453 09/22/20  0330    135* 138   K 3.8 3.6 4.1    103 102   CO2 27 23 27   * 178* 132*   BUN 13 11 11   CREATININE 0.8 0.8 0.8   CALCIUM 7.9* 7.6* 7.5*   PROT 5.6* 5.4* 5.3*   ALBUMIN 2.5* 2.4* 2.5*   BILITOT 0.8 1.1* 1.1*   ALKPHOS 55 48* 48*   AST 29 18 17   ALT 60* 49* 49*   ANIONGAP 8 9 9   EGFRNONAA >60.0 >60.0 >60.0       Diagnostic Results:  I have reviewed all pertinent imaging results/findings within the past 24 hours.

## 2020-09-22 NOTE — ASSESSMENT & PLAN NOTE
55 year old man who presents as a transfer for possible leukemia after manual differential demonstrated blasts.   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  - CBC, CMP, PT/INR, fibrinogen, uric acid, phos BID to assess for TLS and DIC. No evidence of either at this time.  - Peripheral smear -  Hyperleukocytosis with >20% circulating blastoid cells   - 2D ECHO with EF of 58%   - Continue allopurinol 300mg BID  - Hydrea stopped on 9/19  - Continue IVF  - Transfuse cyroglobulin if fibrinogen <150  - Transfuse if platelets <10, Hgb<7 and/or patient actively bleeding  - Lopes placed today (9/21/20).  - Tentatively planning induction early next week pending cytology. Will likely discharge him tomorrow and readmit next week if induction is the plan.

## 2020-09-22 NOTE — PLAN OF CARE
Uneventful shift. Pt pain controlled by po prn pain medication. Pt iv abx regimen maintained. Blood sugar monitored ac/hs. Pt remains on cardiac monitoring. Pt has remained free from injury this shift. Bed in low locked position. Call light and personal belongings within reach. Side rails up x2. Nonskid socks in place. Pt instructed to call with any needs. Will continue to monitor.

## 2020-09-22 NOTE — ASSESSMENT & PLAN NOTE
Patient states that he had pacemaker placed for this.   Previsouly ECHO with EF 58 %  Holding Eliquis due to low platelet count. Resume when appropriate.  EKG from 9/16/20 showing afib with RVR  HR now controlled  Cardiology consulted due to cardiac history as patient will receive anthracycline if he is induced for AML  Cardiology repeated ehco 9/17 to assess for ventricle straining. 16% LV straining noted on echo  - Keep mag > or = 2 and K+ > or = 4  - Continue home digoxin and metoprolol.   - To repeat ECHO after completion of chemo  - Monitor I/O and Daily weights   - weight up 10 lbs since admission. Will give 20 mg IV lasix and stop IVF today (not concerned about TLS at this time).

## 2020-09-23 PROBLEM — N17.9 AKI (ACUTE KIDNEY INJURY): Status: ACTIVE | Noted: 2020-01-01

## 2020-09-23 NOTE — PLAN OF CARE
Problem: Adult Inpatient Plan of Care  Goal: Plan of Care Review  Outcome: Ongoing, Progressing  Flowsheets (Taken 9/23/2020 1525)  Plan of Care Reviewed With: patient  Patient remains free from falls and injury this shift. Bed in low, locked position with call light in reach. Plan of care reviewed with pt.  Wife at bedside. VSS.  Unable to wean O2 this shift, sats 87% on RA.  Afebrile. 1u PLT transfused, with pre-meds given.  CXR completed off unit.  Blood sugars monitored before meals.  Patient encouraged to call for assistance when getting out of bed.  Patient verbalized understanding. All belongings within reach.  Will continue to monitor

## 2020-09-23 NOTE — SUBJECTIVE & OBJECTIVE
Subjective:     Interval History: FISH and NGS still pending. Was tentatively planning for discharge today, but now has mild SEBASTIAN (creatinine 1.6; previously 0.8) so we will continue to monitor inpatient. SEBASTIAN likely 2/2 IV abx. Completed vanc and zosyn yesterday. Started ppx with LVQ today. Resumed IVF. Will need to monitor closely for fluid overload. Continues to have hemoptysis. Giving 1 unit plts today. O2 dropping to upper 80s on room air at rest. Tachycardic with exertion. Will recheck CXR.    Objective:     Vital Signs (Most Recent):  Temp: 98.5 °F (36.9 °C) (09/23/20 1125)  Pulse: 85 (09/23/20 1125)  Resp: 18 (09/23/20 1125)  BP: 134/75 (09/23/20 1125)  SpO2: (!) 93 % (09/23/20 1125) Vital Signs (24h Range):  Temp:  [98 °F (36.7 °C)-98.6 °F (37 °C)] 98.5 °F (36.9 °C)  Pulse:  [65-99] 85  Resp:  [16-20] 18  SpO2:  [87 %-96 %] 93 %  BP: (122-161)/(62-82) 134/75     Weight: (!) 139.5 kg (307 lb 8.7 oz)  Body mass index is 38.44 kg/m².  Body surface area is 2.72 meters squared.    ECOG SCORE         [unfilled]    Intake/Output - Last 3 Shifts       09/21 0700 - 09/22 0659 09/22 0700 - 09/23 0659 09/23 0700 - 09/24 0659    P.O. 180 880 480    I.V. (mL/kg) 758 (5.5)      Blood   238    IV Piggyback 500 1600     Total Intake(mL/kg) 1438 (10.3) 2480 (17.8) 718 (5.1)    Urine (mL/kg/hr) 3000 (0.9) 1500 (0.4)     Total Output 3000 1500     Net -1562 +980 +718           Urine Occurrence  4 x           Physical Exam  Constitutional:       Appearance: He is well-developed.   HENT:      Head: Normocephalic and atraumatic.      Mouth/Throat:      Pharynx: No oropharyngeal exudate.   Eyes:      General:         Right eye: No discharge.         Left eye: No discharge.      Conjunctiva/sclera: Conjunctivae normal.      Pupils: Pupils are equal, round, and reactive to light.   Neck:      Musculoskeletal: Normal range of motion and neck supple.   Cardiovascular:      Rate and Rhythm: Normal rate. Rhythm irregular.      Heart  sounds: Normal heart sounds. No murmur.      Comments: Tachycardic with exertion  Pulmonary:      Effort: Pulmonary effort is normal. No respiratory distress.      Breath sounds: No wheezing or rales.      Comments: clr/diminished left base    Abdominal:      General: Bowel sounds are normal. There is no distension.      Palpations: Abdomen is soft.      Tenderness: There is no abdominal tenderness.   Musculoskeletal: Normal range of motion.         General: No deformity.   Skin:     General: Skin is warm and dry.      Findings: No erythema or rash.   Neurological:      Mental Status: He is alert and oriented to person, place, and time.   Psychiatric:         Behavior: Behavior normal.         Thought Content: Thought content normal.         Judgment: Judgment normal.         Significant Labs:   CBC:   Recent Labs   Lab 09/22/20  0330 09/23/20  0305   WBC 2.39* 1.76*   HGB 7.2* 7.0*   HCT 21.8* 21.9*   PLT 23* 15*    and CMP:   Recent Labs   Lab 09/22/20  0330 09/23/20  0305    138   K 4.1 3.8    103   CO2 27 29   * 148*   BUN 11 16   CREATININE 0.8 1.6*   CALCIUM 7.5* 8.0*   PROT 5.3* 5.4*   ALBUMIN 2.5* 2.4*   BILITOT 1.1* 1.1*   ALKPHOS 48* 52*   AST 17 15   ALT 49* 40   ANIONGAP 9 6*   EGFRNONAA >60.0 47.8*       Diagnostic Results:  I have reviewed all pertinent imaging results/findings within the past 24 hours.

## 2020-09-23 NOTE — ASSESSMENT & PLAN NOTE
- creatinine up to 1.6 today. Previously 0.8.  - likely 2/2 IV abx (Vanc and Zosyn) which he completed on 9/22/20  - resumed IVF

## 2020-09-23 NOTE — ASSESSMENT & PLAN NOTE
Patient presented with fevers, fatigue, concerning due to acute leukemoid process as significant leukocytosis and blasts. Fevers likely related to this but infection workup in process.   - completed 7 day course vancomycin and zosyn 9/22. Switched to ppx LVQ 9/23/20.  - CXR at OSH did not show findings concerning for PNA.   - Chest CT performed 9/16/20 due to hemoptysis and suggestive of LLL PNA  - Blood cx with NGTD  - u/a not suggestive of UTI  - continues to be afebrile   - still having hemoptysis  - repeating CXR today

## 2020-09-23 NOTE — PROGRESS NOTES
Worsening left pleural effusion on cxr. Will consult pulm.    Jeni Holt, FNP  Hematology/Oncology/Bone Marrow Transplant

## 2020-09-23 NOTE — ASSESSMENT & PLAN NOTE
- had at 6 beat run of v-tach 9/18/20 at ~ 6 am. Not sustained  - keep mag > or = 2 and K+ > or = 4  - cardiology consulted earlier in admission

## 2020-09-23 NOTE — ASSESSMENT & PLAN NOTE
- Likely secondary to pneumonia  - on NC, wean of oxygen to maintain sats >90%.   - attempted to wean O2, but desatted. Continue O2 at 1 liter at this time.  - will need to perform 6 minute walk test prior to discharge  - Incentive spirometry   - repeating CXR today

## 2020-09-23 NOTE — PROGRESS NOTES
[facility]  Hematology  Bone Marrow Transplant  Progress Note    Patient Name: Shell Diaz  Admission Date: 9/16/2020  Hospital Length of Stay: 7 days  Code Status: Full Code    Subjective:     Interval History: FISH and NGS still pending. Was tentatively planning for discharge today, but now has mild SEBASTIAN (creatinine 1.6; previously 0.8) so we will continue to monitor inpatient. SEBASTIAN likely 2/2 IV abx. Completed vanc and zosyn yesterday. Started ppx with LVQ today. Resumed IVF. Will need to monitor closely for fluid overload. Continues to have hemoptysis. Giving 1 unit plts today. O2 dropping to upper 80s on room air at rest. Tachycardic with exertion. Will recheck CXR.    Objective:     Vital Signs (Most Recent):  Temp: 98.5 °F (36.9 °C) (09/23/20 1125)  Pulse: 85 (09/23/20 1125)  Resp: 18 (09/23/20 1125)  BP: 134/75 (09/23/20 1125)  SpO2: (!) 93 % (09/23/20 1125) Vital Signs (24h Range):  Temp:  [98 °F (36.7 °C)-98.6 °F (37 °C)] 98.5 °F (36.9 °C)  Pulse:  [65-99] 85  Resp:  [16-20] 18  SpO2:  [87 %-96 %] 93 %  BP: (122-161)/(62-82) 134/75     Weight: (!) 139.5 kg (307 lb 8.7 oz)  Body mass index is 38.44 kg/m².  Body surface area is 2.72 meters squared.    ECOG SCORE         [unfilled]    Intake/Output - Last 3 Shifts       09/21 0700 - 09/22 0659 09/22 0700 - 09/23 0659 09/23 0700 - 09/24 0659    P.O. 180 880 480    I.V. (mL/kg) 758 (5.5)      Blood   238    IV Piggyback 500 1600     Total Intake(mL/kg) 1438 (10.3) 2480 (17.8) 718 (5.1)    Urine (mL/kg/hr) 3000 (0.9) 1500 (0.4)     Total Output 3000 1500     Net -1562 +980 +718           Urine Occurrence  4 x           Physical Exam  Constitutional:       Appearance: He is well-developed.   HENT:      Head: Normocephalic and atraumatic.      Mouth/Throat:      Pharynx: No oropharyngeal exudate.   Eyes:      General:         Right eye: No discharge.         Left eye: No discharge.      Conjunctiva/sclera: Conjunctivae normal.      Pupils:  Pupils are equal, round, and reactive to light.   Neck:      Musculoskeletal: Normal range of motion and neck supple.   Cardiovascular:      Rate and Rhythm: Normal rate. Rhythm irregular.      Heart sounds: Normal heart sounds. No murmur.      Comments: Tachycardic with exertion  Pulmonary:      Effort: Pulmonary effort is normal. No respiratory distress.      Breath sounds: No wheezing or rales.      Comments: clr/diminished left base    Abdominal:      General: Bowel sounds are normal. There is no distension.      Palpations: Abdomen is soft.      Tenderness: There is no abdominal tenderness.   Musculoskeletal: Normal range of motion.         General: No deformity.   Skin:     General: Skin is warm and dry.      Findings: No erythema or rash.   Neurological:      Mental Status: He is alert and oriented to person, place, and time.   Psychiatric:         Behavior: Behavior normal.         Thought Content: Thought content normal.         Judgment: Judgment normal.         Significant Labs:   CBC:   Recent Labs   Lab 09/22/20  0330 09/23/20  0305   WBC 2.39* 1.76*   HGB 7.2* 7.0*   HCT 21.8* 21.9*   PLT 23* 15*    and CMP:   Recent Labs   Lab 09/22/20  0330 09/23/20  0305    138   K 4.1 3.8    103   CO2 27 29   * 148*   BUN 11 16   CREATININE 0.8 1.6*   CALCIUM 7.5* 8.0*   PROT 5.3* 5.4*   ALBUMIN 2.5* 2.4*   BILITOT 1.1* 1.1*   ALKPHOS 48* 52*   AST 17 15   ALT 49* 40   ANIONGAP 9 6*   EGFRNONAA >60.0 47.8*       Diagnostic Results:  I have reviewed all pertinent imaging results/findings within the past 24 hours.    Assessment/Plan:     * Acute myeloid leukemia not having achieved remission  55 year old man who presents as a transfer for possible leukemia after manual differential demonstrated blasts.   - BMBx performed 9/16/20, showing AML with flow cytometry showingincreased population of myeloid blasts  showing expression of CD4, CD9, CD11c, CD15, CD13, CD33(97%), CD64, (partial),  HLA-DR,  and cytoplasmic myeloperoxidase.  - CBC, CMP, PT/INR, fibrinogen, uric acid, phos BID to assess for TLS and DIC. No evidence of either at this time.  - Peripheral smear -  Hyperleukocytosis with >20% circulating blastoid cells   - 2D ECHO with EF of 58%   - Continue allopurinol 300mg BID  - Hydrea stopped on 9/19  - Continue IVF  - Transfuse cyroglobulin if fibrinogen <150  - Transfuse if platelets <10, Hgb<7 and/or patient actively bleeding  - Lopes placed9/21/20.  - Tentatively planning induction early next week pending FISH.     SEBASTIAN (acute kidney injury)  - creatinine up to 1.6 today. Previously 0.8.  - likely 2/2 IV abx (Vanc and Zosyn) which he completed on 9/22/20  - resumed IVF    Acute respiratory failure with hypoxia  - Likely secondary to pneumonia  - on NC, wean of oxygen to maintain sats >90%.   - attempted to wean O2, but desatted. Continue O2 at 1 liter at this time.  - will need to perform 6 minute walk test prior to discharge  - Incentive spirometry   - repeating CXR today      Type 2 diabetes mellitus, without long-term current use of insulin  - history of DM 2 per review of Care Everywhere chart  - on Metformin at home. Holding inpatient  - Insulin Detemir 6U daily   - ACHS blood glucose monitoring and s/s insulin while inpatient  - Goal -180. Stable at this time.    V tach  - had at 6 beat run of v-tach 9/18/20 at ~ 6 am. Not sustained  - keep mag > or = 2 and K+ > or = 4  - cardiology consulted earlier in admission    Leukocytosis  -  on admission and 129 prior to starting hydrea  - see AML  - Hydrea stopped 9/20/20 with normal WBC count.      Pancytopenia  - Secondary to AML  - Transfuse if platelet count<10. If patient develops active bleed, transfuse for goal >50.  - Transfuse for hgb < 7  - ANC 0 today.        Pneumonia  Patient presented with fevers, fatigue, concerning due to acute leukemoid process as significant leukocytosis and blasts. Fevers likely related to this but  infection workup in process.   - completed 7 day course vancomycin and zosyn 9/22. Switched to ppx LVQ 9/23/20.  - CXR at OSH did not show findings concerning for PNA.   - Chest CT performed 9/16/20 due to hemoptysis and suggestive of LLL PNA  - Blood cx with NGTD  - u/a not suggestive of UTI  - continues to be afebrile   - still having hemoptysis  - repeating CXR today      Essential hypertension  - Resumed home losartan 9/17/20  - Monitor BP      Paroxysmal atrial fibrillation  Patient states that he had pacemaker placed for this.   Previsouly ECHO with EF 58 %  Holding Eliquis due to low platelet count. Resume when appropriate.  EKG from 9/16/20 showing afib with RVR  HR now controlled  Cardiology consulted due to cardiac history as patient will receive anthracycline if he is induced for AML  Cardiology repeated ehco 9/17 to assess for ventricle straining. 16% LV straining noted on echo  - Keep mag > or = 2 and K+ > or = 4  - Continue home digoxin and metoprolol.   - To repeat ECHO after completion of chemo per card recs  - Monitor I/O and Daily weights                 VTE Risk Mitigation (From admission, onward)         Ordered     heparin (porcine) injection 1,000 Units  As needed (PRN)      09/22/20 0343     IP VTE HIGH RISK PATIENT  Once      09/16/20 0017     Place sequential compression device  Until discontinued      09/16/20 0017                Disposition: Inpatient    Jeni Holt, NP  Bone Marrow Transplant  [facility]

## 2020-09-23 NOTE — PLAN OF CARE
Uneventful shift. Pt has had no c/o pain this shift. Pt bg monitored ac/hs. No SSI administered. Pt iv abx regimen maintained. Pt remains on 1L O2. Pt remains on cardiac monitoring. Pt has remained free from injury this shift. Bed in low locked position. Call light and personal belongings within reach. Side rails up x2. Nonskid socks in place. Pt instructed to call with any needs. Will continue to monitor.

## 2020-09-23 NOTE — ASSESSMENT & PLAN NOTE
55 year old man who presents as a transfer for possible leukemia after manual differential demonstrated blasts.   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  - CBC, CMP, PT/INR, fibrinogen, uric acid, phos BID to assess for TLS and DIC. No evidence of either at this time.  - Peripheral smear -  Hyperleukocytosis with >20% circulating blastoid cells   - 2D ECHO with EF of 58%   - Continue allopurinol 300mg BID  - Hydrea stopped on 9/19  - Continue IVF  - Transfuse cyroglobulin if fibrinogen <150  - Transfuse if platelets <10, Hgb<7 and/or patient actively bleeding  - Lopes placed9/21/20.  - Tentatively planning induction early next week pending FISH.

## 2020-09-23 NOTE — ASSESSMENT & PLAN NOTE
-  on admission and 129 prior to starting hydrea  - see AML  - Hydrea stopped 9/20/20 with normal WBC count.

## 2020-09-23 NOTE — PLAN OF CARE
Problem: Adult Inpatient Plan of Care  Goal: Plan of Care Review  9/23/2020 1529 by Claire Schoennagel, RN  Outcome: Ongoing, Progressing  Patient remains free from falls and injury this shift. Bed in low, locked position with call light in reach. Plan of care reviewed with pt.   at bedside. VSS.  POD 1.  Griffin maintained per MD to monitor decreased urine output.  PCA maintained and IVF maintained.  Right chest PAC accessed, +blood return.  Blood sugars monitored q6h.  C/o nausea, PRNs administered.  Clear liquid diet maintained.  SCDs in place.  Patient encouraged to call for assistance when needed.  Patient verbalized understanding. All belongings within reach.  Will continue to monitor.

## 2020-09-23 NOTE — ASSESSMENT & PLAN NOTE
Patient states that he had pacemaker placed for this.   Previsouly ECHO with EF 58 %  Holding Eliquis due to low platelet count. Resume when appropriate.  EKG from 9/16/20 showing afib with RVR  HR now controlled  Cardiology consulted due to cardiac history as patient will receive anthracycline if he is induced for AML  Cardiology repeated ehco 9/17 to assess for ventricle straining. 16% LV straining noted on echo  - Keep mag > or = 2 and K+ > or = 4  - Continue home digoxin and metoprolol.   - To repeat ECHO after completion of chemo per card recs  - Monitor I/O and Daily weights

## 2020-09-23 NOTE — PROGRESS NOTES
Therapy with vancomycin complete and/or consult discontinued by provider.  Pharmacy will sign off, please re-consult as needed.     Thank you.

## 2020-09-24 PROBLEM — J90 PLEURAL EFFUSION ON LEFT: Status: ACTIVE | Noted: 2020-01-01

## 2020-09-24 NOTE — ASSESSMENT & PLAN NOTE
55yoM with newly diagnosed acute leukemia. Consulted for SEBASTIAN (unknown baseline) with Cr increase from 0.8 on 9/22 to 1.9 on 9/23. 2.0 on day of consult on 9/24. Etiology is likely multifactorial 2/2 to acyclovir, losartan, contrast-induced from CT chest on 9/17 (Cr increase can be delayed 72-96 hours after contrast), and gradual decrease in hemoglobin from 9.9 on admission to 7.1 today. Patient with good PO intake (~2L per day) and UOP of 1.5-4.5L per day since admission.       - will order Renal U/S, P/C ratio and examine urine sediment  - recommend stopping Losartan until kidney function normalizes  - consider stopping acyclovir if possible   - Renally dose all medications to current GFR  - Avoid nephrotoxic meds/agents such as NSAIDs, ACE, ARBs, and IV radiocontrast  - Strict intake and output and daily standing weights  - Trend daily labs  - No indications for RRT at this time; will reassess daily       .

## 2020-09-24 NOTE — SUBJECTIVE & OBJECTIVE
Past Medical History:   Diagnosis Date    Atrial fibrillation     Hypertension        Past Surgical History:   Procedure Laterality Date    INSERTION OF STAUFFER CATHETER Right 9/21/2020    Procedure: INSERTION, CATHETER, CENTRAL VENOUS, STAUFFER;  Surgeon: Ti Starkey MD;  Location: [facility] OR [address] [place] [address];  Service: General;  Laterality: Right;       Review of patient's allergies indicates:  No Known Allergies    Family History     Problem Relation (Age of Onset)    Cancer Father    Heart disease Mother, Father        Tobacco Use    Smoking status: Former Smoker   Substance and Sexual Activity    Alcohol use: Not on file    Drug use: Not on file    Sexual activity: Not on file         Review of Systems   Constitutional: Negative for activity change, appetite change, chills and fever.   HENT: Negative for congestion and sore throat.    Respiratory: Negative for cough and shortness of breath.    Cardiovascular: Negative for chest pain and leg swelling.   Gastrointestinal: Negative for abdominal distention, abdominal pain, nausea and vomiting.   Genitourinary: Negative for frequency and urgency.   Musculoskeletal: Negative for arthralgias and myalgias.   Neurological: Negative for dizziness, weakness, light-headedness, numbness and headaches.   Psychiatric/Behavioral: Negative for confusion.     Objective:     Vital Signs (Most Recent):  Temp: 98.6 °F (37 °C) (09/24/20 0726)  Pulse: 83 (09/24/20 0801)  Resp: 16 (09/24/20 0726)  BP: (!) 159/85 (09/24/20 0726)  SpO2: (!) 94 % (09/24/20 0726) Vital Signs (24h Range):  Temp:  [97.5 °F (36.4 °C)-99 °F (37.2 °C)] 98.6 °F (37 °C)  Pulse:  [65-87] 83  Resp:  [16-20] 16  SpO2:  [92 %-95 %] 94 %  BP: (124-159)/(71-90) 159/85     Weight: 134.1 kg (295 lb 8.4 oz)  Body mass index is 36.94 kg/m².      Intake/Output Summary (Last 24 hours) at 9/24/2020 0959  Last data filed at 9/24/2020 0700  Gross per 24 hour   Intake 2170.5 ml   Output 6150 ml   Net -3979.5 ml       Physical  Exam  HENT:      Head: Normocephalic and atraumatic.      Nose: No congestion.      Mouth/Throat:      Mouth: Mucous membranes are moist.   Cardiovascular:      Rate and Rhythm: Normal rate. Rhythm irregular.      Heart sounds: Normal heart sounds.   Pulmonary:      Effort: Pulmonary effort is normal. No respiratory distress.      Breath sounds: No wheezing or rales.      Comments: Decreased breath sounds on left middle/lower lobes  Abdominal:      General: Abdomen is flat. Bowel sounds are normal. There is no distension.      Palpations: Abdomen is soft. There is no mass.      Tenderness: There is no abdominal tenderness. There is no right CVA tenderness, left CVA tenderness or guarding.   Musculoskeletal:      Right lower leg: No edema.      Left lower leg: No edema.   Skin:     General: Skin is warm and dry.   Neurological:      Mental Status: He is alert and oriented to person, place, and time.      Cranial Nerves: No cranial nerve deficit.      Motor: No weakness.   Psychiatric:         Mood and Affect: Mood normal.         Behavior: Behavior normal.         Vents:       Lines/Drains/Airways     Central Venous Catheter Line                 Hemodialysis Catheter 09/21/20 0939 right internal jugular 3 days                Significant Labs:    CBC/Anemia Profile:  Recent Labs   Lab 09/23/20 0305 09/24/20 0346   WBC 1.76* 1.85*   HGB 7.0* 7.1*   HCT 21.9* 21.2*   PLT 15* 26*   MCV 99* 98   RDW 16.9* 16.8*        Chemistries:  Recent Labs   Lab 09/23/20 0305 09/23/20 1658 09/24/20  0346    139 137   K 3.8 3.6 3.7    103 100   CO2 29 28 26   BUN 16 17 15   CREATININE 1.6* 1.9* 2.0*   CALCIUM 8.0* 7.9* 8.0*   ALBUMIN 2.4*  --  2.6*   PROT 5.4*  --  5.8*   BILITOT 1.1*  --  0.7   ALKPHOS 52*  --  55   ALT 40  --  36   AST 15  --  13   MG 2.1  --  2.2   PHOS 4.0  --  4.0       CMP:   Recent Labs   Lab 09/23/20 0305 09/23/20 1658 09/24/20  0346    139 137   K 3.8 3.6 3.7    103 100   CO2 29  28 26   * 147* 154*   BUN 16 17 15   CREATININE 1.6* 1.9* 2.0*   CALCIUM 8.0* 7.9* 8.0*   PROT 5.4*  --  5.8*   ALBUMIN 2.4*  --  2.6*   BILITOT 1.1*  --  0.7   ALKPHOS 52*  --  55   AST 15  --  13   ALT 40  --  36   ANIONGAP 6* 8 11   EGFRNONAA 47.8* 38.8* 36.5*     Respiratory Culture: No results for input(s): GSRESP, RESPIRATORYC in the last 48 hours.    Significant Imaging:   I have reviewed and interpreted all pertinent imaging results/findings within the past 24 hours.   9/23 CXR:  Lines and tubes:  Right central venous catheter in stable position.  Lung volumes are stable.  Left pleural effusion is increased in volume.  Dense left retrocardiac consolidation.  Right lung appears fairly clear.  Cardiac silhouette is stable in size    9/16 ECHO:  · Mild left ventricular enlargement.  · Left ventricular systolic function. The estimated ejection fraction is 58%.  · Mild concentric left ventricular hypertrophy.  · No wall motion abnormalities.  · Severe left atrial enlargement.  · Normal right ventricular systolic function.  · Mild right ventricular enlargement.  · Moderate right atrial enlargement.  · Mild tricuspid regurgitation.  · Normal central venous pressure (3 mmHg).  · The estimated PA systolic pressure is 35 mmHg.  · The ascending aorta is mildly dilated.  · Small posterolateral pericardial effusion.

## 2020-09-24 NOTE — PLAN OF CARE
DISCONTINUE ON PATHWAY REGIMEN - Other    No Medical Intervention - Off Treatment.    PRIOR TREATMENT: Off Treatment    START ON PATHWAY REGIMEN - Other          Patient Characteristics:

## 2020-09-24 NOTE — SUBJECTIVE & OBJECTIVE
Subjective:     Interval History: FISH resulted. Plan is to induce with 7+3 as early as tomorrow. CXR from yesterday showing left pleural effusion. pulm consulted. thora performed at bedside today. Bloody pleural fluid removed. Sent for flow, cytology, micro, etc. Started IVF yesterday for SEBASTIAN but subsequently stopped due to concern for fluid overload. Checked BNP and 203. Creatinine up to 2.0. consulted nephrology. ANC 60 today.    Objective:     Vital Signs (Most Recent):  Temp: 97.9 °F (36.6 °C) (09/24/20 1200)  Pulse: 81 (09/24/20 1200)  Resp: 18 (09/24/20 1200)  BP: (!) 153/78 (09/24/20 1200)  SpO2: 95 % (09/24/20 1200) Vital Signs (24h Range):  Temp:  [97.5 °F (36.4 °C)-99 °F (37.2 °C)] 97.9 °F (36.6 °C)  Pulse:  [65-87] 81  Resp:  [16-18] 18  SpO2:  [92 %-95 %] 95 %  BP: (124-159)/(71-90) 153/78     Weight: 134.1 kg (295 lb 8.4 oz)  Body mass index is 36.94 kg/m².  Body surface area is 2.66 meters squared.    ECOG SCORE         [unfilled]    Intake/Output - Last 3 Shifts       09/22 0700 - 09/23 0659 09/23 0700 - 09/24 0659 09/24 0700 - 09/25 0659    P.O. 880 1920     I.V. (mL/kg)  492.5 (3.7)     Blood  238     IV Piggyback 1600      Total Intake(mL/kg) 2480 (17.8) 2650.5 (19.8)     Urine (mL/kg/hr) 1500 (0.4) 5350 (1.7) 800 (0.9)    Stool  0     Total Output 1500 5350 800    Net +980 -2699.5 -800           Urine Occurrence 4 x      Stool Occurrence  1 x           Physical Exam  Constitutional:       Appearance: He is well-developed.   HENT:      Head: Normocephalic and atraumatic.      Mouth/Throat:      Pharynx: No oropharyngeal exudate.   Eyes:      General:         Right eye: No discharge.         Left eye: No discharge.      Conjunctiva/sclera: Conjunctivae normal.      Pupils: Pupils are equal, round, and reactive to light.   Neck:      Musculoskeletal: Normal range of motion and neck supple.   Cardiovascular:      Rate and Rhythm: Normal rate. Rhythm irregular.      Heart sounds: Normal heart sounds.  No murmur.      Comments: Tachycardic with exertion  Pulmonary:      Effort: Pulmonary effort is normal. No respiratory distress.      Breath sounds: No wheezing or rales.      Comments: clr/diminished left base    Abdominal:      General: Bowel sounds are normal. There is no distension.      Palpations: Abdomen is soft.      Tenderness: There is no abdominal tenderness.   Musculoskeletal: Normal range of motion.         General: No deformity.   Skin:     General: Skin is warm and dry.      Findings: No erythema or rash.   Neurological:      Mental Status: He is alert and oriented to person, place, and time.   Psychiatric:         Behavior: Behavior normal.         Thought Content: Thought content normal.         Judgment: Judgment normal.         Significant Labs:   CBC:   Recent Labs   Lab 09/23/20  0305 09/24/20  0346   WBC 1.76* 1.85*   HGB 7.0* 7.1*   HCT 21.9* 21.2*   PLT 15* 26*    and CMP:   Recent Labs   Lab 09/23/20  0305 09/23/20  1658 09/24/20  0346    139 137   K 3.8 3.6 3.7    103 100   CO2 29 28 26   * 147* 154*   BUN 16 17 15   CREATININE 1.6* 1.9* 2.0*   CALCIUM 8.0* 7.9* 8.0*   PROT 5.4*  --  5.8*   ALBUMIN 2.4*  --  2.6*   BILITOT 1.1*  --  0.7   ALKPHOS 52*  --  55   AST 15  --  13   ALT 40  --  36   ANIONGAP 6* 8 11   EGFRNONAA 47.8* 38.8* 36.5*       Diagnostic Results:  I have reviewed all pertinent imaging results/findings within the past 24 hours.

## 2020-09-24 NOTE — PLAN OF CARE
Pt received thoracentesis today. 1L fluid pulled out. CXR done after. US kidneys done, urine labs sent, nephrology consulted. No pain or nausea. Instructed patient to call for assistance, bed low and locked, call bell within reach, nonskid socks on, patient verbalized understanding.

## 2020-09-24 NOTE — PROGRESS NOTES
[facility]  Hematology  Bone Marrow Transplant  Progress Note    Patient Name: Shell Diaz  Admission Date: 9/16/2020  Hospital Length of Stay: 8 days  Code Status: Full Code    Subjective:     Interval History: FISH resulted. Plan is to induce with 7+3 as early as tomorrow. CXR from yesterday showing left pleural effusion. pulm consulted. thora performed at bedside today. Bloody pleural fluid removed. Sent for flow, cytology, micro, etc. Started IVF yesterday for SEBASTIAN but subsequently stopped due to concern for fluid overload. Checked BNP and 203. Creatinine up to 2.0. consulted nephrology. ANC 60 today.    Objective:     Vital Signs (Most Recent):  Temp: 97.9 °F (36.6 °C) (09/24/20 1200)  Pulse: 81 (09/24/20 1200)  Resp: 18 (09/24/20 1200)  BP: (!) 153/78 (09/24/20 1200)  SpO2: 95 % (09/24/20 1200) Vital Signs (24h Range):  Temp:  [97.5 °F (36.4 °C)-99 °F (37.2 °C)] 97.9 °F (36.6 °C)  Pulse:  [65-87] 81  Resp:  [16-18] 18  SpO2:  [92 %-95 %] 95 %  BP: (124-159)/(71-90) 153/78     Weight: 134.1 kg (295 lb 8.4 oz)  Body mass index is 36.94 kg/m².  Body surface area is 2.66 meters squared.    ECOG SCORE         [unfilled]    Intake/Output - Last 3 Shifts       09/22 0700 - 09/23 0659 09/23 0700 - 09/24 0659 09/24 0700 - 09/25 0659    P.O. 880 1920     I.V. (mL/kg)  492.5 (3.7)     Blood  238     IV Piggyback 1600      Total Intake(mL/kg) 2480 (17.8) 2650.5 (19.8)     Urine (mL/kg/hr) 1500 (0.4) 5350 (1.7) 800 (0.9)    Stool  0     Total Output 1500 5350 800    Net +980 -2699.5 -800           Urine Occurrence 4 x      Stool Occurrence  1 x           Physical Exam  Constitutional:       Appearance: He is well-developed.   HENT:      Head: Normocephalic and atraumatic.      Mouth/Throat:      Pharynx: No oropharyngeal exudate.   Eyes:      General:         Right eye: No discharge.         Left eye: No discharge.      Conjunctiva/sclera: Conjunctivae normal.      Pupils: Pupils are equal, round, and  reactive to light.   Neck:      Musculoskeletal: Normal range of motion and neck supple.   Cardiovascular:      Rate and Rhythm: Normal rate. Rhythm irregular.      Heart sounds: Normal heart sounds. No murmur.      Comments: Tachycardic with exertion  Pulmonary:      Effort: Pulmonary effort is normal. No respiratory distress.      Breath sounds: No wheezing or rales.      Comments: clr/diminished left base    Abdominal:      General: Bowel sounds are normal. There is no distension.      Palpations: Abdomen is soft.      Tenderness: There is no abdominal tenderness.   Musculoskeletal: Normal range of motion.         General: No deformity.   Skin:     General: Skin is warm and dry.      Findings: No erythema or rash.   Neurological:      Mental Status: He is alert and oriented to person, place, and time.   Psychiatric:         Behavior: Behavior normal.         Thought Content: Thought content normal.         Judgment: Judgment normal.         Significant Labs:   CBC:   Recent Labs   Lab 09/23/20  0305 09/24/20  0346   WBC 1.76* 1.85*   HGB 7.0* 7.1*   HCT 21.9* 21.2*   PLT 15* 26*    and CMP:   Recent Labs   Lab 09/23/20  0305 09/23/20  1658 09/24/20  0346    139 137   K 3.8 3.6 3.7    103 100   CO2 29 28 26   * 147* 154*   BUN 16 17 15   CREATININE 1.6* 1.9* 2.0*   CALCIUM 8.0* 7.9* 8.0*   PROT 5.4*  --  5.8*   ALBUMIN 2.4*  --  2.6*   BILITOT 1.1*  --  0.7   ALKPHOS 52*  --  55   AST 15  --  13   ALT 40  --  36   ANIONGAP 6* 8 11   EGFRNONAA 47.8* 38.8* 36.5*       Diagnostic Results:  I have reviewed all pertinent imaging results/findings within the past 24 hours.    Assessment/Plan:     * Acute myeloid leukemia not having achieved remission  55 year old man who presents as a transfer for possible leukemia after manual differential demonstrated blasts.   - BMBx performed 9/16/20, showing AML with flow cytometry showingincreased population of myeloid blasts  showing expression of CD4, CD9,  CD11c, CD15, CD13, CD33(97%), CD64, (partial),  HLA-DR, and cytoplasmic myeloperoxidase.  - CBC, CMP, PT/INR, fibrinogen, uric acid, phos BID to assess for TLS and DIC. No evidence of either at this time.  - Peripheral smear -  Hyperleukocytosis with >20% circulating blastoid cells   - 2D ECHO with EF of 58%   - Continue allopurinol 300mg BID  - Hydrea stopped on 9/19  - Continue IVF  - Transfuse cyroglobulin if fibrinogen <150  - Transfuse if platelets <10, Hgb<7 and/or patient actively bleeding  - Lopes placed9/21/20.  - planning induction with 7+3 to start as early as tomorrow    Pleural effusion on left  - worsening pleural effusion seen on CXR from 9/23  - pulm consulted and performed thora at bedside today (9/24)  - bloody pleural fluid removed and sent for cytology, flow, micro, etc.    SEBASTIAN (acute kidney injury)  - creatinine up to 1.6 9/23/20. Previously 0.8.  - likely 2/2 IV abx (Vanc and Zosyn) which he completed on 9/22/20. Also received IV contract with CT on 9/16/20.  - resumed IVF 9/23/20 but then stopped due to concern for fluid overload  - checked BNP and 203  - creatinine up to 2.0 today  - nephrology consulted. Appreciate recs.    Acute respiratory failure with hypoxia  - Likely secondary to pneumonia  - on NC, wean of oxygen to maintain sats >90%.   - attempted to wean O2, but desatted. Continue O2 at 1 liter at this time.  - will need to perform 6 minute walk test prior to discharge  - Incentive spirometry   - see pleural effusion      Type 2 diabetes mellitus, without long-term current use of insulin  - history of DM 2 per review of Care Everywhere chart  - on Metformin at home. Holding inpatient  - Insulin Detemir 6U daily   - ACHS blood glucose monitoring and s/s insulin while inpatient  - Goal -180. Stable at this time.    V tach  - had at 6 beat run of v-tach 9/18/20 at ~ 6 am. Not sustained  - keep mag > or = 2 and K+ > or = 4  - cardiology consulted earlier in  admission    Leukocytosis  -  on admission and 129 prior to starting hydrea  - see AML  - Hydrea stopped 9/20/20 with normal WBC count.      Pancytopenia  - Secondary to AML  - Transfuse if platelet count<10. If patient develops active bleed, transfuse for goal >50.  - Transfuse for hgb < 7  - ANC 60 today.        Pneumonia  Patient presented with fevers, fatigue, concerning due to acute leukemoid process as significant leukocytosis and blasts. Fevers likely related to this but infection workup in process.   - completed 7 day course vancomycin and zosyn 9/22. Switched to ppx LVQ 9/23/20.  - CXR at OSH did not show findings concerning for PNA.   - Chest CT performed 9/16/20 due to hemoptysis and suggestive of LLL PNA  - Blood cx with NGTD  - u/a not suggestive of UTI  - continues to be afebrile   - still having hemoptysis  - repeated cxr 9/23/20. Showing large left pleural effusion.      Essential hypertension  - Resumed home losartan 9/17/20  - Monitor BP      Paroxysmal atrial fibrillation  Patient states that he had pacemaker placed for this.   Previsouly ECHO with EF 58 %  Holding Eliquis due to low platelet count. Resume when appropriate.  EKG from 9/16/20 showing afib with RVR  HR now controlled  Cardiology consulted due to cardiac history as patient will receive anthracycline if he is induced for AML  Cardiology repeated ehco 9/17 to assess for ventricle straining. 16% LV straining noted on echo  - Keep mag > or = 2 and K+ > or = 4  - Continue home digoxin and metoprolol.   - To repeat ECHO after completion of chemo per card recs  - Monitor I/O and Daily weights                 VTE Risk Mitigation (From admission, onward)         Ordered     heparin (porcine) injection 1,000 Units  As needed (PRN)      09/22/20 0343     IP VTE HIGH RISK PATIENT  Once      09/16/20 0017     Place sequential compression device  Until discontinued      09/16/20 0017                Disposition: Inpatient for chemo.    Jeni  ZULEIKA Holt, NP  Bone Marrow Transplant  [facility]

## 2020-09-24 NOTE — ASSESSMENT & PLAN NOTE
- Likely secondary to pneumonia  - on NC, wean of oxygen to maintain sats >90%.   - attempted to wean O2, but desatted. Continue O2 at 1 liter at this time.  - will need to perform 6 minute walk test prior to discharge  - Incentive spirometry   - see pleural effusion

## 2020-09-24 NOTE — SUBJECTIVE & OBJECTIVE
Past Medical History:   Diagnosis Date    Atrial fibrillation     Hypertension        Past Surgical History:   Procedure Laterality Date    INSERTION OF STAUFFER CATHETER Right 9/21/2020    Procedure: INSERTION, CATHETER, CENTRAL VENOUS, STAUFFER;  Surgeon: Ti Starkey MD;  Location: [facility] OR [address] [place] [address];  Service: General;  Laterality: Right;       Review of patient's allergies indicates:  No Known Allergies  Current Facility-Administered Medications   Medication Frequency    0.9%  NaCl infusion (for blood administration) Q24H PRN    acetaminophen tablet 650 mg Q6H PRN    acyclovir capsule 400 mg BID    allopurinoL tablet 300 mg Daily    dextrose 50% injection 12.5 g PRN    dextrose 50% injection 25 g PRN    digoxin tablet 250 mcg Daily    diphenhydrAMINE capsule 25 mg Q6H PRN    glucagon (human recombinant) injection 1 mg PRN    glucose chewable tablet 16 g PRN    glucose chewable tablet 24 g PRN    guaifenesin 100 mg/5 ml syrup 200 mg Q4H PRN    heparin (porcine) injection 1,000 Units PRN    insulin aspart U-100 pen 0-5 Units QID (AC + HS) PRN    insulin detemir U-100 pen 6 Units Daily    levoFLOXacin tablet 500 mg Daily    lidocaine HCL 10 mg/ml (1%) injection 10 mL Once    losartan tablet 50 mg Daily    metoprolol injection 5 mg Q5 Min PRN    metoprolol succinate (TOPROL-XL) 24 hr tablet 100 mg Daily    oxyCODONE immediate release tablet 5 mg Q8H PRN    polyethylene glycol packet 17 g BID PRN    sodium chloride 0.9% flush 10 mL PRN    sodium chloride 0.9% flush 3 mL PRN    terazosin capsule 5 mg QHS     Family History     Problem Relation (Age of Onset)    Cancer Father    Heart disease Mother, Father        Tobacco Use    Smoking status: Former Smoker   Substance and Sexual Activity    Alcohol use: Not on file    Drug use: Not on file    Sexual activity: Not on file     Review of Systems  Objective:     Vital Signs (Most Recent):  Temp: 97.9 °F (36.6 °C) (09/24/20 1200)  Pulse:  81 (09/24/20 1200)  Resp: 18 (09/24/20 1200)  BP: (!) 153/78 (09/24/20 1200)  SpO2: 95 % (09/24/20 1200)  O2 Device (Oxygen Therapy): nasal cannula (09/24/20 0763) Vital Signs (24h Range):  Temp:  [97.5 °F (36.4 °C)-99 °F (37.2 °C)] 97.9 °F (36.6 °C)  Pulse:  [65-87] 81  Resp:  [16-18] 18  SpO2:  [92 %-95 %] 95 %  BP: (124-159)/(71-90) 153/78     Weight: 134.1 kg (295 lb 8.4 oz) (09/24/20 0354)  Body mass index is 36.94 kg/m².  Body surface area is 2.66 meters squared.    I/O last 3 completed shifts:  In: 3750.5 [P.O.:1920; I.V.:492.5; Blood:238; IV Piggyback:1100]  Out: 6150 [Urine:6150]    Physical Exam  Constitutional:       Appearance: He is well-developed.   HENT:      Head: Normocephalic and atraumatic.      Mouth/Throat:      Pharynx: No oropharyngeal exudate.   Eyes:      General:         Right eye: No discharge.         Left eye: No discharge.      Conjunctiva/sclera: Conjunctivae normal.      Pupils: Pupils are equal, round, and reactive to light.   Neck:      Musculoskeletal: Normal range of motion and neck supple.   Cardiovascular:      Rate and Rhythm: Normal rate. Rhythm irregular.      Heart sounds: Normal heart sounds. No murmur.   Pulmonary:      Effort: Pulmonary effort is normal. No respiratory distress.      Breath sounds: No wheezing or rales.      Comments: clr/diminished left base    Abdominal:      General: Bowel sounds are normal. There is no distension.      Palpations: Abdomen is soft.      Tenderness: There is no abdominal tenderness.   Musculoskeletal: Normal range of motion.         General: No deformity.   Skin:     General: Skin is warm and dry.      Findings: No erythema or rash.   Neurological:      Mental Status: He is alert and oriented to person, place, and time.   Psychiatric:         Behavior: Behavior normal.         Thought Content: Thought content normal.         Judgment: Judgment normal.         Significant Labs:  All labs within the past 24 hours have been  reviewed.    Significant Imaging:  Labs: Reviewed

## 2020-09-24 NOTE — PROCEDURES
[facility]  Thoracentesis  Procedure Note    SUMMARY     Date of Procedure: 9/21/2020   Procedure: L thoracentesis    Indications: L pleural effusion    Pre-Operative Diagnosis: as above    Post-Operative Diagnosis: same      Description of the Findings of the Procedure: bloody fluid    Consent: Informed consent was obtained. Risks of the procedure were discussed including: infection, bleeding, pain, pneumothorax.    Under sterile conditions the patient was positioned. Chlorhexadine solution and sterile drapes were utilized.  1% plain lidocaine was used to anesthetize between the rib space after localized under ultrasound. Fluid was obtained after catheter inserted without  difficulty and suction applied with minimal blood loss.  A dressing was applied to the wound and wound care instructions were provided.     1000 ml of bloody pleural fluid was obtained. A sample was sent to Pathology for cytogenetics, flow, and cell counts, as well as for infection analysis.    Plan:    A follow up chest x-ray was ordered.      Complications: None; patient tolerated the procedure well.      Estimated Blood Loss (EBL): 1cc                   Condition: stable    Cindy Noyola MD  [facility] Fellow

## 2020-09-24 NOTE — CONSULTS
"[facility]  Pulmonology  Consult Note    Patient Name: Shell Diaz  MRN: [id number]  Admission Date: 9/16/2020  Hospital Length of Stay: 8 days  Code Status: Full Code  Attending Physician: Jm Fields MD  Primary Care Provider: Alva Rubio MD   Principal Problem: Acute myeloid leukemia not having achieved remission    Consults  Subjective:     HPI:  Mr. Diaz is a 55 year old man with A.fib (pacemaker in place, on Eliquis), cardiomyopathy, HTN who presented to [facility] with fevers and fatigue. Patient says that the fevers started on Saturday night and were as high as 102F. He took Tylenol, which improved the fevers. He then went to urgent care and tested negative for CoVID. He received antibiotics and was sent home. He started coughing up blood a couple of nights ago. When he mentioned this to a family member who is a healthcare worker, he was advised to go to the ED. Patient says that he has been feeling weak to the point that he "can't walk". He has associated chills, night sweats, and occasional pain in his left shoulder that he attributes to positional sleeping. He also has had diarrhea since Saturday that has been occurring about twice a day but no blood in it. He has lost his sense of taste and before Saturday, he noticed that he lost his appetite as well. He has slight headaches and starting about 2 months ago, he has had "sharp spasms" from his feet to his head but has not recently experienced this. He has noticed easy bruising. He denies chest pain, SOB, abdominal pain, nausea, vomiting, urinary issues, leg swelling.      Patient cannot recall all of his medical conditions and the medications that he is currently on (says he is on 9 meds). He states that he has a pacemaker that was placed for his A.fib and says he is on Eliquis, losartan and metoprolol but does not know the dosing. His family history is notable for father having lung cancer. He is a former " "smoker and stopped 20 years ago. Prior to that, he smoked "on and off" for about 10 years. He currently lives in [place] with his wife and daughter.      At OSH, patient received tylenol, IVF, a dose of vancomycin, zosyn and hydrea 4g. Labs were notable for leukocytosis (WBC 95.7), anemia, thrombocytopenia, elevated INR, D-dimer. CXR showed "cardiomegaly with no acute pulmonary process". Urine and blood cultures were in process. Manual differential demonstrated high number of blasts (100).     Past Medical History:   Diagnosis Date    Atrial fibrillation     Hypertension        Past Surgical History:   Procedure Laterality Date    INSERTION OF STAUFFER CATHETER Right 9/21/2020    Procedure: INSERTION, CATHETER, CENTRAL VENOUS, STAUFFER;  Surgeon: Ti Starkey MD;  Location: [facility] OR [address] [place] [address];  Service: General;  Laterality: Right;       Review of patient's allergies indicates:  No Known Allergies    Family History     Problem Relation (Age of Onset)    Cancer Father    Heart disease Mother, Father        Tobacco Use    Smoking status: Former Smoker   Substance and Sexual Activity    Alcohol use: Not on file    Drug use: Not on file    Sexual activity: Not on file         Review of Systems   Constitutional: Negative for activity change, appetite change, chills and fever.   HENT: Negative for congestion and sore throat.    Respiratory: Negative for cough and shortness of breath.    Cardiovascular: Negative for chest pain and leg swelling.   Gastrointestinal: Negative for abdominal distention, abdominal pain, nausea and vomiting.   Genitourinary: Negative for frequency and urgency.   Musculoskeletal: Negative for arthralgias and myalgias.   Neurological: Negative for dizziness, weakness, light-headedness, numbness and headaches.   Psychiatric/Behavioral: Negative for confusion.     Objective:     Vital Signs (Most Recent):  Temp: 98.6 °F (37 °C) (09/24/20 0726)  Pulse: 83 (09/24/20 0801)  Resp: 16 (09/24/20 " 0726)  BP: (!) 159/85 (09/24/20 0726)  SpO2: (!) 94 % (09/24/20 0726) Vital Signs (24h Range):  Temp:  [97.5 °F (36.4 °C)-99 °F (37.2 °C)] 98.6 °F (37 °C)  Pulse:  [65-87] 83  Resp:  [16-20] 16  SpO2:  [92 %-95 %] 94 %  BP: (124-159)/(71-90) 159/85     Weight: 134.1 kg (295 lb 8.4 oz)  Body mass index is 36.94 kg/m².      Intake/Output Summary (Last 24 hours) at 9/24/2020 0959  Last data filed at 9/24/2020 0700  Gross per 24 hour   Intake 2170.5 ml   Output 6150 ml   Net -3979.5 ml       Physical Exam  HENT:      Head: Normocephalic and atraumatic.      Nose: No congestion.      Mouth/Throat:      Mouth: Mucous membranes are moist.   Cardiovascular:      Rate and Rhythm: Normal rate. Rhythm irregular.      Heart sounds: Normal heart sounds.   Pulmonary:      Effort: Pulmonary effort is normal. No respiratory distress.      Breath sounds: No wheezing or rales.      Comments: Decreased breath sounds on left middle/lower lobes  Abdominal:      General: Abdomen is flat. Bowel sounds are normal. There is no distension.      Palpations: Abdomen is soft. There is no mass.      Tenderness: There is no abdominal tenderness. There is no right CVA tenderness, left CVA tenderness or guarding.   Musculoskeletal:      Right lower leg: No edema.      Left lower leg: No edema.   Skin:     General: Skin is warm and dry.   Neurological:      Mental Status: He is alert and oriented to person, place, and time.      Cranial Nerves: No cranial nerve deficit.      Motor: No weakness.   Psychiatric:         Mood and Affect: Mood normal.         Behavior: Behavior normal.         Vents:       Lines/Drains/Airways     Central Venous Catheter Line                 Hemodialysis Catheter 09/21/20 0939 right internal jugular 3 days                Significant Labs:    CBC/Anemia Profile:  Recent Labs   Lab 09/23/20  0305 09/24/20  0346   WBC 1.76* 1.85*   HGB 7.0* 7.1*   HCT 21.9* 21.2*   PLT 15* 26*   MCV 99* 98   RDW 16.9* 16.8*         Chemistries:  Recent Labs   Lab 09/23/20  0305 09/23/20  1658 09/24/20  0346    139 137   K 3.8 3.6 3.7    103 100   CO2 29 28 26   BUN 16 17 15   CREATININE 1.6* 1.9* 2.0*   CALCIUM 8.0* 7.9* 8.0*   ALBUMIN 2.4*  --  2.6*   PROT 5.4*  --  5.8*   BILITOT 1.1*  --  0.7   ALKPHOS 52*  --  55   ALT 40  --  36   AST 15  --  13   MG 2.1  --  2.2   PHOS 4.0  --  4.0       CMP:   Recent Labs   Lab 09/23/20  0305 09/23/20  1658 09/24/20  0346    139 137   K 3.8 3.6 3.7    103 100   CO2 29 28 26   * 147* 154*   BUN 16 17 15   CREATININE 1.6* 1.9* 2.0*   CALCIUM 8.0* 7.9* 8.0*   PROT 5.4*  --  5.8*   ALBUMIN 2.4*  --  2.6*   BILITOT 1.1*  --  0.7   ALKPHOS 52*  --  55   AST 15  --  13   ALT 40  --  36   ANIONGAP 6* 8 11   EGFRNONAA 47.8* 38.8* 36.5*     Respiratory Culture: No results for input(s): GSRESP, RESPIRATORYC in the last 48 hours.    Significant Imaging:   I have reviewed and interpreted all pertinent imaging results/findings within the past 24 hours.   9/23 CXR:  Lines and tubes:  Right central venous catheter in stable position.  Lung volumes are stable.  Left pleural effusion is increased in volume.  Dense left retrocardiac consolidation.  Right lung appears fairly clear.  Cardiac silhouette is stable in size    9/16 ECHO:  · Mild left ventricular enlargement.  · Left ventricular systolic function. The estimated ejection fraction is 58%.  · Mild concentric left ventricular hypertrophy.  · No wall motion abnormalities.  · Severe left atrial enlargement.  · Normal right ventricular systolic function.  · Mild right ventricular enlargement.  · Moderate right atrial enlargement.  · Mild tricuspid regurgitation.  · Normal central venous pressure (3 mmHg).  · The estimated PA systolic pressure is 35 mmHg.  · The ascending aorta is mildly dilated.  · Small posterolateral pericardial effusion.        Assessment/Plan:     Pneumonia  54yo M. with A.fib w/ PM on Eliquis, cardiomyopathy, DM,  HTN, who presented with fevers and fatigue. Patient initially with fevers, chills, night sweats, myalgias, diarrhea, and hemoptysis. Patient tested negative for COVID. Initial CXR on 9/16 commented no acute pulmonary process but started on Vanc + Zosyn. Patient found to have leukocytosis with a predominance of blasts. Pathology of bone marrow biopsy consistent with AML. TTE was ordered as baseline before initiation of chemotherapy and showed EF 58% with concentric LVH hypertropy, LV enlargement and severe LA enlargement. Patient required oxygen this admission and has had persistent hemoptysis. He completed vanc/zosyn course on 9/22 and placed on LVQ ppx 9/23. CXR on 9/23 showed increased left pleural effusion with dense retrocardiac consolidation. Pulmonology consulted for worsening effusion.     Recommendations:  - Thoracentesis today with 1L bloody pleural fluid removed and sent to pathology.  - F/u pleural fluid studies (cytology, LDH, protein, cholesterol, Cx, gram stain, etc)  - Dr. Montez attestation to follow above        Thank you for your consult. I will sign off. Please contact us if you have any additional questions.     Quinn Ellis MD  Pulmonology  [facility]

## 2020-09-24 NOTE — HPI
"Mr. Diaz is a 54yo M with a PMH of Afib with pacemaker on eliquis, HTN, and cardiomyopathy who presnted to OSH with fever and fatigue. At OSH, patient received tylenol, IVF, a dose of vancomycin, zosyn and hydrea 4g. Labs were notable for leukocytosis (WBC 95.7), anemia, thrombocytopenia, elevated INR, D-dimer. CXR showed "cardiomegaly with no acute pulmonary process".  Manual differential demonstrated high number of blasts (100). Patient was found to have LLL pneumonia and was started on Vanc and Zosyn. Finished 7 day course on 9/23. Vanc troughs WNL. He received a CT Chest with contrast on 9/17 and Pulmonology was consulted today for thoracentesis which drained 1L of bloody fluid.     Nephrology was consulted for SEBASTIAN. Cr 1.3 on day of admission. Significant change in creatinine from 0.8 on 9/22 to 1.9 on 9/23. 2.0 on day of consult. Of note, patient is on acyclovir 400 BID and losartan 50, the latter of which is a home medication.     "

## 2020-09-24 NOTE — ASSESSMENT & PLAN NOTE
55 year old man who presents as a transfer for possible leukemia after manual differential demonstrated blasts.   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  - CBC, CMP, PT/INR, fibrinogen, uric acid, phos BID to assess for TLS and DIC. No evidence of either at this time.  - Peripheral smear -  Hyperleukocytosis with >20% circulating blastoid cells   - 2D ECHO with EF of 58%   - Continue allopurinol 300mg BID  - Hydrea stopped on 9/19  - Continue IVF  - Transfuse cyroglobulin if fibrinogen <150  - Transfuse if platelets <10, Hgb<7 and/or patient actively bleeding  - Marianne placed9/21/20.  - planning induction with 7+3 to start as early as tomorrow

## 2020-09-24 NOTE — PROGRESS NOTES
Pharmacist Patient Education Note    Patient and wife were counseled and given information regarding the chemotherapy regimen: 7+3.      Chemotherapy regimen was explained in detail including that cytarabine is given as a continuous infusion for seven days. Patient will need to stay on the unit while receiving chemotherapy. It was also discussed that patient will be here for about a month while counts recover.      The following side effects were discussed:  - Prevention and treatment of nausea/vomiting  - The need for a PICC line for the chemotherapy infusion to reduce risk of extravasation  - Myelosuppression and prophylaxis against infection while counts are low (including changes of antibiotics from oral to IV if there is a suspected infection)  - Cardiac screening and as needed monitoring  - Red discoloration of urine, sweat, and tears associated with idarubicin  - Hair loss  - Changes in nail beds and discoloration  - Taste changes   - Mouth sores  - Monitoring and potential changes of hepatic and renal function while receiving chemotherapy        All questions were answered for patient and wife. Pharmacy will be available throughout the patients stay for further questions.     Liv Easley PharmD  PGY1 Pharmacy Practice Resident  Ext. [phone]

## 2020-09-24 NOTE — ASSESSMENT & PLAN NOTE
Patient presented with fevers, fatigue, concerning due to acute leukemoid process as significant leukocytosis and blasts. Fevers likely related to this but infection workup in process.   - completed 7 day course vancomycin and zosyn 9/22. Switched to ppx LVQ 9/23/20.  - CXR at OSH did not show findings concerning for PNA.   - Chest CT performed 9/16/20 due to hemoptysis and suggestive of LLL PNA  - Blood cx with NGTD  - u/a not suggestive of UTI  - continues to be afebrile   - still having hemoptysis  - repeated cxr 9/23/20. Showing large left pleural effusion.

## 2020-09-24 NOTE — CONSULTS
"[facility]  Nephrology  Consult Note    Patient Name: Shell Diaz  MRN: [id number]  Admission Date: 9/16/2020  Hospital Length of Stay: 8 days  Attending Provider: Jm Fields MD   Primary Care Physician: Alva Rubio MD  Principal Problem:Acute myeloid leukemia not having achieved remission    Consults  Subjective:     HPI: Mr. Diaz is a 54yo M with a PMH of Afib with pacemaker on eliquis, HTN, and cardiomyopathy who presnted to OSH with fever and fatigue. At OSH, patient received tylenol, IVF, a dose of vancomycin, zosyn and hydrea 4g. Labs were notable for leukocytosis (WBC 95.7), anemia, thrombocytopenia, elevated INR, D-dimer. CXR showed "cardiomegaly with no acute pulmonary process".  Manual differential demonstrated high number of blasts (100). Patient was found to have LLL pneumonia and was started on Vanc and Zosyn. Finished 7 day course on 9/23. Vanc troughs WNL. He received a CT Chest with contrast on 9/17 and Pulmonology was consulted today for thoracentesis which drained 1L of bloody fluid.     Nephrology was consulted for SEBASTIAN. Cr 1.3 on day of admission. Significant change in creatinine from 0.8 on 9/22 to 1.9 on 9/23. 2.0 on day of consult. Of note, patient is on acyclovir 400 BID and losartan 50, the latter of which is a home medication.       Past Medical History:   Diagnosis Date    Atrial fibrillation     Hypertension        Past Surgical History:   Procedure Laterality Date    INSERTION OF STAUFFER CATHETER Right 9/21/2020    Procedure: INSERTION, CATHETER, CENTRAL VENOUS, STAUFFER;  Surgeon: Ti Starkey MD;  Location: [facility] OR [address] [place] [address];  Service: General;  Laterality: Right;       Review of patient's allergies indicates:  No Known Allergies  Current Facility-Administered Medications   Medication Frequency    0.9%  NaCl infusion (for blood administration) Q24H PRN    acetaminophen tablet 650 mg Q6H PRN    acyclovir capsule 400 mg BID    allopurinoL " tablet 300 mg Daily    dextrose 50% injection 12.5 g PRN    dextrose 50% injection 25 g PRN    digoxin tablet 250 mcg Daily    diphenhydrAMINE capsule 25 mg Q6H PRN    glucagon (human recombinant) injection 1 mg PRN    glucose chewable tablet 16 g PRN    glucose chewable tablet 24 g PRN    guaifenesin 100 mg/5 ml syrup 200 mg Q4H PRN    heparin (porcine) injection 1,000 Units PRN    insulin aspart U-100 pen 0-5 Units QID (AC + HS) PRN    insulin detemir U-100 pen 6 Units Daily    levoFLOXacin tablet 500 mg Daily    lidocaine HCL 10 mg/ml (1%) injection 10 mL Once    losartan tablet 50 mg Daily    metoprolol injection 5 mg Q5 Min PRN    metoprolol succinate (TOPROL-XL) 24 hr tablet 100 mg Daily    oxyCODONE immediate release tablet 5 mg Q8H PRN    polyethylene glycol packet 17 g BID PRN    sodium chloride 0.9% flush 10 mL PRN    sodium chloride 0.9% flush 3 mL PRN    terazosin capsule 5 mg QHS     Family History     Problem Relation (Age of Onset)    Cancer Father    Heart disease Mother, Father        Tobacco Use    Smoking status: Former Smoker   Substance and Sexual Activity    Alcohol use: Not on file    Drug use: Not on file    Sexual activity: Not on file     Review of Systems  Objective:     Vital Signs (Most Recent):  Temp: 97.9 °F (36.6 °C) (09/24/20 1200)  Pulse: 81 (09/24/20 1200)  Resp: 18 (09/24/20 1200)  BP: (!) 153/78 (09/24/20 1200)  SpO2: 95 % (09/24/20 1200)  O2 Device (Oxygen Therapy): nasal cannula (09/24/20 0726) Vital Signs (24h Range):  Temp:  [97.5 °F (36.4 °C)-99 °F (37.2 °C)] 97.9 °F (36.6 °C)  Pulse:  [65-87] 81  Resp:  [16-18] 18  SpO2:  [92 %-95 %] 95 %  BP: (124-159)/(71-90) 153/78     Weight: 134.1 kg (295 lb 8.4 oz) (09/24/20 0354)  Body mass index is 36.94 kg/m².  Body surface area is 2.66 meters squared.    I/O last 3 completed shifts:  In: 3750.5 [P.O.:1920; I.V.:492.5; Blood:238; IV Piggyback:1100]  Out: 6150 [Urine:6150]    Physical Exam  Constitutional:        Appearance: He is well-developed.   HENT:      Head: Normocephalic and atraumatic.      Mouth/Throat:      Pharynx: No oropharyngeal exudate.   Eyes:      General:         Right eye: No discharge.         Left eye: No discharge.      Conjunctiva/sclera: Conjunctivae normal.      Pupils: Pupils are equal, round, and reactive to light.   Neck:      Musculoskeletal: Normal range of motion and neck supple.   Cardiovascular:      Rate and Rhythm: Normal rate. Rhythm irregular.      Heart sounds: Normal heart sounds. No murmur.   Pulmonary:      Effort: Pulmonary effort is normal. No respiratory distress.      Breath sounds: No wheezing or rales.      Comments: clr/diminished left base    Abdominal:      General: Bowel sounds are normal. There is no distension.      Palpations: Abdomen is soft.      Tenderness: There is no abdominal tenderness.   Musculoskeletal: Normal range of motion.         General: No deformity.   Skin:     General: Skin is warm and dry.      Findings: No erythema or rash.   Neurological:      Mental Status: He is alert and oriented to person, place, and time.   Psychiatric:         Behavior: Behavior normal.         Thought Content: Thought content normal.         Judgment: Judgment normal.         Significant Labs:  All labs within the past 24 hours have been reviewed.    Significant Imaging:  Labs: Reviewed    Assessment/Plan:     * Acute myeloid leukemia not having achieved remission  --managed per primary    SEBASTIAN (acute kidney injury)  55yoM with newly diagnosed acute leukemia. Consulted for SEBASTIAN (unknown baseline) with Cr increase from 0.8 on 9/22 to 1.9 on 9/23. 2.0 on day of consult on 9/24. Etiology is likely multifactorial 2/2 to acyclovir, losartan, contrast-induced from CT chest on 9/17 (Cr increase can be delayed 72-96 hours after contrast), and gradual decrease in hemoglobin from 9.9 on admission to 7.1 today. Patient with good PO intake (~2L per day) and UOP of 1.5-4.5L per day  since admission.       - will order Renal U/S, P/C ratio and examine urine sediment  - recommend stopping Losartan until kidney function normalizes  - consider stopping acyclovir if possible   - Renally dose all medications to current GFR  - Avoid nephrotoxic meds/agents such as NSAIDs, ACE, ARBs, and IV radiocontrast  - Strict intake and output and daily standing weights  - Trend daily labs  - No indications for RRT at this time; will reassess daily       .        Thank you for your consult. I will follow-up with patient. Please contact us if you have any additional questions.    Live Ken MD  Nephrology  [facility]

## 2020-09-24 NOTE — PROGRESS NOTES
Pharmacist Patient Education Note    Patient and wife were counseled and given information regarding the chemotherapy regimen: 7+3.      Chemotherapy regimen was explained in detail including that cytarabine is given as a continuous infusion for seven days. Patient will need to stay on the unit while receiving chemotherapy. It was also discussed that patient will be here for about a month while counts recover.      The following side effects were discussed:  - Prevention and treatment of nausea/vomiting  - The need for a PICC line for the chemotherapy infusion to reduce risk of extravasation  - Myelosuppression and prophylaxis against infection while counts are low (including changes of antibiotics from oral to IV if there is a suspected infection)  - Cardiac screening and as needed monitoring  - Red discoloration of urine, sweat, and tears associated with idarubicin  - Hair loss  - Changes in nail beds and discoloration  - Taste changes   - Mouth sores  - Monitoring and potential changes of hepatic and renal function while receiving chemotherapy         All questions were answered for patient and wife. Pharmacy will be available throughout the patients stay for further questions.      Liv Easley PharmD  PGY1 Pharmacy Practice Resident  Ext. [phone]

## 2020-09-24 NOTE — PLAN OF CARE
Plan of care discussed at start of shift. Free from falls and injuries. Resting quietly with eyes closed. Respirations even, unlabored with oxygen at 1 liter via nasal cannula. Skin warm and dry. Telemetry in use with A fib noted. Denies pain. Denies nausea. Frequent checks for pain and safety maintained. Bed in lowest position, wheels locked, side rails up x's 2, call light in reach. Instructed to call for assistance as needed, verbalizes understanding. Will continue to monitor.

## 2020-09-24 NOTE — HPI
"Mr. Diaz is a 55 year old man with A.fib (pacemaker in place, on Eliquis), cardiomyopathy, HTN who presented to [facility] with fevers and fatigue. Patient says that the fevers started on Saturday night and were as high as 102F. He took Tylenol, which improved the fevers. He then went to urgent care and tested negative for CoVID. He received antibiotics and was sent home. He started coughing up blood a couple of nights ago. When he mentioned this to a family member who is a healthcare worker, he was advised to go to the ED. Patient says that he has been feeling weak to the point that he "can't walk". He has associated chills, night sweats, and occasional pain in his left shoulder that he attributes to positional sleeping. He also has had diarrhea since Saturday that has been occurring about twice a day but no blood in it. He has lost his sense of taste and before Saturday, he noticed that he lost his appetite as well. He has slight headaches and starting about 2 months ago, he has had "sharp spasms" from his feet to his head but has not recently experienced this. He has noticed easy bruising. He denies chest pain, SOB, abdominal pain, nausea, vomiting, urinary issues, leg swelling.      Patient cannot recall all of his medical conditions and the medications that he is currently on (says he is on 9 meds). He states that he has a pacemaker that was placed for his A.fib and says he is on Eliquis, losartan and metoprolol but does not know the dosing. His family history is notable for father having lung cancer. He is a former smoker and stopped 20 years ago. Prior to that, he smoked "on and off" for about 10 years. He currently lives in [place] with his wife and daughter.      At OSH, patient received tylenol, IVF, a dose of vancomycin, zosyn and hydrea 4g. Labs were notable for leukocytosis (WBC 95.7), anemia, thrombocytopenia, elevated INR, D-dimer. CXR showed "cardiomegaly with no acute pulmonary " "process". Urine and blood cultures were in process. Manual differential demonstrated high number of blasts (100).   "

## 2020-09-24 NOTE — ASSESSMENT & PLAN NOTE
- creatinine up to 1.6 9/23/20. Previously 0.8.  - likely 2/2 IV abx (Vanc and Zosyn) which he completed on 9/22/20. Also received IV contract with CT on 9/16/20.  - resumed IVF 9/23/20 but then stopped due to concern for fluid overload  - checked BNP and 203  - creatinine up to 2.0 today  - nephrology consulted. Appreciate recs.

## 2020-09-24 NOTE — PROGRESS NOTES
Seen by nephrology. nephro ordered c/p ratio and renal u/s and rec'd temporarily stopping losartan and acyclovir. Holding both for now.    Jeni Holt, FNP  Hematology/Oncology/Bone Marrow Transplant

## 2020-09-24 NOTE — ASSESSMENT & PLAN NOTE
56yo M. with A.fib w/ PM on Eliquis, cardiomyopathy, DM, HTN, who presented with fevers and fatigue. Patient initially with fevers, chills, night sweats, myalgias, diarrhea, and hemoptysis. Patient tested negative for COVID. Initial CXR on 9/16 commented no acute pulmonary process but started on Vanc + Zosyn. Patient found to have leukocytosis with a predominance of blasts. Pathology of bone marrow biopsy consistent with AML. TTE was ordered as baseline before initiation of chemotherapy and showed EF 58% with concentric LVH hypertropy, LV enlargement and severe LA enlargement. Patient required oxygen this admission and has had persistent hemoptysis. He completed vanc/zosyn course on 9/22 and placed on LVQ ppx 9/23. CXR on 9/23 showed increased left pleural effusion with dense retrocardiac consolidation. Pulmonology consulted for worsening effusion.     Recommendations:  - Thoracentesis today with 1L bloody pleural fluid removed and sent to pathology.  - F/u pleural fluid studies (cytology, LDH, protein, cholesterol, Cx, gram stain, etc)  - Dr. Montez attestation to follow above

## 2020-09-24 NOTE — ASSESSMENT & PLAN NOTE
- Secondary to AML  - Transfuse if platelet count<10. If patient develops active bleed, transfuse for goal >50.  - Transfuse for hgb < 7  - ANC 60 today.

## 2020-09-24 NOTE — PROGRESS NOTES
Patient was consented for chemotherapy to treat AML following pharmacy education. He was consented to receive cytarabine and idarubicin (7+3). The consent was discussed and reviewed with patient. Side effects listed on the consent were discussed; including: nausea, vomiting, constipation, diarrhea, mouth sores, hair loss, allergic reaction, damage to the bone marrow, damage to other organs, sterility, damage at the injection site, possibility of a cancer later in life, death  and fever.  All of the patients questions were answered.    Jeni Holt, FNP  Hematology/Oncology/Bone Marrow Transplant

## 2020-09-25 NOTE — PT/OT/SLP EVAL
Physical Therapy Evaluation    Patient Name:  Shell Diaz   MRN:  [id number]    Recommendations:     Discharge Recommendations:  home   Discharge Equipment Recommendations: none   Barriers to discharge: None    Assessment:     Shell Diaz is a 55 y.o. male admitted with a medical diagnosis of Acute myeloid leukemia not having achieved remission.  He presents with the following impairments/functional limitations:  (at risk for deconditioning) Pt. cooperative and tolerated treatment well.    Rehab Prognosis: Good; patient would benefit from acute skilled PT services to address these deficits and reach maximum level of function.    Recent Surgery: Procedure(s) (LRB):  INSERTION, CATHETER, CENTRAL VENOUS, STAUFFER (Right) 4 Days Post-Op    Plan:     During this hospitalization, patient to be seen 1 x/week to address the identified rehab impairments via gait training, therapeutic activities, therapeutic exercises and progress toward the following goals:    · Plan of Care Expires:  10/25/20    Subjective     Chief Complaint: none  Patient/Family Comments/goals: to go home  Pain/Comfort:  · Pain Rating 1: 0/10  · Pain Rating Post-Intervention 1: 0/10    Patients cultural, spiritual, Orthodox conflicts given the current situation: no    Living Environment:  Pt. Lives with spouse and daughter in [facility] with no GENESIS.  Prior to admission, patients level of function was indep.  Equipment used at home: none.  Upon discharge, patient will have assistance from spouse.    Objective:     Communicated with nursing prior to session.  Patient found supine with peripheral IV  upon PT entry to room.    General Precautions: Standard, fall   Orthopedic Precautions:N/A   Braces:       Exams:  · RLE ROM: WFL  · RLE Strength: WFL  · LLE ROM: WFL  · LLE Strength: WFL    Functional Mobility:  · Bed Mobility:     · Rolling Left:  stand by assistance  · Scooting: stand by assistance  · Supine to Sit: stand by assistance  · Sit to Supine:  stand by assistance  · Transfers:     · Sit to Stand:  stand by assistance with no AD  · Gait: 400' with SBA without AD or LOB  · Balance: good    Therapeutic Activities and Exercises:   Discussed therapy needs, goals, and POC.    AM-PAC 6 CLICK MOBILITY  Total Score:24     Patient left supine with all lines intact and call button in reach.    GOALS:   Multidisciplinary Problems     Physical Therapy Goals        Problem: Physical Therapy Goal    Goal Priority Disciplines Outcome Goal Variances Interventions   Physical Therapy Goal     PT, PT/OT Ongoing, Progressing     Description: Goals to be met by: 10/9/2020     Patient will increase functional independence with mobility by performin. Supine to sit with Set-up [place]  2. Sit to supine with Set-up [place]  3. Sit to stand transfer with Supervision  4. Bed to chair transfer with Supervision  5. Gait  x 200 feet with Supervision.   6. Lower extremity exercise program x15 reps per handout, with supervision                     History:     Past Medical History:   Diagnosis Date    Atrial fibrillation     Hypertension        Past Surgical History:   Procedure Laterality Date    INSERTION OF STAUFFER CATHETER Right 2020    Procedure: INSERTION, CATHETER, CENTRAL VENOUS, STAUFFER;  Surgeon: Ti Starkey MD;  Location: [facility] OR [address] [place] [address];  Service: General;  Laterality: Right;       Time Tracking:     PT Received On: 20  PT Start Time: 943     PT Stop Time: 954  PT Total Time (min): 11 min     Billable Minutes: Evaluation 11      Thiago Ruffin, PT  2020

## 2020-09-25 NOTE — ASSESSMENT & PLAN NOTE
55yoM with newly diagnosed acute leukemia. Consulted for SEBASTIAN (unknown baseline) with Cr increase from 0.8 on 9/22 to 1.9 on 9/23. 2.0 on day of consult on 9/24. Etiology is likely multifactorial 2/2 to acyclovir, losartan, contrast-induced from CT chest on 9/17 (Cr increase can be delayed 72-96 hours after contrast), and gradual decrease in hemoglobin from 9.9 on admission to 7.1 today. Patient with good PO intake (~2L per day) and UOP of 1.5-4.5L per day since admission.       - continue to hold losartan and acyclovir in setting of SEBASTIAN   - renal u/s with no evidence of hydronephrosis  - UPC 0.18   - Renally dose all medications to current GFR  - Avoid nephrotoxic meds/agents such as NSAIDs, ACE, ARBs, and IV radiocontrast  - Strict intake and output and daily standing weights  - Trend daily labs  - No indications for RRT at this time; will reassess daily       .

## 2020-09-25 NOTE — ASSESSMENT & PLAN NOTE
- Secondary to AML  - Transfuse if platelet count<10. If patient develops active bleed, transfuse for goal >50.  - Transfuse for hgb < 7

## 2020-09-25 NOTE — SUBJECTIVE & OBJECTIVE
Interval History: NAEON. Patient to begin Chemo today. ARB and acyclovir held in setting of SEBASTIAN. 3.1L UOP last 24 hours. Cr 2-->2.2 today.     Review of patient's allergies indicates:  No Known Allergies  Current Facility-Administered Medications   Medication Frequency    0.9%  NaCl infusion (for blood administration) Q24H PRN    0.9%  NaCl infusion (for blood administration) Q24H PRN    acetaminophen tablet 650 mg Q6H PRN    allopurinoL tablet 300 mg Daily    dextrose 50% injection 12.5 g PRN    dextrose 50% injection 25 g PRN    digoxin tablet 250 mcg Daily    diphenhydrAMINE capsule 25 mg Q6H PRN    glucagon (human recombinant) injection 1 mg PRN    glucose chewable tablet 16 g PRN    glucose chewable tablet 24 g PRN    guaifenesin 100 mg/5 ml syrup 200 mg Q4H PRN    heparin (porcine) injection 1,000 Units PRN    insulin aspart U-100 pen 0-5 Units QID (AC + HS) PRN    insulin detemir U-100 pen 6 Units Daily    levoFLOXacin tablet 500 mg Daily    metoprolol injection 5 mg Q5 Min PRN    metoprolol succinate (TOPROL-XL) 24 hr tablet 100 mg Daily    oxyCODONE immediate release tablet 5 mg Q8H PRN    polyethylene glycol packet 17 g BID PRN    sodium chloride 0.9% flush 10 mL PRN    sodium chloride 0.9% flush 3 mL PRN    terazosin capsule 5 mg QHS       Objective:     Vital Signs (Most Recent):  Temp: 97.9 °F (36.6 °C) (09/25/20 0758)  Pulse: 85 (09/25/20 0758)  Resp: 16 (09/25/20 0758)  BP: (!) 160/82 (09/25/20 0758)  SpO2: (!) 94 % (09/25/20 0801)  O2 Device (Oxygen Therapy): room air (09/25/20 0801) Vital Signs (24h Range):  Temp:  [97.1 °F (36.2 °C)-98.8 °F (37.1 °C)] 97.9 °F (36.6 °C)  Pulse:  [] 85  Resp:  [16-18] 16  SpO2:  [94 %-96 %] 94 %  BP: (133-160)/(74-82) 160/82     Weight: 134.5 kg (296 lb 8.3 oz) (09/25/20 0400)  Body mass index is 37.06 kg/m².  Body surface area is 2.67 meters squared.    I/O last 3 completed shifts:  In: 4630 [P.O.:4280; Blood:350]  Out: 7300 [Urine:6300;  Other:1000]    Physical Exam  Constitutional:       Appearance: He is well-developed.   HENT:      Head: Normocephalic and atraumatic.      Mouth/Throat:      Pharynx: No oropharyngeal exudate.   Eyes:      General:         Right eye: No discharge.         Left eye: No discharge.      Conjunctiva/sclera: Conjunctivae normal.      Pupils: Pupils are equal, round, and reactive to light.   Neck:      Musculoskeletal: Normal range of motion and neck supple.   Cardiovascular:      Rate and Rhythm: Normal rate. Rhythm irregular.      Heart sounds: Normal heart sounds. No murmur.   Pulmonary:      Effort: Pulmonary effort is normal. No respiratory distress.      Breath sounds: No wheezing or rales.      Comments: clr/diminished left base    Abdominal:      General: Bowel sounds are normal. There is no distension.      Palpations: Abdomen is soft.      Tenderness: There is no abdominal tenderness.   Musculoskeletal: Normal range of motion.         General: No deformity.   Skin:     General: Skin is warm and dry.      Findings: No erythema or rash.   Neurological:      Mental Status: He is alert and oriented to person, place, and time.   Psychiatric:         Behavior: Behavior normal.         Thought Content: Thought content normal.         Judgment: Judgment normal.         Significant Labs:  All labs within the past 24 hours have been reviewed.     Significant Imaging:  Labs: Reviewed  US: Reviewed

## 2020-09-25 NOTE — ASSESSMENT & PLAN NOTE
- pulm consulted and performed thora at bedside 9/24  - 1L bloody pleural fluids removed.  -  sent for cytology, flow, micro, etc.

## 2020-09-25 NOTE — PLAN OF CARE
Plan of care discussed at start of shift. Free from falls and injuries. Resting quietly with eyes closed. Respirations even, unlabored with oxygen at 1 liter via nasal cannula. Skin warm and dry. Telemetry in use with A fib noted. Denies pain. Denies nausea. 1 unit PRBC's given this shift. Frequent checks for pain and safety maintained. Bed in lowest position, wheels locked, side rails up x's 2, call light in reach. Instructed to call for assistance as needed, verbalizes understanding. Will continue to monitor.

## 2020-09-25 NOTE — PT/OT/SLP EVAL
Occupational Therapy   Evaluation    Name: Shell Diaz  MRN: [id number]  Admitting Diagnosis:  Acute myeloid leukemia not having achieved remission 4 Days Post-Op    Recommendations:     Discharge Recommendations: home  Discharge Equipment Recommendations:  none  Barriers to discharge:  None    Assessment:     Shell Diaz is a 55 y.o. male with a medical diagnosis of Acute myeloid leukemia not having achieved remission.  He presents supine with wife present.  Pt in good spirits and willing to participate.  Pt able to demonstrate safe and indep functional perofmrance and mobility and is at risk for decline.  Pt will benefit from skilled OT to ensure functional performance . Performance deficits affecting function: other (comment)(risk of rapid decline).      Rehab Prognosis: Good; patient would benefit from acute skilled OT services to address these deficits and reach maximum level of function.       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  Patient found supine with peripheral IV upon OT entry to room.    General Precautions: Standard, fall   Orthopedic Precautions:N/A   Braces: N/A     Occupational Performance:    Bed Mobility:    · Pt  indpe with bed mobility     Functional Mobility/Transfers:  · Functional Mobility: Pt able to demonstrate safe and    Activities of Daily Living:  · Pt indpe with self care performance     Cognitive/Visual Perceptual:  Cognitive/Psychosocial Skills:     -       Oriented to: Person, Place, Time and Situation   -       Follows Commands/attention:Follows two-step commands  -       Communication: clear/fluent  -       Memory: No Deficits noted  -       Safety awareness/insight to disability: intact   -       Mood/Affect/Coping skills/emotional control: Appropriate to situation    Physical Exam:  Upper Extremity Range of Motion:     -       Right Upper Extremity: WFL  -       Left Upper Extremity: WFL  Upper Extremity Strength:    -       Right Upper Extremity: WFL  -       Left Upper Extremity: WNL    AMPAC 6 Click ADL:  AMPAC Total Score: 24    Treatment & Education:  Evaluation complete and goals set.  Cont with POC  Pt educated on safety, role of OT, importance of increased participation in self care for gains , expectations for participation, expectations for gains, POC, energy conservation, caregiver strain. White board updated.     Education:    Patient left up in chair with all lines intact    GOALS:   Multidisciplinary Problems     Occupational Therapy Goals        Problem: Occupational Therapy Goal    Goal Priority Disciplines Outcome Interventions   Occupational Therapy Goal     OT, PT/OT Ongoing, Progressing    Description: Goals to be met by: 10/25    Patient will increase functional independence with ADLs by performing:    Feeding with [place].  UE Dressing with [place].  LE Dressing with [place].  Grooming while standing with [place].  Toileting from toilet with [place] for hygiene and clothing management.                      History:     Past Medical History:   Diagnosis Date    Atrial fibrillation     Hypertension        Past Surgical History:   Procedure Laterality Date     INSERTION OF STAUFFER CATHETER Right 9/21/2020    Procedure: INSERTION, CATHETER, CENTRAL VENOUS, STAUFFER;  Surgeon: iT Starkey MD;  Location: [facility] OR [address] [place] [address];  Service: General;  Laterality: Right;       Time Tracking:     OT Date of Treatment: 09/25/20  OT Start Time: 0940  OT Stop Time: 1000  OT Total Time (min): 20 min    Billable Minutes:Evaluation 10  Therapeutic Activity 10    Melissa Jarvis, OT  9/25/2020

## 2020-09-25 NOTE — PROGRESS NOTES
Pt and wife seen 9/24 for follow up. Provided wife with proof of hospitalization for work and contact information for [employer] for potential FMLA/disability needs for both her and patient.  Patient relived at not having to discharge and return and hopeful about beginning treatment.  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  No other needs identified at this time. Will continue to follow and assist as needed.

## 2020-09-25 NOTE — SUBJECTIVE & OBJECTIVE
Subjective:     Interval History: FISH result found adverse risk cg t(10;11). Plan to dc cancelled and pt consented for induction with 7+3, treatment to start today. Underwent thora for unilateral pleural effusion yesterday, 1L bloody pleural fluid removed and sent for studies including cytology. Nephrology consulted for keren.      Objective:     Vital Signs (Most Recent):  Temp: 98.6 °F (37 °C) (09/25/20 0418)  Pulse: 76 (09/25/20 0418)  Resp: 16 (09/25/20 0418)  BP: (!) 159/82 (09/25/20 0418)  SpO2: 95 % (09/25/20 0418) Vital Signs (24h Range):  Temp:  [97.1 °F (36.2 °C)-98.8 °F (37.1 °C)] 98.6 °F (37 °C)  Pulse:  [] 76  Resp:  [16-18] 16  SpO2:  [94 %-96 %] 95 %  BP: (133-159)/(74-85) 159/82     Weight: 134.5 kg (296 lb 8.3 oz)  Body mass index is 37.06 kg/m².  Body surface area is 2.67 meters squared.    ECOG SCORE         [unfilled]    Intake/Output - Last 3 Shifts       09/23 0700 - 09/24 0659 09/24 0700 - 09/25 0659    P.O. 1920 3320    I.V. (mL/kg) 492.5 (3.7)     Blood 238 350    Total Intake(mL/kg) 2650.5 (19.8) 3670 (27.3)    Urine (mL/kg/hr) 5350 (1.7) 3900 (1.2)    Other  1000    Stool 0     Total Output 5350 4900    Net -2699.5 -1230          Urine Occurrence  1 x    Stool Occurrence 1 x           Physical Exam  Constitutional:       Appearance: He is well-developed.   HENT:      Head: Normocephalic and atraumatic.      Mouth/Throat:      Pharynx: No oropharyngeal exudate.   Eyes:      General:         Right eye: No discharge.         Left eye: No discharge.      Conjunctiva/sclera: Conjunctivae normal.      Pupils: Pupils are equal, round, and reactive to light.   Neck:      Musculoskeletal: Normal range of motion and neck supple.   Cardiovascular:      Rate and Rhythm: Normal rate. Rhythm irregular.      Heart sounds: Normal heart sounds. No murmur.      Comments: Tachycardic with exertion  Pulmonary:      Effort: Pulmonary effort is normal. No respiratory distress.      Breath sounds: No  wheezing or rales.      Comments: clr/diminished left base    Abdominal:      General: Bowel sounds are normal. There is no distension.      Palpations: Abdomen is soft.      Tenderness: There is no abdominal tenderness.   Musculoskeletal: Normal range of motion.         General: No deformity.   Skin:     General: Skin is warm and dry.      Findings: No erythema or rash.   Neurological:      Mental Status: He is alert and oriented to person, place, and time.   Psychiatric:         Behavior: Behavior normal.         Thought Content: Thought content normal.         Judgment: Judgment normal.         Significant Labs:   CBC:   Recent Labs   Lab 09/24/20  0346 09/24/20  1642 09/25/20  0515   WBC 1.85* 2.01* 2.33*   HGB 7.1* 6.6* 7.3*   HCT 21.2* 20.0* 21.8*   PLT 26* 41*  --     and CMP:   Recent Labs   Lab 09/23/20  1658 09/24/20  0346    137   K 3.6 3.7    100   CO2 28 26   * 154*   BUN 17 15   CREATININE 1.9* 2.0*   CALCIUM 7.9* 8.0*   PROT  --  5.8*   ALBUMIN  --  2.6*   BILITOT  --  0.7   ALKPHOS  --  55   AST  --  13   ALT  --  36   ANIONGAP 8 11   EGFRNONAA 38.8* 36.5*       Diagnostic Results:  I have reviewed all pertinent imaging results/findings within the past 24 hours.

## 2020-09-25 NOTE — ASSESSMENT & PLAN NOTE
55 year old man who presents as a transfer for possible leukemia after manual differential demonstrated blasts.   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  - fish results - t(10;11), flt3 negative, ngs pending.   - CBC, CMP, PT/INR, fibrinogen, uric acid, phos BID to assess for TLS and DIC. No evidence of either at this time.  - Peripheral smear -  Hyperleukocytosis with >20% circulating blastoid cells   - 2D ECHO with EF of 58%   - Continue allopurinol 300mg BID  - Hydrea stopped on 9/19  - Continue IVF  - Transfuse cyroglobulin if fibrinogen <150  - Transfuse if platelets <10, Hgb<7 and/or patient actively bleeding  - Lopes placed 9/21/20.  - planning induction with 7+3 to start as early as tomorrow

## 2020-09-25 NOTE — ASSESSMENT & PLAN NOTE
Patient presented with fevers, fatigue, concerning due to acute leukemoid process as significant leukocytosis and blasts. Fevers likely related to this but infection workup in process.   - completed 7 day course vancomycin and zosyn 9/22. Switched to ppx LVQ 9/23/20.  - CXR at OSH did not show findings concerning for PNA.   - Chest CT performed 9/16/20 due to hemoptysis and suggestive of LLL PNA  - Blood cx with NGTD  - u/a not suggestive of UTI  - continues to be afebrile   - still having hemoptysis  - repeated cxr 9/23/20. Showing large left pleural effusion. S/p thoracentesis 09/24. 1L bloody pleural fluid removed and sent for studies including cytology.

## 2020-09-25 NOTE — PROGRESS NOTES
[facility]  Hematology  Bone Marrow Transplant  Progress Note    Patient Name: Shell Diaz  Admission Date: 9/16/2020  Hospital Length of Stay: 9 days  Code Status: Full Code    Subjective:     Interval History: FISH result found adverse risk cg t(10;11). Plan to dc cancelled and pt consented for induction with 7+3, treatment to start today. Underwent thora for unilateral pleural effusion yesterday, 1L bloody pleural fluid removed and sent for studies including cytology. Nephrology consulted for keren.      Objective:     Vital Signs (Most Recent):  Temp: 98.6 °F (37 °C) (09/25/20 0418)  Pulse: 76 (09/25/20 0418)  Resp: 16 (09/25/20 0418)  BP: (!) 159/82 (09/25/20 0418)  SpO2: 95 % (09/25/20 0418) Vital Signs (24h Range):  Temp:  [97.1 °F (36.2 °C)-98.8 °F (37.1 °C)] 98.6 °F (37 °C)  Pulse:  [] 76  Resp:  [16-18] 16  SpO2:  [94 %-96 %] 95 %  BP: (133-159)/(74-85) 159/82     Weight: 134.5 kg (296 lb 8.3 oz)  Body mass index is 37.06 kg/m².  Body surface area is 2.67 meters squared.    ECOG SCORE         [unfilled]    Intake/Output - Last 3 Shifts       09/23 0700 - 09/24 0659 09/24 0700 - 09/25 0659    P.O. 1920 3320    I.V. (mL/kg) 492.5 (3.7)     Blood 238 350    Total Intake(mL/kg) 2650.5 (19.8) 3670 (27.3)    Urine (mL/kg/hr) 5350 (1.7) 3900 (1.2)    Other  1000    Stool 0     Total Output 5350 4900    Net -2699.5 -1230          Urine Occurrence  1 x    Stool Occurrence 1 x           Physical Exam  Constitutional:       Appearance: He is well-developed.   HENT:      Head: Normocephalic and atraumatic.      Mouth/Throat:      Pharynx: No oropharyngeal exudate.   Eyes:      General:         Right eye: No discharge.         Left eye: No discharge.      Conjunctiva/sclera: Conjunctivae normal.      Pupils: Pupils are equal, round, and reactive to light.   Neck:      Musculoskeletal: Normal range of motion and neck supple.   Cardiovascular:      Rate and Rhythm: Normal rate. Rhythm irregular.       Heart sounds: Normal heart sounds. No murmur.      Comments: Tachycardic with exertion  Pulmonary:      Effort: Pulmonary effort is normal. No respiratory distress.      Breath sounds: No wheezing or rales.      Comments: clr/diminished left base    Abdominal:      General: Bowel sounds are normal. There is no distension.      Palpations: Abdomen is soft.      Tenderness: There is no abdominal tenderness.   Musculoskeletal: Normal range of motion.         General: No deformity.   Skin:     General: Skin is warm and dry.      Findings: No erythema or rash.   Neurological:      Mental Status: He is alert and oriented to person, place, and time.   Psychiatric:         Behavior: Behavior normal.         Thought Content: Thought content normal.         Judgment: Judgment normal.         Significant Labs:   CBC:   Recent Labs   Lab 09/24/20  0346 09/24/20  1642 09/25/20  0515   WBC 1.85* 2.01* 2.33*   HGB 7.1* 6.6* 7.3*   HCT 21.2* 20.0* 21.8*   PLT 26* 41*  --     and CMP:   Recent Labs   Lab 09/23/20  1658 09/24/20  0346    137   K 3.6 3.7    100   CO2 28 26   * 154*   BUN 17 15   CREATININE 1.9* 2.0*   CALCIUM 7.9* 8.0*   PROT  --  5.8*   ALBUMIN  --  2.6*   BILITOT  --  0.7   ALKPHOS  --  55   AST  --  13   ALT  --  36   ANIONGAP 8 11   EGFRNONAA 38.8* 36.5*       Diagnostic Results:  I have reviewed all pertinent imaging results/findings within the past 24 hours.    Assessment/Plan:     * Acute myeloid leukemia not having achieved remission  55 year old man who presents as a transfer for possible leukemia after manual differential demonstrated blasts.   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  - fish results - t(10;11), flt3 negative, ngs pending.   - CBC, CMP, PT/INR, fibrinogen, uric acid, phos BID to  assess for TLS and DIC. No evidence of either at this time.  - Peripheral smear -  Hyperleukocytosis with >20% circulating blastoid cells   - 2D ECHO with EF of 58%   - Continue allopurinol 300mg BID  - Hydrea stopped on 9/19  - Continue IVF  - Transfuse cyroglobulin if fibrinogen <150  - Transfuse if platelets <10, Hgb<7 and/or patient actively bleeding  - Lopes placed 9/21/20.  - planning induction with 7+3 to start as early as tomorrow    Pleural effusion on left  - pulm consulted and performed thora at bedside 9/24  - 1L bloody pleural fluids removed.  -  sent for cytology, flow, micro, etc.    SEBASTIAN (acute kidney injury)  - creatinine up to 2. Previously 0.8.  - likely 2/2 IV abx (Vanc and Zosyn) which he completed on 9/22/20. Also received IV contract with CT on 9/16/20.  - Nephrology consulted, appreciate recommendations   - may also be from atn 2/2 to hypoperfusion from anemia and acyclovir and losartan. Both meds have been held.       Acute respiratory failure with hypoxia  - Likely secondary to pneumonia  - on NC, wean of oxygen to maintain sats >90%.   - attempted to wean O2, but desatted. Continue O2 at 1 liter at this time.  - will need to perform 6 minute walk test prior to discharge  - Incentive spirometry   - see pleural effusion      Type 2 diabetes mellitus, without long-term current use of insulin  - history of DM 2 per review of Care Everywhere chart  - on Metformin at home. Holding inpatient  - Insulin Detemir 6U daily   - ACHS blood glucose monitoring and s/s insulin while inpatient  - Goal -180. Stable at this time.    V tach  - had at 6 beat run of v-tach 9/18/20 at ~ 6 am. Not sustained  - keep mag > or = 2 and K+ > or = 4  - cardiology consulted earlier in admission    Leukocytosis  -  on admission and 129 prior to starting hydrea  - see AML  - Hydrea stopped 9/20/20 with normal WBC count.      Pancytopenia  - Secondary to AML  - Transfuse if platelet count<10. If patient  I will START or STAY ON the medications listed below when I get home from the hospital:  None develops active bleed, transfuse for goal >50.  - Transfuse for hgb < 7          Pneumonia  Patient presented with fevers, fatigue, concerning due to acute leukemoid process as significant leukocytosis and blasts. Fevers likely related to this but infection workup in process.   - completed 7 day course vancomycin and zosyn 9/22. Switched to ppx LVQ 9/23/20.  - CXR at OSH did not show findings concerning for PNA.   - Chest CT performed 9/16/20 due to hemoptysis and suggestive of LLL PNA  - Blood cx with NGTD  - u/a not suggestive of UTI  - continues to be afebrile   - still having hemoptysis  - repeated cxr 9/23/20. Showing large left pleural effusion. S/p thoracentesis 09/24. 1L bloody pleural fluid removed and sent for studies including cytology.       Essential hypertension  - Resumed home losartan 9/17/20  - Monitor BP      Paroxysmal atrial fibrillation  Patient states that he had pacemaker placed for this.   Previsouly ECHO with EF 58 %  Holding Eliquis due to low platelet count. Resume when appropriate.  EKG from 9/16/20 showing afib with RVR  HR now controlled  Cardiology consulted due to cardiac history as patient will receive anthracycline if he is induced for AML  Cardiology repeated ehco 9/17 to assess for ventricle straining. 16% LV straining noted on echo  - Keep mag > or = 2 and K+ > or = 4  - Continue home digoxin and metoprolol.   - To repeat ECHO after completion of chemo per card recs  - Monitor I/O and Daily weights                 VTE Risk Mitigation (From admission, onward)         Ordered     heparin (porcine) injection 1,000 Units  As needed (PRN)      09/22/20 0343     IP VTE HIGH RISK PATIENT  Once      09/16/20 0017     Place sequential compression device  Until discontinued      09/16/20 0017                Disposition: in patient    Santino Haider MD  Bone Marrow Transplant  [facility]       I will START or STAY ON the medications listed below when I get home from the hospital:    ibuprofen 100 mg/5 mL oral suspension  -- 30 milliliter(s) by mouth every 6 hours  -- Indication: For Pain    oxyCODONE 5 mg/5 mL oral solution  -- 5 milliliter(s) by mouth every 6 hours, As needed, Moderate Pain (4 - 6)  -- Indication: For Pain    chlorhexidine 0.12% mucous membrane liquid  -- 15 spray(s) mucous membrane 2 times a day  -- Indication: For Infection    pseudoephedrine 15 mg/5 mL oral liquid  -- 10 milliliter(s) by mouth 4 times a day  -- Indication: For Congestion    phenylephrine 0.5% nasal spray  -- 2 spray(s) into nose 2 times a day  -- Indication: For Congestion    fluticasone 50 mcg/inh nasal spray  -- 2 spray(s) into nose   -- Indication: For Congestion    oxymetazoline 0.05% nasal spray  -- 2 spray(s) into nose 2 times a day, stop after 3 days.  -- Indication: For Congestion    sodium chloride 0.65% nasal spray  -- 2 spray(s) into nose every 1 to 2 hours, As Needed  -- Indication: For Congestion    amoxicillin 250 mg/5 mL oral suspension  -- 10 milliliter(s) by mouth 3 times a day  -- Indication: For Infection

## 2020-09-25 NOTE — PLAN OF CARE
Evaluation complete and goals set.  Cont with POC  Melissa Jarvis OT  9/25/2020    Problem: Occupational Therapy Goal  Goal: Occupational Therapy Goal  Description: Goals to be met by: 10/25    Patient will increase functional independence with ADLs by performing:    Feeding with [place].  UE Dressing with [place].  LE Dressing with [place].  Grooming while standing with [place].  Toileting from toilet with [place] for hygiene and clothing management.     Outcome: Ongoing, Progressing

## 2020-09-25 NOTE — PROGRESS NOTES
"[facility]  Adult Nutrition  Progress Note    SUMMARY       Recommendations    1. Change diet to 2200 kcal diabetic diet (2800 kcal is too high).  Goals: 1. Pt's intake meals >75% x 7 days.  Nutrition Goal Status: progressing towards goal  Communication of RD Recs: (POC)    Reason for Assessment    Reason For Assessment: length of stay  Diagnosis: cancer diagnosis/related complications(AML)  Relevant Medical History: DM2, HTN, Afib s/p pacemaker  Interdisciplinary Rounds: did not attend  General Information Comments: Pt with new diagnosis AML admitted with PNA, is s/p thoracentesis for pleural effusion. Pt to start chemotherapy today. Wt has been stable, pt eating % meals. Unable to see pt as he was with PT.  Nutrition Discharge Planning: Discharge on carb consistent diet.    Nutrition Risk Screen    Nutrition Risk Screen: no indicators present    Nutrition/Diet History    Spiritual, Cultural Beliefs, Uatsdin Practices, Values that Affect Care: no    Anthropometrics    Temp: 97.9 °F (36.6 °C)  Height: 6' 3" (190.5 cm)  Height (inches): 75 in  Weight Method: Standard Scale  Weight: 134.5 kg (296 lb 8.3 oz)  Weight (lb): 296.52 lb  Ideal Body Weight (IBW), Male: 196 lb  % Ideal Body Weight, Male (lb): 151.02 %  BMI (Calculated): 37.1       Lab/Procedures/Meds    Pertinent Labs Reviewed: reviewed  Pertinent Labs Comments: A1c 8%, Glu 133, Cre 2.2, Phos 4.8, eGFR 37.6, T. Bili 1.9  Pertinent Medications Reviewed: reviewed  Pertinent Medications Comments: detemir, IVF    Estimated/Assessed Needs    Weight Used For Calorie Calculations: 134.5 kg (296 lb 8.3 oz)  Energy Calorie Requirements (kcal): 2266 kcal  Energy Need Method: [place]-St Jeor  Protein Requirements: 94-107g  Weight Used For Protein Calculations: 134.5 kg (296 lb 8.3 oz)        RDA Method (mL): 2266  CHO Requirement: 283g      Nutrition Prescription Ordered    Current Diet Order: Diabetic 2800 kcal    Evaluation of Received " Nutrient/Fluid Intake    Comments: LBM 9/24  % Intake of Estimated Energy Needs: 75 - 100 %  % Meal Intake: 75 - 100 %    Nutrition Risk    Level of Risk/Frequency of Follow-up: low     Assessment and Plan    Nutrition Problem  Altered nutrition-related lab values    Related to (etiology):   Blood glucose    Signs and Symptoms (as evidenced by):   A1c 8%, Glu 133     Interventions(treatment strategy):  Collaboration of care with providers.  Modified diet: Carbohydrates    Nutrition Diagnosis Status:   New      Monitor and Evaluation    Food and Nutrient Intake: energy intake, food and beverage intake  Food and Nutrient Adminstration: diet order  Knowledge/Beliefs/Attitudes: food and nutrition knowledge/skill  Anthropometric Measurements: weight, weight change, body mass index  Biochemical Data, Medical Tests and Procedures: electrolyte and renal panel, gastrointestinal profile, glucose/endocrine profile, inflammatory profile, lipid profile  Nutrition-Focused Physical Findings: overall appearance     Malnutrition Assessment      Pt does not meet criteria for malnutrition.    Nutrition Follow-Up    RD Follow-up?: Yes

## 2020-09-25 NOTE — PLAN OF CARE
Problem: Adult Inpatient Plan of Care  Goal: Plan of Care Review  Outcome: Ongoing, Progressing   Recommendations     1. Change diet to 2200 kcal diabetic diet (2800 kcal is too high).  Goals: 1. Pt's intake meals >75% x 7 days.  Nutrition Goal Status: progressing towards goal  Communication of RD Recs: (POC)

## 2020-09-25 NOTE — PLAN OF CARE
POC reviewed with patient; understanding verbalized. Chemo administered this shift. Pt remains independent. He voids clear, yellow urine via the urinal; no noted BM this shift. Diabetic diet with good appetite; ACHS with no coverage required. NS at 50. Wife to remain at the bedside. Pt. with nonskid footwear on, bed in lowest position, and locked with bed rails up x2. Pt. has call light and personal items within reach. VSS and afebrile this shift. All questions and concerns addressed at this time. Will continue to monitor.

## 2020-09-25 NOTE — PLAN OF CARE
Problem: Physical Therapy Goal  Goal: Physical Therapy Goal  Description: Goals to be met by: 10/9/2020     Patient will increase functional independence with mobility by performin. Supine to sit with Set-up [place]  2. Sit to supine with Set-up [place]  3. Sit to stand transfer with Supervision  4. Bed to chair transfer with Supervision  5. Gait  x 200 feet with Supervision.   6. Lower extremity exercise program x15 reps per handout, with supervision    Outcome: Ongoing, Progressing

## 2020-09-25 NOTE — PROGRESS NOTES
"[facility]  Nephrology  Progress Note    Patient Name: Shell Diaz  MRN: [id number]  Admission Date: 9/16/2020  Hospital Length of Stay: 9 days  Attending Provider: Jm Fields MD   Primary Care Physician: Alva Rubio MD  Principal Problem:Acute myeloid leukemia not having achieved remission    Subjective:     HPI: Mr. Diaz is a 56yo M with a PMH of Afib with pacemaker on eliquis, HTN, and cardiomyopathy who presnted to OSH with fever and fatigue. At OSH, patient received tylenol, IVF, a dose of vancomycin, zosyn and hydrea 4g. Labs were notable for leukocytosis (WBC 95.7), anemia, thrombocytopenia, elevated INR, D-dimer. CXR showed "cardiomegaly with no acute pulmonary process".  Manual differential demonstrated high number of blasts (100). Patient was found to have LLL pneumonia and was started on Vanc and Zosyn. Finished 7 day course on 9/23. Vanc troughs WNL. He received a CT Chest with contrast on 9/17 and Pulmonology was consulted today for thoracentesis which drained 1L of bloody fluid.     Nephrology was consulted for SEBASTIAN. Cr 1.3 on day of admission. Significant change in creatinine from 0.8 on 9/22 to 1.9 on 9/23. 2.0 on day of consult. Of note, patient is on acyclovir 400 BID and losartan 50, the latter of which is a home medication.       Interval History: NAEON. Patient to begin Chemo today. ARB and acyclovir held in setting of SEBASTIAN. 3.1L UOP last 24 hours. Cr 2-->2.2 today.     Review of patient's allergies indicates:  No Known Allergies  Current Facility-Administered Medications   Medication Frequency    0.9%  NaCl infusion (for blood administration) Q24H PRN    0.9%  NaCl infusion (for blood administration) Q24H PRN    acetaminophen tablet 650 mg Q6H PRN    allopurinoL tablet 300 mg Daily    dextrose 50% injection 12.5 g PRN    dextrose 50% injection 25 g PRN    digoxin tablet 250 mcg Daily    diphenhydrAMINE capsule 25 mg Q6H PRN    glucagon (human " recombinant) injection 1 mg PRN    glucose chewable tablet 16 g PRN    glucose chewable tablet 24 g PRN    guaifenesin 100 mg/5 ml syrup 200 mg Q4H PRN    heparin (porcine) injection 1,000 Units PRN    insulin aspart U-100 pen 0-5 Units QID (AC + HS) PRN    insulin detemir U-100 pen 6 Units Daily    levoFLOXacin tablet 500 mg Daily    metoprolol injection 5 mg Q5 Min PRN    metoprolol succinate (TOPROL-XL) 24 hr tablet 100 mg Daily    oxyCODONE immediate release tablet 5 mg Q8H PRN    polyethylene glycol packet 17 g BID PRN    sodium chloride 0.9% flush 10 mL PRN    sodium chloride 0.9% flush 3 mL PRN    terazosin capsule 5 mg QHS       Objective:     Vital Signs (Most Recent):  Temp: 97.9 °F (36.6 °C) (09/25/20 0758)  Pulse: 85 (09/25/20 0758)  Resp: 16 (09/25/20 0758)  BP: (!) 160/82 (09/25/20 0758)  SpO2: (!) 94 % (09/25/20 0801)  O2 Device (Oxygen Therapy): room air (09/25/20 0801) Vital Signs (24h Range):  Temp:  [97.1 °F (36.2 °C)-98.8 °F (37.1 °C)] 97.9 °F (36.6 °C)  Pulse:  [] 85  Resp:  [16-18] 16  SpO2:  [94 %-96 %] 94 %  BP: (133-160)/(74-82) 160/82     Weight: 134.5 kg (296 lb 8.3 oz) (09/25/20 0400)  Body mass index is 37.06 kg/m².  Body surface area is 2.67 meters squared.    I/O last 3 completed shifts:  In: 4630 [P.O.:4280; Blood:350]  Out: 7300 [Urine:6300; Other:1000]    Physical Exam  Constitutional:       Appearance: He is well-developed.   HENT:      Head: Normocephalic and atraumatic.      Mouth/Throat:      Pharynx: No oropharyngeal exudate.   Eyes:      General:         Right eye: No discharge.         Left eye: No discharge.      Conjunctiva/sclera: Conjunctivae normal.      Pupils: Pupils are equal, round, and reactive to light.   Neck:      Musculoskeletal: Normal range of motion and neck supple.   Cardiovascular:      Rate and Rhythm: Normal rate. Rhythm irregular.      Heart sounds: Normal heart sounds. No murmur.   Pulmonary:      Effort: Pulmonary effort is normal.  No respiratory distress.      Breath sounds: No wheezing or rales.      Comments: clr/diminished left base    Abdominal:      General: Bowel sounds are normal. There is no distension.      Palpations: Abdomen is soft.      Tenderness: There is no abdominal tenderness.   Musculoskeletal: Normal range of motion.         General: No deformity.   Skin:     General: Skin is warm and dry.      Findings: No erythema or rash.   Neurological:      Mental Status: He is alert and oriented to person, place, and time.   Psychiatric:         Behavior: Behavior normal.         Thought Content: Thought content normal.         Judgment: Judgment normal.         Significant Labs:  All labs within the past 24 hours have been reviewed.     Significant Imaging:  Labs: Reviewed  US: Reviewed    Assessment/Plan:     * Acute myeloid leukemia not having achieved remission  --managed per primary    SEBASTIAN (acute kidney injury)  55yoM with newly diagnosed acute leukemia. Consulted for SEBASTIAN (unknown baseline) with Cr increase from 0.8 on 9/22 to 1.9 on 9/23. 2.0 on day of consult on 9/24. Etiology is likely multifactorial 2/2 to acyclovir, losartan, contrast-induced from CT chest on 9/17 (Cr increase can be delayed 72-96 hours after contrast), and gradual decrease in hemoglobin from 9.9 on admission to 7.1 today. Patient with good PO intake (~2L per day) and UOP of 1.5-4.5L per day since admission.       - continue to hold losartan and acyclovir in setting of SEBASTIAN   - renal u/s with no evidence of hydronephrosis  - UPC 0.18   - Renally dose all medications to current GFR  - Avoid nephrotoxic meds/agents such as NSAIDs, ACE, ARBs, and IV radiocontrast  - Strict intake and output and daily standing weights  - Trend daily labs  - No indications for RRT at this time; will reassess daily       .      Thank you for your consult. I will follow-up with patient. Please contact us if you have any additional questions.    Live Ken,  MD  Nephrology  [facility]

## 2020-09-25 NOTE — ASSESSMENT & PLAN NOTE
- creatinine up to 2. Previously 0.8.  - likely 2/2 IV abx (Vanc and Zosyn) which he completed on 9/22/20. Also received IV contract with CT on 9/16/20.  - Nephrology consulted, appreciate recommendations   - may also be from atn 2/2 to hypoperfusion from anemia and acyclovir and losartan. Both meds have been held.

## 2020-09-26 PROBLEM — E83.39 HYPERPHOSPHATEMIA: Status: ACTIVE | Noted: 2020-01-01

## 2020-09-26 PROBLEM — J96.01 ACUTE RESPIRATORY FAILURE WITH HYPOXIA: Status: RESOLVED | Noted: 2020-01-01 | Resolved: 2020-01-01

## 2020-09-26 NOTE — ASSESSMENT & PLAN NOTE
Patient states that he had pacemaker placed for this.   Previsouly ECHO with EF 58 %  Holding Eliquis due to low platelet count. Resume when appropriate.  EKG from 9/16/20 showing afib with RVR  HR now controlled  Cardiology consulted due to cardiac history as patient will receive anthracycline if he is induced for AML  Cardiology repeated ehco 9/17 to assess for ventricle straining. 16% LV straining noted on echo  - Keep mag > 2 and K+ > 4  - Continue home digoxin and metoprolol.   - To repeat ECHO after completion of chemo per card recs  - Monitor I/O and Daily weights

## 2020-09-26 NOTE — PLAN OF CARE
Patient remained free from falls throughout shift, call bell within reach. Day 2 of 7+3. No complaints of pain or discomfort. On RA. No SOB reported. Vitals stable, will continue to monitor.

## 2020-09-26 NOTE — ASSESSMENT & PLAN NOTE
HbA1C 8  - on Metformin at home. Holding inpatient  - Insulin Detemir 8U daily   - ACHS blood glucose monitoring and s/s insulin while inpatient  - Goal -180.

## 2020-09-26 NOTE — PROGRESS NOTES
[facility]  Hematology  Bone Marrow Transplant  Progress Note    Patient Name: Shell Diaz  Admission Date: 9/16/2020  Hospital Length of Stay: 10 days  Code Status: Full Code    Subjective:     Interval History: Day 2 of 7+3  Tolerating well, denies any symptoms. Good UOP in setting of SEBASTIAN    Objective:     Vital Signs (Most Recent):  Temp: 98.3 °F (36.8 °C) (09/26/20 1109)  Pulse: 80 (09/26/20 1109)  Resp: 18 (09/26/20 1109)  BP: 137/73 (09/26/20 1109)  SpO2: (!) 92 % (09/26/20 1109) Vital Signs (24h Range):  Temp:  [97.8 °F (36.6 °C)-99.7 °F (37.6 °C)] 98.3 °F (36.8 °C)  Pulse:  [] 80  Resp:  [16-18] 18  SpO2:  [91 %-95 %] 92 %  BP: (129-157)/(59-83) 137/73     Weight: 134.1 kg (295 lb 10.2 oz)  Body mass index is 36.95 kg/m².  Body surface area is 2.66 meters squared.    ECOG SCORE         [unfilled]    Intake/Output - Last 3 Shifts       09/24 0700 - 09/25 0659 09/25 0700 - 09/26 0659 09/26 0700 - 09/27 0659    P.O. 3320 1600 600    I.V. (mL/kg)  550.8 (4.1) 354.2 (2.6)    Blood 350      IV Piggyback  430.2     Total Intake(mL/kg) 3670 (27.3) 2581.1 (19.2) 954.2 (7.1)    Urine (mL/kg/hr) 3900 (1.2) 3050 (0.9) 1325 (1.5)    Other 1000      Stool  0     Total Output 4900 3050 1325    Net -1230 -469 -370.8           Urine Occurrence 1 x      Stool Occurrence  1 x           Physical Exam  Constitutional:       Appearance: He is well-developed. He is obese.   HENT:      Head: Normocephalic and atraumatic.      Mouth/Throat:      Pharynx: No oropharyngeal exudate.   Eyes:      General:         Right eye: No discharge.         Left eye: No discharge.      Conjunctiva/sclera: Conjunctivae normal.      Pupils: Pupils are equal, round, and reactive to light.   Neck:      Musculoskeletal: Normal range of motion and neck supple.   Cardiovascular:      Rate and Rhythm: Rhythm irregular.      Heart sounds: Normal heart sounds. No murmur.   Pulmonary:      Effort: Pulmonary effort is normal. No  respiratory distress.      Breath sounds: No wheezing or rales.      Comments:     Abdominal:      General: Bowel sounds are normal. There is no distension.      Palpations: Abdomen is soft.      Tenderness: There is no abdominal tenderness.   Musculoskeletal: Normal range of motion.         General: No deformity.   Skin:     General: Skin is warm and dry.      Findings: No erythema or rash.   Neurological:      Mental Status: He is alert and oriented to person, place, and time.   Psychiatric:         Behavior: Behavior normal.         Thought Content: Thought content normal.         Judgment: Judgment normal.         Significant Labs:   CBC:   Recent Labs   Lab 09/24/20  1642 09/25/20  0515 09/26/20  0300   WBC 2.01* 2.33* 2.24*   HGB 6.6* 7.3* 7.9*   HCT 20.0* 21.8* 24.1*   PLT 41* 31* 28*    and CMP:   Recent Labs   Lab 09/25/20  0515 09/25/20  1605 09/26/20  0300    137 137   K 3.7 3.8 3.9    101 100   CO2 29 28 26   * 179* 245*   BUN 19 20 24*   CREATININE 2.2* 2.3* 2.3*   CALCIUM 8.1* 8.0* 8.2*   PROT 5.7* 5.7* 6.4   ALBUMIN 2.6* 2.5* 2.9*   BILITOT 1.9* 0.8 0.9   ALKPHOS 58 55 64   AST 10 15 12   ALT 29 31 33   ANIONGAP 8 8 11   EGFRNONAA 32.5* 30.8* 30.8*       Diagnostic Results:  I have reviewed all pertinent imaging results/findings within the past 24 hours.    Assessment/Plan:     * Acute myeloid leukemia not having achieved remission  55 year old man who presents as a transfer for possible leukemia after manual differential demonstrated blasts.   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  - fish results - t(10;11), flt3 negative, ngs pending.   - Started on 7+3  Day 2    - CBC, CMP, PT/INR, fibrinogen, uric acid, phos daily to assess for TLS and DIC.   - Peripheral smear -  Hyperleukocytosis with  >20% circulating blastoid cells   - 2D ECHO with EF of 58%   - Continue allopurinol 300mg BID and on IVF  - Hydrea stopped on 9/19  - Transfuse cyroglobulin if fibrinogen <150  - Transfuse if platelets <10, Hgb<7 and/or patient actively bleeding  - Lopes placed 9/21/20.      Pleural effusion on left  - Pulm consulted and performed thora at bedside 9/24  - 1L bloody pleural fluids removed.  -  sent for cytology, flow, micro, etc.    SEBASTIAN (acute kidney injury)  Hyperphosphatemia    creatinine up to 2. Previously 0.8.  - likely 2/2 IV abx (Vanc and Zosyn) which he completed on 9/22/20. Also received IV contract with CT on 9/16/20.   - Nephrology consulted, appreciate recommendations   -Monitor RFT  -Strict I/O  -Avoid Nephrotoxic drugs, holding losartan   -Renally dose Medications        Type 2 diabetes mellitus, without long-term current use of insulin  HbA1C 8  - on Metformin at home. Holding inpatient  - Insulin Detemir 8U daily   - ACHS blood glucose monitoring and s/s insulin while inpatient  - Goal -180.     V tach  - had at 6 beat run of v-tach 9/18/20 at ~ 6 am. Not sustained  - keep mag > or = 2 and K+ > or = 4  - cardiology consulted earlier in admission    Leukocytosis  -  on admission and 129 prior to starting hydrea  - see AML  - Hydrea stopped 9/20/20 with normal WBC count.      Pancytopenia  - Secondary to AML  - Transfuse if platelet count<10. If patient develops active bleed, transfuse for goal >50.  - Transfuse for hgb < 7          Pneumonia  Patient presented with fevers, fatigue, concerning due to acute leukemoid process as significant leukocytosis and blasts. Fevers likely related to this but infection workup in process.   - CXR at OSH did not show findings concerning for PNA.   - Chest CT performed 9/16/20 due to hemoptysis and suggestive of LLL PNA  - Blood cx with NGTD  - u/a not suggestive of UTI  -- completed 7 day course vancomycin and zosyn 9/22.   - Switched to ppx LVQ  9/23/20.  - repeated cxr 9/23/20. Showing large left pleural effusion. S/p thoracentesis 09/24. 1L bloody pleural fluid removed and sent for studies including cytology.       Essential hypertension  - Hold home losartan in setting ok SEBASTIAN  - Monitor BP      Paroxysmal atrial fibrillation  Patient states that he had pacemaker placed for this.   Previsouly ECHO with EF 58 %  Holding Eliquis due to low platelet count. Resume when appropriate.  EKG from 9/16/20 showing afib with RVR  HR now controlled  Cardiology consulted due to cardiac history as patient will receive anthracycline if he is induced for AML  Cardiology repeated ehco 9/17 to assess for ventricle straining. 16% LV straining noted on echo  - Keep mag > 2 and K+ > 4  - Continue home digoxin and metoprolol.   - To repeat ECHO after completion of chemo per card recs  - Monitor I/O and Daily weights                 VTE Risk Mitigation (From admission, onward)         Ordered     heparin, porcine (PF) 100 unit/mL injection flush 300 Units  As needed (PRN)      09/25/20 1408     heparin (porcine) injection 1,000 Units  As needed (PRN)      09/22/20 0343     IP VTE HIGH RISK PATIENT  Once      09/16/20 0017     Place sequential compression device  Until discontinued      09/16/20 0017                Disposition: Home    Vida Guillen MD  Bone Marrow Transplant  [facility]

## 2020-09-26 NOTE — ASSESSMENT & PLAN NOTE
Hyperphosphatemia    creatinine up to 2. Previously 0.8.  - likely 2/2 IV abx (Vanc and Zosyn) which he completed on 9/22/20. Also received IV contract with CT on 9/16/20.   - Nephrology consulted, appreciate recommendations   -Monitor RFT  -Strict I/O  -Avoid Nephrotoxic drugs, holding losartan   -Renally dose Medications

## 2020-09-26 NOTE — ASSESSMENT & PLAN NOTE
- Likely secondary to pneumonia  - will need to perform 6 minute walk test prior to discharge  - Incentive spirometry   - see pleural effusion  Resolved

## 2020-09-26 NOTE — PROGRESS NOTES
"[facility]  Nephrology  Progress Note    Patient Name: Shell Diaz  MRN: [id number]  Admission Date: 9/16/2020  Hospital Length of Stay: 10 days  Attending Provider: Richy Thurman MD   Primary Care Physician: Alva Rubio MD  Principal Problem:Acute myeloid leukemia not having achieved remission    Subjective:     HPI: Mr. Diaz is a 54yo M with a PMH of Afib with pacemaker on eliquis, HTN, and cardiomyopathy who presnted to OSH with fever and fatigue. At OSH, patient received tylenol, IVF, a dose of vancomycin, zosyn and hydrea 4g. Labs were notable for leukocytosis (WBC 95.7), anemia, thrombocytopenia, elevated INR, D-dimer. CXR showed "cardiomegaly with no acute pulmonary process".  Manual differential demonstrated high number of blasts (100). Patient was found to have LLL pneumonia and was started on Vanc and Zosyn. Finished 7 day course on 9/23. Vanc troughs WNL. He received a CT Chest with contrast on 9/17 and Pulmonology was consulted today for thoracentesis which drained 1L of bloody fluid.     Nephrology was consulted for SEBASTIAN. Cr 1.3 on day of admission. Significant change in creatinine from 0.8 on 9/22 to 1.9 on 9/23. 2.0 on day of consult. Of note, patient is on acyclovir 400 BID and losartan 50, the latter of which is a home medication.       Interval History: NAEON. Patient to begin Chemo today. ARB and acyclovir held in setting of SEBASTIAN. 3.1L UOP last 24 hours. Cr 2-->2.2 today.     Review of patient's allergies indicates:  No Known Allergies  Current Facility-Administered Medications   Medication Frequency    0.9%  NaCl infusion (for blood administration) Q24H PRN    0.9%  NaCl infusion (for blood administration) Q24H PRN    0.9%  NaCl infusion Continuous    acetaminophen tablet 650 mg Q6H PRN    acyclovir capsule 400 mg BID    allopurinoL tablet 300 mg Daily    alteplase injection 2 mg PRN    cytarabine (PF) (CYTOSAR) 100 mg/m2/day = 265 mg in sodium chloride 0.9% " 1,000 mL chemo infusion Q24H    dexAMETHasone tablet 12 mg Q24H    dextrose 50% injection 12.5 g PRN    dextrose 50% injection 25 g PRN    digoxin tablet 250 mcg Daily    diphenhydrAMINE capsule 25 mg Q6H PRN    glucagon (human recombinant) injection 1 mg PRN    glucose chewable tablet 16 g PRN    glucose chewable tablet 24 g PRN    guaifenesin 100 mg/5 ml syrup 200 mg Q4H PRN    heparin (porcine) injection 1,000 Units PRN    heparin, porcine (PF) 100 unit/mL injection flush 300 Units PRN    IDArubicin injection 30 mg Q24H    insulin aspart U-100 pen 0-5 Units QID (AC + HS) PRN    insulin detemir U-100 pen 8 Units Daily    levoFLOXacin tablet 500 mg Daily    metoprolol injection 5 mg Q5 Min PRN    metoprolol succinate (TOPROL-XL) 24 hr tablet 100 mg Daily    ondansetron disintegrating tablet 8 mg Q8H PRN    ondansetron tablet 16 mg Q24H    oxyCODONE immediate release tablet 5 mg Q8H PRN    polyethylene glycol packet 17 g BID PRN    prochlorperazine injection Soln 10 mg Q6H PRN    sodium chloride 0.9% flush 10 mL PRN    terazosin capsule 5 mg QHS       Objective:     Vital Signs (Most Recent):  Temp: 98.2 °F (36.8 °C) (09/26/20 0828)  Pulse: 79 (09/26/20 0852)  Resp: 16 (09/26/20 0725)  BP: (!) 152/75 (09/26/20 0730)  SpO2: (!) 91 % (09/26/20 0725)  O2 Device (Oxygen Therapy): room air (09/26/20 0725) Vital Signs (24h Range):  Temp:  [97.8 °F (36.6 °C)-99.7 °F (37.6 °C)] 98.2 °F (36.8 °C)  Pulse:  [] 79  Resp:  [16-18] 16  SpO2:  [91 %-95 %] 91 %  BP: (129-157)/(59-83) 152/75     Weight: 134.1 kg (295 lb 10.2 oz) (09/26/20 0254)  Body mass index is 36.95 kg/m².  Body surface area is 2.66 meters squared.    I/O last 3 completed shifts:  In: 3771.1 [P.O.:2440; I.V.:550.8; Blood:350; IV Piggyback:430.2]  Out: 4550 [Urine:4550]    Physical Exam  Constitutional:       Appearance: He is well-developed.   HENT:      Head: Normocephalic and atraumatic.      Mouth/Throat:      Pharynx: No  oropharyngeal exudate.   Eyes:      General:         Right eye: No discharge.         Left eye: No discharge.      Conjunctiva/sclera: Conjunctivae normal.      Pupils: Pupils are equal, round, and reactive to light.   Neck:      Musculoskeletal: Normal range of motion and neck supple.   Cardiovascular:      Rate and Rhythm: Normal rate. Rhythm irregular.      Heart sounds: Normal heart sounds. No murmur.   Pulmonary:      Effort: Pulmonary effort is normal. No respiratory distress.      Breath sounds: No wheezing or rales.      Comments: clr/diminished left base    Abdominal:      General: Bowel sounds are normal. There is no distension.      Palpations: Abdomen is soft.      Tenderness: There is no abdominal tenderness.   Musculoskeletal: Normal range of motion.         General: No deformity.   Skin:     General: Skin is warm and dry.      Findings: No erythema or rash.   Neurological:      Mental Status: He is alert and oriented to person, place, and time.   Psychiatric:         Behavior: Behavior normal.         Thought Content: Thought content normal.         Judgment: Judgment normal.         Significant Labs:  All labs within the past 24 hours have been reviewed.     Significant Imaging:  Labs: Reviewed  US: Reviewed    Assessment/Plan:     * Acute myeloid leukemia not having achieved remission  --managed per primary    SEBASTIAN (acute kidney injury)  55yoM with newly diagnosed acute leukemia. Consulted for SEBASTIAN (unknown baseline) with Cr increase from 0.8 on 9/22 to 1.9 on 9/23. 2.0 on day of consult on 9/24. Etiology is likely multifactorial 2/2 to acyclovir, losartan, contrast-induced from CT chest on 9/17 (Cr increase can be delayed 72-96 hours after contrast), and gradual decrease in hemoglobin from 9.9 on admission to 7.1 today. Patient with good PO intake (~2L per day) and UOP of 1.5-4.5L per day since admission.       - continue to hold losartan and acyclovir in setting of SEBASTIAN   - renal u/s with no evidence  of hydronephrosis  - UPC 0.18   - Renally dose all medications to current GFR  - Avoid nephrotoxic meds/agents such as NSAIDs, ACE, ARBs, and IV radiocontrast  - Strict intake and output and daily standing weights  - Trend daily labs  - No indications for RRT at this time; will reassess daily       .        Thank you for your consult. I will follow-up with patient. Please contact us if you have any additional questions.    Dylan Bush MD  Nephrology  [facility]

## 2020-09-26 NOTE — ASSESSMENT & PLAN NOTE
Patient presented with fevers, fatigue, concerning due to acute leukemoid process as significant leukocytosis and blasts. Fevers likely related to this but infection workup in process.   - CXR at OSH did not show findings concerning for PNA.   - Chest CT performed 9/16/20 due to hemoptysis and suggestive of LLL PNA  - Blood cx with NGTD  - u/a not suggestive of UTI  -- completed 7 day course vancomycin and zosyn 9/22.   - Switched to ppx LVQ 9/23/20.  - repeated cxr 9/23/20. Showing large left pleural effusion. S/p thoracentesis 09/24. 1L bloody pleural fluid removed and sent for studies including cytology.

## 2020-09-26 NOTE — SUBJECTIVE & OBJECTIVE
Subjective:     Interval History: Day 2 of 7+3  Tolerating well, denies any symptoms. Good UOP in setting of SEBASTIAN    Objective:     Vital Signs (Most Recent):  Temp: 98.3 °F (36.8 °C) (09/26/20 1109)  Pulse: 80 (09/26/20 1109)  Resp: 18 (09/26/20 1109)  BP: 137/73 (09/26/20 1109)  SpO2: (!) 92 % (09/26/20 1109) Vital Signs (24h Range):  Temp:  [97.8 °F (36.6 °C)-99.7 °F (37.6 °C)] 98.3 °F (36.8 °C)  Pulse:  [] 80  Resp:  [16-18] 18  SpO2:  [91 %-95 %] 92 %  BP: (129-157)/(59-83) 137/73     Weight: 134.1 kg (295 lb 10.2 oz)  Body mass index is 36.95 kg/m².  Body surface area is 2.66 meters squared.    ECOG SCORE         [unfilled]    Intake/Output - Last 3 Shifts       09/24 0700 - 09/25 0659 09/25 0700 - 09/26 0659 09/26 0700 - 09/27 0659    P.O. 3320 1600 600    I.V. (mL/kg)  550.8 (4.1) 354.2 (2.6)    Blood 350      IV Piggyback  430.2     Total Intake(mL/kg) 3670 (27.3) 2581.1 (19.2) 954.2 (7.1)    Urine (mL/kg/hr) 3900 (1.2) 3050 (0.9) 1325 (1.5)    Other 1000      Stool  0     Total Output 4900 3050 1325    Net -1230 -469 -370.8           Urine Occurrence 1 x      Stool Occurrence  1 x           Physical Exam  Constitutional:       Appearance: He is well-developed. He is obese.   HENT:      Head: Normocephalic and atraumatic.      Mouth/Throat:      Pharynx: No oropharyngeal exudate.   Eyes:      General:         Right eye: No discharge.         Left eye: No discharge.      Conjunctiva/sclera: Conjunctivae normal.      Pupils: Pupils are equal, round, and reactive to light.   Neck:      Musculoskeletal: Normal range of motion and neck supple.   Cardiovascular:      Rate and Rhythm: Rhythm irregular.      Heart sounds: Normal heart sounds. No murmur.   Pulmonary:      Effort: Pulmonary effort is normal. No respiratory distress.      Breath sounds: No wheezing or rales.      Comments:     Abdominal:      General: Bowel sounds are normal. There is no distension.      Palpations: Abdomen is soft.       Tenderness: There is no abdominal tenderness.   Musculoskeletal: Normal range of motion.         General: No deformity.   Skin:     General: Skin is warm and dry.      Findings: No erythema or rash.   Neurological:      Mental Status: He is alert and oriented to person, place, and time.   Psychiatric:         Behavior: Behavior normal.         Thought Content: Thought content normal.         Judgment: Judgment normal.         Significant Labs:   CBC:   Recent Labs   Lab 09/24/20  1642 09/25/20  0515 09/26/20  0300   WBC 2.01* 2.33* 2.24*   HGB 6.6* 7.3* 7.9*   HCT 20.0* 21.8* 24.1*   PLT 41* 31* 28*    and CMP:   Recent Labs   Lab 09/25/20  0515 09/25/20  1605 09/26/20  0300    137 137   K 3.7 3.8 3.9    101 100   CO2 29 28 26   * 179* 245*   BUN 19 20 24*   CREATININE 2.2* 2.3* 2.3*   CALCIUM 8.1* 8.0* 8.2*   PROT 5.7* 5.7* 6.4   ALBUMIN 2.6* 2.5* 2.9*   BILITOT 1.9* 0.8 0.9   ALKPHOS 58 55 64   AST 10 15 12   ALT 29 31 33   ANIONGAP 8 8 11   EGFRNONAA 32.5* 30.8* 30.8*       Diagnostic Results:  I have reviewed all pertinent imaging results/findings within the past 24 hours.

## 2020-09-26 NOTE — SUBJECTIVE & OBJECTIVE
Interval History: NAEON. Patient to begin Chemo today. ARB and acyclovir held in setting of SEBASTIAN. 3.1L UOP last 24 hours. Cr 2-->2.2 today.     Review of patient's allergies indicates:  No Known Allergies  Current Facility-Administered Medications   Medication Frequency    0.9%  NaCl infusion (for blood administration) Q24H PRN    0.9%  NaCl infusion (for blood administration) Q24H PRN    0.9%  NaCl infusion Continuous    acetaminophen tablet 650 mg Q6H PRN    acyclovir capsule 400 mg BID    allopurinoL tablet 300 mg Daily    alteplase injection 2 mg PRN    cytarabine (PF) (CYTOSAR) 100 mg/m2/day = 265 mg in sodium chloride 0.9% 1,000 mL chemo infusion Q24H    dexAMETHasone tablet 12 mg Q24H    dextrose 50% injection 12.5 g PRN    dextrose 50% injection 25 g PRN    digoxin tablet 250 mcg Daily    diphenhydrAMINE capsule 25 mg Q6H PRN    glucagon (human recombinant) injection 1 mg PRN    glucose chewable tablet 16 g PRN    glucose chewable tablet 24 g PRN    guaifenesin 100 mg/5 ml syrup 200 mg Q4H PRN    heparin (porcine) injection 1,000 Units PRN    heparin, porcine (PF) 100 unit/mL injection flush 300 Units PRN    IDArubicin injection 30 mg Q24H    insulin aspart U-100 pen 0-5 Units QID (AC + HS) PRN    insulin detemir U-100 pen 8 Units Daily    levoFLOXacin tablet 500 mg Daily    metoprolol injection 5 mg Q5 Min PRN    metoprolol succinate (TOPROL-XL) 24 hr tablet 100 mg Daily    ondansetron disintegrating tablet 8 mg Q8H PRN    ondansetron tablet 16 mg Q24H    oxyCODONE immediate release tablet 5 mg Q8H PRN    polyethylene glycol packet 17 g BID PRN    prochlorperazine injection Soln 10 mg Q6H PRN    sodium chloride 0.9% flush 10 mL PRN    terazosin capsule 5 mg QHS       Objective:     Vital Signs (Most Recent):  Temp: 98.2 °F (36.8 °C) (09/26/20 0828)  Pulse: 79 (09/26/20 0852)  Resp: 16 (09/26/20 0725)  BP: (!) 152/75 (09/26/20 0730)  SpO2: (!) 91 % (09/26/20 0725)  O2 Device  (Oxygen Therapy): room air (09/26/20 0725) Vital Signs (24h Range):  Temp:  [97.8 °F (36.6 °C)-99.7 °F (37.6 °C)] 98.2 °F (36.8 °C)  Pulse:  [] 79  Resp:  [16-18] 16  SpO2:  [91 %-95 %] 91 %  BP: (129-157)/(59-83) 152/75     Weight: 134.1 kg (295 lb 10.2 oz) (09/26/20 0254)  Body mass index is 36.95 kg/m².  Body surface area is 2.66 meters squared.    I/O last 3 completed shifts:  In: 3771.1 [P.O.:2440; I.V.:550.8; Blood:350; IV Piggyback:430.2]  Out: 4550 [Urine:4550]    Physical Exam  Constitutional:       Appearance: He is well-developed.   HENT:      Head: Normocephalic and atraumatic.      Mouth/Throat:      Pharynx: No oropharyngeal exudate.   Eyes:      General:         Right eye: No discharge.         Left eye: No discharge.      Conjunctiva/sclera: Conjunctivae normal.      Pupils: Pupils are equal, round, and reactive to light.   Neck:      Musculoskeletal: Normal range of motion and neck supple.   Cardiovascular:      Rate and Rhythm: Normal rate. Rhythm irregular.      Heart sounds: Normal heart sounds. No murmur.   Pulmonary:      Effort: Pulmonary effort is normal. No respiratory distress.      Breath sounds: No wheezing or rales.      Comments: clr/diminished left base    Abdominal:      General: Bowel sounds are normal. There is no distension.      Palpations: Abdomen is soft.      Tenderness: There is no abdominal tenderness.   Musculoskeletal: Normal range of motion.         General: No deformity.   Skin:     General: Skin is warm and dry.      Findings: No erythema or rash.   Neurological:      Mental Status: He is alert and oriented to person, place, and time.   Psychiatric:         Behavior: Behavior normal.         Thought Content: Thought content normal.         Judgment: Judgment normal.         Significant Labs:  All labs within the past 24 hours have been reviewed.     Significant Imaging:  Labs: Reviewed  US: Reviewed

## 2020-09-27 NOTE — SUBJECTIVE & OBJECTIVE
Subjective:     Interval History: Day 3 of 7+3  Tolerating well, denies any symptoms. SEBASTIAN improving.     Objective:     Vital Signs (Most Recent):  Temp: 97.8 °F (36.6 °C) (09/27/20 1126)  Pulse: 73 (09/27/20 1126)  Resp: 20 (09/27/20 1126)  BP: (!) 145/72 (09/27/20 1130)  SpO2: 95 % (09/27/20 1126) Vital Signs (24h Range):  Temp:  [97.8 °F (36.6 °C)-98.7 °F (37.1 °C)] 97.8 °F (36.6 °C)  Pulse:  [61-75] 73  Resp:  [16-22] 20  SpO2:  [90 %-95 %] 95 %  BP: (128-148)/(60-79) 145/72     Weight: 134.3 kg (296 lb 1.2 oz)  Body mass index is 37.01 kg/m².  Body surface area is 2.67 meters squared.    ECOG SCORE         [unfilled]    Intake/Output - Last 3 Shifts       09/25 0700 - 09/26 0659 09/26 0700 - 09/27 0659 09/27 0700 - 09/28 0659    P.O. 1600 2390 840    I.V. (mL/kg) 550.8 (4.1) 1327.5 (9.9) 409.2 (3)    Blood       IV Piggyback 430.2 1123.9 298.1    Total Intake(mL/kg) 2581.1 (19.2) 4841.4 (36) 1547.3 (11.5)    Urine (mL/kg/hr) 3050 (0.9) 3675 (1.1) 1100 (1.2)    Other       Stool 0 0     Total Output 3050 3675 1100    Net -469 +1166.4 +447.3           Stool Occurrence 1 x 1 x           Physical Exam  Constitutional:       Appearance: He is well-developed. He is obese.   HENT:      Head: Normocephalic and atraumatic.      Mouth/Throat:      Pharynx: No oropharyngeal exudate.   Eyes:      General:         Right eye: No discharge.         Left eye: No discharge.      Conjunctiva/sclera: Conjunctivae normal.      Pupils: Pupils are equal, round, and reactive to light.   Neck:      Musculoskeletal: Normal range of motion and neck supple.   Cardiovascular:      Rate and Rhythm: Rhythm irregular.      Heart sounds: Normal heart sounds. No murmur.   Pulmonary:      Effort: Pulmonary effort is normal. No respiratory distress.      Breath sounds: No wheezing or rales.      Comments:     Abdominal:      General: Bowel sounds are normal. There is no distension.      Palpations: Abdomen is soft.      Tenderness: There is no  abdominal tenderness.   Musculoskeletal: Normal range of motion.         General: No deformity.   Skin:     General: Skin is warm and dry.      Findings: No erythema or rash.   Neurological:      Mental Status: He is alert and oriented to person, place, and time.   Psychiatric:         Behavior: Behavior normal.         Thought Content: Thought content normal.         Judgment: Judgment normal.         Significant Labs:   CBC:   Recent Labs   Lab 09/26/20  0300 09/27/20  0300   WBC 2.24* 1.71*   HGB 7.9* 7.0*   HCT 24.1* 21.0*   PLT 28* 18*    and CMP:   Recent Labs   Lab 09/25/20  1605 09/26/20  0300 09/27/20  0300    137 136   K 3.8 3.9 4.3    100 103   CO2 28 26 23   * 245* 242*   BUN 20 24* 29*   CREATININE 2.3* 2.3* 2.1*   CALCIUM 8.0* 8.2* 7.7*   PROT 5.7* 6.4 5.5*   ALBUMIN 2.5* 2.9* 2.5*   BILITOT 0.8 0.9 0.5   ALKPHOS 55 64 53*   AST 15 12 10   ALT 31 33 25   ANIONGAP 8 11 10   EGFRNONAA 30.8* 30.8* 34.4*       Diagnostic Results:  I have reviewed all pertinent imaging results/findings within the past 24 hours.

## 2020-09-27 NOTE — NURSING
cytarabine (PF) (CYTOSAR) 100 mg/m2/day = 265 mg in sodium chloride 0.9% 1,000 mL chemo infusion started through R chest vast cath noted for having positive blood return over 1 hour.  Chemotherapy dosage and BSA checked by two chemotherapy certified nurses prior to administration.  Chemotherapy education done prior to hanging of chemotherapy.  Chemotherapeutic precautions in place throughout therapy.  Will continue to monitor.

## 2020-09-27 NOTE — ASSESSMENT & PLAN NOTE
55 year old man who presents as a transfer for possible leukemia after manual differential demonstrated blasts.   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  - fish results - t(10;11), flt3 negative, ngs pending.   - Started on 7+3  Day 3    - CBC, CMP, PT/INR, fibrinogen, uric acid, phos daily to assess for TLS and DIC.   - Peripheral smear -  Hyperleukocytosis with >20% circulating blastoid cells   - 2D ECHO with EF of 58%   - Continue allopurinol 300mg BID and on IVF  - Hydrea stopped on 9/19  - Transfuse cyroglobulin if fibrinogen <150  - Transfuse if platelets <10, Hgb<7 and/or patient actively bleeding  - Lopes placed 9/21/20.  - On Acyclovir and Levaquin for ppx

## 2020-09-27 NOTE — NURSING
IDArubicin injection 30 mg started through R chest vast cath noted for having positive blood return. Pre-medications administered prior to chemo administration. Pt denied c/o of irritation at infusion site. Blood back noted in infusion line prior and post administration. Chemotherapy dosage and BSA checked by two chemotherapy certified nurses prior to administration.  Chemotherapy education done prior to hanging of chemotherapy.  Chemotherapeutic precautions in place throughout therapy.  Will continue to monitor.

## 2020-09-27 NOTE — ASSESSMENT & PLAN NOTE
Hyperphosphatemia    creatinine up to 2. Previously 0.8.  - likely 2/2 IV abx (Vanc and Zosyn) which he completed on 9/22/20. Also received IV contract with CT on 9/16/20.   - Nephrology consulted, appreciate recommendations   -Monitor RF, improving  -Strict I/O  -Avoid Nephrotoxic drugs, holding losartan   -Renally dose Medications

## 2020-09-27 NOTE — PROGRESS NOTES
[facility]  Hematology  Bone Marrow Transplant  Progress Note    Patient Name: Shell Diaz  Admission Date: 9/16/2020  Hospital Length of Stay: 11 days  Code Status: Full Code    Subjective:     Interval History: Day 3 of 7+3  Tolerating well, denies any symptoms. SEBASTIAN improving.     Objective:     Vital Signs (Most Recent):  Temp: 97.8 °F (36.6 °C) (09/27/20 1126)  Pulse: 73 (09/27/20 1126)  Resp: 20 (09/27/20 1126)  BP: (!) 145/72 (09/27/20 1130)  SpO2: 95 % (09/27/20 1126) Vital Signs (24h Range):  Temp:  [97.8 °F (36.6 °C)-98.7 °F (37.1 °C)] 97.8 °F (36.6 °C)  Pulse:  [61-75] 73  Resp:  [16-22] 20  SpO2:  [90 %-95 %] 95 %  BP: (128-148)/(60-79) 145/72     Weight: 134.3 kg (296 lb 1.2 oz)  Body mass index is 37.01 kg/m².  Body surface area is 2.67 meters squared.    ECOG SCORE         [unfilled]    Intake/Output - Last 3 Shifts       09/25 0700 - 09/26 0659 09/26 0700 - 09/27 0659 09/27 0700 - 09/28 0659    P.O. 1600 2390 840    I.V. (mL/kg) 550.8 (4.1) 1327.5 (9.9) 409.2 (3)    Blood       IV Piggyback 430.2 1123.9 298.1    Total Intake(mL/kg) 2581.1 (19.2) 4841.4 (36) 1547.3 (11.5)    Urine (mL/kg/hr) 3050 (0.9) 3675 (1.1) 1100 (1.2)    Other       Stool 0 0     Total Output 3050 3675 1100    Net -469 +1166.4 +447.3           Stool Occurrence 1 x 1 x           Physical Exam  Constitutional:       Appearance: He is well-developed. He is obese.   HENT:      Head: Normocephalic and atraumatic.      Mouth/Throat:      Pharynx: No oropharyngeal exudate.   Eyes:      General:         Right eye: No discharge.         Left eye: No discharge.      Conjunctiva/sclera: Conjunctivae normal.      Pupils: Pupils are equal, round, and reactive to light.   Neck:      Musculoskeletal: Normal range of motion and neck supple.   Cardiovascular:      Rate and Rhythm: Rhythm irregular.      Heart sounds: Normal heart sounds. No murmur.   Pulmonary:      Effort: Pulmonary effort is normal. No respiratory  distress.      Breath sounds: No wheezing or rales.      Comments:     Abdominal:      General: Bowel sounds are normal. There is no distension.      Palpations: Abdomen is soft.      Tenderness: There is no abdominal tenderness.   Musculoskeletal: Normal range of motion.         General: No deformity.   Skin:     General: Skin is warm and dry.      Findings: No erythema or rash.   Neurological:      Mental Status: He is alert and oriented to person, place, and time.   Psychiatric:         Behavior: Behavior normal.         Thought Content: Thought content normal.         Judgment: Judgment normal.         Significant Labs:   CBC:   Recent Labs   Lab 09/26/20  0300 09/27/20  0300   WBC 2.24* 1.71*   HGB 7.9* 7.0*   HCT 24.1* 21.0*   PLT 28* 18*    and CMP:   Recent Labs   Lab 09/25/20  1605 09/26/20  0300 09/27/20  0300    137 136   K 3.8 3.9 4.3    100 103   CO2 28 26 23   * 245* 242*   BUN 20 24* 29*   CREATININE 2.3* 2.3* 2.1*   CALCIUM 8.0* 8.2* 7.7*   PROT 5.7* 6.4 5.5*   ALBUMIN 2.5* 2.9* 2.5*   BILITOT 0.8 0.9 0.5   ALKPHOS 55 64 53*   AST 15 12 10   ALT 31 33 25   ANIONGAP 8 11 10   EGFRNONAA 30.8* 30.8* 34.4*       Diagnostic Results:  I have reviewed all pertinent imaging results/findings within the past 24 hours.    Assessment/Plan:     * Acute myeloid leukemia not having achieved remission  55 year old man who presents as a transfer for possible leukemia after manual differential demonstrated blasts.   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  - fish results - t(10;11), flt3 negative, ngs pending.   - Started on 7+3  Day 3    - CBC, CMP, PT/INR, fibrinogen, uric acid, phos daily to assess for TLS and DIC.   - Peripheral smear -  Hyperleukocytosis with >20% circulating blastoid cells   - 2D ECHO with  EF of 58%   - Continue allopurinol 300mg BID and on IVF  - Hydrea stopped on 9/19  - Transfuse cyroglobulin if fibrinogen <150  - Transfuse if platelets <10, Hgb<7 and/or patient actively bleeding  - Lopes placed 9/21/20.  - On Acyclovir and Levaquin for ppx    Pleural effusion on left  - Pulm consulted and performed thora at bedside 9/24  - 1L bloody pleural fluids removed.  -  sent for cytology, flow, micro, etc.    SEBASTIAN (acute kidney injury)  Hyperphosphatemia    creatinine up to 2. Previously 0.8.  - likely 2/2 IV abx (Vanc and Zosyn) which he completed on 9/22/20. Also received IV contract with CT on 9/16/20.   - Nephrology consulted, appreciate recommendations   -Monitor RF, improving  -Strict I/O  -Avoid Nephrotoxic drugs, holding losartan   -Renally dose Medications        Type 2 diabetes mellitus, without long-term current use of insulin  HbA1C 8  - on Metformin at home. Holding inpatient  - Insulin Detemir 8U daily   - Insulin Aspart 2U TID  - ACHS blood glucose monitoring and s/s insulin while inpatient  - Goal -180.     V tach  - had at 6 beat run of v-tach 9/18/20 at ~ 6 am. Not sustained  - keep mag > or = 2 and K+ > or = 4  - cardiology consulted earlier in admission    Leukocytosis  -  on admission and 129 prior to starting hydrea  - see AML  - Hydrea stopped 9/20/20 with normal WBC count.      Pancytopenia  - Secondary to AML  - Transfuse if platelet count<10. If patient develops active bleed, transfuse for goal >50.  - Transfuse for hgb < 7          Pneumonia  Patient presented with fevers, fatigue, concerning due to acute leukemoid process as significant leukocytosis and blasts. Fevers likely related to this but infection workup in process.   - CXR at OSH did not show findings concerning for PNA.   - Chest CT performed 9/16/20 due to hemoptysis and suggestive of LLL PNA  - Blood cx with NGTD  - u/a not suggestive of UTI  -- completed 7 day course vancomycin and zosyn 9/22.   -  Switched to ppx LVQ 9/23/20.  - repeated cxr 9/23/20. Showing large left pleural effusion. S/p thoracentesis 09/24. 1L bloody pleural fluid removed and sent for studies including cytology.       Essential hypertension  - Hold home losartan in setting ok SEBASTIAN  - Monitor BP      Paroxysmal atrial fibrillation  Patient states that he had pacemaker placed for this.   Previsouly ECHO with EF 58 %  Holding Eliquis due to low platelet count. Resume when appropriate.  EKG from 9/16/20 showing afib with RVR  HR now controlled  Cardiology consulted due to cardiac history as patient will receive anthracycline if he is induced for AML  Cardiology repeated ehco 9/17 to assess for ventricle straining. 16% LV straining noted on echo  - Keep mag > 2 and K+ > 4  - Continue home digoxin and metoprolol.   - To repeat ECHO after completion of chemo per card recs  - Monitor I/O and Daily weights                 VTE Risk Mitigation (From admission, onward)         Ordered     heparin, porcine (PF) 100 unit/mL injection flush 300 Units  As needed (PRN)      09/25/20 1408     heparin (porcine) injection 1,000 Units  As needed (PRN)      09/22/20 0343     IP VTE HIGH RISK PATIENT  Once      09/16/20 0017     Place sequential compression device  Until discontinued      09/16/20 0017                Disposition: Home    Vida Guillen MD  Bone Marrow Transplant  [facility]

## 2020-09-27 NOTE — PLAN OF CARE
Patient remained free from falls throughout shift, call bell within reach. Day 3 of 7+3. No complaints of pain or discomfort. Mild dyspnea on exertion. Vitals stable, will continue to monitor.

## 2020-09-27 NOTE — ASSESSMENT & PLAN NOTE
HbA1C 8  - on Metformin at home. Holding inpatient  - Insulin Detemir 8U daily   - Insulin Aspart 2U TID  - ACHS blood glucose monitoring and s/s insulin while inpatient  - Goal -180.

## 2020-09-27 NOTE — PLAN OF CARE
Plan of care reviewed with patient and family this shift. VS stable. Maintaining saturations on room air.  IVF continued. Telemetry monitoring continued; A fib with episodes of tachycardia on exertion. . All questions and concerns addressed. Will continue to monitor.

## 2020-09-27 NOTE — PLAN OF CARE
Pt involved in plan of care and communicating needs throughout shift. Day 3 of 7 + 3. Cytarabine maintained @ 44 ml/hr. Continuous fluids maintained @ 50 ml/hr. Up in room and to bathroom independently; no c/o pain. Tolerating diet, voiding without difficulty. Telemetry maintained; a fib noted  Accuchecks ACHS; insulin administered as ordered. VSS; no acute events so far this shift.  Pt remaining free from falls or injury throughout shift; bed locked and in lowest position; call light within reach.  Pt instructed to call for assistance as needed.  Q1H rounding done on pt.

## 2020-09-28 PROBLEM — E83.39 HYPERPHOSPHATEMIA: Status: ACTIVE | Noted: 2020-01-01

## 2020-09-28 PROBLEM — D61.810 PANCYTOPENIA DUE TO ANTINEOPLASTIC CHEMOTHERAPY: Status: ACTIVE | Noted: 2020-01-01

## 2020-09-28 PROBLEM — E83.39 HYPERPHOSPHATEMIA: Status: RESOLVED | Noted: 2020-01-01 | Resolved: 2020-01-01

## 2020-09-28 PROBLEM — T45.1X5A PANCYTOPENIA DUE TO ANTINEOPLASTIC CHEMOTHERAPY: Status: ACTIVE | Noted: 2020-01-01

## 2020-09-28 NOTE — ASSESSMENT & PLAN NOTE
Patient presented with fevers, fatigue, concerning due to acute leukemoid process as significant leukocytosis and blasts. Fevers likely related to this but infection workup in process.   - CXR at OSH did not show findings concerning for PNA.   - Chest CT performed 9/16/20 due to hemoptysis and suggestive of LLL PNA  - Blood cx with NGTD  - u/a not suggestive of UTI  -- completed 7 day course vancomycin and zosyn 9/22.   - Switched to ppx LVQ 9/23/20.  - repeated cxr 9/23/20. Showing large left pleural effusion. S/p thoracentesis 09/24. 1L bloody pleural fluid removed and sent for studies including cytology, micro, and flow. Cytology and micro pending. Flow showing blasts.

## 2020-09-28 NOTE — PROGRESS NOTES
Chemotherapy consent on chart. Drug, 2 pt identifiers, BSA and chemo calculation checked with second certified RN.  Cytarabine (PF) (CYTOSAR) 100 mg/m2/day = 265 mg in sodium chloride 0.9% 1,000 mL chemo infusion to right vas-cath, at 44ml/h.  Instructed pt to call with any problems/discomfort. Verbalized understanding. Call light within reach.

## 2020-09-28 NOTE — ASSESSMENT & PLAN NOTE
55 year old man who presents as a transfer for possible leukemia after manual differential demonstrated blasts.   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  - fish results - t(10;11), flt3 negative, ngs pending.   - Started 7+3 on 9/25/20. Today is day 4    - CBC, CMP, PT/INR, fibrinogen, uric acid, phos daily to assess for TLS and DIC.   - Peripheral smear -  Hyperleukocytosis with >20% circulating blastoid cells   - 2D ECHO with EF of 58%   - Continue allopurinol 300mg BID and on IVF  - Hydrea stopped on 9/19  - Transfuse cyroglobulin if fibrinogen <150  - Transfuse if platelets <10, Hgb<7 and/or patient actively bleeding  - Lopes placed 9/21/20.  - On Acyclovir and Levaquin for ppx. Will start vori ppx tomorrow (9/29/20)  - his is a stem cell transplant candidate. Sent HLA typing earlier in admission. Will get contact info for siblings and share with transplant coordinator.

## 2020-09-28 NOTE — ASSESSMENT & PLAN NOTE
- Pulm consulted and performed thora at bedside 9/24  - 1L bloody pleural fluids removed.  -  sent for cytology, flow, micro, etc.  - cytology and micro pending. Blasts seen on flow.

## 2020-09-28 NOTE — SUBJECTIVE & OBJECTIVE
Interval History: No acute events overnight. Urinating without issue.    Review of patient's allergies indicates:  No Known Allergies  Current Facility-Administered Medications   Medication Frequency    0.9%  NaCl infusion (for blood administration) Q24H PRN    0.9%  NaCl infusion (for blood administration) Q24H PRN    0.9%  NaCl infusion Continuous    acetaminophen tablet 650 mg Q6H PRN    acyclovir capsule 400 mg BID    allopurinoL tablet 300 mg Daily    alteplase injection 2 mg PRN    cytarabine (PF) (CYTOSAR) 100 mg/m2/day = 265 mg in sodium chloride 0.9% 1,000 mL chemo infusion Q24H    dextrose 50% injection 12.5 g PRN    dextrose 50% injection 25 g PRN    digoxin tablet 250 mcg Daily    diphenhydrAMINE capsule 25 mg Q6H PRN    glucagon (human recombinant) injection 1 mg PRN    glucose chewable tablet 16 g PRN    glucose chewable tablet 24 g PRN    guaifenesin 100 mg/5 ml syrup 200 mg Q4H PRN    heparin (porcine) injection 1,000 Units PRN    heparin, porcine (PF) 100 unit/mL injection flush 300 Units PRN    insulin aspart U-100 pen 0-5 Units QID (AC + HS) PRN    insulin aspart U-100 pen 2 Units TIDWM    insulin detemir U-100 pen 10 Units Daily    levoFLOXacin tablet 500 mg Daily    metoprolol injection 5 mg Q5 Min PRN    metoprolol succinate (TOPROL-XL) 24 hr tablet 100 mg Daily    ondansetron disintegrating tablet 8 mg Q8H PRN    ondansetron tablet 16 mg Q24H    oxyCODONE immediate release tablet 5 mg Q8H PRN    polyethylene glycol packet 17 g BID PRN    prochlorperazine injection Soln 10 mg Q6H PRN    sevelamer carbonate tablet 800 mg TID WM    sodium chloride 0.9% flush 10 mL PRN    terazosin capsule 5 mg QHS    [START ON 9/29/2020] voriconazole tablet 200 mg BID       Objective:     Vital Signs (Most Recent):  Temp: 98.3 °F (36.8 °C) (09/28/20 1124)  Pulse: 67 (09/28/20 1124)  Resp: 18 (09/28/20 1124)  BP: 117/74 (09/28/20 1124)  SpO2: (!) 93 % (09/28/20 1124)  O2 Device  (Oxygen Therapy): room air (09/28/20 0817) Vital Signs (24h Range):  Temp:  [97.4 °F (36.3 °C)-98.8 °F (37.1 °C)] 98.3 °F (36.8 °C)  Pulse:  [61-94] 67  Resp:  [16-18] 18  SpO2:  [90 %-96 %] 93 %  BP: (117-155)/(60-95) 117/74     Weight: 134.8 kg (297 lb 1.1 oz) (09/28/20 0453)  Body mass index is 37.13 kg/m².  Body surface area is 2.67 meters squared.    I/O last 3 completed shifts:  In: 6761.9 [P.O.:2875; I.V.:1744.2; IV Piggyback:2142.7]  Out: 7300 [Urine:7300]    Physical Exam    Significant Labs:  CBC:   Recent Labs   Lab 09/28/20  0303   WBC 0.33*   RBC 2.04*   HGB 6.5*   HCT 19.7*   PLT 10*   MCV 97   MCH 31.9*   MCHC 33.0     CMP:   Recent Labs   Lab 09/28/20  0303   *   CALCIUM 7.5*   ALBUMIN 2.5*   PROT 5.2*      K 4.7   CO2 24      BUN 39*   CREATININE 2.0*   ALKPHOS 51*   ALT 23   AST 8*   BILITOT 0.6     All labs within the past 24 hours have been reviewed.     Significant Imaging:    Retroperitoneal US 9/24:  Impression:     Elevated resistive indices with decreased perfusion suggestive of medical renal disease.  No evidence of hydronephrosis.     Prostate upper limits of normal in size.

## 2020-09-28 NOTE — SUBJECTIVE & OBJECTIVE
Subjective:     Interval History: Day 4 of 7+3 for AML. Tolerating chemo well thus far. Creatinine slowly down-trending. Weight stable. Good urine output. Oral intake is fair. No evidence of mucositis on exam. Phos 7.0. started phos binder. Blood glucose in 200s. Increased levemir dose to 10 units nightly. Receiving 1 unit prbc today for hgb of 6.5. Will start vori tomorrow.    Objective:     Vital Signs (Most Recent):  Temp: 98.3 °F (36.8 °C) (09/28/20 1124)  Pulse: 67 (09/28/20 1124)  Resp: 18 (09/28/20 1124)  BP: 117/74 (09/28/20 1124)  SpO2: (!) 93 % (09/28/20 1124) Vital Signs (24h Range):  Temp:  [97.4 °F (36.3 °C)-98.8 °F (37.1 °C)] 98.3 °F (36.8 °C)  Pulse:  [61-94] 67  Resp:  [16-18] 18  SpO2:  [90 %-96 %] 93 %  BP: (117-155)/(60-95) 117/74     Weight: 134.8 kg (297 lb 1.1 oz)  Body mass index is 37.13 kg/m².  Body surface area is 2.67 meters squared.    ECOG SCORE         [unfilled]    Intake/Output - Last 3 Shifts       09/26 0700 - 09/27 0659 09/27 0700 - 09/28 0659 09/28 0700 - 09/29 0659    P.O. 2390 1895 480    I.V. (mL/kg) 1327.5 (9.9) 1170.8 (8.7) 230.8 (1.7)    IV Piggyback 1123.9 1018.8 203.1    Total Intake(mL/kg) 4841.4 (36) 4084.6 (30.3) 914 (6.8)    Urine (mL/kg/hr) 3675 (1.1) 6000 (1.9) 1275 (1.9)    Stool 0 0     Total Output 3675 6000 1275    Net +1166.4 -1915.4 -361           Stool Occurrence 1 x 0 x           Physical Exam  Constitutional:       Appearance: He is well-developed.   HENT:      Head: Normocephalic and atraumatic.      Mouth/Throat:      Pharynx: No oropharyngeal exudate.   Eyes:      General:         Right eye: No discharge.         Left eye: No discharge.      Conjunctiva/sclera: Conjunctivae normal.      Pupils: Pupils are equal, round, and reactive to light.   Neck:      Musculoskeletal: Normal range of motion and neck supple.   Cardiovascular:      Rate and Rhythm: Normal rate. Rhythm irregular.      Heart sounds: Normal heart sounds. No murmur.   Pulmonary:      Effort:  Pulmonary effort is normal. No respiratory distress.      Breath sounds: No wheezing or rales.      Comments: clr/diminished left base    Abdominal:      General: Bowel sounds are normal. There is no distension.      Palpations: Abdomen is soft.      Tenderness: There is no abdominal tenderness.   Musculoskeletal: Normal range of motion.         General: No deformity.   Skin:     General: Skin is warm and dry.      Findings: No erythema or rash.      Comments: Right chest wall vas cath. Dressing c/d/i with no sign of infection noted.   Neurological:      Mental Status: He is alert and oriented to person, place, and time.   Psychiatric:         Behavior: Behavior normal.         Thought Content: Thought content normal.         Judgment: Judgment normal.         Significant Labs:   CBC:   Recent Labs   Lab 09/27/20  0300 09/28/20  0303   WBC 1.71* 0.33*   HGB 7.0* 6.5*   HCT 21.0* 19.7*   PLT 18* 10*    and CMP:   Recent Labs   Lab 09/27/20  0300 09/28/20  0303    137   K 4.3 4.7    105   CO2 23 24   * 264*   BUN 29* 39*   CREATININE 2.1* 2.0*   CALCIUM 7.7* 7.5*   PROT 5.5* 5.2*   ALBUMIN 2.5* 2.5*   BILITOT 0.5 0.6   ALKPHOS 53* 51*   AST 10 8*   ALT 25 23   ANIONGAP 10 8   EGFRNONAA 34.4* 36.5*       Diagnostic Results:  I have reviewed all pertinent imaging results/findings within the past 24 hours.

## 2020-09-28 NOTE — PLAN OF CARE
Problem: Adult Inpatient Plan of Care  Goal: Plan of Care Review  Outcome: Ongoing, Progressing  Flowsheets (Taken 9/28/2020 8186)  Plan of Care Reviewed With: patient  Patient remains free from falls and injury this shift. Bed in low, locked position with call light in reach. Plan of care reviewed with pt.  ELBAS.  Day 4 of 7+3.  1u RBC completed transfusing this AM.  Continuous IVF and chemo infusing to right chest vas-cath, dressing c/d/I.  Blood sugar monitored before meals.  Patient encouraged to call for assistance when getting out of bed.  Patient verbalized understanding. All belongings within reach.  Will continue to monitor.

## 2020-09-28 NOTE — ASSESSMENT & PLAN NOTE
- creatinine up to 2. Previously 0.8.  - likely 2/2 IV abx (Vanc and Zosyn) which he completed on 9/22/20. Also received IV contract with CT on 9/16/20.   - Nephrology consulted, appreciate recommendations   -Monitor RF, improving  -Strict I/O  -Avoid Nephrotoxic drugs, holding losartan   -Renally dose Medications  -creatinine down slightly to 2.0 today. Slowly down-trending.

## 2020-09-28 NOTE — ASSESSMENT & PLAN NOTE
HbA1C 8  - on Metformin at home. Holding inpatient  - Insulin Aspart 2U TID  - ACHS blood glucose monitoring and s/s insulin while inpatient  - Goal -180.   - blood glucose in 200s  - was started on levemir since admission. Increased dose from 8 units nightly to 10 units nightly today (9/28/20). Increased conservatively as oral intake is fair and may worsen.

## 2020-09-28 NOTE — ASSESSMENT & PLAN NOTE
- had at 6 beat run of v-tach 9/18/20 at ~ 6 am. Not sustained  - keep mag > or = 2 and K+ > or = 4  - cardiology consulted earlier in admission. No longer following. Will re-consult them if indicated.

## 2020-09-28 NOTE — PROGRESS NOTES
"[facility]  Nephrology  Progress Note    Patient Name: Shell Diaz  MRN: [id number]  Admission Date: 9/16/2020  Hospital Length of Stay: 12 days  Attending Provider: Richy Thurman MD   Primary Care Physician: Alva Rubio MD  Principal Problem:Acute myeloid leukemia not having achieved remission    Subjective:     HPI: Mr. Diaz is a 54yo M with a PMH of Afib with pacemaker on eliquis, HTN, and cardiomyopathy who presnted to OSH with fever and fatigue. At OSH, patient received tylenol, IVF, a dose of vancomycin, zosyn and hydrea 4g. Labs were notable for leukocytosis (WBC 95.7), anemia, thrombocytopenia, elevated INR, D-dimer. CXR showed "cardiomegaly with no acute pulmonary process".  Manual differential demonstrated high number of blasts (100). Patient was found to have LLL pneumonia and was started on Vanc and Zosyn. Finished 7 day course on 9/23. Vanc troughs WNL. He received a CT Chest with contrast on 9/17 and Pulmonology was consulted today for thoracentesis which drained 1L of bloody fluid.     Nephrology was consulted for SEBASTIAN. Cr 1.3 on day of admission. Significant change in creatinine from 0.8 on 9/22 to 1.9 on 9/23. 2.0 on day of consult. Of note, patient is on acyclovir 400 BID and losartan 50, the latter of which is a home medication.       Interval History: No acute events overnight. Urinating without issue.    Review of patient's allergies indicates:  No Known Allergies  Current Facility-Administered Medications   Medication Frequency    0.9%  NaCl infusion (for blood administration) Q24H PRN    0.9%  NaCl infusion (for blood administration) Q24H PRN    0.9%  NaCl infusion Continuous    acetaminophen tablet 650 mg Q6H PRN    acyclovir capsule 400 mg BID    allopurinoL tablet 300 mg Daily    alteplase injection 2 mg PRN    cytarabine (PF) (CYTOSAR) 100 mg/m2/day = 265 mg in sodium chloride 0.9% 1,000 mL chemo infusion Q24H    dextrose 50% injection 12.5 g PRN    " dextrose 50% injection 25 g PRN    digoxin tablet 250 mcg Daily    diphenhydrAMINE capsule 25 mg Q6H PRN    glucagon (human recombinant) injection 1 mg PRN    glucose chewable tablet 16 g PRN    glucose chewable tablet 24 g PRN    guaifenesin 100 mg/5 ml syrup 200 mg Q4H PRN    heparin (porcine) injection 1,000 Units PRN    heparin, porcine (PF) 100 unit/mL injection flush 300 Units PRN    insulin aspart U-100 pen 0-5 Units QID (AC + HS) PRN    insulin aspart U-100 pen 2 Units TIDWM    insulin detemir U-100 pen 10 Units Daily    levoFLOXacin tablet 500 mg Daily    metoprolol injection 5 mg Q5 Min PRN    metoprolol succinate (TOPROL-XL) 24 hr tablet 100 mg Daily    ondansetron disintegrating tablet 8 mg Q8H PRN    ondansetron tablet 16 mg Q24H    oxyCODONE immediate release tablet 5 mg Q8H PRN    polyethylene glycol packet 17 g BID PRN    prochlorperazine injection Soln 10 mg Q6H PRN    sevelamer carbonate tablet 800 mg TID WM    sodium chloride 0.9% flush 10 mL PRN    terazosin capsule 5 mg QHS    [START ON 9/29/2020] voriconazole tablet 200 mg BID       Objective:     Vital Signs (Most Recent):  Temp: 98.3 °F (36.8 °C) (09/28/20 1124)  Pulse: 67 (09/28/20 1124)  Resp: 18 (09/28/20 1124)  BP: 117/74 (09/28/20 1124)  SpO2: (!) 93 % (09/28/20 1124)  O2 Device (Oxygen Therapy): room air (09/28/20 0817) Vital Signs (24h Range):  Temp:  [97.4 °F (36.3 °C)-98.8 °F (37.1 °C)] 98.3 °F (36.8 °C)  Pulse:  [61-94] 67  Resp:  [16-18] 18  SpO2:  [90 %-96 %] 93 %  BP: (117-155)/(60-95) 117/74     Weight: 134.8 kg (297 lb 1.1 oz) (09/28/20 0453)  Body mass index is 37.13 kg/m².  Body surface area is 2.67 meters squared.    I/O last 3 completed shifts:  In: 6761.9 [P.O.:2875; I.V.:1744.2; IV Piggyback:2142.7]  Out: 7300 [Urine:7300]    Physical Exam    Significant Labs:  CBC:   Recent Labs   Lab 09/28/20  0303   WBC 0.33*   RBC 2.04*   HGB 6.5*   HCT 19.7*   PLT 10*   MCV 97   MCH 31.9*   MCHC 33.0     CMP:    Recent Labs   Lab 09/28/20  0303   *   CALCIUM 7.5*   ALBUMIN 2.5*   PROT 5.2*      K 4.7   CO2 24      BUN 39*   CREATININE 2.0*   ALKPHOS 51*   ALT 23   AST 8*   BILITOT 0.6     All labs within the past 24 hours have been reviewed.     Significant Imaging:    Retroperitoneal US 9/24:  Impression:     Elevated resistive indices with decreased perfusion suggestive of medical renal disease.  No evidence of hydronephrosis.     Prostate upper limits of normal in size.    Assessment/Plan:     * Acute myeloid leukemia not having achieved remission  --managed per primary    SEBASTIAN (acute kidney injury)  55yoM with newly diagnosed acute leukemia. Consulted for SEBASTIAN (unknown baseline) with Cr increase from 0.8 on 9/22 to 1.9 on 9/23. 2.0 on day of consult on 9/24. Etiology is likely multifactorial 2/2 to acyclovir, losartan, contrast-induced from CT chest on 9/17 (Cr increase can be delayed 72-96 hours after contrast), and gradual decrease in hemoglobin from 9.9 on admission to 7.1 today. Patient with good PO intake (~2L per day) and UOP of 1.5-4.5L per day since admission. Likely in polyuric phase of ATN.    - Switch maintenance fluids to 1/2 NS with a rate that matches urine output  - continue to hold losartan and acyclovir in setting of SEBASTIAN   - renal u/s with no evidence of hydronephrosis  - UPC 0.18   - Renally dose all medications to current GFR  - Avoid nephrotoxic meds/agents such as NSAIDs, ACE, ARBs, and IV radiocontrast  - Strict intake and output and daily standing weights  - Trend daily labs  - No indications for RRT at this time; will reassess daily       .      Thank you for your consult. I will follow-up with patient. Please contact us if you have any additional questions.    Chris Lin MD PGY1  Nephrology  [facility]

## 2020-09-28 NOTE — ASSESSMENT & PLAN NOTE
Patient states that he had pacemaker placed for this.   Previsouly ECHO with EF 58 %  Holding Eliquis due to low platelet count. Resume when appropriate.  EKG from 9/16/20 showing afib with RVR  HR now controlled  Cardiology consulted due to cardiac history as patient will receive anthracycline if he is induced for AML  Cardiology repeated ehco 9/17 to assess for ventricle straining. 16% LV straining noted on echo  - Keep mag > 2 and K+ > 4  - Continue home digoxin and metoprolol.   - To repeat ECHO after completion of chemo per card recs  - Monitor I/O and Daily weights   - currently rate-controlled

## 2020-09-28 NOTE — ASSESSMENT & PLAN NOTE
55yoM with newly diagnosed acute leukemia. Consulted for SEBASTIAN (unknown baseline) with Cr increase from 0.8 on 9/22 to 1.9 on 9/23. 2.0 on day of consult on 9/24. Etiology is likely multifactorial 2/2 to acyclovir, losartan, contrast-induced from CT chest on 9/17 (Cr increase can be delayed 72-96 hours after contrast), and gradual decrease in hemoglobin from 9.9 on admission to 7.1 today. Patient with good PO intake (~2L per day) and UOP of 1.5-4.5L per day since admission. Likely in polyuric phase of ATN.    - Switch maintenance fluids to 1/2 NS with a rate that matches urine output  - continue to hold losartan and acyclovir in setting of SEBASTIAN   - renal u/s with no evidence of hydronephrosis  - UPC 0.18   - Renally dose all medications to current GFR  - Avoid nephrotoxic meds/agents such as NSAIDs, ACE, ARBs, and IV radiocontrast  - Strict intake and output and daily standing weights  - Trend daily labs  - No indications for RRT at this time; will reassess daily       .

## 2020-09-28 NOTE — PROGRESS NOTES
[facility]  Hematology  Bone Marrow Transplant  Progress Note    Patient Name: Shell Diaz  Admission Date: 9/16/2020  Hospital Length of Stay: 12 days  Code Status: Full Code    Subjective:     Interval History: Day 4 of 7+3 for AML. Tolerating chemo well thus far. Creatinine slowly down-trending. Weight stable. Good urine output. Oral intake is fair. No evidence of mucositis on exam. Phos 7.0. started phos binder. Blood glucose in 200s. Increased levemir dose to 10 units nightly. Receiving 1 unit prbc today for hgb of 6.5. Will start vori tomorrow.    Objective:     Vital Signs (Most Recent):  Temp: 98.3 °F (36.8 °C) (09/28/20 1124)  Pulse: 67 (09/28/20 1124)  Resp: 18 (09/28/20 1124)  BP: 117/74 (09/28/20 1124)  SpO2: (!) 93 % (09/28/20 1124) Vital Signs (24h Range):  Temp:  [97.4 °F (36.3 °C)-98.8 °F (37.1 °C)] 98.3 °F (36.8 °C)  Pulse:  [61-94] 67  Resp:  [16-18] 18  SpO2:  [90 %-96 %] 93 %  BP: (117-155)/(60-95) 117/74     Weight: 134.8 kg (297 lb 1.1 oz)  Body mass index is 37.13 kg/m².  Body surface area is 2.67 meters squared.    ECOG SCORE         [unfilled]    Intake/Output - Last 3 Shifts       09/26 0700 - 09/27 0659 09/27 0700 - 09/28 0659 09/28 0700 - 09/29 0659    P.O. 2390 1895 480    I.V. (mL/kg) 1327.5 (9.9) 1170.8 (8.7) 230.8 (1.7)    IV Piggyback 1123.9 1018.8 203.1    Total Intake(mL/kg) 4841.4 (36) 4084.6 (30.3) 914 (6.8)    Urine (mL/kg/hr) 3675 (1.1) 6000 (1.9) 1275 (1.9)    Stool 0 0     Total Output 3675 6000 1275    Net +1166.4 -1915.4 -361           Stool Occurrence 1 x 0 x           Physical Exam  Constitutional:       Appearance: He is well-developed.   HENT:      Head: Normocephalic and atraumatic.      Mouth/Throat:      Pharynx: No oropharyngeal exudate.   Eyes:      General:         Right eye: No discharge.         Left eye: No discharge.      Conjunctiva/sclera: Conjunctivae normal.      Pupils: Pupils are equal, round, and reactive to light.   Neck:       Musculoskeletal: Normal range of motion and neck supple.   Cardiovascular:      Rate and Rhythm: Normal rate. Rhythm irregular.      Heart sounds: Normal heart sounds. No murmur.   Pulmonary:      Effort: Pulmonary effort is normal. No respiratory distress.      Breath sounds: No wheezing or rales.      Comments: clr/diminished left base    Abdominal:      General: Bowel sounds are normal. There is no distension.      Palpations: Abdomen is soft.      Tenderness: There is no abdominal tenderness.   Musculoskeletal: Normal range of motion.         General: No deformity.   Skin:     General: Skin is warm and dry.      Findings: No erythema or rash.      Comments: Right chest wall vas cath. Dressing c/d/i with no sign of infection noted.   Neurological:      Mental Status: He is alert and oriented to person, place, and time.   Psychiatric:         Behavior: Behavior normal.         Thought Content: Thought content normal.         Judgment: Judgment normal.         Significant Labs:   CBC:   Recent Labs   Lab 09/27/20  0300 09/28/20  0303   WBC 1.71* 0.33*   HGB 7.0* 6.5*   HCT 21.0* 19.7*   PLT 18* 10*    and CMP:   Recent Labs   Lab 09/27/20  0300 09/28/20  0303    137   K 4.3 4.7    105   CO2 23 24   * 264*   BUN 29* 39*   CREATININE 2.1* 2.0*   CALCIUM 7.7* 7.5*   PROT 5.5* 5.2*   ALBUMIN 2.5* 2.5*   BILITOT 0.5 0.6   ALKPHOS 53* 51*   AST 10 8*   ALT 25 23   ANIONGAP 10 8   EGFRNONAA 34.4* 36.5*       Diagnostic Results:  I have reviewed all pertinent imaging results/findings within the past 24 hours.    Assessment/Plan:     * Acute myeloid leukemia not having achieved remission  55 year old man who presents as a transfer for possible leukemia after manual differential demonstrated blasts.   - BMBx performed 9/16/20, showing AML with flow cytometry showingincreased population of myeloid blasts  showing expression of CD4, CD9, CD11c, CD15, CD13, CD33(97%), CD64, (partial),  HLA-DR, and  cytoplasmic myeloperoxidase.  - fish results - t(10;11), flt3 negative, ngs pending.   - Started 7+3 on 9/25/20. Today is day 4    - CBC, CMP, PT/INR, fibrinogen, uric acid, phos daily to assess for TLS and DIC.   - Peripheral smear -  Hyperleukocytosis with >20% circulating blastoid cells   - 2D ECHO with EF of 58%   - Continue allopurinol 300mg BID and on IVF  - Hydrea stopped on 9/19  - Transfuse cyroglobulin if fibrinogen <150  - Transfuse if platelets <10, Hgb<7 and/or patient actively bleeding  - Lopes placed 9/21/20.  - On Acyclovir and Levaquin for ppx. Will start vori ppx tomorrow (9/29/20)  - his is a stem cell transplant candidate. Sent HLA typing earlier in admission. Will get contact info for siblings and share with transplant coordinator.    SEBASTIAN (acute kidney injury)  - creatinine up to 2. Previously 0.8.  - likely 2/2 IV abx (Vanc and Zosyn) which he completed on 9/22/20. Also received IV contract with CT on 9/16/20.   - Nephrology consulted, appreciate recommendations   -Monitor RF, improving  -Strict I/O  -Avoid Nephrotoxic drugs, holding losartan   -Renally dose Medications  -creatinine down slightly to 2.0 today. Slowly down-trending.        Pleural effusion on left  - Pulm consulted and performed thora at bedside 9/24  - 1L bloody pleural fluids removed.  -  sent for cytology, flow, micro, etc.  - cytology and micro pending. Blasts seen on flow.    Paroxysmal atrial fibrillation  Patient states that he had pacemaker placed for this.   Previsouly ECHO with EF 58 %  Holding Eliquis due to low platelet count. Resume when appropriate.  EKG from 9/16/20 showing afib with RVR  HR now controlled  Cardiology consulted due to cardiac history as patient will receive anthracycline if he is induced for AML  Cardiology repeated ehco 9/17 to assess for ventricle straining. 16% LV straining noted on echo  - Keep mag > 2 and K+ > 4  - Continue home digoxin and metoprolol.   - To repeat ECHO after completion of  chemo per card recs  - Monitor I/O and Daily weights   - currently rate-controlled            Pancytopenia due to antineoplastic chemotherapy  - Secondary to AML  - Transfuse if platelet count<10. If patient develops active bleed, transfuse for goal >50.  - Transfuse for hgb < 7  - receiving 1 unit prbc for hgb of 6.5 today  -   - continue ppx with acyclovir and LVQ. Will start vori tomorrow (9/29)          Hyperphosphatemia  - 2/2 SEBASTIAN  - phos up to 7.0 today  - started phos binder with meals    Type 2 diabetes mellitus, without long-term current use of insulin  HbA1C 8  - on Metformin at home. Holding inpatient  - Insulin Aspart 2U TID  - ACHS blood glucose monitoring and s/s insulin while inpatient  - Goal -180.   - blood glucose in 200s  - was started on levemir since admission. Increased dose from 8 units nightly to 10 units nightly today (9/28/20). Increased conservatively as oral intake is fair and may worsen.    V tach  - had at 6 beat run of v-tach 9/18/20 at ~ 6 am. Not sustained  - keep mag > or = 2 and K+ > or = 4  - cardiology consulted earlier in admission. No longer following. Will re-consult them if indicated.    Leukocytosis  -  on admission and 129 prior to starting hydrea  - see AML  - Hydrea stopped 9/20/20 with normal WBC count.      Pneumonia  Patient presented with fevers, fatigue, concerning due to acute leukemoid process as significant leukocytosis and blasts. Fevers likely related to this but infection workup in process.   - CXR at OSH did not show findings concerning for PNA.   - Chest CT performed 9/16/20 due to hemoptysis and suggestive of LLL PNA  - Blood cx with NGTD  - u/a not suggestive of UTI  -- completed 7 day course vancomycin and zosyn 9/22.   - Switched to ppx LVQ 9/23/20.  - repeated cxr 9/23/20. Showing large left pleural effusion. S/p thoracentesis 09/24. 1L bloody pleural fluid removed and sent for studies including cytology, micro, and flow. Cytology and  micro pending. Flow showing blasts.       Essential hypertension  - Hold home losartan in setting of SEBASTIAN  - Monitor BP  - BP stable at this time          VTE Risk Mitigation (From admission, onward)         Ordered     heparin, porcine (PF) 100 unit/mL injection flush 300 Units  As needed (PRN)      09/25/20 1408     heparin (porcine) injection 1,000 Units  As needed (PRN)      09/22/20 0343     IP VTE HIGH RISK PATIENT  Once      09/16/20 0017     Place sequential compression device  Until discontinued      09/16/20 0017                Disposition: Inpatient for induction chemo for AML.    Jeni Holt, NP  Bone Marrow Transplant  [facility]

## 2020-09-28 NOTE — PLAN OF CARE
Patient remained free from falls throughout shift, call bell within reach. Day 4 of 7+3. No complaints of pain or discomfort. Hgb 6.5 this morning, will get 1 unit RBC. Vitals stable, will continue to monitor.

## 2020-09-28 NOTE — ASSESSMENT & PLAN NOTE
- Secondary to AML  - Transfuse if platelet count<10. If patient develops active bleed, transfuse for goal >50.  - Transfuse for hgb < 7  - receiving 1 unit prbc for hgb of 6.5 today  -   - continue ppx with acyclovir and LVQ. Will start vori tomorrow (9/29)

## 2020-09-29 NOTE — ASSESSMENT & PLAN NOTE
55yoM with newly diagnosed acute leukemia. Consulted for SEBASTIAN (unknown baseline) with Cr increase from 0.8 on 9/22 to 1.9 on 9/23. 2.0 on day of consult on 9/24. Etiology is likely multifactorial 2/2 to acyclovir, losartan, contrast-induced from CT chest on 9/17 (Cr increase can be delayed 72-96 hours after contrast), and gradual decrease in hemoglobin from 9.9 on admission to 7.1 today. Patient with good PO intake (~2L per day) and UOP of 1.5-4.5L per day since admission. Renal u/s with no evidence of hydronephrosis  UPC 0.18  Likely in polyuric phase of ATN. Back on acyclovir.    Recommendations:  Switch maintenance fluids to 1/2 NS with a rate that matches urine output and monitor serum Na closely  Continue to hold losartan in setting of SEBASTIAN   Renally dose all medications to current GFR  Avoid nephrotoxic meds/agents such as NSAIDs, ACE, ARBs, and IV radiocontrast  Strict intake and output and daily standing weights  Trend daily labs  No indications for RRT at this time; will reassess daily

## 2020-09-29 NOTE — SUBJECTIVE & OBJECTIVE
Subjective:     Interval History: Day 5 of 7+3 for AML. Plan to start Voriconazole today. Serum creatinine improved this am to 1.8, had significant uop of > 5L yesterday. Tolerating chemo well. Ordered 1 pack platelets this AM.     Objective:     Vital Signs (Most Recent):  Temp: 97.6 °F (36.4 °C) (09/29/20 0338)  Pulse: 88 (09/29/20 0338)  Resp: 20 (09/29/20 0338)  BP: (!) 154/85 (09/29/20 0338)  SpO2: (!) 94 % (09/29/20 0338) Vital Signs (24h Range):  Temp:  [97.4 °F (36.3 °C)-98.8 °F (37.1 °C)] 97.6 °F (36.4 °C)  Pulse:  [61-88] 88  Resp:  [15-20] 20  SpO2:  [91 %-96 %] 94 %  BP: (110-155)/(58-95) 154/85     Weight: 134.9 kg (297 lb 6.4 oz)  Body mass index is 37.17 kg/m².  Body surface area is 2.67 meters squared.    ECOG SCORE         [unfilled]    Intake/Output - Last 3 Shifts       09/27 0700 - 09/28 0659 09/28 0700 - 09/29 0659    P.O. 1895 3080    I.V. (mL/kg) 1170.8 (8.7) 1181.3 (8.8)    IV Piggyback 1018.8 1001    Total Intake(mL/kg) 4084.6 (30.3) 5262.3 (39)    Urine (mL/kg/hr) 6000 (1.9) 5975 (1.8)    Stool 0     Total Output 6000 5975    Net -1915.4 -712.7          Stool Occurrence 0 x           Physical Exam  Constitutional:       Appearance: He is well-developed.   HENT:      Head: Normocephalic and atraumatic.      Mouth/Throat:      Pharynx: No oropharyngeal exudate.   Eyes:      General:         Right eye: No discharge.         Left eye: No discharge.      Conjunctiva/sclera: Conjunctivae normal.      Pupils: Pupils are equal, round, and reactive to light.   Neck:      Musculoskeletal: Normal range of motion and neck supple.   Cardiovascular:      Rate and Rhythm: Normal rate. Rhythm irregular.      Heart sounds: Normal heart sounds. No murmur.   Pulmonary:      Effort: Pulmonary effort is normal. No respiratory distress.      Breath sounds: No wheezing or rales.      Comments: clr/diminished left base    Abdominal:      General: Bowel sounds are normal. There is no distension.      Palpations:  Abdomen is soft.      Tenderness: There is no abdominal tenderness.   Musculoskeletal: Normal range of motion.         General: No deformity.   Skin:     General: Skin is warm and dry.      Findings: No erythema or rash.      Comments: Right chest wall vas cath. Dressing c/d/i with no sign of infection noted.   Neurological:      Mental Status: He is alert and oriented to person, place, and time.   Psychiatric:         Behavior: Behavior normal.         Thought Content: Thought content normal.         Judgment: Judgment normal.         Significant Labs:   CBC:   Recent Labs   Lab 09/28/20 0303 09/29/20  0339   WBC 0.33* 0.33*   HGB 6.5* 7.3*   HCT 19.7* 21.6*   PLT 10* 7*    and CMP:   Recent Labs   Lab 09/28/20 0303 09/29/20 0339    137   K 4.7 3.8    104   CO2 24 25   * 178*   BUN 39* 45*   CREATININE 2.0* 1.8*   CALCIUM 7.5* 7.6*   PROT 5.2* 5.2*   ALBUMIN 2.5* 2.5*   BILITOT 0.6 0.8   ALKPHOS 51* 48*   AST 8* 6*   ALT 23 19   ANIONGAP 8 8   EGFRNONAA 36.5* 41.4*       Diagnostic Results:  I have reviewed all pertinent imaging results/findings within the past 24 hours.

## 2020-09-29 NOTE — ASSESSMENT & PLAN NOTE
HbA1C 8  - on Metformin at home. Holding inpatient  - Insulin Aspart 2U TID  - ACHS blood glucose monitoring and s/s insulin while inpatient  - Goal -180.   - blood glucose in 200s  - was started on levemir since admission. Increased dose from 8 units nightly to 10 units nightly 9/28/20. Increased conservatively as oral intake is fair and may worsen.

## 2020-09-29 NOTE — PLAN OF CARE
Patient remained free from falls throughout shift, call bell within reach. Day 5 of 7+3. No complaints of pain or discomfort. Only complaint is generalized fatigue. Vitals stable, will continue to monitor.

## 2020-09-29 NOTE — ASSESSMENT & PLAN NOTE
- Secondary to AML  - Transfuse if platelet count<10. If patient develops active bleed, transfuse for goal >50.  - Transfuse for hgb < 7  - ANC 30  - continue ppx with acyclovir and LVQ. Will start vori today (9/29)

## 2020-09-29 NOTE — PROGRESS NOTES
"[facility]  Nephrology  Progress Note    Patient Name: Shell Diaz  MRN: [id number]  Admission Date: 9/16/2020  Hospital Length of Stay: 13 days  Attending Provider: Richy Thurman MD   Primary Care Physician: Alva Rubio MD  Principal Problem:Acute myeloid leukemia not having achieved remission    Subjective:     HPI: Mr. Diaz is a 56yo M with a PMH of Afib with pacemaker on eliquis, HTN, and cardiomyopathy who presnted to OSH with fever and fatigue. At OSH, patient received tylenol, IVF, a dose of vancomycin, zosyn and hydrea 4g. Labs were notable for leukocytosis (WBC 95.7), anemia, thrombocytopenia, elevated INR, D-dimer. CXR showed "cardiomegaly with no acute pulmonary process".  Manual differential demonstrated high number of blasts (100). Patient was found to have LLL pneumonia and was started on Vanc and Zosyn. Finished 7 day course on 9/23. Vanc troughs WNL. He received a CT Chest with contrast on 9/17 and Pulmonology was consulted today for thoracentesis which drained 1L of bloody fluid.     Nephrology was consulted for SEBASTIAN. Cr 1.3 on day of admission. Significant change in creatinine from 0.8 on 9/22 to 1.9 on 9/23. 2.0 on day of consult. Of note, patient is on acyclovir 400 BID and losartan 50, the latter of which is a home medication.       Interval History: No acute events overnight. Patient is feeling good today. Denies chest pain, shortness of breath, nausea, vomiting, abdominal pain, dysuria, hematuria.     Review of patient's allergies indicates:  No Known Allergies  Current Facility-Administered Medications   Medication Frequency    0.9%  NaCl infusion (for blood administration) Q24H PRN    0.9%  NaCl infusion Continuous    acetaminophen tablet 650 mg Q6H PRN    acyclovir capsule 400 mg BID    allopurinoL tablet 300 mg Daily    alteplase injection 2 mg PRN    cytarabine (PF) (CYTOSAR) 100 mg/m2/day = 265 mg in sodium chloride 0.9% 1,000 mL chemo infusion Q24H "    dextrose 50% injection 12.5 g PRN    dextrose 50% injection 25 g PRN    digoxin tablet 250 mcg Daily    diphenhydrAMINE capsule 25 mg Q6H PRN    glucagon (human recombinant) injection 1 mg PRN    glucose chewable tablet 16 g PRN    glucose chewable tablet 24 g PRN    guaifenesin 100 mg/5 ml syrup 200 mg Q4H PRN    heparin (porcine) injection 1,000 Units PRN    heparin, porcine (PF) 100 unit/mL injection flush 300 Units PRN    insulin aspart U-100 pen 0-5 Units QID (AC + HS) PRN    insulin aspart U-100 pen 2 Units TIDWM    insulin detemir U-100 pen 10 Units Daily    levoFLOXacin tablet 500 mg Daily    metoprolol injection 5 mg Q5 Min PRN    metoprolol succinate (TOPROL-XL) 24 hr tablet 100 mg Daily    ondansetron disintegrating tablet 8 mg Q8H PRN    ondansetron tablet 16 mg Q24H    oxyCODONE immediate release tablet 5 mg Q8H PRN    polyethylene glycol packet 17 g BID PRN    prochlorperazine injection Soln 10 mg Q6H PRN    sevelamer carbonate tablet 800 mg TID WM    sodium chloride 0.9% flush 10 mL PRN    terazosin capsule 5 mg QHS    voriconazole tablet 200 mg BID       Objective:     Vital Signs (Most Recent):  Temp: 98.9 °F (37.2 °C) (09/29/20 0829)  Pulse: 62 (09/29/20 0854)  Resp: 19 (09/29/20 0829)  BP: (!) 141/81 (09/29/20 0829)  SpO2: 97 % (09/29/20 0829)  O2 Device (Oxygen Therapy): room air (09/29/20 0829) Vital Signs (24h Range):  Temp:  [97.6 °F (36.4 °C)-98.9 °F (37.2 °C)] 98.9 °F (37.2 °C)  Pulse:  [60-88] 62  Resp:  [15-20] 19  SpO2:  [91 %-97 %] 97 %  BP: (110-155)/(58-86) 141/81     Weight: 134.9 kg (297 lb 6.4 oz) (09/29/20 0338)  Body mass index is 37.17 kg/m².  Body surface area is 2.67 meters squared.    I/O last 3 completed shifts:  In: 6985.4 [P.O.:3780; I.V.:1725.5; IV Piggyback:1479.9]  Out: 9575 [Urine:9575]    Physical Exam  Constitutional:       General: He is not in acute distress.     Appearance: He is not ill-appearing.   HENT:      Head: Normocephalic and  atraumatic.      Mouth/Throat:      Mouth: Mucous membranes are moist.      Pharynx: Oropharynx is clear.   Eyes:      General:         Right eye: No discharge.         Left eye: No discharge.      Extraocular Movements: Extraocular movements intact.   Cardiovascular:      Rate and Rhythm: Normal rate and regular rhythm.      Pulses: Normal pulses.      Heart sounds: No murmur.   Pulmonary:      Effort: Pulmonary effort is normal. No respiratory distress.      Breath sounds: No wheezing.   Abdominal:      General: There is no distension.      Palpations: Abdomen is soft.      Tenderness: There is no abdominal tenderness.   Musculoskeletal:      Right lower leg: No edema.      Left lower leg: No edema.   Skin:     General: Skin is warm and dry.   Neurological:      General: No focal deficit present.      Mental Status: He is alert and oriented to person, place, and time.   Psychiatric:         Mood and Affect: Mood normal.         Behavior: Behavior normal.         Significant Labs:  CBC:   Recent Labs   Lab 09/29/20  0339   WBC 0.33*   RBC 2.31*   HGB 7.3*   HCT 21.6*   PLT 7*   MCV 94   MCH 31.6*   MCHC 33.8     CMP:   Recent Labs   Lab 09/29/20  0339   *   CALCIUM 7.6*   ALBUMIN 2.5*   PROT 5.2*      K 3.8   CO2 25      BUN 45*   CREATININE 1.8*   ALKPHOS 48*   ALT 19   AST 6*   BILITOT 0.8     All labs within the past 24 hours have been reviewed.     Significant Imaging:    Retroperitoneal US 9/24:  Impression:     Elevated resistive indices with decreased perfusion suggestive of medical renal disease.  No evidence of hydronephrosis.     Prostate upper limits of normal in size..    Assessment/Plan:     * Acute myeloid leukemia not having achieved remission  Managed per primary    SEBASTIAN (acute kidney injury)  55yoM with newly diagnosed acute leukemia. Consulted for SEBASTIAN (unknown baseline) with Cr increase from 0.8 on 9/22 to 1.9 on 9/23. 2.0 on day of consult on 9/24. Etiology is likely  multifactorial 2/2 to acyclovir, losartan, contrast-induced from CT chest on 9/17 (Cr increase can be delayed 72-96 hours after contrast), and gradual decrease in hemoglobin from 9.9 on admission to 7.1 today. Patient with good PO intake (~2L per day) and UOP of 1.5-4.5L per day since admission. Renal u/s with no evidence of hydronephrosis  UPC 0.18  Likely in polyuric phase of ATN. Back on acyclovir.    Recommendations:  Switch maintenance fluids to 1/2 NS with a rate that matches urine output and monitor serum Na closely  Continue to hold losartan in setting of SEBASTIAN   Renally dose all medications to current GFR  Avoid nephrotoxic meds/agents such as NSAIDs, ACE, ARBs, and IV radiocontrast  Strict intake and output and daily standing weights  Trend daily labs  No indications for RRT at this time; will reassess daily           Thank you for your consult. I will follow-up with patient. Please contact us if you have any additional questions.    Chris Lin MD PGY1  Nephrology  [facility]

## 2020-09-29 NOTE — ASSESSMENT & PLAN NOTE
- Improving, baseline 0.8.  - likely 2/2 IV abx (Vanc and Zosyn) which he completed on 9/22/20. Also received IV contract with CT on 9/16/20.   - Nephrology consulted, appreciate recommendations   -Monitor RF, improving  -Strict I/O  -Avoid Nephrotoxic drugs, holding losartan   -Renally dose Medications

## 2020-09-29 NOTE — PROGRESS NOTES
[facility]  Hematology  Bone Marrow Transplant  Progress Note    Patient Name: Shell Diaz  Admission Date: 9/16/2020  Hospital Length of Stay: 13 days  Code Status: Full Code    Subjective:     Interval History: Day 5 of 7+3 for AML. Plan to start Voriconazole today. Serum creatinine improved this am to 1.8, had significant uop of > 5L yesterday. Tolerating chemo well. Ordered 1 pack platelets this AM.     Objective:     Vital Signs (Most Recent):  Temp: 97.6 °F (36.4 °C) (09/29/20 0338)  Pulse: 88 (09/29/20 0338)  Resp: 20 (09/29/20 0338)  BP: (!) 154/85 (09/29/20 0338)  SpO2: (!) 94 % (09/29/20 0338) Vital Signs (24h Range):  Temp:  [97.4 °F (36.3 °C)-98.8 °F (37.1 °C)] 97.6 °F (36.4 °C)  Pulse:  [61-88] 88  Resp:  [15-20] 20  SpO2:  [91 %-96 %] 94 %  BP: (110-155)/(58-95) 154/85     Weight: 134.9 kg (297 lb 6.4 oz)  Body mass index is 37.17 kg/m².  Body surface area is 2.67 meters squared.    ECOG SCORE         [unfilled]    Intake/Output - Last 3 Shifts       09/27 0700 - 09/28 0659 09/28 0700 - 09/29 0659    P.O. 1895 3080    I.V. (mL/kg) 1170.8 (8.7) 1181.3 (8.8)    IV Piggyback 1018.8 1001    Total Intake(mL/kg) 4084.6 (30.3) 5262.3 (39)    Urine (mL/kg/hr) 6000 (1.9) 5975 (1.8)    Stool 0     Total Output 6000 5975    Net -1915.4 -712.7          Stool Occurrence 0 x           Physical Exam  Constitutional:       Appearance: He is well-developed.   HENT:      Head: Normocephalic and atraumatic.      Mouth/Throat:      Pharynx: No oropharyngeal exudate.   Eyes:      General:         Right eye: No discharge.         Left eye: No discharge.      Conjunctiva/sclera: Conjunctivae normal.      Pupils: Pupils are equal, round, and reactive to light.   Neck:      Musculoskeletal: Normal range of motion and neck supple.   Cardiovascular:      Rate and Rhythm: Normal rate. Rhythm irregular.      Heart sounds: Normal heart sounds. No murmur.   Pulmonary:      Effort: Pulmonary effort is normal. No  respiratory distress.      Breath sounds: No wheezing or rales.      Comments: clr/diminished left base    Abdominal:      General: Bowel sounds are normal. There is no distension.      Palpations: Abdomen is soft.      Tenderness: There is no abdominal tenderness.   Musculoskeletal: Normal range of motion.         General: No deformity.   Skin:     General: Skin is warm and dry.      Findings: No erythema or rash.      Comments: Right chest wall vas cath. Dressing c/d/i with no sign of infection noted.   Neurological:      Mental Status: He is alert and oriented to person, place, and time.   Psychiatric:         Behavior: Behavior normal.         Thought Content: Thought content normal.         Judgment: Judgment normal.         Significant Labs:   CBC:   Recent Labs   Lab 09/28/20  0303 09/29/20  0339   WBC 0.33* 0.33*   HGB 6.5* 7.3*   HCT 19.7* 21.6*   PLT 10* 7*    and CMP:   Recent Labs   Lab 09/28/20  0303 09/29/20  0339    137   K 4.7 3.8    104   CO2 24 25   * 178*   BUN 39* 45*   CREATININE 2.0* 1.8*   CALCIUM 7.5* 7.6*   PROT 5.2* 5.2*   ALBUMIN 2.5* 2.5*   BILITOT 0.6 0.8   ALKPHOS 51* 48*   AST 8* 6*   ALT 23 19   ANIONGAP 8 8   EGFRNONAA 36.5* 41.4*       Diagnostic Results:  I have reviewed all pertinent imaging results/findings within the past 24 hours.    Assessment/Plan:     * Acute myeloid leukemia not having achieved remission  55 year old man who presents as a transfer for possible leukemia after manual differential demonstrated blasts.   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  - fish results - t(10;11), ngs - No pathogenic genetic alteration is detected  - Started 7+3 on 9/25/20. Today is day 5   - CBC, CMP, PT/INR, fibrinogen, uric acid, phos daily to assess for TLS and DIC.   -  Peripheral smear -  Hyperleukocytosis with >20% circulating blastoid cells   - 2D ECHO with EF of 58%   - Continue allopurinol 300mg BID and on IVF  - Hydrea stopped on 9/19  - Transfuse cyroglobulin if fibrinogen <150  - Transfuse if platelets <10, Hgb<7 and/or patient actively bleeding  - Lopes placed 9/21/20.  - On Acyclovir and Levaquin for ppx. Will start vori ppx today (9/29/20)  - his is a stem cell transplant candidate. Sent HLA typing earlier in admission. Will get contact info for siblings and share with transplant coordinator.    Hyperphosphatemia  - 2/2 SEBASTIAN  - started phos binder with meals    Pleural effusion on left  - Pulm consulted and performed thora at bedside 9/24  - 1L bloody pleural fluids removed.  -  sent for cytology, flow, micro, etc.  - cytology and micro pending. Blasts seen on flow.    SEABSTIAN (acute kidney injury)  - Improving, baseline 0.8.  - likely 2/2 IV abx (Vanc and Zosyn) which he completed on 9/22/20. Also received IV contract with CT on 9/16/20.   - Nephrology consulted, appreciate recommendations   -Monitor RF, improving  -Strict I/O  -Avoid Nephrotoxic drugs, holding losartan   -Renally dose Medications          Type 2 diabetes mellitus, without long-term current use of insulin  HbA1C 8  - on Metformin at home. Holding inpatient  - Insulin Aspart 2U TID  - ACHS blood glucose monitoring and s/s insulin while inpatient  - Goal -180.   - blood glucose in 200s  - was started on levemir since admission. Increased dose from 8 units nightly to 10 units nightly 9/28/20. Increased conservatively as oral intake is fair and may worsen.    V tach  - Not sustained  - had at 6 beat run of v-tach 9/18/20 at ~ 6 am.   - keep mag > or = 2 and K+ > or = 4  - cardiology consulted earlier in admission. No longer following. Will re-consult them if indicated.    Leukocytosis  -  on admission and 129 prior to starting hydrea  - see AML  - Hydrea stopped 9/20/20 with normal WBC  count.      Pancytopenia due to antineoplastic chemotherapy  - Secondary to AML  - Transfuse if platelet count<10. If patient develops active bleed, transfuse for goal >50.  - Transfuse for hgb < 7  - ANC 30  - continue ppx with acyclovir and LVQ. Will start vori today (9/29)          Pneumonia  Patient presented with fevers, fatigue, concerning due to acute leukemoid process as significant leukocytosis and blasts. Fevers likely related to this but infection workup in process.   - CXR at OSH did not show findings concerning for PNA.   - Chest CT performed 9/16/20 due to hemoptysis and suggestive of LLL PNA  - Blood cx with NGTD  - u/a not suggestive of UTI  -- completed 7 day course vancomycin and zosyn 9/22.   - Switched to ppx LVQ 9/23/20.  - repeated cxr 9/23/20. Showing large left pleural effusion. S/p thoracentesis 09/24. 1L bloody pleural fluid removed and sent for studies including cytology, micro, and flow. Cytology and micro pending. Flow showing blasts.       Essential hypertension  - Hold home losartan in setting of SEBASTIAN  - Monitor BP  - BP stable at this time      Paroxysmal atrial fibrillation  Patient states that he had pacemaker placed for this.   Previsouly ECHO with EF 58 %  Holding Eliquis due to low platelet count. Resume when appropriate.  EKG from 9/16/20 showing afib with RVR  HR now controlled  Cardiology consulted due to cardiac history as patient will receive anthracycline if he is induced for AML  Cardiology repeated ehco 9/17 to assess for ventricle straining. 16% LV straining noted on echo  - Keep mag > 2 and K+ > 4  - Continue home digoxin and metoprolol.   - To repeat ECHO after completion of chemo per card recs  - Monitor I/O and Daily weights   - currently rate-controlled                VTE Risk Mitigation (From admission, onward)         Ordered     heparin, porcine (PF) 100 unit/mL injection flush 300 Units  As needed (PRN)      09/25/20 1408     heparin (porcine) injection 1,000  Units  As needed (PRN)      09/22/20 0343     IP VTE HIGH RISK PATIENT  Once      09/16/20 0017     Place sequential compression device  Until discontinued      09/16/20 0017                Disposition: bmt unit    Santino Haider MD  Bone Marrow Transplant  [facility]

## 2020-09-29 NOTE — SUBJECTIVE & OBJECTIVE
Interval History: No acute events overnight. Patient is feeling good today. Denies chest pain, shortness of breath, nausea, vomiting, abdominal pain, dysuria, hematuria.     Review of patient's allergies indicates:  No Known Allergies  Current Facility-Administered Medications   Medication Frequency    0.9%  NaCl infusion (for blood administration) Q24H PRN    0.9%  NaCl infusion Continuous    acetaminophen tablet 650 mg Q6H PRN    acyclovir capsule 400 mg BID    allopurinoL tablet 300 mg Daily    alteplase injection 2 mg PRN    cytarabine (PF) (CYTOSAR) 100 mg/m2/day = 265 mg in sodium chloride 0.9% 1,000 mL chemo infusion Q24H    dextrose 50% injection 12.5 g PRN    dextrose 50% injection 25 g PRN    digoxin tablet 250 mcg Daily    diphenhydrAMINE capsule 25 mg Q6H PRN    glucagon (human recombinant) injection 1 mg PRN    glucose chewable tablet 16 g PRN    glucose chewable tablet 24 g PRN    guaifenesin 100 mg/5 ml syrup 200 mg Q4H PRN    heparin (porcine) injection 1,000 Units PRN    heparin, porcine (PF) 100 unit/mL injection flush 300 Units PRN    insulin aspart U-100 pen 0-5 Units QID (AC + HS) PRN    insulin aspart U-100 pen 2 Units TIDWM    insulin detemir U-100 pen 10 Units Daily    levoFLOXacin tablet 500 mg Daily    metoprolol injection 5 mg Q5 Min PRN    metoprolol succinate (TOPROL-XL) 24 hr tablet 100 mg Daily    ondansetron disintegrating tablet 8 mg Q8H PRN    ondansetron tablet 16 mg Q24H    oxyCODONE immediate release tablet 5 mg Q8H PRN    polyethylene glycol packet 17 g BID PRN    prochlorperazine injection Soln 10 mg Q6H PRN    sevelamer carbonate tablet 800 mg TID WM    sodium chloride 0.9% flush 10 mL PRN    terazosin capsule 5 mg QHS    voriconazole tablet 200 mg BID       Objective:     Vital Signs (Most Recent):  Temp: 98.9 °F (37.2 °C) (09/29/20 0829)  Pulse: 62 (09/29/20 0854)  Resp: 19 (09/29/20 0829)  BP: (!) 141/81 (09/29/20 0829)  SpO2: 97 % (09/29/20  0829)  O2 Device (Oxygen Therapy): room air (09/29/20 0829) Vital Signs (24h Range):  Temp:  [97.6 °F (36.4 °C)-98.9 °F (37.2 °C)] 98.9 °F (37.2 °C)  Pulse:  [60-88] 62  Resp:  [15-20] 19  SpO2:  [91 %-97 %] 97 %  BP: (110-155)/(58-86) 141/81     Weight: 134.9 kg (297 lb 6.4 oz) (09/29/20 0338)  Body mass index is 37.17 kg/m².  Body surface area is 2.67 meters squared.    I/O last 3 completed shifts:  In: 6985.4 [P.O.:3780; I.V.:1725.5; IV Piggyback:1479.9]  Out: 9575 [Urine:9575]    Physical Exam  Constitutional:       General: He is not in acute distress.     Appearance: He is not ill-appearing.   HENT:      Head: Normocephalic and atraumatic.      Mouth/Throat:      Mouth: Mucous membranes are moist.      Pharynx: Oropharynx is clear.   Eyes:      General:         Right eye: No discharge.         Left eye: No discharge.      Extraocular Movements: Extraocular movements intact.   Cardiovascular:      Rate and Rhythm: Normal rate and regular rhythm.      Pulses: Normal pulses.      Heart sounds: No murmur.   Pulmonary:      Effort: Pulmonary effort is normal. No respiratory distress.      Breath sounds: No wheezing.   Abdominal:      General: There is no distension.      Palpations: Abdomen is soft.      Tenderness: There is no abdominal tenderness.   Musculoskeletal:      Right lower leg: No edema.      Left lower leg: No edema.   Skin:     General: Skin is warm and dry.   Neurological:      General: No focal deficit present.      Mental Status: He is alert and oriented to person, place, and time.   Psychiatric:         Mood and Affect: Mood normal.         Behavior: Behavior normal.         Significant Labs:  CBC:   Recent Labs   Lab 09/29/20 0339   WBC 0.33*   RBC 2.31*   HGB 7.3*   HCT 21.6*   PLT 7*   MCV 94   MCH 31.6*   MCHC 33.8     CMP:   Recent Labs   Lab 09/29/20 0339   *   CALCIUM 7.6*   ALBUMIN 2.5*   PROT 5.2*      K 3.8   CO2 25      BUN 45*   CREATININE 1.8*   ALKPHOS 48*   ALT  19   AST 6*   BILITOT 0.8     All labs within the past 24 hours have been reviewed.     Significant Imaging:    Retroperitoneal US 9/24:  Impression:     Elevated resistive indices with decreased perfusion suggestive of medical renal disease.  No evidence of hydronephrosis.     Prostate upper limits of normal in size..

## 2020-09-29 NOTE — PLAN OF CARE
Patient AAOX4, up independently, and wife at bedside today. Day 6 of 7+3 initiated. Accuchecks continued before meals with scheduled insulin. One unit of platelets infused. Shower completed. Patient stable, will continue to monitor.

## 2020-09-29 NOTE — ASSESSMENT & PLAN NOTE
- Not sustained  - had at 6 beat run of v-tach 9/18/20 at ~ 6 am.   - keep mag > or = 2 and K+ > or = 4  - cardiology consulted earlier in admission. No longer following. Will re-consult them if indicated.

## 2020-09-29 NOTE — ASSESSMENT & PLAN NOTE
55 year old man who presents as a transfer for possible leukemia after manual differential demonstrated blasts.   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  - fish results - t(10;11), ngs - No pathogenic genetic alteration is detected  - Started 7+3 on 9/25/20. Today is day 5   - CBC, CMP, PT/INR, fibrinogen, uric acid, phos daily to assess for TLS and DIC.   - Peripheral smear -  Hyperleukocytosis with >20% circulating blastoid cells   - 2D ECHO with EF of 58%   - Continue allopurinol 300mg BID and on IVF  - Hydrea stopped on 9/19  - Transfuse cyroglobulin if fibrinogen <150  - Transfuse if platelets <10, Hgb<7 and/or patient actively bleeding  - Lopes placed 9/21/20.  - On Acyclovir and Levaquin for ppx. Will start vori ppx today (9/29/20)  - his is a stem cell transplant candidate. Sent HLA typing earlier in admission. Will get contact info for siblings and share with transplant coordinator.

## 2020-09-30 PROBLEM — E80.6 HYPERBILIRUBINEMIA: Status: ACTIVE | Noted: 2020-01-01

## 2020-09-30 NOTE — PLAN OF CARE
Patient AAOX4, up independently to bathroom, and wife visiting today. Neutropenic precautions maintained. Day 7 of 7+3 to be initiated today. Accuchecks continued prior to meals. One unit of PRBCs infused, as ordered. Patient stable, will continue to monitor.

## 2020-09-30 NOTE — ASSESSMENT & PLAN NOTE
- Secondary to AML  - Transfuse if platelet count<10. If patient develops active bleed, transfuse for goal >50.  - Transfuse for hgb < 7  - ANC 30  - continue ppx with acyclovir and LVQ. Started vori 9/29

## 2020-09-30 NOTE — ASSESSMENT & PLAN NOTE
- Improving, baseline 0.8.  - likely 2/2 IV abx (Vanc and Zosyn) which he completed on 9/22/20. Also received IV contract with CT on 9/16/20.   - Nephrology consulted, appreciate recommendations   -Monitor RF, improving  -Strict I/O  -Avoid Nephrotoxic drugs, holding losartan   -Renally dose Medications  -Creatinine 1.6 today  -Continue phos binder

## 2020-09-30 NOTE — SUBJECTIVE & OBJECTIVE
Interval History: No acute events overnight. Patient is feeling good today. Denies chest pain, shortness of breath, nausea, vomiting, abdominal pain, dysuria, hematuria.     Review of patient's allergies indicates:  No Known Allergies  Current Facility-Administered Medications   Medication Frequency    0.9%  NaCl infusion (for blood administration) Q24H PRN    0.9%  NaCl infusion Continuous    acetaminophen tablet 650 mg Q6H PRN    acetaminophen tablet 650 mg Once PRN    acyclovir capsule 400 mg BID    allopurinoL tablet 300 mg Daily    alteplase injection 2 mg PRN    cytarabine (PF) (CYTOSAR) 100 mg/m2/day = 265 mg in sodium chloride 0.9% 1,000 mL chemo infusion Q24H    dextrose 50% injection 12.5 g PRN    dextrose 50% injection 25 g PRN    digoxin tablet 250 mcg Daily    diphenhydrAMINE capsule 25 mg Q6H PRN    diphenhydrAMINE capsule 25 mg Once PRN    glucagon (human recombinant) injection 1 mg PRN    glucose chewable tablet 16 g PRN    glucose chewable tablet 24 g PRN    guaifenesin 100 mg/5 ml syrup 200 mg Q4H PRN    heparin (porcine) injection 1,000 Units PRN    heparin, porcine (PF) 100 unit/mL injection flush 300 Units PRN    insulin aspart U-100 pen 0-5 Units QID (AC + HS) PRN    insulin detemir U-100 pen 10 Units Daily    levoFLOXacin tablet 500 mg Daily    metoprolol injection 5 mg Q5 Min PRN    metoprolol succinate (TOPROL-XL) 24 hr tablet 100 mg Daily    ondansetron disintegrating tablet 8 mg Q8H PRN    ondansetron tablet 16 mg Q24H    oxyCODONE immediate release tablet 5 mg Q8H PRN    polyethylene glycol packet 17 g BID PRN    prochlorperazine injection Soln 10 mg Q6H PRN    sevelamer carbonate tablet 800 mg TID WM    sodium chloride 0.9% flush 10 mL PRN    terazosin capsule 5 mg QHS    voriconazole tablet 200 mg BID       Objective:     Vital Signs (Most Recent):  Temp: 98.1 °F (36.7 °C) (09/30/20 0804)  Pulse: 60 (09/30/20 0818)  Resp: 17 (09/30/20 0804)  BP: (!)  142/81 (09/30/20 0804)  SpO2: 97 % (09/30/20 0804)  O2 Device (Oxygen Therapy): room air (09/30/20 0804) Vital Signs (24h Range):  Temp:  [97.5 °F (36.4 °C)-98.6 °F (37 °C)] 98.1 °F (36.7 °C)  Pulse:  [60-82] 60  Resp:  [16-18] 17  SpO2:  [93 %-97 %] 97 %  BP: (120-153)/(67-83) 142/81     Weight: 133.7 kg (294 lb 12.1 oz) (09/30/20 0345)  Body mass index is 36.84 kg/m².  Body surface area is 2.66 meters squared.    I/O last 3 completed shifts:  In: 6867 [P.O.:3229; I.V.:1922.5; Blood:231; IV Piggyback:1484.5]  Out: 02568 [Urine:01800]    Physical Exam  Constitutional:       General: He is not in acute distress.     Appearance: He is not ill-appearing.   HENT:      Head: Normocephalic and atraumatic.      Mouth/Throat:      Mouth: Mucous membranes are moist.      Pharynx: Oropharynx is clear.   Eyes:      General:         Right eye: No discharge.         Left eye: No discharge.      Extraocular Movements: Extraocular movements intact.   Cardiovascular:      Rate and Rhythm: Normal rate and regular rhythm.      Pulses: Normal pulses.      Heart sounds: No murmur.   Pulmonary:      Effort: Pulmonary effort is normal. No respiratory distress.      Breath sounds: No wheezing.   Abdominal:      General: There is no distension.      Palpations: Abdomen is soft.      Tenderness: There is no abdominal tenderness.   Musculoskeletal:      Right lower leg: No edema.      Left lower leg: No edema.   Skin:     General: Skin is warm and dry.   Neurological:      General: No focal deficit present.      Mental Status: He is alert and oriented to person, place, and time.   Psychiatric:         Mood and Affect: Mood normal.         Behavior: Behavior normal.         Significant Labs:  CBC:   Recent Labs   Lab 09/30/20  0400   WBC 0.35*   RBC 2.08*   HGB 6.7*   HCT 19.9*   PLT 20*   MCV 96   MCH 32.2*   MCHC 33.7     CMP:   Recent Labs   Lab 09/30/20  0400   *   CALCIUM 7.2*   ALBUMIN 2.5*   PROT 5.0*      K 3.8   CO2 25       BUN 36*   CREATININE 1.6*   ALKPHOS 46*   ALT 15   AST 8*   BILITOT 1.5*     All labs within the past 24 hours have been reviewed.     Significant Imaging:    Retroperitoneal US 9/24:  Impression:     Elevated resistive indices with decreased perfusion suggestive of medical renal disease.  No evidence of hydronephrosis.     Prostate upper limits of normal in size..

## 2020-09-30 NOTE — SUBJECTIVE & OBJECTIVE
Subjective:     Interval History: Day 6 of 7+3 for AML. Continues to be afebrile. VSS. Continues to be on room air. Cytology from pleural fluid and flow suggestive of blasts. Micro still with NGTD. Blood glucose stable. Likely elevated 2/2 steroids. Stopped scheduled novolog. Will continue scheduled levemir for now. Creatinine down to 1.6. phos continues to be elevated at 5.4. Will continue phos binder. T-bili up to 1.5 from 0.8 today. Will monitor for now. Hgb 6.7. Transfusing 1 unit prbc. ANC today.    Objective:     Vital Signs (Most Recent):  Temp: 98.1 °F (36.7 °C) (09/30/20 0804)  Pulse: 60 (09/30/20 0818)  Resp: 17 (09/30/20 0804)  BP: (!) 142/81 (09/30/20 0804)  SpO2: 97 % (09/30/20 0804) Vital Signs (24h Range):  Temp:  [97.5 °F (36.4 °C)-98.6 °F (37 °C)] 98.1 °F (36.7 °C)  Pulse:  [60-82] 60  Resp:  [16-18] 17  SpO2:  [93 %-97 %] 97 %  BP: (120-153)/(67-83) 142/81     Weight: 133.7 kg (294 lb 12.1 oz)  Body mass index is 36.84 kg/m².  Body surface area is 2.66 meters squared.    ECOG SCORE         [unfilled]    Intake/Output - Last 3 Shifts       09/28 0700 - 09/29 0659 09/29 0700 - 09/30 0659 09/30 0700 - 10/01 0659    P.O. 3080 1529     I.V. (mL/kg) 1181.3 (8.8) 1397 (10.4)     Blood  231 350    IV Piggyback 1001 1060.7     Total Intake(mL/kg) 5262.3 (39) 4217.7 (31.5) 350 (2.6)    Urine (mL/kg/hr) 5975 (1.8) 6545 (2) 2000 (7.4)    Stool  0     Total Output 5975 6545 2000    Net -712.7 -2327.3 -1130           Stool Occurrence  1 x           Physical Exam  Constitutional:       Appearance: He is well-developed.   HENT:      Head: Normocephalic and atraumatic.      Mouth/Throat:      Pharynx: No oropharyngeal exudate.   Eyes:      General:         Right eye: No discharge.         Left eye: No discharge.      Conjunctiva/sclera: Conjunctivae normal.      Pupils: Pupils are equal, round, and reactive to light.   Neck:      Musculoskeletal: Normal range of motion and neck supple.   Cardiovascular:      Rate  and Rhythm: Normal rate. Rhythm irregular.      Heart sounds: Normal heart sounds. No murmur.   Pulmonary:      Effort: Pulmonary effort is normal. No respiratory distress.      Breath sounds: No wheezing or rales.      Comments: clr/diminished left base    Abdominal:      General: Bowel sounds are normal. There is no distension.      Palpations: Abdomen is soft.      Tenderness: There is no abdominal tenderness.   Musculoskeletal: Normal range of motion.         General: No deformity.   Skin:     General: Skin is warm and dry.      Findings: No erythema or rash.      Comments: Right chest wall vas cath. Dressing c/d/i with no sign of infection noted.   Neurological:      Mental Status: He is alert and oriented to person, place, and time.   Psychiatric:         Behavior: Behavior normal.         Thought Content: Thought content normal.         Judgment: Judgment normal.         Significant Labs:   CBC:   Recent Labs   Lab 09/29/20  0339 09/30/20  0400   WBC 0.33* 0.35*   HGB 7.3* 6.7*   HCT 21.6* 19.9*   PLT 7* 20*    and CMP:   Recent Labs   Lab 09/29/20  0339 09/30/20  0400    139   K 3.8 3.8    105   CO2 25 25   * 157*   BUN 45* 36*   CREATININE 1.8* 1.6*   CALCIUM 7.6* 7.2*   PROT 5.2* 5.0*   ALBUMIN 2.5* 2.5*   BILITOT 0.8 1.5*   ALKPHOS 48* 46*   AST 6* 8*   ALT 19 15   ANIONGAP 8 9   EGFRNONAA 41.4* 47.8*       Diagnostic Results:  I have reviewed all pertinent imaging results/findings within the past 24 hours.

## 2020-09-30 NOTE — ASSESSMENT & PLAN NOTE
- Pulm consulted and performed thora at bedside 9/24  - 1L bloody pleural fluids removed.  -  sent for cytology, flow, micro, etc.  - cytology and flow suggestive of blasts. Micro with NGTD.

## 2020-09-30 NOTE — PLAN OF CARE
Pt is on day 6/7 of 7&3 regimen for AML. Denies any complaints overnight. On telemetry, and had a run of bardycardia at the beginning of the shift into the 40-50s, currently being V-paced in the 60s. On IVF @ 100/hr. Continuous cytarabine at 44ml/hr. bm x 1 overnight. Afebrile, VSS. Plan of care reviewed with pt. All questions answered. Bed low and locked. Nonskid footwear when oob. Instructed to call nursing for assistance. Call light and personal belongings within reach.

## 2020-09-30 NOTE — ASSESSMENT & PLAN NOTE
55 year old man who presents as a transfer for possible leukemia after manual differential demonstrated blasts.   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  - fish results - t(10;11), ngs - No pathogenic genetic alteration is detected  - Started 7+3 on 9/25/20. Today is day    - CBC, CMP, PT/INR, fibrinogen, uric acid, phos daily to assess for TLS and DIC.   - Peripheral smear -  Hyperleukocytosis with >20% circulating blastoid cells   - 2D ECHO with EF of 58%   - Continue allopurinol 300mg BID and on IVF  - Hydrea stopped on 9/19  - Transfuse cyroglobulin if fibrinogen <150  - Transfuse if platelets <10, Hgb<7 and/or patient actively bleeding  - Lopes placed 9/21/20.  - On Acyclovir and Levaquin for ppx. Will start vori ppx today (9/29/20)  - his is a stem cell transplant candidate. Sent HLA typing earlier in admission. Sent sibling contact info to transplant coordinator.

## 2020-09-30 NOTE — ASSESSMENT & PLAN NOTE
HbA1C 8  - on Metformin at home. Holding inpatient  - Insulin Aspart 2U TID  - ACHS blood glucose monitoring and s/s insulin while inpatient  - Goal -180.   - blood glucose in 200s  - was started on levemir since admission. Increased dose from 8 units nightly to 10 units nightly 9/28/20. Increased conservatively as oral intake is fair and may worsen.  - stopped scheduled novolog 9/30/20 with blood glucose in 120s and patient no longer receiving steroids

## 2020-09-30 NOTE — ASSESSMENT & PLAN NOTE
55yoM with newly diagnosed acute leukemia. Consulted for SEBASTIAN (unknown baseline) with Cr increase from 0.8 on 9/22 to 1.9 on 9/23. 2.0 on day of consult on 9/24. Etiology is likely multifactorial 2/2 to acyclovir, losartan, contrast-induced from CT chest on 9/17 (Cr increase can be delayed 72-96 hours after contrast), and gradual decrease in hemoglobin from 9.9 on admission to 7.1 today. Patient with good PO intake (~2L per day) and UOP of 1.5-4.5L per day since admission. Renal u/s with no evidence of hydronephrosis  UPC 0.18  Likely in polyuric phase of ATN. Back on acyclovir.    Recommendations:  Switch maintenance fluids to Plasma-lyte at 100ml/hr  Continue to hold losartan in setting of SEBASTIAN   Renally dose all medications to current GFR  Avoid nephrotoxic meds/agents such as NSAIDs, ACE, ARBs, and IV radiocontrast  Strict intake and output and daily standing weights  Trend daily labs  No indications for RRT at this time; will reassess daily

## 2020-09-30 NOTE — ASSESSMENT & PLAN NOTE
Patient presented with fevers, fatigue, concerning due to acute leukemoid process as significant leukocytosis and blasts. Fevers likely related to this but infection workup in process.   - CXR at OSH did not show findings concerning for PNA.   - Chest CT performed 9/16/20 due to hemoptysis and suggestive of LLL PNA  - Blood cx with NGTD  - u/a not suggestive of UTI  -- completed 7 day course vancomycin and zosyn 9/22.   - Switched to ppx LVQ 9/23/20.  - repeated cxr 9/23/20. Showing large left pleural effusion. S/p thoracentesis 09/24. 1L bloody pleural fluid removed and sent for studies including cytology, micro, and flow. Cytology and flow showing blasts. Micro with NGTD.

## 2020-09-30 NOTE — PROGRESS NOTES
[facility]  Hematology  Bone Marrow Transplant  Progress Note    Patient Name: Shell Diaz  Admission Date: 9/16/2020  Hospital Length of Stay: 14 days  Code Status: Full Code    Subjective:     Interval History: Day 6 of 7+3 for AML. Continues to be afebrile. VSS. Continues to be on room air. Cytology from pleural fluid and flow suggestive of blasts. Micro still with NGTD. Blood glucose stable. Likely elevated 2/2 steroids. Stopped scheduled novolog. Will continue scheduled levemir for now. Creatinine down to 1.6. phos continues to be elevated at 5.4. Will continue phos binder. T-bili up to 1.5 from 0.8 today. Will monitor for now. Hgb 6.7. Transfusing 1 unit prbc. ANC today.    Objective:     Vital Signs (Most Recent):  Temp: 98.1 °F (36.7 °C) (09/30/20 0804)  Pulse: 60 (09/30/20 0818)  Resp: 17 (09/30/20 0804)  BP: (!) 142/81 (09/30/20 0804)  SpO2: 97 % (09/30/20 0804) Vital Signs (24h Range):  Temp:  [97.5 °F (36.4 °C)-98.6 °F (37 °C)] 98.1 °F (36.7 °C)  Pulse:  [60-82] 60  Resp:  [16-18] 17  SpO2:  [93 %-97 %] 97 %  BP: (120-153)/(67-83) 142/81     Weight: 133.7 kg (294 lb 12.1 oz)  Body mass index is 36.84 kg/m².  Body surface area is 2.66 meters squared.    ECOG SCORE         [unfilled]    Intake/Output - Last 3 Shifts       09/28 0700 - 09/29 0659 09/29 0700 - 09/30 0659 09/30 0700 - 10/01 0659    P.O. 3080 1529     I.V. (mL/kg) 1181.3 (8.8) 1397 (10.4)     Blood  231 350    IV Piggyback 1001 1060.7     Total Intake(mL/kg) 5262.3 (39) 4217.7 (31.5) 350 (2.6)    Urine (mL/kg/hr) 5975 (1.8) 6545 (2) 2000 (7.4)    Stool  0     Total Output 5975 6545 2000    Net -712.7 -2327.3 -1650           Stool Occurrence  1 x           Physical Exam  Constitutional:       Appearance: He is well-developed.   HENT:      Head: Normocephalic and atraumatic.      Mouth/Throat:      Pharynx: No oropharyngeal exudate.   Eyes:      General:         Right eye: No discharge.         Left eye: No discharge.       Conjunctiva/sclera: Conjunctivae normal.      Pupils: Pupils are equal, round, and reactive to light.   Neck:      Musculoskeletal: Normal range of motion and neck supple.   Cardiovascular:      Rate and Rhythm: Normal rate. Rhythm irregular.      Heart sounds: Normal heart sounds. No murmur.   Pulmonary:      Effort: Pulmonary effort is normal. No respiratory distress.      Breath sounds: No wheezing or rales.      Comments: clr/diminished left base    Abdominal:      General: Bowel sounds are normal. There is no distension.      Palpations: Abdomen is soft.      Tenderness: There is no abdominal tenderness.   Musculoskeletal: Normal range of motion.         General: No deformity.   Skin:     General: Skin is warm and dry.      Findings: No erythema or rash.      Comments: Right chest wall vas cath. Dressing c/d/i with no sign of infection noted.   Neurological:      Mental Status: He is alert and oriented to person, place, and time.   Psychiatric:         Behavior: Behavior normal.         Thought Content: Thought content normal.         Judgment: Judgment normal.         Significant Labs:   CBC:   Recent Labs   Lab 09/29/20  0339 09/30/20  0400   WBC 0.33* 0.35*   HGB 7.3* 6.7*   HCT 21.6* 19.9*   PLT 7* 20*    and CMP:   Recent Labs   Lab 09/29/20  0339 09/30/20  0400    139   K 3.8 3.8    105   CO2 25 25   * 157*   BUN 45* 36*   CREATININE 1.8* 1.6*   CALCIUM 7.6* 7.2*   PROT 5.2* 5.0*   ALBUMIN 2.5* 2.5*   BILITOT 0.8 1.5*   ALKPHOS 48* 46*   AST 6* 8*   ALT 19 15   ANIONGAP 8 9   EGFRNONAA 41.4* 47.8*       Diagnostic Results:  I have reviewed all pertinent imaging results/findings within the past 24 hours.    Assessment/Plan:     * Acute myeloid leukemia not having achieved remission  55 year old man who presents as a transfer for possible leukemia after manual differential demonstrated blasts.   - BMBx performed 9/16/20, showing AML with flow cytometry showingincreased population of  myeloid blasts  showing expression of CD4, CD9, CD11c, CD15, CD13, CD33(97%), CD64, (partial),  HLA-DR, and cytoplasmic myeloperoxidase.  - fish results - t(10;11), ngs - No pathogenic genetic alteration is detected  - Started 7+3 on 9/25/20. Today is day    - CBC, CMP, PT/INR, fibrinogen, uric acid, phos daily to assess for TLS and DIC.   - Peripheral smear -  Hyperleukocytosis with >20% circulating blastoid cells   - 2D ECHO with EF of 58%   - Continue allopurinol 300mg BID and on IVF  - Hydrea stopped on 9/19  - Transfuse cyroglobulin if fibrinogen <150  - Transfuse if platelets <10, Hgb<7 and/or patient actively bleeding  - Lopes placed 9/21/20.  - On Acyclovir and Levaquin for ppx. Will start vori ppx today (9/29/20)  - his is a stem cell transplant candidate. Sent HLA typing earlier in admission. Sent sibling contact info to transplant coordinator.    SEBASTIAN (acute kidney injury)  - Improving, baseline 0.8.  - likely 2/2 IV abx (Vanc and Zosyn) which he completed on 9/22/20. Also received IV contract with CT on 9/16/20.   - Nephrology consulted, appreciate recommendations   -Monitor RF, improving  -Strict I/O  -Avoid Nephrotoxic drugs, holding losartan   -Renally dose Medications  -Creatinine 1.6 today  -Continue phos binder    Pleural effusion on left  - Pulm consulted and performed thora at bedside 9/24  - 1L bloody pleural fluids removed.  -  sent for cytology, flow, micro, etc.  - cytology and flow suggestive of blasts. Micro with NGTD.    Paroxysmal atrial fibrillation  Patient states that he had pacemaker placed for this.   Previsouly ECHO with EF 58 %  Holding Eliquis due to low platelet count. Resume when appropriate.  EKG from 9/16/20 showing afib with RVR  HR now controlled  Cardiology consulted due to cardiac history as patient will receive anthracycline if he is induced for AML  Cardiology repeated ehco 9/17 to assess for ventricle straining. 16% LV straining noted on echo  - Keep mag > 2 and  K+ > 4  - Continue home digoxin and metoprolol.   - To repeat ECHO after completion of chemo per card recs  - Monitor I/O and Daily weights   - currently rate-controlled            Pancytopenia due to antineoplastic chemotherapy  - Secondary to AML  - Transfuse if platelet count<10. If patient develops active bleed, transfuse for goal >50.  - Transfuse for hgb < 7  - ANC 30  - continue ppx with acyclovir and LVQ. Started vori 9/29          Hyperbilirubinemia  -T-bili up to 1.5 from 0.8 today  -may be 2/2 chemo vs transfusion  -will trend for now    Hyperphosphatemia  - 2/2 SEBASTIAN  - continue phos binder with meals    Type 2 diabetes mellitus, without long-term current use of insulin  HbA1C 8  - on Metformin at home. Holding inpatient  - Insulin Aspart 2U TID  - ACHS blood glucose monitoring and s/s insulin while inpatient  - Goal -180.   - blood glucose in 200s  - was started on levemir since admission. Increased dose from 8 units nightly to 10 units nightly 9/28/20. Increased conservatively as oral intake is fair and may worsen.  - stopped scheduled novolog 9/30/20 with blood glucose in 120s and patient no longer receiving steroids    V tach  - had at 6 beat run of v-tach 9/18/20 at ~ 6 am. Not sustained. No episodes since.  - keep mag > or = 2 and K+ > or = 4  - cardiology consulted earlier in admission. No longer following. Will re-consult them if indicated.    Leukocytosis  -  on admission and 129 prior to starting hydrea  - see AML  - Hydrea stopped 9/20/20 with normal WBC count.      Pneumonia  Patient presented with fevers, fatigue, concerning due to acute leukemoid process as significant leukocytosis and blasts. Fevers likely related to this but infection workup in process.   - CXR at OSH did not show findings concerning for PNA.   - Chest CT performed 9/16/20 due to hemoptysis and suggestive of LLL PNA  - Blood cx with NGTD  - u/a not suggestive of UTI  -- completed 7 day course vancomycin and  zosyn 9/22.   - Switched to ppx LVQ 9/23/20.  - repeated cxr 9/23/20. Showing large left pleural effusion. S/p thoracentesis 09/24. 1L bloody pleural fluid removed and sent for studies including cytology, micro, and flow. Cytology and flow showing blasts. Micro with NGTD.      Essential hypertension  - Hold home losartan in setting of SEBASTIAN  - Monitor BP  - BP stable at this time          VTE Risk Mitigation (From admission, onward)         Ordered     heparin, porcine (PF) 100 unit/mL injection flush 300 Units  As needed (PRN)      09/25/20 1408     heparin (porcine) injection 1,000 Units  As needed (PRN)      09/22/20 0343     IP VTE HIGH RISK PATIENT  Once      09/16/20 0017     Place sequential compression device  Until discontinued      09/16/20 0017                Disposition: Inpatient for chemo.    Jeni Holt, NP  Bone Marrow Transplant  [facility]

## 2020-09-30 NOTE — ASSESSMENT & PLAN NOTE
- had at 6 beat run of v-tach 9/18/20 at ~ 6 am. Not sustained. No episodes since.  - keep mag > or = 2 and K+ > or = 4  - cardiology consulted earlier in admission. No longer following. Will re-consult them if indicated.

## 2020-10-01 NOTE — ASSESSMENT & PLAN NOTE
- Pulm consulted and performed thora at bedside 9/24  - 1L bloody pleural fluids removed.  -  sent for cytology, flow, micro, etc.  - cytology and flow showing blasts. Micro with NGTD.

## 2020-10-01 NOTE — PT/OT/SLP PROGRESS
Occupational Therapy   Treatment    Name: Shell Diaz  MRN: [id number]  Admitting Diagnosis:  Acute myeloid leukemia not having achieved remission  10 Days Post-Op    Recommendations:     Discharge Recommendations: home  Discharge Equipment Recommendations:  none  Barriers to discharge:       Assessment:     Shell Diaz is a 55 y.o. male with a medical diagnosis of Acute myeloid leukemia not having achieved remission. Once a week check-in complete - pt still currently (I) with ADLs & functional mobility and voicing no complaints. Will continue to follow 1x/week until D/C (possibly 2 weeks). Performance deficits affecting function are impaired endurance.     Rehab Prognosis:  Good; patient would benefit from acute skilled OT services to address these deficits and reach maximum level of function.       Plan:     Patient to be seen 1 x/week to address the above listed problems via self-care/home management, therapeutic activities, therapeutic exercises  · Plan of Care Expires:    · Plan of Care Reviewed with: patient    Subjective     Pain/Comfort:  · Pain Rating 1: 0/10    Objective:     Communicated with: rn prior to session.  Patient found supine with peripheral IV upon OT entry to room.    General Precautions: Standard, fall   Orthopedic Precautions:N/A   Braces:       Occupational Performance:     Bed Mobility:    · Independent    Functional Mobility/Transfers:  Independent - walked ~ 400' pushing IV.    Activities of Daily Living:  Independent    [facility] 6 Click ADL: 24    Treatment & Education:  Discussed OT POC and any needs.    Patient left up in chair with all lines intact and call button in reachEducation:      GOALS:   Multidisciplinary Problems     Occupational Therapy Goals        Problem: Occupational Therapy Goal    Goal Priority Disciplines Outcome Interventions   Occupational Therapy Goal     OT, PT/OT Ongoing, Progressing    Description: Goals to be met by: 10/25    Patient will increase  functional independence with ADLs by performing:    Feeding with [place]. MET  UE Dressing with [place].  LE Dressing with [place]. MET  Grooming while standing with [place].  Toileting from toilet with [place] for hygiene and clothing management.                      Time Tracking:     OT Date of Treatment: 10/01/20  OT Start Time: 0855  OT Stop Time: 0906  OT Total Time (min): 11 min    Billable Minutes:Therapeutic Activity 11    SANCHO Walters  10/1/2020

## 2020-10-01 NOTE — PLAN OF CARE
bag 7/7 of cytarabine of regimen 7&3 currently infusing. Pt denies any pain, nausea, diarrhea. Afebrile, VSS. Plan of care reviewed with pt. All questions answered. Bed low and locked. Nonskid footwear when oob. Instructed to call nursing for assistance. Call light and personal belongings within reach.

## 2020-10-01 NOTE — PROGRESS NOTES
Pt and wife seen for follow up. In good spirits with no complaints beyond adjustment after shaving beard.  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  No other needs identified at this time. Will continue to follow and assist as needed.

## 2020-10-01 NOTE — SUBJECTIVE & OBJECTIVE
Interval History: No acute events overnight. Patient is feeling good today. Denies chest pain, shortness of breath, nausea, vomiting, abdominal pain, dysuria, hematuria.     Review of patient's allergies indicates:  No Known Allergies  Current Facility-Administered Medications   Medication Frequency    0.9%  NaCl infusion (for blood administration) Q24H PRN    acetaminophen tablet 650 mg Q6H PRN    acetaminophen tablet 650 mg Once PRN    acyclovir capsule 400 mg BID    allopurinoL tablet 300 mg Daily    alteplase injection 2 mg PRN    cytarabine (PF) (CYTOSAR) 100 mg/m2/day = 265 mg in sodium chloride 0.9% 1,000 mL chemo infusion Q24H    dextrose 50% injection 12.5 g PRN    dextrose 50% injection 25 g PRN    digoxin tablet 250 mcg Daily    diphenhydrAMINE capsule 25 mg Q6H PRN    diphenhydrAMINE capsule 25 mg Once PRN    glucagon (human recombinant) injection 1 mg PRN    glucose chewable tablet 16 g PRN    glucose chewable tablet 24 g PRN    guaifenesin 100 mg/5 ml syrup 200 mg Q4H PRN    heparin (porcine) injection 1,000 Units PRN    heparin, porcine (PF) 100 unit/mL injection flush 300 Units PRN    insulin aspart U-100 pen 0-5 Units QID (AC + HS) PRN    insulin detemir U-100 pen 10 Units Daily    lactated ringers infusion Continuous    levoFLOXacin tablet 500 mg Daily    metoprolol injection 5 mg Q5 Min PRN    metoprolol succinate (TOPROL-XL) 24 hr tablet 50 mg Daily    ondansetron disintegrating tablet 8 mg Q8H PRN    ondansetron tablet 16 mg Q24H    oxyCODONE immediate release tablet 5 mg Q8H PRN    polyethylene glycol packet 17 g BID PRN    prochlorperazine injection Soln 10 mg Q6H PRN    sevelamer carbonate tablet 800 mg TID WM    sodium chloride 0.9% flush 10 mL PRN    terazosin capsule 5 mg QHS    voriconazole tablet 200 mg BID       Objective:     Vital Signs (Most Recent):  Temp: 98.2 °F (36.8 °C) (10/01/20 0756)  Pulse: 73 (10/01/20 0756)  Resp: 18 (10/01/20 0756)  BP:  131/69 (10/01/20 0756)  SpO2: (!) 94 % (10/01/20 0756)  O2 Device (Oxygen Therapy): room air (10/01/20 0538) Vital Signs (24h Range):  Temp:  [97.5 °F (36.4 °C)-98.8 °F (37.1 °C)] 98.2 °F (36.8 °C)  Pulse:  [59-77] 73  Resp:  [16-18] 18  SpO2:  [93 %-96 %] 94 %  BP: (131-157)/(65-79) 131/69     Weight: 131.1 kg (289 lb 0.4 oz) (10/01/20 0400)  Body mass index is 36.13 kg/m².  Body surface area is 2.63 meters squared.    I/O last 3 completed shifts:  In: 6447.5 [P.O.:1440; I.V.:3113; Blood:350; IV Piggyback:1544.5]  Out: 38914 [Urine:40507]    Physical Exam  Constitutional:       General: He is not in acute distress.     Appearance: He is not ill-appearing.   HENT:      Head: Normocephalic and atraumatic.      Mouth/Throat:      Mouth: Mucous membranes are moist.      Pharynx: Oropharynx is clear.   Eyes:      General:         Right eye: No discharge.         Left eye: No discharge.      Extraocular Movements: Extraocular movements intact.   Cardiovascular:      Rate and Rhythm: Normal rate and regular rhythm.      Pulses: Normal pulses.      Heart sounds: No murmur.   Pulmonary:      Effort: Pulmonary effort is normal. No respiratory distress.      Breath sounds: No wheezing.   Abdominal:      General: There is no distension.      Palpations: Abdomen is soft.      Tenderness: There is no abdominal tenderness.   Musculoskeletal:      Right lower leg: No edema.      Left lower leg: No edema.   Skin:     General: Skin is warm and dry.   Neurological:      General: No focal deficit present.      Mental Status: He is alert and oriented to person, place, and time.   Psychiatric:         Mood and Affect: Mood normal.         Behavior: Behavior normal.         Significant Labs:  CBC:   Recent Labs   Lab 10/01/20  0400   WBC 0.46*   RBC 2.36*   HGB 7.5*   HCT 21.5*   PLT 16*   MCV 91   MCH 31.8*   MCHC 34.9     CMP:   Recent Labs   Lab 10/01/20  0400   *   CALCIUM 7.6*   ALBUMIN 2.7*   PROT 5.3*      K 3.5   CO2  28      BUN 28*   CREATININE 1.4   ALKPHOS 54*   ALT 18   AST 11   BILITOT 2.0*     All labs within the past 24 hours have been reviewed.     Significant Imaging:    Retroperitoneal US 9/24:  Impression:     Elevated resistive indices with decreased perfusion suggestive of medical renal disease.  No evidence of hydronephrosis.     Prostate upper limits of normal in size..

## 2020-10-01 NOTE — ASSESSMENT & PLAN NOTE
55 year old man who presents as a transfer for possible leukemia after manual differential demonstrated blasts.   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  - fish results - t(10;11), ngs - No pathogenic genetic alteration is detected  - Started 7+3 on 9/25/20. Today is day 7   - CBC, CMP, PT/INR, fibrinogen, uric acid, phos daily to assess for TLS and DIC.   - Peripheral smear -  Hyperleukocytosis with >20% circulating blastoid cells   - 2D ECHO with EF of 58%   - Continue allopurinol 300mg BID and on IVF  - Hydrea stopped on 9/19  - Transfuse cyroglobulin if fibrinogen <150  - Transfuse if platelets <10, Hgb<7 and/or patient actively bleeding  - Lopes placed 9/21/20.  - On Acyclovir and Levaquin for ppx. Will start vori ppx today (9/29/20)  - his is a stem cell transplant candidate. Sent HLA typing earlier in admission. Sent sibling contact info to transplant coordinator.

## 2020-10-01 NOTE — SUBJECTIVE & OBJECTIVE
Subjective:     Interval History: Day 7 of 7+3. Doing well. Afebrile. VSS. Blood glucose stable. Creatinine down to 1.4 today. IVF changed to 1/2 NS per nephrology recs. Nephrology signed off, and patient will f/u with them outpatient in 4 weeks. t-bili up to 2.0 from 1.5 today. Will continue to trend. ANC is 0.    Objective:     Vital Signs (Most Recent):  Temp: 98.7 °F (37.1 °C) (10/01/20 1119)  Pulse: 69 (10/01/20 1119)  Resp: 16 (10/01/20 1119)  BP: 137/71 (10/01/20 1119)  SpO2: 95 % (10/01/20 1119) Vital Signs (24h Range):  Temp:  [97.5 °F (36.4 °C)-98.8 °F (37.1 °C)] 98.7 °F (37.1 °C)  Pulse:  [] 69  Resp:  [16-18] 16  SpO2:  [93 %-96 %] 95 %  BP: (131-157)/(65-79) 137/71     Weight: 131.1 kg (289 lb 0.4 oz)  Body mass index is 36.13 kg/m².  Body surface area is 2.63 meters squared.    ECOG SCORE         [unfilled]    Intake/Output - Last 3 Shifts       09/29 0700 - 09/30 0659 09/30 0700 - 10/01 0659 10/01 0700 - 10/02 0659    P.O. 1529 1440     I.V. (mL/kg) 1397 (10.4) 2166 (16.5)     Blood 231 350     IV Piggyback 1060.7 1100.6     Total Intake(mL/kg) 4217.7 (31.5) 5056.6 (38.6)     Urine (mL/kg/hr) 6545 (2) 8550 (2.7) 1200 (1.7)    Stool 0 0     Total Output 6545 8550 1200    Net -2327.3 -3493.4 -1200           Stool Occurrence 1 x 1 x           Physical Exam  Constitutional:       Appearance: He is well-developed.   HENT:      Head: Normocephalic and atraumatic.      Mouth/Throat:      Pharynx: No oropharyngeal exudate.   Eyes:      General:         Right eye: No discharge.         Left eye: No discharge.      Conjunctiva/sclera: Conjunctivae normal.      Pupils: Pupils are equal, round, and reactive to light.   Neck:      Musculoskeletal: Normal range of motion and neck supple.   Cardiovascular:      Rate and Rhythm: Normal rate. Rhythm irregular.      Heart sounds: Normal heart sounds. No murmur.   Pulmonary:      Effort: Pulmonary effort is normal. No respiratory distress.      Breath sounds: No  wheezing or rales.      Comments: clr/diminished left base    Abdominal:      General: Bowel sounds are normal. There is no distension.      Palpations: Abdomen is soft.      Tenderness: There is no abdominal tenderness.   Musculoskeletal: Normal range of motion.         General: No deformity.   Skin:     General: Skin is warm and dry.      Findings: No erythema or rash.      Comments: Right chest wall vas cath. Dressing c/d/i with no sign of infection noted.   Neurological:      Mental Status: He is alert and oriented to person, place, and time.   Psychiatric:         Behavior: Behavior normal.         Thought Content: Thought content normal.         Judgment: Judgment normal.         Significant Labs:   CBC:   Recent Labs   Lab 09/30/20  0400 10/01/20  0400   WBC 0.35* 0.46*   HGB 6.7* 7.5*   HCT 19.9* 21.5*   PLT 20* 16*    and CMP:   Recent Labs   Lab 09/30/20  0400 10/01/20  0400    138   K 3.8 3.5    104   CO2 25 28   * 136*   BUN 36* 28*   CREATININE 1.6* 1.4   CALCIUM 7.2* 7.6*   PROT 5.0* 5.3*   ALBUMIN 2.5* 2.7*   BILITOT 1.5* 2.0*   ALKPHOS 46* 54*   AST 8* 11   ALT 15 18   ANIONGAP 9 6*   EGFRNONAA 47.8* 56.2*       Diagnostic Results:  I have reviewed all pertinent imaging results/findings within the past 24 hours.

## 2020-10-01 NOTE — ASSESSMENT & PLAN NOTE
-T-bili up to 1.5 from 0.8 9/30/20  -may be 2/2 chemo vs transfusion  -T-bili up to 2.0 for now  -will continue to trend for now

## 2020-10-01 NOTE — PROGRESS NOTES
"[facility]  Nephrology  Progress Note    Patient Name: Shell Diaz  MRN: [id number]  Admission Date: 9/16/2020  Hospital Length of Stay: 15 days  Attending Provider: Richy Thurman MD   Primary Care Physician: Alva Rubio MD  Principal Problem:Acute myeloid leukemia not having achieved remission    Subjective:     HPI: Mr. Diaz is a 56yo M with a PMH of Afib with pacemaker on eliquis, HTN, and cardiomyopathy who presnted to OSH with fever and fatigue. At OSH, patient received tylenol, IVF, a dose of vancomycin, zosyn and hydrea 4g. Labs were notable for leukocytosis (WBC 95.7), anemia, thrombocytopenia, elevated INR, D-dimer. CXR showed "cardiomegaly with no acute pulmonary process".  Manual differential demonstrated high number of blasts (100). Patient was found to have LLL pneumonia and was started on Vanc and Zosyn. Finished 7 day course on 9/23. Vanc troughs WNL. He received a CT Chest with contrast on 9/17 and Pulmonology was consulted today for thoracentesis which drained 1L of bloody fluid.     Nephrology was consulted for SEBASTIAN. Cr 1.3 on day of admission. Significant change in creatinine from 0.8 on 9/22 to 1.9 on 9/23. 2.0 on day of consult. Of note, patient is on acyclovir 400 BID and losartan 50, the latter of which is a home medication.       Interval History: No acute events overnight. Patient is feeling good today. Denies chest pain, shortness of breath, nausea, vomiting, abdominal pain, dysuria, hematuria.     Review of patient's allergies indicates:  No Known Allergies  Current Facility-Administered Medications   Medication Frequency    0.9%  NaCl infusion (for blood administration) Q24H PRN    acetaminophen tablet 650 mg Q6H PRN    acetaminophen tablet 650 mg Once PRN    acyclovir capsule 400 mg BID    allopurinoL tablet 300 mg Daily    alteplase injection 2 mg PRN    cytarabine (PF) (CYTOSAR) 100 mg/m2/day = 265 mg in sodium chloride 0.9% 1,000 mL chemo infusion " Q24H    dextrose 50% injection 12.5 g PRN    dextrose 50% injection 25 g PRN    digoxin tablet 250 mcg Daily    diphenhydrAMINE capsule 25 mg Q6H PRN    diphenhydrAMINE capsule 25 mg Once PRN    glucagon (human recombinant) injection 1 mg PRN    glucose chewable tablet 16 g PRN    glucose chewable tablet 24 g PRN    guaifenesin 100 mg/5 ml syrup 200 mg Q4H PRN    heparin (porcine) injection 1,000 Units PRN    heparin, porcine (PF) 100 unit/mL injection flush 300 Units PRN    insulin aspart U-100 pen 0-5 Units QID (AC + HS) PRN    insulin detemir U-100 pen 10 Units Daily    lactated ringers infusion Continuous    levoFLOXacin tablet 500 mg Daily    metoprolol injection 5 mg Q5 Min PRN    metoprolol succinate (TOPROL-XL) 24 hr tablet 50 mg Daily    ondansetron disintegrating tablet 8 mg Q8H PRN    ondansetron tablet 16 mg Q24H    oxyCODONE immediate release tablet 5 mg Q8H PRN    polyethylene glycol packet 17 g BID PRN    prochlorperazine injection Soln 10 mg Q6H PRN    sevelamer carbonate tablet 800 mg TID WM    sodium chloride 0.9% flush 10 mL PRN    terazosin capsule 5 mg QHS    voriconazole tablet 200 mg BID       Objective:     Vital Signs (Most Recent):  Temp: 98.2 °F (36.8 °C) (10/01/20 0756)  Pulse: 73 (10/01/20 0756)  Resp: 18 (10/01/20 0756)  BP: 131/69 (10/01/20 0756)  SpO2: (!) 94 % (10/01/20 0756)  O2 Device (Oxygen Therapy): room air (10/01/20 0538) Vital Signs (24h Range):  Temp:  [97.5 °F (36.4 °C)-98.8 °F (37.1 °C)] 98.2 °F (36.8 °C)  Pulse:  [59-77] 73  Resp:  [16-18] 18  SpO2:  [93 %-96 %] 94 %  BP: (131-157)/(65-79) 131/69     Weight: 131.1 kg (289 lb 0.4 oz) (10/01/20 0400)  Body mass index is 36.13 kg/m².  Body surface area is 2.63 meters squared.    I/O last 3 completed shifts:  In: 6447.5 [P.O.:1440; I.V.:3113; Blood:350; IV Piggyback:1544.5]  Out: 66412 [Urine:13018]    Physical Exam  Constitutional:       General: He is not in acute distress.     Appearance: He is  not ill-appearing.   HENT:      Head: Normocephalic and atraumatic.      Mouth/Throat:      Mouth: Mucous membranes are moist.      Pharynx: Oropharynx is clear.   Eyes:      General:         Right eye: No discharge.         Left eye: No discharge.      Extraocular Movements: Extraocular movements intact.   Cardiovascular:      Rate and Rhythm: Normal rate and regular rhythm.      Pulses: Normal pulses.      Heart sounds: No murmur.   Pulmonary:      Effort: Pulmonary effort is normal. No respiratory distress.      Breath sounds: No wheezing.   Abdominal:      General: There is no distension.      Palpations: Abdomen is soft.      Tenderness: There is no abdominal tenderness.   Musculoskeletal:      Right lower leg: No edema.      Left lower leg: No edema.   Skin:     General: Skin is warm and dry.   Neurological:      General: No focal deficit present.      Mental Status: He is alert and oriented to person, place, and time.   Psychiatric:         Mood and Affect: Mood normal.         Behavior: Behavior normal.         Significant Labs:  CBC:   Recent Labs   Lab 10/01/20  0400   WBC 0.46*   RBC 2.36*   HGB 7.5*   HCT 21.5*   PLT 16*   MCV 91   MCH 31.8*   MCHC 34.9     CMP:   Recent Labs   Lab 10/01/20  0400   *   CALCIUM 7.6*   ALBUMIN 2.7*   PROT 5.3*      K 3.5   CO2 28      BUN 28*   CREATININE 1.4   ALKPHOS 54*   ALT 18   AST 11   BILITOT 2.0*     All labs within the past 24 hours have been reviewed.     Significant Imaging:    Retroperitoneal US 9/24:  Impression:     Elevated resistive indices with decreased perfusion suggestive of medical renal disease.  No evidence of hydronephrosis.     Prostate upper limits of normal in size..    Assessment/Plan:     * Acute myeloid leukemia not having achieved remission  Managed per primary    SEBASTIAN (acute kidney injury)  55yoM with newly diagnosed acute leukemia. Consulted for SEBASTIAN (unknown baseline) with Cr increase from 0.8 on 9/22 to 1.9 on 9/23. 2.0  on day of consult on 9/24. Etiology is likely multifactorial 2/2 to acyclovir, losartan, contrast-induced from CT chest on 9/17 (Cr increase can be delayed 72-96 hours after contrast), and gradual decrease in hemoglobin from 9.9 on admission to 7.1 today. Patient with good PO intake (~2L per day) and UOP of 1.5-4.5L per day since admission. Renal u/s with no evidence of hydronephrosis  UPC 0.18  Likely in polyuric phase of ATN. Back on acyclovir.    Recommendations:  Switch maintenance fluids to 1/2 normal saline to match urine output  Patient will need to follow up with Nephrology clinic in 4 weeks  Continue to hold losartan in setting of SEBASTIAN   Renally dose all medications to current GFR  Avoid nephrotoxic meds/agents such as NSAIDs, ACE, ARBs, and IV radiocontrast  Strict intake and output and daily standing weights  Trend daily labs  No indications for RRT at this time; will reassess daily           Thank you for your consult. I will sign off. Please contact us if you have any additional questions.    Chris Lin MD PGY1  Nephrology  [facility]

## 2020-10-01 NOTE — ASSESSMENT & PLAN NOTE
Patient states that he had pacemaker placed for this.   Previsouly ECHO with EF 58 %  Holding Eliquis due to low platelet count. Resume when appropriate.  EKG from 9/16/20 showing afib with RVR  HR now controlled  Cardiology consulted due to cardiac history as patient will receive anthracycline if he is induced for AML  Cardiology repeated ehco 9/17 to assess for ventricle straining. 16% LV straining noted on echo  - Keep mag > 2 and K+ > 4  - Continue home digoxin and metoprolol. Decreased metoprolol dose from 100 mg to 50 mg with paced HR in 60s. Will monitor closely at this dose.   - To repeat ECHO after completion of chemo per card recs  - Monitor I/O and Daily weights   - currently rate-controlled

## 2020-10-01 NOTE — PROGRESS NOTES
[facility]  Hematology  Bone Marrow Transplant  Progress Note    Patient Name: Shell Diaz  Admission Date: 9/16/2020  Hospital Length of Stay: 15 days  Code Status: Full Code    Subjective:     Interval History: Day 7 of 7+3. Doing well. Afebrile. VSS. Blood glucose stable. Creatinine down to 1.4 today. IVF changed to 1/2 NS per nephrology recs. Nephrology signed off, and patient will f/u with them outpatient in 4 weeks. t-bili up to 2.0 from 1.5 today. Will continue to trend. ANC is 0.    Objective:     Vital Signs (Most Recent):  Temp: 98.7 °F (37.1 °C) (10/01/20 1119)  Pulse: 69 (10/01/20 1119)  Resp: 16 (10/01/20 1119)  BP: 137/71 (10/01/20 1119)  SpO2: 95 % (10/01/20 1119) Vital Signs (24h Range):  Temp:  [97.5 °F (36.4 °C)-98.8 °F (37.1 °C)] 98.7 °F (37.1 °C)  Pulse:  [] 69  Resp:  [16-18] 16  SpO2:  [93 %-96 %] 95 %  BP: (131-157)/(65-79) 137/71     Weight: 131.1 kg (289 lb 0.4 oz)  Body mass index is 36.13 kg/m².  Body surface area is 2.63 meters squared.    ECOG SCORE         [unfilled]    Intake/Output - Last 3 Shifts       09/29 0700 - 09/30 0659 09/30 0700 - 10/01 0659 10/01 0700 - 10/02 0659    P.O. 1529 1440     I.V. (mL/kg) 1397 (10.4) 2166 (16.5)     Blood 231 350     IV Piggyback 1060.7 1100.6     Total Intake(mL/kg) 4217.7 (31.5) 5056.6 (38.6)     Urine (mL/kg/hr) 6545 (2) 8550 (2.7) 1200 (1.7)    Stool 0 0     Total Output 6545 8550 1200    Net -2327.3 -3493.4 -1200           Stool Occurrence 1 x 1 x           Physical Exam  Constitutional:       Appearance: He is well-developed.   HENT:      Head: Normocephalic and atraumatic.      Mouth/Throat:      Pharynx: No oropharyngeal exudate.   Eyes:      General:         Right eye: No discharge.         Left eye: No discharge.      Conjunctiva/sclera: Conjunctivae normal.      Pupils: Pupils are equal, round, and reactive to light.   Neck:      Musculoskeletal: Normal range of motion and neck supple.   Cardiovascular:       Rate and Rhythm: Normal rate. Rhythm irregular.      Heart sounds: Normal heart sounds. No murmur.   Pulmonary:      Effort: Pulmonary effort is normal. No respiratory distress.      Breath sounds: No wheezing or rales.      Comments: clr/diminished left base    Abdominal:      General: Bowel sounds are normal. There is no distension.      Palpations: Abdomen is soft.      Tenderness: There is no abdominal tenderness.   Musculoskeletal: Normal range of motion.         General: No deformity.   Skin:     General: Skin is warm and dry.      Findings: No erythema or rash.      Comments: Right chest wall vas cath. Dressing c/d/i with no sign of infection noted.   Neurological:      Mental Status: He is alert and oriented to person, place, and time.   Psychiatric:         Behavior: Behavior normal.         Thought Content: Thought content normal.         Judgment: Judgment normal.         Significant Labs:   CBC:   Recent Labs   Lab 09/30/20  0400 10/01/20  0400   WBC 0.35* 0.46*   HGB 6.7* 7.5*   HCT 19.9* 21.5*   PLT 20* 16*    and CMP:   Recent Labs   Lab 09/30/20  0400 10/01/20  0400    138   K 3.8 3.5    104   CO2 25 28   * 136*   BUN 36* 28*   CREATININE 1.6* 1.4   CALCIUM 7.2* 7.6*   PROT 5.0* 5.3*   ALBUMIN 2.5* 2.7*   BILITOT 1.5* 2.0*   ALKPHOS 46* 54*   AST 8* 11   ALT 15 18   ANIONGAP 9 6*   EGFRNONAA 47.8* 56.2*       Diagnostic Results:  I have reviewed all pertinent imaging results/findings within the past 24 hours.    Assessment/Plan:     * Acute myeloid leukemia not having achieved remission  55 year old man who presents as a transfer for possible leukemia after manual differential demonstrated blasts.   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  - fish results - t(10;11), ngs - No pathogenic genetic  alteration is detected  - Started 7+3 on 9/25/20. Today is day 7   - CBC, CMP, PT/INR, fibrinogen, uric acid, phos daily to assess for TLS and DIC.   - Peripheral smear -  Hyperleukocytosis with >20% circulating blastoid cells   - 2D ECHO with EF of 58%   - Continue allopurinol 300mg BID and on IVF  - Hydrea stopped on 9/19  - Transfuse cyroglobulin if fibrinogen <150  - Transfuse if platelets <10, Hgb<7 and/or patient actively bleeding  - Lopes placed 9/21/20.  - On Acyclovir and Levaquin for ppx. Will start vori ppx today (9/29/20)  - his is a stem cell transplant candidate. Sent HLA typing earlier in admission. Sent sibling contact info to transplant coordinator.    SEBASTIAN (acute kidney injury)  - Improving, baseline 0.8.  - likely 2/2 IV abx (Vanc and Zosyn) which he completed on 9/22/20. Also received IV contract with CT on 9/16/20.   - Nephrology consulted, appreciate recommendations   -Monitor RF, improving  -Strict I/O  -Avoid Nephrotoxic drugs, holding losartan   -Renally dose Medications  -Creatinine down to1.4 today  -phos down to 3.7. will stop phos binder  -changed IVF to 1/2 NS to match urine output per nephrology rec  -nephrology signed off 10/1/20. They will follow up with patient on an outpatient basis 4 weeks from sign-off.    Pleural effusion on left  - Pulm consulted and performed thora at bedside 9/24  - 1L bloody pleural fluids removed.  -  sent for cytology, flow, micro, etc.  - cytology and flow showing blasts. Micro with NGTD.    Paroxysmal atrial fibrillation  Patient states that he had pacemaker placed for this.   Previsouly ECHO with EF 58 %  Holding Eliquis due to low platelet count. Resume when appropriate.  EKG from 9/16/20 showing afib with RVR  HR now controlled  Cardiology consulted due to cardiac history as patient will receive anthracycline if he is induced for AML  Cardiology repeated ehco 9/17 to assess for ventricle straining. 16% LV straining noted on echo  - Keep mag > 2 and  K+ > 4  - Continue home digoxin and metoprolol. Decreased metoprolol dose from 100 mg to 50 mg with paced HR in 60s. Will monitor closely at this dose.   - To repeat ECHO after completion of chemo per card recs  - Monitor I/O and Daily weights   - currently rate-controlled            Pancytopenia due to antineoplastic chemotherapy  - Secondary to AML  - Transfuse if platelet count<10. If patient develops active bleed, transfuse for goal >50.  - Transfuse for hgb < 7  - ANC 0  - continue ppx with acyclovir and LVQ. Started vori 9/29          Hyperbilirubinemia  -T-bili up to 1.5 from 0.8 9/30/20  -may be 2/2 chemo vs transfusion  -T-bili up to 2.0 for now  -will continue to trend for now    Hyperphosphatemia  - 2/2 SEBASTIAN  - creatinine down to 1.4 and phos now 3.7  - will stop phos binder today (10/1/20)    Type 2 diabetes mellitus, without long-term current use of insulin  HbA1C 8  - on Metformin at home. Holding inpatient  - Insulin Aspart 2U TID  - ACHS blood glucose monitoring and s/s insulin while inpatient  - Goal -180.   - blood glucose in 200s  - was started on levemir since admission. Increased dose from 8 units nightly to 10 units nightly 9/28/20. Increased conservatively as oral intake is fair and may worsen.  - stopped scheduled novolog 9/30/20 with blood glucose in 120s and patient no longer receiving steroids    V tach  - had at 6 beat run of v-tach 9/18/20 at ~ 6 am. Not sustained. No episodes since.  - keep mag > or = 2 and K+ > or = 4  - cardiology consulted earlier in admission. No longer following. Will re-consult them if indicated.    Leukocytosis  -  on admission and 129 prior to starting hydrea  - see AML  - Hydrea stopped 9/20/20 with normal WBC count.      Pneumonia  Patient presented with fevers, fatigue, concerning due to acute leukemoid process as significant leukocytosis and blasts. Fevers likely related to this but infection workup in process.   - CXR at OSH did not show  findings concerning for PNA.   - Chest CT performed 9/16/20 due to hemoptysis and suggestive of LLL PNA  - Blood cx with NGTD  - u/a not suggestive of UTI  -- completed 7 day course vancomycin and zosyn 9/22.   - Switched to ppx LVQ 9/23/20.  - repeated cxr 9/23/20. Showing large left pleural effusion. S/p thoracentesis 09/24. 1L bloody pleural fluid removed and sent for studies including cytology, micro, and flow. Cytology and flow showing blasts. Micro with NGTD.      Essential hypertension  - Hold home losartan in setting of SEBASTIAN  - Monitor BP  - BP stable at this time          VTE Risk Mitigation (From admission, onward)         Ordered     heparin, porcine (PF) 100 unit/mL injection flush 300 Units  As needed (PRN)      09/25/20 1408     heparin (porcine) injection 1,000 Units  As needed (PRN)      09/22/20 0343     IP VTE HIGH RISK PATIENT  Once      09/16/20 0017     Place sequential compression device  Until discontinued      09/16/20 0017                Disposition: Inpatient for chemo.    Jeni Holt, KAITLYN  Bone Marrow Transplant  [facility]

## 2020-10-01 NOTE — PLAN OF CARE
Problem: Adult Inpatient Plan of Care  Goal: Plan of Care Review  Outcome: Ongoing, Progressing  Patient remains free from falls and injury this shift. Bed in low, locked position with call light in reach. Plan of care reviewed with pt. Family at bedside. VSS with bradycardia and paced heart rhythm. Tele in place. Pacemaker. No pain reported. Diet tolerated. Consults to nephrology. Patient encouraged to call for assistance when getting out of bed. Patient verbalized understanding. All belongings within reach. Will continue to monitor.      Cytarabine 265mg/1000ml NS infusing at 44ml/hr. Patient tolerating infusion.

## 2020-10-01 NOTE — ASSESSMENT & PLAN NOTE
- Improving, baseline 0.8.  - likely 2/2 IV abx (Vanc and Zosyn) which he completed on 9/22/20. Also received IV contract with CT on 9/16/20.   - Nephrology consulted, appreciate recommendations   -Monitor RF, improving  -Strict I/O  -Avoid Nephrotoxic drugs, holding losartan   -Renally dose Medications  -Creatinine down to1.4 today  -phos down to 3.7. will stop phos binder  -changed IVF to 1/2 NS to match urine output per nephrology rec  -nephrology signed off 10/1/20. They will follow up with patient on an outpatient basis 4 weeks from sign-off.

## 2020-10-01 NOTE — ASSESSMENT & PLAN NOTE
- Secondary to AML  - Transfuse if platelet count<10. If patient develops active bleed, transfuse for goal >50.  - Transfuse for hgb < 7  - ANC 0  - continue ppx with acyclovir and LVQ. Started vori 9/29

## 2020-10-01 NOTE — ASSESSMENT & PLAN NOTE
55yoM with newly diagnosed acute leukemia. Consulted for SEBASTIAN (unknown baseline) with Cr increase from 0.8 on 9/22 to 1.9 on 9/23. 2.0 on day of consult on 9/24. Etiology is likely multifactorial 2/2 to acyclovir, losartan, contrast-induced from CT chest on 9/17 (Cr increase can be delayed 72-96 hours after contrast), and gradual decrease in hemoglobin from 9.9 on admission to 7.1 today. Patient with good PO intake (~2L per day) and UOP of 1.5-4.5L per day since admission. Renal u/s with no evidence of hydronephrosis  UPC 0.18  Likely in polyuric phase of ATN. Back on acyclovir.    Recommendations:  Switch maintenance fluids to 1/2 normal saline to match urine output  Patient will need to follow up with Nephrology clinic in 4 weeks  Continue to hold losartan in setting of SEBASTIAN   Renally dose all medications to current GFR  Avoid nephrotoxic meds/agents such as NSAIDs, ACE, ARBs, and IV radiocontrast  Strict intake and output and daily standing weights  Trend daily labs  No indications for RRT at this time; will reassess daily

## 2020-10-01 NOTE — ASSESSMENT & PLAN NOTE
- 2/2 SEBASTIAN  - creatinine down to 1.4 and phos now 3.7  - will stop phos binder today (10/1/20)

## 2020-10-02 NOTE — ASSESSMENT & PLAN NOTE
-T-bili up to 1.5 from 0.8 9/30/20 and then to 2.0 on 10/1  -may be 2/2 chemo vs transfusion  -T-bili down to 1.3 today  -will continue to trend for now

## 2020-10-02 NOTE — PROGRESS NOTES
"[facility]  Adult Nutrition  Progress Note    SUMMARY       Recommendations  1. Change diet to 2200 kcal diabetic diet (2800 kcal is too high).  2. Encourage good PO intake.   Goals: 1. Pt's intake meals >75% x 7 days.  Nutrition Goal Status: progressing towards goal  Communication of RD Recs: (POC)    Reason for Assessment  Reason For Assessment: RD follow-up  Diagnosis: cancer diagnosis/related complications(AML)  Relevant Medical History: DM2, HTN, Afib s/p pacemaker  Interdisciplinary Rounds: did not attend  General Information Comments: Informed pt of 14lb wt loss in past 3 weeks. Reports he is happy to have lost some weight. Has been eating a bit less at meals bc he is tired of hospital food. Reports wife brings in food occasionally. Pt appears nourished. BMI 36.  Nutrition Discharge Planning: Discharge on carb consistent diet.    Nutrition Risk Screen  Nutrition Risk Screen: no indicators present    Nutrition/Diet History  Spiritual, Cultural Beliefs, Orthodox Practices, Values that Affect Care: no    Anthropometrics  Temp: 98.5 °F (36.9 °C)  Height: 6' 3" (190.5 cm)  Height (inches): 75 in  Weight Method: Standard Scale  Weight: 130 kg (286 lb 11.3 oz)  Weight (lb): 286.71 lb  Ideal Body Weight (IBW), Male: 196 lb  % Ideal Body Weight, Male (lb): 151.02 %  BMI (Calculated): 35.8  BMI Grade: 35 - 39.9 - obesity - grade II     Lab/Procedures/Meds  Pertinent Labs Reviewed: reviewed  Pertinent Labs Comments: BUN 22, Glu 126, Moi 7.4, Alb 2.6  Pertinent Medications Reviewed: reviewed  Pertinent Medications Comments: Insulin, metoprolol, ondansetron    Estimated/Assessed Needs  Weight Used For Calorie Calculations: 134.5 kg (296 lb 8.3 oz)  Energy Calorie Requirements (kcal): 2266 kcal  Energy Need Method: Schleicher- Darylor  Protein Requirements: 94-107g  Weight Used For Protein Calculations: 134.5 kg (296 lb 8.3 oz)  RDA Method (mL): 2266  CHO Requirement: 283g    Nutrition Prescription " Ordered  Current Diet Order: Diabetic 2800 kcal    Evaluation of Received Nutrient/Fluid Intake  Comments: LBM 10/2  % Intake of Estimated Energy Needs: 50 - 75 %  % Meal Intake: 50 - 75 %    Nutrition Risk  Level of Risk/Frequency of Follow-up: low     Assessment and Plan  Nutrition Problem  Altered nutrition-related lab values     Related to (etiology):   Blood glucose     Signs and Symptoms (as evidenced by):   A1C 8%, Glu 133      Interventions(treatment strategy):  Collaboration of care with providers.  Modified diet: Carbohydrates     Nutrition Diagnosis Status:   Continues    Monitor and Evaluation  Food and Nutrient Intake: energy intake, food and beverage intake  Food and Nutrient Adminstration: diet order  Knowledge/Beliefs/Attitudes: food and nutrition knowledge/skill  Anthropometric Measurements: weight, weight change, body mass index  Biochemical Data, Medical Tests and Procedures: electrolyte and renal panel, gastrointestinal profile, glucose/endocrine profile, inflammatory profile, lipid profile  Nutrition-Focused Physical Findings: overall appearance     Nutrition Follow-Up  RD Follow-up?: Yes

## 2020-10-02 NOTE — ASSESSMENT & PLAN NOTE
55 year old man who presents as a transfer for possible leukemia after manual differential demonstrated blasts.   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  - fish results - t(10;11), ngs - No pathogenic genetic alteration is detected  - Started 7+3 on 9/25/20. Today is day 8   - CBC, CMP, PT/INR, fibrinogen, uric acid, phos daily to assess for TLS and DIC.   - Peripheral smear -  Hyperleukocytosis with >20% circulating blastoid cells   - 2D ECHO with EF of 58%   - Continue allopurinol 300mg BID and on IVF  - Hydrea stopped on 9/19  - Transfuse cyroglobulin if fibrinogen <150  - Transfuse if platelets <10, Hgb<7 and/or patient actively bleeding  - Lopes placed 9/21/20.  - On Acyclovir and Levaquin for ppx. Will start vori ppx today (9/29/20)  - his is a stem cell transplant candidate. Sent HLA typing earlier in admission. Sent sibling contact info to transplant coordinator.

## 2020-10-02 NOTE — PLAN OF CARE
Pt currently on bag 7 of cytarabine infusion for 7&3 regimen. IVF at 100ml infusing to RCW vascath. No acute events overnight. Afebrile. VSS. Pt's hem 6.4 this am, will receive unit PRBCs this am. Plan of care reviewed with pt. All questions answered. Bed low and locked. Nonskid footwear when oob. Instructed to call nursing for assistance. Call light and personal belongings within reach.

## 2020-10-02 NOTE — SUBJECTIVE & OBJECTIVE
Subjective:     Interval History: Day 8 of 7+3. Continues to be afebrile. VSS. Appetite diminished. Boost ordered with meals. ANC 10 today. Transfusing 1 unit prbc for hgb of 6.4. Creatinine down to 1.3 today. Continue 1/2 NS at 100 per nephrology rec. T-bili down to 1.3.    Objective:     Vital Signs (Most Recent):  Temp: 98.5 °F (36.9 °C) (10/02/20 1123)  Pulse: 63 (10/02/20 1123)  Resp: 17 (10/02/20 1123)  BP: 130/77 (10/02/20 1123)  SpO2: (!) 94 % (10/02/20 1123) Vital Signs (24h Range):  Temp:  [97.9 °F (36.6 °C)-98.5 °F (36.9 °C)] 98.5 °F (36.9 °C)  Pulse:  [61-81] 63  Resp:  [16-18] 17  SpO2:  [94 %-97 %] 94 %  BP: (127-158)/(69-86) 130/77     Weight: 130 kg (286 lb 11.3 oz)  Body mass index is 35.84 kg/m².  Body surface area is 2.62 meters squared.    ECOG SCORE         [unfilled]    Intake/Output - Last 3 Shifts       09/30 0700 - 10/01 0659 10/01 0700 - 10/02 0659 10/02 0700 - 10/03 0659    P.O. 1440  250    I.V. (mL/kg) 2166 (16.5) 1760.7 (13.5) 530 (4.1)    Blood 350 350     IV Piggyback 1100.6 1132.9 229.4    Total Intake(mL/kg) 5056.6 (38.6) 3243.6 (25) 1009.4 (7.8)    Urine (mL/kg/hr) 8550 (2.7) 6875 (2.2) 1550 (1.5)    Stool 0 0     Total Output 8550 6875 1550    Net -3493.4 -3631.4 -540.7           Stool Occurrence 1 x 1 x           Physical Exam  Constitutional:       Appearance: He is well-developed.   HENT:      Head: Normocephalic and atraumatic.      Mouth/Throat:      Pharynx: No oropharyngeal exudate.   Eyes:      General:         Right eye: No discharge.         Left eye: No discharge.      Conjunctiva/sclera: Conjunctivae normal.      Pupils: Pupils are equal, round, and reactive to light.   Neck:      Musculoskeletal: Normal range of motion and neck supple.   Cardiovascular:      Rate and Rhythm: Normal rate. Rhythm irregular.      Heart sounds: Normal heart sounds. No murmur.   Pulmonary:      Effort: Pulmonary effort is normal. No respiratory distress.      Breath sounds: No wheezing or  rales.      Comments: clr/diminished left base    Abdominal:      General: Bowel sounds are normal. There is no distension.      Palpations: Abdomen is soft.      Tenderness: There is no abdominal tenderness.   Musculoskeletal: Normal range of motion.         General: No deformity.   Skin:     General: Skin is warm and dry.      Findings: No erythema or rash.      Comments: Right chest wall vas cath. Dressing c/d/i with no sign of infection noted.   Neurological:      Mental Status: He is alert and oriented to person, place, and time.   Psychiatric:         Behavior: Behavior normal.         Thought Content: Thought content normal.         Judgment: Judgment normal.         Significant Labs:   CBC:   Recent Labs   Lab 10/01/20  0400 10/02/20  0400   WBC 0.46* 0.44*   HGB 7.5* 6.4*   HCT 21.5* 18.4*   PLT 16* 11*    and CMP:   Recent Labs   Lab 10/01/20  0400 10/02/20  0400    138   K 3.5 3.5    103   CO2 28 27   * 126*   BUN 28* 22*   CREATININE 1.4 1.3   CALCIUM 7.6* 7.4*   PROT 5.3* 5.1*   ALBUMIN 2.7* 2.6*   BILITOT 2.0* 1.3*   ALKPHOS 54* 56   AST 11 13   ALT 18 18   ANIONGAP 6* 8   EGFRNONAA 56.2* >60.0       Diagnostic Results:  I have reviewed all pertinent imaging results/findings within the past 24 hours.

## 2020-10-02 NOTE — PT/OT/SLP PROGRESS
Physical Therapy Treatment    Patient Name:  Shell Diaz   MRN:  [id number]    Recommendations:     Discharge Recommendations:  home   Discharge Equipment Recommendations: none   Barriers to discharge: None    Assessment:     Shell Diaz is a 55 y.o. male admitted with a medical diagnosis of Acute myeloid leukemia not having achieved remission.  He presents with the following impairments/functional limitations:  impaired endurance Pt. cooperative and tolerated treatment well. Pt. progressing with mobility.    Rehab Prognosis: Good; patient would benefit from acute skilled PT services to address these deficits and reach maximum level of function.    Recent Surgery: Procedure(s) (LRB):  INSERTION, CATHETER, CENTRAL VENOUS, STAUFFER (Right) 11 Days Post-Op    Plan:     During this hospitalization, patient to be seen 1 x/week to address the identified rehab impairments via gait training, therapeutic activities, therapeutic exercises and progress toward the following goals:    · Plan of Care Expires:  10/25/20    Subjective     Chief Complaint: none  Patient/Family Comments/goals: to go home  Pain/Comfort:  · Pain Rating 1: 0/10  · Pain Rating Post-Intervention 1: 0/10      Objective:     Communicated with nursing prior to session.  Patient found supine with peripheral IV upon PT entry to room.     General Precautions: Standard, fall   Orthopedic Precautions:N/A   Braces:       Functional Mobility:  · Bed Mobility:     · Rolling Left:  supervision  · Scooting: supervision  · Supine to Sit: supervision  · Sit to Supine: supervision  · Transfers:     · Sit to Stand:  supervision with no AD  · Gait: 500' with Supervision without AD or LOB  · Balance: good      AM-PAC 6 CLICK MOBILITY  Turning over in bed (including adjusting bedclothes, sheets and blankets)?: 4  Sitting down on and standing up from a chair with arms (e.g., wheelchair, bedside commode, etc.): 4  Moving from lying on back to sitting on the side of the  bed?: 4  Moving to and from a bed to a chair (including a wheelchair)?: 4  Need to walk in hospital room?: 4  Climbing 3-5 steps with a railing?: 4  Basic Mobility Total Score: 24       Therapeutic Activities and Exercises:   Discussed pt.'s progress, goals, and POC.    Patient left supine with all lines intact and call button in reach..    GOALS:   Multidisciplinary Problems     Physical Therapy Goals        Problem: Physical Therapy Goal    Goal Priority Disciplines Outcome Goal Variances Interventions   Physical Therapy Goal     PT, PT/OT Ongoing, Progressing     Description: Goals to be met by: 10/9/2020     Patient will increase functional independence with mobility by performin. Supine to sit with Set-up [place]  2. Sit to supine with Set-up [place]  3. Sit to stand transfer with Supervision  4. Bed to chair transfer with Supervision  5. Gait  x 200 feet with Supervision.   6. Lower extremity exercise program x15 reps per handout, with supervision                     Time Tracking:     PT Received On: 10/02/20  PT Start Time: 1122     PT Stop Time: 1130  PT Total Time (min): 8 min     Billable Minutes: Gait Training 8    Treatment Type: Treatment  PT/PTA: PT           Thiago Ruffin, PT  10/02/2020

## 2020-10-02 NOTE — PHYSICIAN QUERY
PT Name: Shell Diaz  MR #: [id number]     Documentation Clarification      CDS: Jade Al RN      Contact information:[email or web address] or (cell) [phone]    This form is a permanent document in the medical record.     Query Date: October 2, 2020    By submitting this query, we are merely seeking further clarification of documentation. Please utilize your independent clinical judgment when addressing the question(s) below.    The Medical Record reflects the following:    Supporting Clinical Findings Location in Medical Record   Pleural effusion on left  - Pulm consulted and performed thora at bedside 9/24  - 1L bloody pleural fluids removed.  -  sent for cytology, flow, micro, etc.  - cytology and flow showing blasts. Micro with NGTD.   BMT PN 10/1   Acute myeloid leukemia not having achieved remission  55 year old man who presents as a transfer for possible leukemia after manual differential demonstrated blasts.   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  - fish results - t(10;11), ngs - No pathogenic genetic alteration is detected  - Started 7+3 on 9/25/20. Today is day 7   - CBC, CMP, PT/INR, fibrinogen, uric acid, phos daily to assess for TLS and DIC.   - Peripheral smear -  Hyperleukocytosis with >20% circulating blastoid cells   - 2D ECHO with EF of 58%   - Continue allopurinol 300mg BID and on IVF  - Hydrea stopped on 9/19  - Transfuse cyroglobulin if fibrinogen <150  - Transfuse if platelets <10, Hgb<7 and/or patient actively bleeding  - Lopes placed 9/21/20.  - On Acyclovir and Levaquin for ppx. Will start vori ppx today (9/29/20)  - his is a stem cell transplant candidate. Sent HLA typing earlier in admission. Sent sibling contact info to transplant coordinator.   BMT PN 10/1   PLEURAL FLUID, LEFT,  CYTOLOGY AND CELL BLOCK PREPARATIONS:   - Abundant atypical mononuclear cells, suggestive of blasts   - Please correlate with concurrent cell count and flow cytometric analysis   Medical Cytology 9/24                                                                            Provider, please further specify the Pleural Effusion.    [  x ]  Malignant Pleural Effusion     [   ] Other (please specify): ____________     [  ] Clinically undetermined

## 2020-10-02 NOTE — ASSESSMENT & PLAN NOTE
- Secondary to AML  - Transfuse if platelet count<10. If patient develops active bleed, transfuse for goal >50.  - Transfuse for hgb < 7  - ANC 10  - continue ppx with acyclovir and LVQ. Started vori 9/29

## 2020-10-02 NOTE — PROGRESS NOTES
The HLA typing for Shell Diaz has been completed.  The low and high resolution results are concordant and the samples were collected at different times, as required.    GAEL Diehl MD, JHON  Histocompatability and Immunogenetics Lab  Section of Transfusion Medicine & Histocompatibility  Department of Pathology and Laboratory Medicine  [facility]  10/02/2020

## 2020-10-02 NOTE — PLAN OF CARE
POC reviewed with patient; understanding verbalized. Day 8 of 7/3. 1U blood administered this shift. Pt remains independent. Tele in place. Regular diet with fair appetite. ACHS with no coverage required. He voids clear, yellow urine via the urinal; no noted BM this shift. 0.45NS at 100. Wife to remain at the bedside. Pt. with nonskid footwear on, bed in lowest position, and locked with bed rails up x2.   Pt. has call light and personal items within reach.  VSS and afebrile this shift. All questions and concerns addressed at this time. Will continue to monitor.

## 2020-10-02 NOTE — PROGRESS NOTES
[facility]  Hematology  Bone Marrow Transplant  Progress Note    Patient Name: Shell Diaz  Admission Date: 9/16/2020  Hospital Length of Stay: 16 days  Code Status: Full Code    Subjective:     Interval History: Day 8 of 7+3. Continues to be afebrile. VSS. Appetite diminished. Boost ordered with meals. ANC 10 today. Transfusing 1 unit prbc for hgb of 6.4. Creatinine down to 1.3 today. Continue 1/2 NS at 100 per nephrology rec. T-bili down to 1.3.    Objective:     Vital Signs (Most Recent):  Temp: 98.5 °F (36.9 °C) (10/02/20 1123)  Pulse: 63 (10/02/20 1123)  Resp: 17 (10/02/20 1123)  BP: 130/77 (10/02/20 1123)  SpO2: (!) 94 % (10/02/20 1123) Vital Signs (24h Range):  Temp:  [97.9 °F (36.6 °C)-98.5 °F (36.9 °C)] 98.5 °F (36.9 °C)  Pulse:  [61-81] 63  Resp:  [16-18] 17  SpO2:  [94 %-97 %] 94 %  BP: (127-158)/(69-86) 130/77     Weight: 130 kg (286 lb 11.3 oz)  Body mass index is 35.84 kg/m².  Body surface area is 2.62 meters squared.    ECOG SCORE         [unfilled]    Intake/Output - Last 3 Shifts       09/30 0700 - 10/01 0659 10/01 0700 - 10/02 0659 10/02 0700 - 10/03 0659    P.O. 1440  250    I.V. (mL/kg) 2166 (16.5) 1760.7 (13.5) 530 (4.1)    Blood 350 350     IV Piggyback 1100.6 1132.9 229.4    Total Intake(mL/kg) 5056.6 (38.6) 3243.6 (25) 1009.4 (7.8)    Urine (mL/kg/hr) 8550 (2.7) 6875 (2.2) 1550 (1.5)    Stool 0 0     Total Output 8550 6875 1550    Net -3493.4 -3631.4 -540.7           Stool Occurrence 1 x 1 x           Physical Exam  Constitutional:       Appearance: He is well-developed.   HENT:      Head: Normocephalic and atraumatic.      Mouth/Throat:      Pharynx: No oropharyngeal exudate.   Eyes:      General:         Right eye: No discharge.         Left eye: No discharge.      Conjunctiva/sclera: Conjunctivae normal.      Pupils: Pupils are equal, round, and reactive to light.   Neck:      Musculoskeletal: Normal range of motion and neck supple.   Cardiovascular:      Rate and  Rhythm: Normal rate. Rhythm irregular.      Heart sounds: Normal heart sounds. No murmur.   Pulmonary:      Effort: Pulmonary effort is normal. No respiratory distress.      Breath sounds: No wheezing or rales.      Comments: clr/diminished left base    Abdominal:      General: Bowel sounds are normal. There is no distension.      Palpations: Abdomen is soft.      Tenderness: There is no abdominal tenderness.   Musculoskeletal: Normal range of motion.         General: No deformity.   Skin:     General: Skin is warm and dry.      Findings: No erythema or rash.      Comments: Right chest wall vas cath. Dressing c/d/i with no sign of infection noted.   Neurological:      Mental Status: He is alert and oriented to person, place, and time.   Psychiatric:         Behavior: Behavior normal.         Thought Content: Thought content normal.         Judgment: Judgment normal.         Significant Labs:   CBC:   Recent Labs   Lab 10/01/20  0400 10/02/20  0400   WBC 0.46* 0.44*   HGB 7.5* 6.4*   HCT 21.5* 18.4*   PLT 16* 11*    and CMP:   Recent Labs   Lab 10/01/20  0400 10/02/20  0400    138   K 3.5 3.5    103   CO2 28 27   * 126*   BUN 28* 22*   CREATININE 1.4 1.3   CALCIUM 7.6* 7.4*   PROT 5.3* 5.1*   ALBUMIN 2.7* 2.6*   BILITOT 2.0* 1.3*   ALKPHOS 54* 56   AST 11 13   ALT 18 18   ANIONGAP 6* 8   EGFRNONAA 56.2* >60.0       Diagnostic Results:  I have reviewed all pertinent imaging results/findings within the past 24 hours.    Assessment/Plan:     * Acute myeloid leukemia not having achieved remission  55 year old man who presents as a transfer for possible leukemia after manual differential demonstrated blasts.   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  - fish results - t(10;11), ngs - No pathogenic genetic alteration  is detected  - Started 7+3 on 9/25/20. Today is day 8   - CBC, CMP, PT/INR, fibrinogen, uric acid, phos daily to assess for TLS and DIC.   - Peripheral smear -  Hyperleukocytosis with >20% circulating blastoid cells   - 2D ECHO with EF of 58%   - Continue allopurinol 300mg BID and on IVF  - Hydrea stopped on 9/19  - Transfuse cyroglobulin if fibrinogen <150  - Transfuse if platelets <10, Hgb<7 and/or patient actively bleeding  - Lopes placed 9/21/20.  - On Acyclovir and Levaquin for ppx. Will start vori ppx today (9/29/20)  - his is a stem cell transplant candidate. Sent HLA typing earlier in admission. Sent sibling contact info to transplant coordinator.    SEBASTIAN (acute kidney injury)  - Improving, baseline 0.8.  - likely 2/2 IV abx (Vanc and Zosyn) which he completed on 9/22/20. Also received IV contract with CT on 9/16/20.   - Nephrology consulted, appreciate recommendations   -Monitor RF, improving  -Strict I/O  -Avoid Nephrotoxic drugs, holding losartan   -Renally dose Medications  -Creatinine down to1.3 today  -stopped phos binder 10/1. Phos 3.3 today  -changed IVF to 1/2 NS to match urine output per nephrology rec  -nephrology signed off 10/1/20. They will follow up with patient on an outpatient basis 4 weeks from sign-off.    Pleural effusion on left  - Pulm consulted and performed thora at bedside 9/24  - 1L bloody pleural fluids removed.  -  sent for cytology, flow, micro, etc.  - cytology and flow showing blasts. Micro with NGTD.    Paroxysmal atrial fibrillation  Patient states that he had pacemaker placed for this.   Previsouly ECHO with EF 58 %  Holding Eliquis due to low platelet count. Resume when appropriate.  EKG from 9/16/20 showing afib with RVR  HR now controlled  Cardiology consulted due to cardiac history as patient will receive anthracycline if he is induced for AML  Cardiology repeated ehco 9/17 to assess for ventricle straining. 16% LV straining noted on echo  - Keep mag > 2 and K+ > 4  -  Continue home digoxin and metoprolol. Decreased metoprolol dose from 100 mg to 50 mg with paced HR in 60s. Will monitor closely at this dose.   - To repeat ECHO after completion of chemo per card recs  - Monitor I/O and Daily weights   - currently rate-controlled            Pancytopenia due to antineoplastic chemotherapy  - Secondary to AML  - Transfuse if platelet count<10. If patient develops active bleed, transfuse for goal >50.  - Transfuse for hgb < 7  - ANC 10  - continue ppx with acyclovir and LVQ. Started vori 9/29          Hyperbilirubinemia  -T-bili up to 1.5 from 0.8 9/30/20 and then to 2.0 on 10/1  -may be 2/2 chemo vs transfusion  -T-bili down to 1.3 today  -will continue to trend for now    Hyperphosphatemia  - 2/2 SEBASTIAN  - creatinine down to 1.3 and phos now 3.3  - stopped phos binder 10/1/20    Type 2 diabetes mellitus, without long-term current use of insulin  HbA1C 8  - on Metformin at home. Holding inpatient  - Insulin Aspart 2U TID  - ACHS blood glucose monitoring and s/s insulin while inpatient  - Goal -180.   - blood glucose in 200s  - was started on levemir since admission. Increased dose from 8 units nightly to 10 units nightly 9/28/20. Increased conservatively as oral intake is fair and may worsen.  - stopped scheduled novolog 9/30/20 with blood glucose in 120s and patient no longer receiving steroids    V tach  - had at 6 beat run of v-tach 9/18/20 at ~ 6 am. Not sustained. No episodes since.  - keep mag > or = 2 and K+ > or = 4  - cardiology consulted earlier in admission. No longer following. Will re-consult them if indicated.    Leukocytosis  -  on admission and 129 prior to starting hydrea  - see AML  - Hydrea stopped 9/20/20 with normal WBC count.      Pneumonia  Patient presented with fevers, fatigue, concerning due to acute leukemoid process as significant leukocytosis and blasts. Fevers likely related to this but infection workup in process.   - CXR at OSH did not show  findings concerning for PNA.   - Chest CT performed 9/16/20 due to hemoptysis and suggestive of LLL PNA  - Blood cx with NGTD  - u/a not suggestive of UTI  -- completed 7 day course vancomycin and zosyn 9/22.   - Switched to ppx LVQ 9/23/20.  - repeated cxr 9/23/20. Showing large left pleural effusion. S/p thoracentesis 09/24. 1L bloody pleural fluid removed and sent for studies including cytology, micro, and flow. Cytology and flow showing blasts. Micro with NGTD.      Essential hypertension  - Hold home losartan in setting of SEBASTIAN  - Monitor BP  - BP stable at this time          VTE Risk Mitigation (From admission, onward)         Ordered     heparin, porcine (PF) 100 unit/mL injection flush 300 Units  As needed (PRN)      09/25/20 1408     heparin (porcine) injection 1,000 Units  As needed (PRN)      09/22/20 0343     IP VTE HIGH RISK PATIENT  Once      09/16/20 0017     Place sequential compression device  Until discontinued      09/16/20 0017                Disposition: Inpatient for induction chemo.    Jeni Holt, KAITLYN  Bone Marrow Transplant  [facility]

## 2020-10-02 NOTE — ASSESSMENT & PLAN NOTE
- Improving, baseline 0.8.  - likely 2/2 IV abx (Vanc and Zosyn) which he completed on 9/22/20. Also received IV contract with CT on 9/16/20.   - Nephrology consulted, appreciate recommendations   -Monitor RF, improving  -Strict I/O  -Avoid Nephrotoxic drugs, holding losartan   -Renally dose Medications  -Creatinine down to1.3 today  -stopped phos binder 10/1. Phos 3.3 today  -changed IVF to 1/2 NS to match urine output per nephrology rec  -nephrology signed off 10/1/20. They will follow up with patient on an outpatient basis 4 weeks from sign-off.

## 2020-10-02 NOTE — PLAN OF CARE
Recommendations  1. Change diet to 2200 kcal diabetic diet (2800 kcal is too high).  2. Encourage good PO intake.   Goals: 1. Pt's intake meals >75% x 7 days.  Nutrition Goal Status: progressing towards goal  Communication of RD Recs: (POC)

## 2020-10-03 PROBLEM — I47.29 NON-SUSTAINED VENTRICULAR TACHYCARDIA: Status: ACTIVE | Noted: 2020-01-01

## 2020-10-03 PROBLEM — J94.8 NEOPLASTIC PLEURAL EFFUSION: Status: ACTIVE | Noted: 2020-01-01

## 2020-10-03 PROBLEM — J18.9 LEFT LOWER LOBE PNEUMONIA: Status: ACTIVE | Noted: 2020-01-01

## 2020-10-03 NOTE — ASSESSMENT & PLAN NOTE
-Home regimen: Amlodipine and Losartan.   -Held home ARB since 09/24 after development of SEBASTIAN.   -Blood pressure currently controled without anti-hypertensive ordered.

## 2020-10-03 NOTE — ASSESSMENT & PLAN NOTE
-Initially presented with fevers and fatigue with significant leukocytosis and blasts with CT chest on 09/17 after transfer here showing bbnormal opacity involving the left lower lobe with a prominent area of dense confluent opacity that may relate to an area of dense consolidation, with surrounding prominent confluent infiltrates/airspace disease, as well as a small to moderate left pleural effusion. He was treated with Vancomycin and Zosyn from 09/15 to 09/22.   -Pulmonology consulted on 09/24 after repeat CXR showed progression of left sided pleural effusion. Status post thoracentesis on 09/24 with 1L of blood fluid removed with studies negative for infection, but showing abundant atypical mononuclear cells, suggestive of blasts.   -Vital signs stable on room air.     Plan:   -Continue oppurutnistic infection prophylaxis with Levofloxacin.   -Holding home Apixaban in setting of thrombocytopenia.   -See assessment for AML.

## 2020-10-03 NOTE — ASSESSMENT & PLAN NOTE
This is a 55 year old male who presented to [facility] on 09/16/2020 for suspected acute leukemia after manual differential demonstrated blasts at outside hospital. He underwent bone marrow biopsy on 09/16 showing AML with flow cytometry showingincreased population of myeloid blasts  showing expression of CD4, CD9, CD11c, CD15, CD13, CD33(97%), CD64, (partial), HLA-DR, and cytoplasmic myeloperoxidase. Fish results - t(10;11), ngs - No pathogenic genetic alteration is detected. Associated with neoplastic effusion of left lower lung. Status post Lopes catheter placement on 09/21.     Plan:   -Initiated 7+3 on 09/25 with Idarubicin and Cytarabine; today is day #9.   -Allopurinol 300 mg BID with IVF for TLS prophylaxis.   -Oppurtunistic infection prophylaxis with Acyclovir, Levofloxacin, and Voriconazole.   -Baseline ECHO on 09/16/20 with EF of 58%.   -Transfuse cyroglobulin if fibrinogen <150  -Transfuse if platelets <10, Hgb<7 and/or patient actively bleeding  -He is a stem cell transplant candidate. Sent HLA typing earlier in admission. Sent sibling contact info to transplant coordinator.  -CBC, CMP, PT/INR, fibrinogen, uric acid, phos daily to assess for TLS and DIC.   -See assessment for neoplastic effusion.

## 2020-10-03 NOTE — ASSESSMENT & PLAN NOTE
-Most recent A1c 8 in 09/2020.   -Home regimen: Metformin.   -Noted issues with sub-optimal hyperglycemia while receiving Dexamethasone, however resolved after stopping steroids. Stopped scheduled Aspart on 9/30/20 with blood glucose in 120s and patient no longer receiving steroids.   -Glucose at goal between 140-180 currently.     Plan:   -Continue Detemir 8 U daily with LDSCI.   -Diabetic diet.   -POCT glucose checks with meals and bedtime.   - Goal -180.

## 2020-10-03 NOTE — PROGRESS NOTES
[facility]  Hematology  Bone Marrow Transplant  Progress Note    Patient Name: Shell Diaz  Admission Date: 9/16/2020  Hospital Length of Stay: 17 days  Code Status: Full Code     Hospital Course:   Mr. Shell Diaz was admitted to [facility] on 09/16/2020 as a transfer for suspicion for acute leukemia following presentation at OSH for fevers with smear showing significant leukocytosis () and blasts on peripheral smear. He underwent bone marrow biopsy on 09/16 that was diagnostic of AML. Lopes catheter was placed on 09/21 and he was initiated on induction therapy on 09/25 with Idarubicin and Cytarabine. His presentation was also notable for concern for left lower lobe pneumonia with effusion that was initially treated with broad spectrum antibiotics until 09/22 without improvement. Pulmonology was consulted on 09/24 and patient underwent thoracentesis with fluid analysis showing blasts. Hospital course also associated with development of SEBASTIAN by 09/23 prompting nephrology consultation; etiology attributed to likely ATN; resolved by 10/01. Continuing supportive management with intermittent transfusions.     Subjective:     Interval History: Day #9 of 7+3. Vital signs remains WNL on room air. On bedside interview, patient had no complaints and was feeling well. On review of labs, Hgb was down to 6.7 and platelets down to 6; one unit of each ordered. Renal function WNL this morning with good urine output overnight.     Objective:     Vital Signs (Most Recent):  Temp: 98.2 °F (36.8 °C) (10/03/20 1126)  Pulse: 64 (10/03/20 1126)  Resp: 18 (10/03/20 1126)  BP: 125/80 (10/03/20 1126)  SpO2: 99 % (10/03/20 1126) Vital Signs (24h Range):  Temp:  [97.9 °F (36.6 °C)-98.9 °F (37.2 °C)] 98.2 °F (36.8 °C)  Pulse:  [61-73] 64  Resp:  [15-18] 18  SpO2:  [95 %-99 %] 99 %  BP: (125-148)/(65-86) 125/80     Weight: 127.5 kg (281 lb)  Body mass index is 35.12 kg/m².  Body surface area is 2.6 meters  squared.    ECOG SCORE           Intake/Output - Last 3 Shifts       10/01 0700 - 10/02 0659 10/02 0700 - 10/03 0659 10/03 0700 - 10/04 0659    P.O.  1380 250    I.V. (mL/kg) 1760.7 (13.5) 1961 (15.4) 750 (5.9)    Blood 350  490    IV Piggyback 1132.9 401.7     Total Intake(mL/kg) 3243.6 (25) 3742.7 (29.4) 1490 (11.7)    Urine (mL/kg/hr) 6875 (2.2) 4200 (1.4) 600 (0.9)    Emesis/NG output  0     Stool 0 0     Total Output 6875 4200 600    Net -3631.4 -457.3 +890           Urine Occurrence  1 x 1 x    Stool Occurrence 1 x 1 x     Emesis Occurrence  0 x           Physical Exam  Constitutional:       Appearance: He is well-developed.   HENT:      Head: Normocephalic and atraumatic.      Mouth/Throat:      Pharynx: No oropharyngeal exudate.   Eyes:      General:         Right eye: No discharge.         Left eye: No discharge.      Conjunctiva/sclera: Conjunctivae normal.      Pupils: Pupils are equal, round, and reactive to light.   Neck:      Musculoskeletal: Normal range of motion and neck supple.   Cardiovascular:      Rate and Rhythm: Normal rate. Rhythm irregular.      Heart sounds: Normal heart sounds. No murmur.   Pulmonary:      Effort: Pulmonary effort is normal.      Breath sounds: Examination of the right-lower field reveals decreased breath sounds. Examination of the left-lower field reveals decreased breath sounds. Decreased breath sounds present. No wheezing or rales.      Comments:     Abdominal:      General: Bowel sounds are normal. There is no distension.      Palpations: Abdomen is soft.      Tenderness: There is no abdominal tenderness.   Musculoskeletal: Normal range of motion.         General: No deformity.   Skin:     General: Skin is warm and dry.      Findings: No erythema or rash.      Comments: Right chest wall vas cath. Dressing c/d/i with no sign of infection noted.   Neurological:      Mental Status: He is alert and oriented to person, place, and time.   Psychiatric:         Behavior:  Behavior normal.         Thought Content: Thought content normal.         Judgment: Judgment normal.         Significant Labs:   All pertinent labs from the last 24 hours have been reviewed.    Diagnostic Results:  I have reviewed all pertinent imaging results/findings within the past 24 hours.    Assessment/Plan:     * Acute myeloid leukemia not having achieved remission  This is a 55 year old male who presented to [facility] on 09/16/2020 for suspected acute leukemia after manual differential demonstrated blasts at outside hospital. He underwent bone marrow biopsy on 09/16 showing AML with flow cytometry showingincreased population of myeloid blasts  showing expression of CD4, CD9, CD11c, CD15, CD13, CD33(97%), CD64, (partial), HLA-DR, and cytoplasmic myeloperoxidase. Fish results - t(10;11), ngs - No pathogenic genetic alteration is detected. Associated with neoplastic effusion of left lower lung. Status post Lopes catheter placement on 09/21.     Plan:   -Initiated 7+3 on 09/25 with Idarubicin and Cytarabine; today is day #9.   -Allopurinol 300 mg BID with IVF for TLS prophylaxis.   -Oppurtunistic infection prophylaxis with Acyclovir, Levofloxacin, and Voriconazole.   -Baseline ECHO on 09/16/20 with EF of 58%.   -Transfuse cyroglobulin if fibrinogen <150  -Transfuse if platelets <10, Hgb<7 and/or patient actively bleeding  -He is a stem cell transplant candidate. Sent HLA typing earlier in admission. Sent sibling contact info to transplant coordinator.  -CBC, CMP, PT/INR, fibrinogen, uric acid, phos daily to assess for TLS and DIC.   -See assessment for neoplastic effusion.     Hyperbilirubinemia  -T-bili up to 1.5 from 0.8 9/30/20 and then to 2.0 on 10/1  -may be 2/2 chemo vs transfusion  -T-bili down to 1.3 today  -will continue to trend for now    Hyperphosphatemia  -Developed secondary to SEBASTIAN and treated with SEVELAMER from 09/28 to 10/01 with improvement.   -Noted to be elevated to 6 on morning of  10/03.     Plan:   -Continue to trend daily prior to possible resuming SEVELAMER.     SEBASTIAN (acute kidney injury)  -Noted to develop SEBASTIAN on 09/23 with Cr peaking at 2.3 on 09/26.   -Nephrology consulted on 09/24 with suspicion for multifactorial SEBASTIAN secondary to Acyclovir, Losartan, contrast-induced from CT chest on 9/17 (Cr increase can be delayed 72-96 hours after contrast), and gradual decrease in hemoglobin. Most likely from ATN.   -Improved back to baseline by 10/01 with use of IVF and avoidance of nephrotoxic agents; nephrology signed off at that time.   -Making good urine output daily without signs of hypervolemia.     Plan:   -Trending renal function daily.   -Holding home ARB.   -Strict I/O  -Avoid nephrotoxic agents.    -Renally dose medications.    Type 2 diabetes mellitus, without long-term current use of insulin  -Most recent A1c 8 in 09/2020.   -Home regimen: Metformin.   -Noted issues with sub-optimal hyperglycemia while receiving Dexamethasone, however resolved after stopping steroids. Stopped scheduled Aspart on 9/30/20 with blood glucose in 120s and patient no longer receiving steroids.   -Glucose at goal between 140-180 currently.     Plan:   -Continue Detemir 8 U daily with LDSCI.   -Diabetic diet.   -POCT glucose checks with meals and bedtime.   - Goal -180.     Non-sustained ventricular tachycardia  - Noted 6 beat run of VT on 9/18/20 at ~ 6 am. No episodes since.    Plan:   - Goal Mg > 2 and K+ > 4  - Telemetry ordered.     Leukocytosis  -  on admission and 129 prior to starting Hydrea  - Hydrea stopped 9/20/20 with normal WBC count.    Plan:   -See assessment for AML.     Pancytopenia due to antineoplastic chemotherapy  Plan:   - Transfuse if platelet count<10. If patient develops active bleed, transfuse for goal >50.  - Transfuse for hgb < 7  - Opportunistic infection prophylaxis with Acyclovir, Levofloxacin, and Voriconazole.           Neoplastic pleural effusion  -Initially  presented with fevers and fatigue with significant leukocytosis and blasts with CT chest on 09/17 after transfer here showing bbnormal opacity involving the left lower lobe with a prominent area of dense confluent opacity that may relate to an area of dense consolidation, with surrounding prominent confluent infiltrates/airspace disease, as well as a small to moderate left pleural effusion. He was treated with Vancomycin and Zosyn from 09/15 to 09/22.   -Pulmonology consulted on 09/24 after repeat CXR showed progression of left sided pleural effusion. Status post thoracentesis on 09/24 with 1L of blood fluid removed with studies negative for infection, but showing abundant atypical mononuclear cells, suggestive of blasts.   -Vital signs stable on room air.     Plan:   -Continue oppurutnistic infection prophylaxis with Levofloxacin.   -Holding home Apixaban in setting of thrombocytopenia.   -See assessment for AML.     Essential hypertension  -Home regimen: Amlodipine and Losartan.   -Held home ARB since 09/24 after development of SEBASTIAN.   -Blood pressure currently controled without anti-hypertensive ordered.       Paroxysmal atrial fibrillation  -Status post pacemaker placement.   -Home regimen: Apixaban, Digoxin, and Metoprolol.    -Currently rate controled.     Plan:   -Baseline ECHO on 09/16 prior to chemotherapy initiation with EF of 58%.   -Cardiology consulted on 09/17 for clearance for chemotherapy induction in the setting of acute leukemia; repeat ECHO on 09/17 notable for 16% left ventricular straining.   -Per cardiology, continued rate control with Metoprolol and Digoxin; decreased Metoprolol to 50 mg with HR paced in 60's.   -Holding Eliquis due to thrombocytopenia. Resume when appropriate.  -Keep Mg > 2 and K+ > 4.  -A follow up echocardiogram is recommended at the end of treatment with Adriamycin and at 6 months post treatment.         VTE Risk Mitigation (From admission, onward)         Ordered      heparin, porcine (PF) 100 unit/mL injection flush 300 Units  As needed (PRN)      09/25/20 1408     heparin (porcine) injection 1,000 Units  As needed (PRN)      09/22/20 0343     IP VTE HIGH RISK PATIENT  Once      09/16/20 0017     Place sequential compression device  Until discontinued      09/16/20 0017                Disposition: Remain in-patient following induction of chemotherapy.     Fantasma Laguna MD  Bone Marrow Transplant  [facility]

## 2020-10-03 NOTE — ASSESSMENT & PLAN NOTE
-Developed secondary to SEBASTIAN and treated with SEVELAMER from 09/28 to 10/01 with improvement.   -Noted to be elevated to 6 on morning of 10/03.     Plan:   -Continue to trend daily prior to possible resuming SEVELAMER.

## 2020-10-03 NOTE — ASSESSMENT & PLAN NOTE
-Status post pacemaker placement.   -Home regimen: Apixaban, Digoxin, and Metoprolol.    -Currently rate controled.     Plan:   -Baseline ECHO on 09/16 prior to chemotherapy initiation with EF of 58%.   -Cardiology consulted on 09/17 for clearance for chemotherapy induction in the setting of acute leukemia; repeat ECHO on 09/17 notable for 16% left ventricular straining.   -Per cardiology, continued rate control with Metoprolol and Digoxin; decreased Metoprolol to 50 mg with HR paced in 60's.   -Holding Eliquis due to thrombocytopenia. Resume when appropriate.  -Keep Mg > 2 and K+ > 4.  -A follow up echocardiogram is recommended at the end of treatment with Adriamycin and at 6 months post treatment.

## 2020-10-03 NOTE — PLAN OF CARE
Patient AAOx4, VSS on RA. Afebrile. Tele in place- SR. Accucheck orders maintained, no coverage given o/n. 1/2 NS @ 100mL/hr w/o issues to R chest vas cath- dsg CDI. Skin intact- not breakdown noted o/n. Pt denied pain o/n. Up independently, voids per urinal- see I&O for details. Bed locked and lowered, call bell in reach, nonskid socks on when out of bed. Reviewed fall precautions w/ pt; reminded pt to call for assistance, pt verbalized understanding. Hand hygiene performed before and after care. NAD noted, WCTM.

## 2020-10-03 NOTE — PLAN OF CARE
POC reviewed with patient; understanding verbalized. Day 9 of 7/3. 1U blood and 1U platelets administered this shift. Magnesium and Potassium replacements administered. Pt remains independent. Tele in place. Regular diet with poor appetite. One episode of emesis this shift; PRN Zofran administered with full relief.  ACHS with no coverage required. He voids clear, yellow urine via the urinal; one BM this shift. 0.45NS at 100. Pt. with nonskid footwear on, bed in lowest position, and locked with bed rails up x2.   Pt. has call light and personal items within reach.  VSS and afebrile this shift. All questions and concerns addressed at this time. Will continue to monitor.

## 2020-10-03 NOTE — ASSESSMENT & PLAN NOTE
-Noted to develop SEBASTIAN on 09/23 with Cr peaking at 2.3 on 09/26.   -Nephrology consulted on 09/24 with suspicion for multifactorial SEBASTIAN secondary to Acyclovir, Losartan, contrast-induced from CT chest on 9/17 (Cr increase can be delayed 72-96 hours after contrast), and gradual decrease in hemoglobin. Most likely from ATN.   -Improved back to baseline by 10/01 with use of IVF and avoidance of nephrotoxic agents; nephrology signed off at that time.   -Making good urine output daily without signs of hypervolemia.     Plan:   -Trending renal function daily.   -Holding home ARB.   -Strict I/O  -Avoid nephrotoxic agents.    -Renally dose medications.

## 2020-10-03 NOTE — SUBJECTIVE & OBJECTIVE
Subjective:     Interval History: Day #9 of 7+3. Vital signs remains WNL on room air. On bedside interview, patient had no complaints and was feeling well. On review of labs, Hgb was down to 6.7 and platelets down to 6; one unit of each ordered. Renal function WNL this morning with good urine output overnight.     Objective:     Vital Signs (Most Recent):  Temp: 98.2 °F (36.8 °C) (10/03/20 1126)  Pulse: 64 (10/03/20 1126)  Resp: 18 (10/03/20 1126)  BP: 125/80 (10/03/20 1126)  SpO2: 99 % (10/03/20 1126) Vital Signs (24h Range):  Temp:  [97.9 °F (36.6 °C)-98.9 °F (37.2 °C)] 98.2 °F (36.8 °C)  Pulse:  [61-73] 64  Resp:  [15-18] 18  SpO2:  [95 %-99 %] 99 %  BP: (125-148)/(65-86) 125/80     Weight: 127.5 kg (281 lb)  Body mass index is 35.12 kg/m².  Body surface area is 2.6 meters squared.    ECOG SCORE           Intake/Output - Last 3 Shifts       10/01 0700 - 10/02 0659 10/02 0700 - 10/03 0659 10/03 0700 - 10/04 0659    P.O.  1380 250    I.V. (mL/kg) 1760.7 (13.5) 1961 (15.4) 750 (5.9)    Blood 350  490    IV Piggyback 1132.9 401.7     Total Intake(mL/kg) 3243.6 (25) 3742.7 (29.4) 1490 (11.7)    Urine (mL/kg/hr) 6875 (2.2) 4200 (1.4) 600 (0.9)    Emesis/NG output  0     Stool 0 0     Total Output 6875 4200 600    Net -3631.4 -457.3 +890           Urine Occurrence  1 x 1 x    Stool Occurrence 1 x 1 x     Emesis Occurrence  0 x           Physical Exam  Constitutional:       Appearance: He is well-developed.   HENT:      Head: Normocephalic and atraumatic.      Mouth/Throat:      Pharynx: No oropharyngeal exudate.   Eyes:      General:         Right eye: No discharge.         Left eye: No discharge.      Conjunctiva/sclera: Conjunctivae normal.      Pupils: Pupils are equal, round, and reactive to light.   Neck:      Musculoskeletal: Normal range of motion and neck supple.   Cardiovascular:      Rate and Rhythm: Normal rate. Rhythm irregular.      Heart sounds: Normal heart sounds. No murmur.   Pulmonary:      Effort:  Pulmonary effort is normal.      Breath sounds: Examination of the right-lower field reveals decreased breath sounds. Examination of the left-lower field reveals decreased breath sounds. Decreased breath sounds present. No wheezing or rales.      Comments:     Abdominal:      General: Bowel sounds are normal. There is no distension.      Palpations: Abdomen is soft.      Tenderness: There is no abdominal tenderness.   Musculoskeletal: Normal range of motion.         General: No deformity.   Skin:     General: Skin is warm and dry.      Findings: No erythema or rash.      Comments: Right chest wall vas cath. Dressing c/d/i with no sign of infection noted.   Neurological:      Mental Status: He is alert and oriented to person, place, and time.   Psychiatric:         Behavior: Behavior normal.         Thought Content: Thought content normal.         Judgment: Judgment normal.         Significant Labs:   All pertinent labs from the last 24 hours have been reviewed.    Diagnostic Results:  I have reviewed all pertinent imaging results/findings within the past 24 hours.

## 2020-10-03 NOTE — ASSESSMENT & PLAN NOTE
- Noted 6 beat run of VT on 9/18/20 at ~ 6 am. No episodes since.    Plan:   - Goal Mg > 2 and K+ > 4  - Telemetry ordered.

## 2020-10-03 NOTE — ASSESSMENT & PLAN NOTE
-  on admission and 129 prior to starting Hydrea  - Hydrea stopped 9/20/20 with normal WBC count.    Plan:   -See assessment for AML.

## 2020-10-04 PROBLEM — I48.21 PERMANENT ATRIAL FIBRILLATION: Status: ACTIVE | Noted: 2020-01-01

## 2020-10-04 NOTE — ASSESSMENT & PLAN NOTE
Plan:   - Transfuse if platelet count<10. If patient develops active bleed, transfuse for goal >50.  - Transfuse for hgb < 7  - Opportunistic infection prophylaxis with Acyclovir, Levofloxacin, and Voriconazole.

## 2020-10-04 NOTE — PLAN OF CARE
Plan of care reviewed with the patient at the beginning of the shift. Pt is day 10 of 7&3. Pt did well overnight. No nausea or emesis. Pt tolerated PO intake and had a boost. PO intake encouraged. IVF infusing. Mucous membranes are intact. He denies diarrhea, reports soft stool. Blood glucose monitoring ac and hs. No coverage required overnight. Tele monitoring continued. A fib noted. Increased HR with ambulation. All other vitals stable. Pt afebrile. Mild edema to BLE. Fall precautions maintained. Pt up independently. Pt remained free from falls and injury this shift. Bed locked in lowest position, side rails up x2, call light within reach. Instructed pt to call for assistance as needed. Pt verbalized understanding. Neutropenic precautions maintained. No acute issues overnight. Will continue to monitor.

## 2020-10-04 NOTE — ASSESSMENT & PLAN NOTE
This is a 55 year old male who presented to [facility] on 09/16/2020 for suspected acute leukemia after manual differential demonstrated blasts at outside hospital. He underwent bone marrow biopsy on 09/16 showing AML with flow cytometry showing increased population of myeloid blasts showing expression of CD4, CD9, CD11c, CD15, CD13, CD33(97%), CD64, (partial), HLA-DR, and cytoplasmic myeloperoxidase. Fish results - t(10;11), ngs - No pathogenic genetic alteration was detected. Associated with neoplastic effusion of left lower lung. Status post Lopes catheter placement on 09/21.     Plan:   -Initiated 7+3 on 09/25 with Idarubicin and Cytarabine; today is day #10.   -Allopurinol 300 mg BID with IVF for TLS prophylaxis.   -Oppurtunistic infection prophylaxis with Acyclovir, Levofloxacin, and Voriconazole.   -Baseline ECHO on 09/16/20 with EF of 58%.   -Transfuse cyroglobulin if fibrinogen <150  -Transfuse if platelets <10, Hgb<7 and/or patient actively bleeding  -He is a stem cell transplant candidate. Sent HLA typing earlier in admission. Sent sibling contact info to transplant coordinator.  -CBC, CMP, PT/INR, fibrinogen, uric acid, phos daily to assess for TLS and DIC.   -See assessment for neoplastic effusion.

## 2020-10-04 NOTE — SUBJECTIVE & OBJECTIVE
Subjective:     Interval History: Day #10 of 7+3. Vital signs remains WNL on room air. On bedside interview, patient had no complaints and was feeling well. On review of labs, Hgb was down to 6.8 and 1 U PRBC ordered. Renal function continued to be WNL this morning with good urine output overnight.     Objective:     Vital Signs (Most Recent):  Temp: 98.3 °F (36.8 °C) (10/04/20 1011)  Pulse: 78 (10/04/20 1011)  Resp: 16 (10/04/20 1011)  BP: 138/74 (10/04/20 1011)  SpO2: 97 % (10/04/20 1011) Vital Signs (24h Range):  Temp:  [98.1 °F (36.7 °C)-98.9 °F (37.2 °C)] 98.3 °F (36.8 °C)  Pulse:  [59-87] 78  Resp:  [16-18] 16  SpO2:  [95 %-99 %] 97 %  BP: (113-162)/(66-87) 138/74     Weight: 127.8 kg (281 lb 12 oz)  Body mass index is 35.22 kg/m².  Body surface area is 2.6 meters squared.    ECOG SCORE           Intake/Output - Last 3 Shifts       10/02 0700 - 10/03 0659 10/03 0700 - 10/04 0659 10/04 0700 - 10/05 0659    P.O. 1380 2580     I.V. (mL/kg) 1961 (15.4) 2270 (17.8) 635 (5)    Blood  490 321    IV Piggyback 401.7      Total Intake(mL/kg) 3742.7 (29.4) 5340 (41.8) 956 (7.5)    Urine (mL/kg/hr) 4200 (1.4) 3850 (1.3) 1850 (2.6)    Emesis/NG output 0 0     Stool 0 0     Total Output 4200 3850 1850    Net -457.3 +1490 -894           Urine Occurrence 1 x 2 x     Stool Occurrence 1 x 1 x     Emesis Occurrence 0 x 1 x           Physical Exam  Constitutional:       Appearance: He is well-developed.   HENT:      Head: Normocephalic and atraumatic.      Mouth/Throat:      Pharynx: No oropharyngeal exudate.   Eyes:      General:         Right eye: No discharge.         Left eye: No discharge.      Conjunctiva/sclera: Conjunctivae normal.      Pupils: Pupils are equal, round, and reactive to light.   Neck:      Musculoskeletal: Normal range of motion and neck supple.   Cardiovascular:      Rate and Rhythm: Normal rate. Rhythm irregularly irregular.      Heart sounds: Normal heart sounds. No murmur.   Pulmonary:      Effort:  Pulmonary effort is normal.      Breath sounds: Examination of the right-lower field reveals decreased breath sounds. Examination of the left-lower field reveals decreased breath sounds. Decreased breath sounds present. No wheezing or rales.      Comments:     Abdominal:      General: Bowel sounds are normal. There is no distension.      Palpations: Abdomen is soft.      Tenderness: There is no abdominal tenderness.   Musculoskeletal: Normal range of motion.         General: No deformity.   Skin:     General: Skin is warm and dry.      Findings: No erythema or rash.      Comments: Right chest wall vas cath. Dressing c/d/i with no sign of infection noted.   Neurological:      Mental Status: He is alert and oriented to person, place, and time.   Psychiatric:         Behavior: Behavior normal.         Thought Content: Thought content normal.         Judgment: Judgment normal.         Significant Labs:   All pertinent labs from the last 24 hours have been reviewed.    Diagnostic Results:  I have reviewed all pertinent imaging results/findings within the past 24 hours.

## 2020-10-04 NOTE — ASSESSMENT & PLAN NOTE
-Initially presented with fevers and fatigue with significant leukocytosis and blasts with CT chest on 09/17 after transfer here showing abnormal opacity involving the left lower lobe with a prominent area of dense confluent opacity that may relate to an area of dense consolidation, with surrounding prominent confluent infiltrates/airspace disease, as well as a small to moderate left pleural effusion. He was treated with Vancomycin and Zosyn from 09/15 to 09/22.   -Pulmonology consulted on 09/24 after repeat CXR showed progression of left sided pleural effusion. Status post thoracentesis on 09/24 with 1L of blood fluid removed with studies negative for infection, but showing abundant atypical mononuclear cells, suggestive of blasts.   -Vital signs stable on room air.     Plan:   -Continue oppurutnistic infection prophylaxis with Levofloxacin.   -Holding home Apixaban in setting of thrombocytopenia.   -See assessment for AML.

## 2020-10-04 NOTE — PLAN OF CARE
POC reviewed with patient; understanding verbalized. Pt Day 10 of 7+3. 1U PRBCs administered this shift. Remains on tele; a. Fib. Diabetic diet; no coverage required this shift. Pt voids in urinal; one BM this shift. Pt. with nonskid footwear on, bed in lowest position, and locked with bed rails up x2. Pt. has call light and personal items within reach. Patient ambulates in room independently. VSS and afebrile this shift. All questions and concerns addressed at this time. Will continue to monitor.

## 2020-10-04 NOTE — ASSESSMENT & PLAN NOTE
-Most recent A1c 8 in 09/2020.   -Home regimen: Metformin.   -Noted issues with sub-optimal hyperglycemia while receiving Dexamethasone, however resolved after stopping steroids. Stopped scheduled Aspart on 9/30/20 with blood glucose in 120s and patient no longer receiving steroids.   -Glucose at goal between 140-180 currently.     Plan:   -Continue Detemir 8 U daily with LDSCI.   -Diabetic diet.   -POCT glucose checks with meals and bedtime.   -Goal -180.

## 2020-10-04 NOTE — ASSESSMENT & PLAN NOTE
-  on admission and 129 prior to starting Hydroxyurea  - Hydroxyurea stopped 9/20/20 with normal WBC count.    Plan:   -See assessment for AML.

## 2020-10-04 NOTE — PROGRESS NOTES
[facility]  Hematology  Bone Marrow Transplant  Progress Note    Patient Name: Shell Diaz  Admission Date: 9/16/2020  Hospital Length of Stay: 18 days  Code Status: Full Code     Hospital Course:   Mr. Shell Diaz was admitted to [facility] on 09/16/2020 as a transfer for suspicion for acute leukemia following presentation at OSH for fevers with smear showing significant leukocytosis () and blasts on peripheral smear. He underwent bone marrow biopsy on 09/16 that was diagnostic of AML. Lopes catheter was placed on 09/21 and he was initiated on induction therapy on 09/25 with Idarubicin and Cytarabine. Leukocytosis improved with IVF and Hydroxyurea. His presentation was also notable for concern for left lower lobe pneumonia with effusion that was initially treated with broad spectrum antibiotics until 09/22 without improvement. Pulmonology was consulted on 09/24 and patient underwent thoracentesis with fluid analysis showing blasts. Hospital course also associated with development of SEBASTIAN by 09/23 prompting nephrology consultation; etiology attributed to likely ATN; resolved by 10/01. Continuing supportive management with intermittent transfusions.     Subjective:     Interval History: Day #10 of 7+3. Vital signs remains WNL on room air. On bedside interview, patient had no complaints and was feeling well. On review of labs, Hgb was down to 6.8 and 1 U PRBC ordered. Renal function continued to be WNL this morning with good urine output overnight.     Objective:     Vital Signs (Most Recent):  Temp: 98.3 °F (36.8 °C) (10/04/20 1011)  Pulse: 78 (10/04/20 1011)  Resp: 16 (10/04/20 1011)  BP: 138/74 (10/04/20 1011)  SpO2: 97 % (10/04/20 1011) Vital Signs (24h Range):  Temp:  [98.1 °F (36.7 °C)-98.9 °F (37.2 °C)] 98.3 °F (36.8 °C)  Pulse:  [59-87] 78  Resp:  [16-18] 16  SpO2:  [95 %-99 %] 97 %  BP: (113-162)/(66-87) 138/74     Weight: 127.8 kg (281 lb 12 oz)  Body mass index is 35.22  kg/m².  Body surface area is 2.6 meters squared.    ECOG SCORE           Intake/Output - Last 3 Shifts       10/02 0700 - 10/03 0659 10/03 0700 - 10/04 0659 10/04 0700 - 10/05 0659    P.O. 1380 2580     I.V. (mL/kg) 1961 (15.4) 2270 (17.8) 635 (5)    Blood  490 321    IV Piggyback 401.7      Total Intake(mL/kg) 3742.7 (29.4) 5340 (41.8) 956 (7.5)    Urine (mL/kg/hr) 4200 (1.4) 3850 (1.3) 1850 (2.6)    Emesis/NG output 0 0     Stool 0 0     Total Output 4200 3850 1850    Net -457.3 +1490 -894           Urine Occurrence 1 x 2 x     Stool Occurrence 1 x 1 x     Emesis Occurrence 0 x 1 x           Physical Exam  Constitutional:       Appearance: He is well-developed.   HENT:      Head: Normocephalic and atraumatic.      Mouth/Throat:      Pharynx: No oropharyngeal exudate.   Eyes:      General:         Right eye: No discharge.         Left eye: No discharge.      Conjunctiva/sclera: Conjunctivae normal.      Pupils: Pupils are equal, round, and reactive to light.   Neck:      Musculoskeletal: Normal range of motion and neck supple.   Cardiovascular:      Rate and Rhythm: Normal rate. Rhythm irregularly irregular.      Heart sounds: Normal heart sounds. No murmur.   Pulmonary:      Effort: Pulmonary effort is normal.      Breath sounds: Examination of the right-lower field reveals decreased breath sounds. Examination of the left-lower field reveals decreased breath sounds. Decreased breath sounds present. No wheezing or rales.      Comments:     Abdominal:      General: Bowel sounds are normal. There is no distension.      Palpations: Abdomen is soft.      Tenderness: There is no abdominal tenderness.   Musculoskeletal: Normal range of motion.         General: No deformity.   Skin:     General: Skin is warm and dry.      Findings: No erythema or rash.      Comments: Right chest wall vas cath. Dressing c/d/i with no sign of infection noted.   Neurological:      Mental Status: He is alert and oriented to person, place, and  time.   Psychiatric:         Behavior: Behavior normal.         Thought Content: Thought content normal.         Judgment: Judgment normal.         Significant Labs:   All pertinent labs from the last 24 hours have been reviewed.    Diagnostic Results:  I have reviewed all pertinent imaging results/findings within the past 24 hours.    Assessment/Plan:     * Acute myeloid leukemia not having achieved remission  This is a 55 year old male who presented to [facility] on 09/16/2020 for suspected acute leukemia after manual differential demonstrated blasts at outside hospital. He underwent bone marrow biopsy on 09/16 showing AML with flow cytometry showing increased population of myeloid blasts showing expression of CD4, CD9, CD11c, CD15, CD13, CD33(97%), CD64, (partial), HLA-DR, and cytoplasmic myeloperoxidase. Fish results - t(10;11), ngs - No pathogenic genetic alteration was detected. Associated with neoplastic effusion of left lower lung. Status post Lopes catheter placement on 09/21.     Plan:   -Initiated 7+3 on 09/25 with Idarubicin and Cytarabine; today is day #10.   -Allopurinol 300 mg BID with IVF for TLS prophylaxis.   -Oppurtunistic infection prophylaxis with Acyclovir, Levofloxacin, and Voriconazole.   -Baseline ECHO on 09/16/20 with EF of 58%.   -Transfuse cyroglobulin if fibrinogen <150  -Transfuse if platelets <10, Hgb<7 and/or patient actively bleeding  -He is a stem cell transplant candidate. Sent HLA typing earlier in admission. Sent sibling contact info to transplant coordinator.  -CBC, CMP, PT/INR, fibrinogen, uric acid, phos daily to assess for TLS and DIC.   -See assessment for neoplastic effusion.     Hyperbilirubinemia  -T-bili up to 1.5 from 0.8 9/30/20 and then to 2.0 on 10/1  -may be 2/2 chemo vs transfusion  -T-bili down to 1.3 today  -will continue to trend for now    Hyperphosphatemia  -Developed secondary to SEBASTIAN and treated with SEVELAMER from 09/28 to 10/01 with improvement.    -Noted to be elevated to 6 on morning of 10/03.     Plan:   -Continue to trend daily prior to possible resuming SEVELAMER.     SEBASTIAN (acute kidney injury)  -Noted to develop SEBASTIAN on 09/23 with Cr peaking at 2.3 on 09/26.   -Nephrology consulted on 09/24 with suspicion for multifactorial SEBASTIAN secondary to Acyclovir, Losartan, contrast-induced from CT chest on 9/17 (Cr increase can be delayed 72-96 hours after contrast), and gradual decrease in hemoglobin. Most likely from ATN.   -Improved back to baseline by 10/01 with use of IVF and avoidance of nephrotoxic agents; nephrology signed off at that time.   -Making good urine output daily without signs of hypervolemia.     Plan:   -Trending renal function daily.   -Holding home ARB.   -Strict I/O  -Avoid nephrotoxic agents.    -Renally dose medications.    Type 2 diabetes mellitus, without long-term current use of insulin  -Most recent A1c 8 in 09/2020.   -Home regimen: Metformin.   -Noted issues with sub-optimal hyperglycemia while receiving Dexamethasone, however resolved after stopping steroids. Stopped scheduled Aspart on 9/30/20 with blood glucose in 120s and patient no longer receiving steroids.   -Glucose at goal between 140-180 currently.     Plan:   -Continue Detemir 8 U daily with LDSCI.   -Diabetic diet.   -POCT glucose checks with meals and bedtime.   -Goal -180.     Non-sustained ventricular tachycardia  - Noted 6 beat run of VT on 9/18/20 at ~ 6 am. No episodes since.    Plan:   - Goal Mg > 2 and K+ > 4  - Telemetry ordered.     Leukocytosis  -  on admission and 129 prior to starting Hydroxyurea  - Hydroxyurea stopped 9/20/20 with normal WBC count.    Plan:   -See assessment for AML.     Pancytopenia due to antineoplastic chemotherapy  Plan:   - Transfuse if platelet count<10. If patient develops active bleed, transfuse for goal >50.  - Transfuse for hgb < 7  - Opportunistic infection prophylaxis with Acyclovir, Levofloxacin, and Voriconazole.            Neoplastic pleural effusion  -Initially presented with fevers and fatigue with significant leukocytosis and blasts with CT chest on 09/17 after transfer here showing abnormal opacity involving the left lower lobe with a prominent area of dense confluent opacity that may relate to an area of dense consolidation, with surrounding prominent confluent infiltrates/airspace disease, as well as a small to moderate left pleural effusion. He was treated with Vancomycin and Zosyn from 09/15 to 09/22.   -Pulmonology consulted on 09/24 after repeat CXR showed progression of left sided pleural effusion. Status post thoracentesis on 09/24 with 1L of blood fluid removed with studies negative for infection, but showing abundant atypical mononuclear cells, suggestive of blasts.   -Vital signs stable on room air.     Plan:   -Continue oppurutnistic infection prophylaxis with Levofloxacin.   -Holding home Apixaban in setting of thrombocytopenia.   -See assessment for AML.     Essential hypertension  -Home regimen: Amlodipine and Losartan.   -Held home ARB since 09/24 after development of SEBASTIAN.   -Blood pressure currently controled without anti-hypertensive ordered.       Permanent atrial fibrillation  -Status post pacemaker placement.   -Home regimen: Apixaban, Digoxin, and Metoprolol.    -Currently rate controled.     Plan:   -Baseline ECHO on 09/16 prior to chemotherapy initiation with EF of 58%.   -Cardiology consulted on 09/17 for clearance for chemotherapy induction in the setting of acute leukemia; repeat ECHO on 09/17 notable for 16% left ventricular straining.   -Per cardiology, continued rate control with Metoprolol and Digoxin; decreased Metoprolol to 50 mg with HR paced in 60's.   -Holding Eliquis due to thrombocytopenia. Resume when appropriate.  -Keep Mg > 2 and K+ > 4.  -A follow up echocardiogram is recommended at the end of treatment with Adriamycin and at 6 months post treatment.         VTE Risk Mitigation  (From admission, onward)         Ordered     heparin, porcine (PF) 100 unit/mL injection flush 300 Units  As needed (PRN)      09/25/20 1408     heparin (porcine) injection 1,000 Units  As needed (PRN)      09/22/20 0343     IP VTE HIGH RISK PATIENT  Once      09/16/20 0017     Place sequential compression device  Until discontinued      09/16/20 0017                Disposition: Remain in-patient following induction chemotherapy.     Fantasma Laguna MD  Bone Marrow Transplant  [facility]

## 2020-10-05 PROBLEM — E83.51 HYPOCALCEMIA: Status: ACTIVE | Noted: 2020-01-01

## 2020-10-05 NOTE — PROGRESS NOTES
[facility]  Hematology  Bone Marrow Transplant  Progress Note    Patient Name: Shell Diaz  Admission Date: 9/16/2020  Hospital Length of Stay: 19 days  Code Status: Full Code    Subjective:     Interval History: Day 11 of 7+3. Continues to due well. Afebrile. VSS. Oral intake fair. Will maintain IVF. SEBASTIAN resolved. Creatinine is 1.1 today. t-bili stable at 1.4. vori level collected today and is in process at this time. ANC continues to be 0. Planning for day 14 BMBx on 10/8/20.    Objective:     Vital Signs (Most Recent):  Temp: 98.6 °F (37 °C) (10/05/20 1056)  Pulse: 84 (10/05/20 1056)  Resp: 18 (10/05/20 1056)  BP: 133/76 (10/05/20 1056)  SpO2: 99 % (10/05/20 1056) Vital Signs (24h Range):  Temp:  [98.1 °F (36.7 °C)-99 °F (37.2 °C)] 98.6 °F (37 °C)  Pulse:  [61-84] 84  Resp:  [16-18] 18  SpO2:  [95 %-99 %] 99 %  BP: (131-162)/(63-81) 133/76     Weight: 127.5 kg (281 lb 1.4 oz)  Body mass index is 35.13 kg/m².  Body surface area is 2.6 meters squared.    ECOG SCORE         [unfilled]    Intake/Output - Last 3 Shifts       10/03 0700 - 10/04 0659 10/04 0700 - 10/05 0659 10/05 0700 - 10/06 0659    P.O. 2580 2980     I.V. (mL/kg) 2270 (17.8) 2371 (18.6)     Blood 490 321     IV Piggyback       Total Intake(mL/kg) 5340 (41.8) 5672 (44.5)     Urine (mL/kg/hr) 3850 (1.3) 5000 (1.6) 775 (1)    Emesis/NG output 0      Stool 0 0     Total Output 3850 5000 775    Net +1490 +672 -775           Urine Occurrence 2 x      Stool Occurrence 1 x 1 x     Emesis Occurrence 1 x            Physical Exam  Constitutional:       Appearance: He is well-developed.   HENT:      Head: Normocephalic and atraumatic.      Mouth/Throat:      Pharynx: No oropharyngeal exudate.   Eyes:      General:         Right eye: No discharge.         Left eye: No discharge.      Conjunctiva/sclera: Conjunctivae normal.      Pupils: Pupils are equal, round, and reactive to light.   Neck:      Musculoskeletal: Normal range of motion and  neck supple.   Cardiovascular:      Rate and Rhythm: Normal rate. Rhythm irregular.      Heart sounds: Normal heart sounds. No murmur.   Pulmonary:      Effort: Pulmonary effort is normal. No respiratory distress.      Breath sounds: No wheezing or rales.      Comments: clr/diminished left base    Abdominal:      General: Bowel sounds are normal. There is no distension.      Palpations: Abdomen is soft.      Tenderness: There is no abdominal tenderness.   Musculoskeletal: Normal range of motion.         General: No deformity.   Skin:     General: Skin is warm and dry.      Findings: No erythema or rash.      Comments: Right chest wall vas cath. Dressing c/d/i with no sign of infection noted.   Neurological:      Mental Status: He is alert and oriented to person, place, and time.   Psychiatric:         Behavior: Behavior normal.         Thought Content: Thought content normal.         Judgment: Judgment normal.         Significant Labs:   CBC:   Recent Labs   Lab 10/04/20  0446 10/05/20  0431   WBC 0.40* 0.39*   HGB 6.8* 7.2*   HCT 19.8* 21.0*   PLT 17* 13*    and CMP:   Recent Labs   Lab 10/04/20  0446 10/05/20  0431    136   K 3.2* 3.4*    103   CO2 27 26   * 119*   BUN 17 15   CREATININE 1.0 1.1   CALCIUM 7.6* 7.6*   PROT 5.3* 5.2*   ALBUMIN 2.7* 2.7*   BILITOT 1.4* 1.4*   ALKPHOS 64 69   AST 12 9*   ALT 21 18   ANIONGAP 9 7*   EGFRNONAA >60.0 >60.0       Diagnostic Results:  I have reviewed all pertinent imaging results/findings within the past 24 hours.    Assessment/Plan:     * Acute myeloid leukemia not having achieved remission  This is a 55 year old male who presented to [facility] on 09/16/2020 for suspected acute leukemia after manual differential demonstrated blasts at outside hospital. He underwent bone marrow biopsy on 09/16 showing AML with flow cytometry showing increased population of myeloid blasts showing expression of CD4, CD9, CD11c, CD15, CD13, CD33(97%), CD64, (partial),  HLA-DR, and cytoplasmic myeloperoxidase. Fish results - t(10;11), ngs - No pathogenic genetic alteration was detected. Associated with neoplastic effusion of left lower lung. Status post Lopes catheter placement on 09/21.     Plan:   -Initiated 7+3 on 09/25 with Idarubicin and Cytarabine; today is day #11.   -Allopurinol 300 mg BID with IVF for TLS prophylaxis.   -Oppurtunistic infection prophylaxis with Acyclovir, Levofloxacin, and Voriconazole.   -Baseline ECHO on 09/16/20 with EF of 58%.   -Transfuse cyroglobulin if fibrinogen <150  -Transfuse if platelets <10, Hgb<7 and/or patient actively bleeding  -He is a stem cell transplant candidate. Sent HLA typing earlier in admission. Sent sibling contact info to transplant coordinator.  -aptt, PT/INR, fibrinogen, uric acid, phos twice weekly to assess for TLS and DIC.   -See assessment for neoplastic effusion.     SEBASTIAN (acute kidney injury)  -Noted to develop SEBASTIAN on 09/23 with Cr peaking at 2.3 on 09/26.   -Nephrology consulted on 09/24 with suspicion for multifactorial SEBASTIAN secondary to Acyclovir, Losartan, contrast-induced from CT chest on 9/17 (Cr increase can be delayed 72-96 hours after contrast), and gradual decrease in hemoglobin. Most likely from ATN.   -Improved back to baseline by 10/01 with use of IVF and avoidance of nephrotoxic agents; nephrology signed off at that time.   -Making good urine output daily without signs of hypervolemia.   -Trending renal function daily.   -Holding home ARB.   -Strict I/O  -Avoid nephrotoxic agents.    -Renally dose medications.  -creatinine down to 1.1 today  resolved    Permanent atrial fibrillation  -Status post pacemaker placement.   -Home regimen: Apixaban, Digoxin, and Metoprolol.    -Currently rate controlled.    -Baseline ECHO on 09/16 prior to chemotherapy initiation with EF of 58%.   -Cardiology consulted on 09/17 for clearance for chemotherapy induction in the setting of acute leukemia; repeat ECHO on 09/17 notable  for 16% left ventricular straining.   -Per cardiology, continued rate control with Metoprolol and Digoxin; decreased Metoprolol to 50 mg with HR paced in 60's.   -Holding Eliquis due to thrombocytopenia. Resume when appropriate.  -Keep Mg > 2 and K+ > 4.  -A follow up echocardiogram is recommended at the end of treatment with Adriamycin and at 6 months post treatment. We will plan of inpatient repeat echo only if patient becomes symptomatic per Dr. Thurman.    Pancytopenia due to antineoplastic chemotherapy  - Transfuse if platelet count<10. If patient develops active bleed, transfuse for goal >50.  - Transfuse for hgb < 7  - Opportunistic infection prophylaxis with Acyclovir, Levofloxacin, and Voriconazole. vori level collected today (10/5) and is pending at this time.          Hypocalcemia  - corrected calcium slightly low at 8.6  - will start daily calcium supplement (10/5/20)    Hyperbilirubinemia  -T-bili up to 1.5 from 0.8 9/30/20 and then to 2.0 on 10/1  -may be 2/2 chemo vs transfusion  -T-bili stable at 1.4 today  -will continue to trend for now    Hyperphosphatemia  -Developed secondary to SEBASTIAN and treated with SEVELAMER from 09/28 to 10/01 with improvement.   -Noted to be elevated to 6 on morning of 10/03.   -Continue to trend daily prior to possible resuming SEVELAMER. No indication for resuming at this time.     Type 2 diabetes mellitus, without long-term current use of insulin  -Most recent A1c 8 in 09/2020.   -Home regimen: Metformin.   -Noted issues with sub-optimal hyperglycemia while receiving Dexamethasone, however resolved after stopping steroids. Stopped scheduled Aspart on 9/30/20 with blood glucose in 120s and patient no longer receiving steroids.   -Glucose at goal between 140-180 currently.   -Continue Detemir 10 U daily with LDSCI.   -Diabetic diet.   -POCT glucose checks with meals and bedtime.   -Goal -180.     Non-sustained ventricular tachycardia  - Noted 6 beat run of VT on 9/18/20  at ~ 6 am. No episodes since.  - Goal Mg > 2 and K+ > 4  - Telemetry ordered.     Leukocytosis  -  on admission and 129 prior to starting Hydroxyurea  - Hydroxyurea stopped 9/20/20 with normal WBC count.  -See assessment for AML.     Neoplastic pleural effusion  -Initially presented with fevers and fatigue with significant leukocytosis and blasts with CT chest on 09/17 after transfer here showing abnormal opacity involving the left lower lobe with a prominent area of dense confluent opacity that may relate to an area of dense consolidation, with surrounding prominent confluent infiltrates/airspace disease, as well as a small to moderate left pleural effusion. He was treated with Vancomycin and Zosyn from 09/15 to 09/22.   -Pulmonology consulted on 09/24 after repeat CXR showed progression of left sided pleural effusion. Status post thoracentesis on 09/24 with 1L of blood fluid removed with studies negative for infection, but showing abundant atypical mononuclear cells, suggestive of blasts.   -Vital signs stable on room air.   -Continue oppurutnistic infection prophylaxis with Levofloxacin.   -Holding home Apixaban in setting of thrombocytopenia.   -See assessment for AML.     Essential hypertension  -Home regimen: Amlodipine and Losartan.   -Held home ARB since 09/24 after development of SEBASTIAN.   -Blood pressure currently controled without anti-hypertensive           VTE Risk Mitigation (From admission, onward)         Ordered     heparin, porcine (PF) 100 unit/mL injection flush 300 Units  As needed (PRN)      09/25/20 1408     heparin (porcine) injection 1,000 Units  As needed (PRN)      09/22/20 0343     IP VTE HIGH RISK PATIENT  Once      09/16/20 0017     Place sequential compression device  Until discontinued      09/16/20 0017                Disposition: Inpatient    Jeni Holt, NP  Bone Marrow Transplant  [facility]

## 2020-10-05 NOTE — ASSESSMENT & PLAN NOTE
-Noted to develop SEBASTIAN on 09/23 with Cr peaking at 2.3 on 09/26.   -Nephrology consulted on 09/24 with suspicion for multifactorial SEBASTIAN secondary to Acyclovir, Losartan, contrast-induced from CT chest on 9/17 (Cr increase can be delayed 72-96 hours after contrast), and gradual decrease in hemoglobin. Most likely from ATN.   -Improved back to baseline by 10/01 with use of IVF and avoidance of nephrotoxic agents; nephrology signed off at that time.   -Making good urine output daily without signs of hypervolemia.   -Trending renal function daily.   -Holding home ARB.   -Strict I/O  -Avoid nephrotoxic agents.    -Renally dose medications.  -creatinine down to 1.1 today  resolved

## 2020-10-05 NOTE — ASSESSMENT & PLAN NOTE
-Initially presented with fevers and fatigue with significant leukocytosis and blasts with CT chest on 09/17 after transfer here showing abnormal opacity involving the left lower lobe with a prominent area of dense confluent opacity that may relate to an area of dense consolidation, with surrounding prominent confluent infiltrates/airspace disease, as well as a small to moderate left pleural effusion. He was treated with Vancomycin and Zosyn from 09/15 to 09/22.   -Pulmonology consulted on 09/24 after repeat CXR showed progression of left sided pleural effusion. Status post thoracentesis on 09/24 with 1L of blood fluid removed with studies negative for infection, but showing abundant atypical mononuclear cells, suggestive of blasts.   -Vital signs stable on room air.   -Continue oppurutnistic infection prophylaxis with Levofloxacin.   -Holding home Apixaban in setting of thrombocytopenia.   -See assessment for AML.

## 2020-10-05 NOTE — ASSESSMENT & PLAN NOTE
- Noted 6 beat run of VT on 9/18/20 at ~ 6 am. No episodes since.  - Goal Mg > 2 and K+ > 4  - Telemetry ordered.

## 2020-10-05 NOTE — ASSESSMENT & PLAN NOTE
-Status post pacemaker placement.   -Home regimen: Apixaban, Digoxin, and Metoprolol.    -Currently rate controlled.    -Baseline ECHO on 09/16 prior to chemotherapy initiation with EF of 58%.   -Cardiology consulted on 09/17 for clearance for chemotherapy induction in the setting of acute leukemia; repeat ECHO on 09/17 notable for 16% left ventricular straining.   -Per cardiology, continued rate control with Metoprolol and Digoxin; decreased Metoprolol to 50 mg with HR paced in 60's.   -Holding Eliquis due to thrombocytopenia. Resume when appropriate.  -Keep Mg > 2 and K+ > 4.  -A follow up echocardiogram is recommended at the end of treatment with Adriamycin and at 6 months post treatment. We will plan of inpatient repeat echo only if patient becomes symptomatic per Dr. Thurman.

## 2020-10-05 NOTE — PLAN OF CARE
Plan of care reviewed with the patient at the beginning of the shift. Pt is day 11 of 7&3. Pt did well overnight. No nausea or emesis. Pt tolerated PO intake. PO intake encouraged. IVF infusing. Mucous membranes are intact. He denies diarrhea, reports soft stool. Blood glucose monitoring ac and hs. No coverage required overnight. Tele monitoring continued. A fib noted. Increased HR with ambulation. All other vitals stable. Pt afebrile. Mild edema to BLE. Fall precautions maintained. Pt up independently. Pt remained free from falls and injury this shift. Bed locked in lowest position, side rails up x2, call light within reach. Instructed pt to call for assistance as needed. Pt verbalized understanding. Neutropenic precautions maintained. No acute issues overnight. Will continue to monitor.

## 2020-10-05 NOTE — ASSESSMENT & PLAN NOTE
-  on admission and 129 prior to starting Hydroxyurea  - Hydroxyurea stopped 9/20/20 with normal WBC count.  -See assessment for AML.

## 2020-10-05 NOTE — ASSESSMENT & PLAN NOTE
-Home regimen: Amlodipine and Losartan.   -Held home ARB since 09/24 after development of SEBASTIAN.   -Blood pressure currently controled without anti-hypertensive

## 2020-10-05 NOTE — ASSESSMENT & PLAN NOTE
This is a 55 year old male who presented to [facility] on 09/16/2020 for suspected acute leukemia after manual differential demonstrated blasts at outside hospital. He underwent bone marrow biopsy on 09/16 showing AML with flow cytometry showing increased population of myeloid blasts showing expression of CD4, CD9, CD11c, CD15, CD13, CD33(97%), CD64, (partial), HLA-DR, and cytoplasmic myeloperoxidase. Fish results - t(10;11), ngs - No pathogenic genetic alteration was detected. Associated with neoplastic effusion of left lower lung. Status post Lopes catheter placement on 09/21.     Plan:   -Initiated 7+3 on 09/25 with Idarubicin and Cytarabine; today is day #11.   -Allopurinol 300 mg BID with IVF for TLS prophylaxis.   -Oppurtunistic infection prophylaxis with Acyclovir, Levofloxacin, and Voriconazole.   -Baseline ECHO on 09/16/20 with EF of 58%.   -Transfuse cyroglobulin if fibrinogen <150  -Transfuse if platelets <10, Hgb<7 and/or patient actively bleeding  -He is a stem cell transplant candidate. Sent HLA typing earlier in admission. Sent sibling contact info to transplant coordinator.  -aptt, PT/INR, fibrinogen, uric acid, phos twice weekly to assess for TLS and DIC.   -See assessment for neoplastic effusion.

## 2020-10-05 NOTE — PROGRESS NOTES
Received request to assist with disability form; received single page from patient's RN for [employer] policy.  Scanned form sent to [facility] Desk who requested full six page version of form.  Met with with who provided complete form.  Scanned and resent to disability desk.  Will follow.

## 2020-10-05 NOTE — PLAN OF CARE
Side rails up x2; call bell in place; bed in lowest, locked position; skid proof socks on; no evidence of skin breakdown; care plan explained to patient; pt remains free of injury.  Pt is day 11 of 7 and 3. Denies pain, n/v, diarrnea or mucositis. CBG monitored no insulin needed at this time, telemetry monitored with no changes noted. Pt tolerated a small amount of po , pt with poor appetite, voids, BM x 1, ambulates in room and bowie. IVF infusing, VSS and afebrile, neutropenic precautions maintained.

## 2020-10-05 NOTE — SUBJECTIVE & OBJECTIVE
Subjective:     Interval History: Day 11 of 7+3. Continues to due well. Afebrile. VSS. Oral intake fair. Will maintain IVF. SEBASTIAN resolved. Creatinine is 1.1 today. t-bili stable at 1.4. vori level collected today and is in process at this time. ANC continues to be 0. Planning for day 14 BMBx on 10/8/20.    Objective:     Vital Signs (Most Recent):  Temp: 98.6 °F (37 °C) (10/05/20 1056)  Pulse: 84 (10/05/20 1056)  Resp: 18 (10/05/20 1056)  BP: 133/76 (10/05/20 1056)  SpO2: 99 % (10/05/20 1056) Vital Signs (24h Range):  Temp:  [98.1 °F (36.7 °C)-99 °F (37.2 °C)] 98.6 °F (37 °C)  Pulse:  [61-84] 84  Resp:  [16-18] 18  SpO2:  [95 %-99 %] 99 %  BP: (131-162)/(63-81) 133/76     Weight: 127.5 kg (281 lb 1.4 oz)  Body mass index is 35.13 kg/m².  Body surface area is 2.6 meters squared.    ECOG SCORE         [unfilled]    Intake/Output - Last 3 Shifts       10/03 0700 - 10/04 0659 10/04 0700 - 10/05 0659 10/05 0700 - 10/06 0659    P.O. 2580 2980     I.V. (mL/kg) 2270 (17.8) 2371 (18.6)     Blood 490 321     IV Piggyback       Total Intake(mL/kg) 5340 (41.8) 5672 (44.5)     Urine (mL/kg/hr) 3850 (1.3) 5000 (1.6) 775 (1)    Emesis/NG output 0      Stool 0 0     Total Output 3850 5000 775    Net +1490 +672 -775           Urine Occurrence 2 x      Stool Occurrence 1 x 1 x     Emesis Occurrence 1 x            Physical Exam  Constitutional:       Appearance: He is well-developed.   HENT:      Head: Normocephalic and atraumatic.      Mouth/Throat:      Pharynx: No oropharyngeal exudate.   Eyes:      General:         Right eye: No discharge.         Left eye: No discharge.      Conjunctiva/sclera: Conjunctivae normal.      Pupils: Pupils are equal, round, and reactive to light.   Neck:      Musculoskeletal: Normal range of motion and neck supple.   Cardiovascular:      Rate and Rhythm: Normal rate. Rhythm irregular.      Heart sounds: Normal heart sounds. No murmur.   Pulmonary:      Effort: Pulmonary effort is normal. No respiratory  distress.      Breath sounds: No wheezing or rales.      Comments: clr/diminished left base    Abdominal:      General: Bowel sounds are normal. There is no distension.      Palpations: Abdomen is soft.      Tenderness: There is no abdominal tenderness.   Musculoskeletal: Normal range of motion.         General: No deformity.   Skin:     General: Skin is warm and dry.      Findings: No erythema or rash.      Comments: Right chest wall vas cath. Dressing c/d/i with no sign of infection noted.   Neurological:      Mental Status: He is alert and oriented to person, place, and time.   Psychiatric:         Behavior: Behavior normal.         Thought Content: Thought content normal.         Judgment: Judgment normal.         Significant Labs:   CBC:   Recent Labs   Lab 10/04/20  0446 10/05/20  0431   WBC 0.40* 0.39*   HGB 6.8* 7.2*   HCT 19.8* 21.0*   PLT 17* 13*    and CMP:   Recent Labs   Lab 10/04/20  0446 10/05/20  0431    136   K 3.2* 3.4*    103   CO2 27 26   * 119*   BUN 17 15   CREATININE 1.0 1.1   CALCIUM 7.6* 7.6*   PROT 5.3* 5.2*   ALBUMIN 2.7* 2.7*   BILITOT 1.4* 1.4*   ALKPHOS 64 69   AST 12 9*   ALT 21 18   ANIONGAP 9 7*   EGFRNONAA >60.0 >60.0       Diagnostic Results:  I have reviewed all pertinent imaging results/findings within the past 24 hours.

## 2020-10-05 NOTE — ASSESSMENT & PLAN NOTE
-Developed secondary to SEBASTIAN and treated with SEVELAMER from 09/28 to 10/01 with improvement.   -Noted to be elevated to 6 on morning of 10/03.   -Continue to trend daily prior to possible resuming SEVELAMER. No indication for resuming at this time.

## 2020-10-05 NOTE — ASSESSMENT & PLAN NOTE
- Transfuse if platelet count<10. If patient develops active bleed, transfuse for goal >50.  - Transfuse for hgb < 7  - Opportunistic infection prophylaxis with Acyclovir, Levofloxacin, and Voriconazole. vori level collected today (10/5) and is pending at this time.

## 2020-10-05 NOTE — ASSESSMENT & PLAN NOTE
-T-bili up to 1.5 from 0.8 9/30/20 and then to 2.0 on 10/1  -may be 2/2 chemo vs transfusion  -T-bili stable at 1.4 today  -will continue to trend for now

## 2020-10-05 NOTE — ASSESSMENT & PLAN NOTE
-Most recent A1c 8 in 09/2020.   -Home regimen: Metformin.   -Noted issues with sub-optimal hyperglycemia while receiving Dexamethasone, however resolved after stopping steroids. Stopped scheduled Aspart on 9/30/20 with blood glucose in 120s and patient no longer receiving steroids.   -Glucose at goal between 140-180 currently.   -Continue Detemir 10 U daily with LDSCI.   -Diabetic diet.   -POCT glucose checks with meals and bedtime.   -Goal -180.

## 2020-10-06 NOTE — SUBJECTIVE & OBJECTIVE
Subjective:     Interval History: Day #12 of 7+3. He reports bilateral jaw pain with chewing, however this has been on-going since prior to admission and attributed to prior dental abnormality. He reports overall feeling well. He tolerating PO and urinating without difficulty. He is ambulating and using exercise bike daily without difficulty. On review of labs, Hgb downtrended to 6.6 and platelets to 7; one unit of each ordered. Potassium and magnesium down to 3.4 and 1.5, respectively; PRN electrolyte repletion utilized. Anticipated repeat bone marrow biopsy on 10/08/2020.     Objective:     Vital Signs (Most Recent):  Temp: 98.2 °F (36.8 °C) (10/06/20 1245)  Pulse: 62 (10/06/20 1245)  Resp: 16 (10/06/20 1245)  BP: (!) 141/83 (10/06/20 1245)  SpO2: 96 % (10/06/20 1245) Vital Signs (24h Range):  Temp:  [97.3 °F (36.3 °C)-99.5 °F (37.5 °C)] 98.2 °F (36.8 °C)  Pulse:  [60-83] 62  Resp:  [16-18] 16  SpO2:  [93 %-99 %] 96 %  BP: (113-151)/(63-85) 141/83     Weight: 127.5 kg (281 lb 1.4 oz)  Body mass index is 35.13 kg/m².  Body surface area is 2.6 meters squared.    ECOG SCORE           Intake/Output - Last 3 Shifts       10/04 0700 - 10/05 0659 10/05 0700 - 10/06 0659 10/06 0700 - 10/07 0659    P.O. 2980 1440 240    I.V. (mL/kg) 2371 (18.6) 1083 (8.5)     Blood 321  509    Total Intake(mL/kg) 5672 (44.5) 2523 (19.8) 749 (5.9)    Urine (mL/kg/hr) 5000 (1.6) 3125 (1) 1550 (1.8)    Emesis/NG output       Stool 0 0     Total Output 5000 3125 1550    Net +672 -602 -801           Urine Occurrence  1 x     Stool Occurrence 1 x 1 x           Physical Exam  Constitutional:       Appearance: He is well-developed.   HENT:      Head: Normocephalic and atraumatic.      Mouth/Throat:      Pharynx: No oropharyngeal exudate.   Eyes:      General:         Right eye: No discharge.         Left eye: No discharge.      Conjunctiva/sclera: Conjunctivae normal.      Pupils: Pupils are equal, round, and reactive to light.   Neck:       Musculoskeletal: Normal range of motion and neck supple.   Cardiovascular:      Rate and Rhythm: Normal rate. Rhythm irregularly irregular.      Heart sounds: Normal heart sounds. No murmur.   Pulmonary:      Effort: Pulmonary effort is normal.      Breath sounds: Examination of the right-lower field reveals decreased breath sounds. Examination of the left-lower field reveals decreased breath sounds. Decreased breath sounds present. No wheezing or rales.      Comments:     Abdominal:      General: Bowel sounds are normal. There is no distension.      Palpations: Abdomen is soft.      Tenderness: There is no abdominal tenderness.   Musculoskeletal: Normal range of motion.         General: No deformity.   Skin:     General: Skin is warm and dry.      Findings: No erythema or rash.      Comments: Right chest wall vas cath. Dressing c/d/i with no sign of infection noted.   Neurological:      Mental Status: He is alert and oriented to person, place, and time.   Psychiatric:         Behavior: Behavior normal.         Thought Content: Thought content normal.         Judgment: Judgment normal.         Significant Labs:   All pertinent labs from the last 24 hours have been reviewed.    Diagnostic Results:  I have reviewed all pertinent imaging results/findings within the past 24 hours.

## 2020-10-06 NOTE — ASSESSMENT & PLAN NOTE
- Corrected calcium slightly low at 8.6 on 10/05; normalized as of 10/06 with initiation of daily calcium supplement.     Plan:   -Continue daily supplement.   -Calcium level ordered daily.

## 2020-10-06 NOTE — PLAN OF CARE
Side rails up x2; call bell in place; bed in lowest, locked position; skid proof socks on; no evidence of skin breakdown; care plan explained to patient; pt remains free of injury.  Pt is day 12 of 7 and 3. Denies pain, n/v, diarrnea or mucositis. CBG monitored no insulin needed at this time, telemetry monitored, pt in a fib with ventricular pacing. Pt tolerated po , pt with improved appetite, voids, BM x 1, ambulates in room and bowie. One unit PRBCs and platelets infused without incident, IVF infusing, VSS and afebrile, neutropenic precautions maintained.

## 2020-10-06 NOTE — PROGRESS NOTES
[facility]  Hematology  Bone Marrow Transplant  Progress Note    Patient Name: Shell Diaz  Admission Date: 9/16/2020  Hospital Length of Stay: 20 days  Code Status: Full Code     Hospital Course:   Mr. Shell Diaz was admitted to [facility] on 09/16/2020 as a transfer for suspicion for acute leukemia following presentation at OSH for fevers with smear showing significant leukocytosis () and blasts on peripheral smear. He underwent bone marrow biopsy on 09/16 that was diagnostic of AML. Lopes catheter was placed on 09/21 and he was initiated on induction therapy on 09/25 with Idarubicin and Cytarabine. Leukocytosis improved with IVF and Hydroxyurea. His presentation was also notable for concern for left lower lobe pneumonia with effusion that was initially treated with broad spectrum antibiotics until 09/22 without improvement. Pulmonology was consulted on 09/24 and patient underwent thoracentesis with fluid analysis showing blasts. Hospital course also associated with development of SEBASTIAN by 09/23 prompting nephrology consultation; etiology attributed to likely ATN; resolved by 10/01. Vital signs stable on room air. Continuing supportive management with intermittent transfusions.     Subjective:     Interval History: Day #12 of 7+3. He reports bilateral jaw pain with chewing, however this has been on-going since prior to admission and attributed to prior dental abnormality. He reports overall feeling well. He tolerating PO and urinating without difficulty. He is ambulating and using exercise bike daily without difficulty. On review of labs, Hgb downtrended to 6.6 and platelets to 7; one unit of each ordered. Potassium and magnesium down to 3.4 and 1.5, respectively; PRN electrolyte repletion utilized. Anticipated repeat bone marrow biopsy on 10/08/2020.     Objective:     Vital Signs (Most Recent):  Temp: 98.2 °F (36.8 °C) (10/06/20 1245)  Pulse: 62 (10/06/20 1245)  Resp: 16  (10/06/20 1245)  BP: (!) 141/83 (10/06/20 1245)  SpO2: 96 % (10/06/20 1245) Vital Signs (24h Range):  Temp:  [97.3 °F (36.3 °C)-99.5 °F (37.5 °C)] 98.2 °F (36.8 °C)  Pulse:  [60-83] 62  Resp:  [16-18] 16  SpO2:  [93 %-99 %] 96 %  BP: (113-151)/(63-85) 141/83     Weight: 127.5 kg (281 lb 1.4 oz)  Body mass index is 35.13 kg/m².  Body surface area is 2.6 meters squared.    ECOG SCORE           Intake/Output - Last 3 Shifts       10/04 0700 - 10/05 0659 10/05 0700 - 10/06 0659 10/06 0700 - 10/07 0659    P.O. 2980 1440 240    I.V. (mL/kg) 2371 (18.6) 1083 (8.5)     Blood 321  509    Total Intake(mL/kg) 5672 (44.5) 2523 (19.8) 749 (5.9)    Urine (mL/kg/hr) 5000 (1.6) 3125 (1) 1550 (1.8)    Emesis/NG output       Stool 0 0     Total Output 5000 3125 1550    Net +672 -602 -801           Urine Occurrence  1 x     Stool Occurrence 1 x 1 x           Physical Exam  Constitutional:       Appearance: He is well-developed.   HENT:      Head: Normocephalic and atraumatic.      Mouth/Throat:      Pharynx: No oropharyngeal exudate.   Eyes:      General:         Right eye: No discharge.         Left eye: No discharge.      Conjunctiva/sclera: Conjunctivae normal.      Pupils: Pupils are equal, round, and reactive to light.   Neck:      Musculoskeletal: Normal range of motion and neck supple.   Cardiovascular:      Rate and Rhythm: Normal rate. Rhythm irregularly irregular.      Heart sounds: Normal heart sounds. No murmur.   Pulmonary:      Effort: Pulmonary effort is normal.      Breath sounds: Examination of the right-lower field reveals decreased breath sounds. Examination of the left-lower field reveals decreased breath sounds. Decreased breath sounds present. No wheezing or rales.      Comments:     Abdominal:      General: Bowel sounds are normal. There is no distension.      Palpations: Abdomen is soft.      Tenderness: There is no abdominal tenderness.   Musculoskeletal: Normal range of motion.         General: No deformity.    Skin:     General: Skin is warm and dry.      Findings: No erythema or rash.      Comments: Right chest wall vas cath. Dressing c/d/i with no sign of infection noted.   Neurological:      Mental Status: He is alert and oriented to person, place, and time.   Psychiatric:         Behavior: Behavior normal.         Thought Content: Thought content normal.         Judgment: Judgment normal.         Significant Labs:   All pertinent labs from the last 24 hours have been reviewed.    Diagnostic Results:  I have reviewed all pertinent imaging results/findings within the past 24 hours.    Assessment/Plan:     * Acute myeloid leukemia not having achieved remission  This is a 55 year old male who presented to [facility] on 09/16/2020 for suspected acute leukemia after manual differential demonstrated blasts at outside hospital. He underwent bone marrow biopsy on 09/16 showing AML with flow cytometry showing increased population of myeloid blasts showing expression of CD4, CD9, CD11c, CD15, CD13, CD33(97%), CD64, (partial), HLA-DR, and cytoplasmic myeloperoxidase. Fish results - t(10;11), ngs - No pathogenic genetic alteration was detected. Associated with neoplastic effusion of left lower lung. Status post Lopes catheter placement on 09/21.     Plan:   -Initiated 7+3 on 09/25 with Idarubicin and Cytarabine; today is day #12.   -Anticipated repeat bone marrow biopsy on 10/08/2020.   -Allopurinol 300 mg BID with IVF for TLS prophylaxis.   -Oppurtunistic infection prophylaxis with Acyclovir, Levofloxacin, and Voriconazole.   -Baseline ECHO on 09/16/20 with EF of 58%.   -Transfuse cyroglobulin if fibrinogen <150  -Transfuse if platelets <10, Hgb<7 and/or patient actively bleeding  -He is a stem cell transplant candidate. Sent HLA typing earlier in admission. Sent sibling contact info to transplant coordinator.  -aPTT, PT/INR, fibrinogen, uric acid, and phosphorous twice weekly to assess for TLS and DIC.   -See assessment  for neoplastic effusion.     Hypocalcemia  - Corrected calcium slightly low at 8.6 on 10/05; normalized as of 10/06 with initiation of daily calcium supplement.     Plan:   -Continue daily supplement.   -Calcium level ordered daily.     Hyperbilirubinemia  -T-bili up to 1.5 from 0.8 9/30/20 and then to 2.0 on 10/1  -may be 2/2 chemo vs transfusion  -T-bili stable at 1.4 today  -will continue to trend for now    Hyperphosphatemia  -Developed secondary to SEBASTIAN and treated with SEVELAMER from 09/28 to 10/01 with improvement.   -Noted to be elevated to 6 on morning of 10/03.   -Continue to trend daily prior to possible resuming SEVELAMER. No indication for resuming at this time.     SEBASTIAN (acute kidney injury)  -Noted to develop SEBASTIAN on 09/23 with Cr peaking at 2.3 on 09/26.   -Nephrology consulted on 09/24 with suspicion for multifactorial SEBASTIAN secondary to Acyclovir, Losartan, contrast-induced from CT chest on 9/17 (Cr increase can be delayed 72-96 hours after contrast), and gradual decrease in hemoglobin. Most likely from ATN.   -Improved back to baseline by 10/01 with use of IVF and avoidance of nephrotoxic agents; nephrology signed off at that time.   -Making good urine output daily without signs of hypervolemia.   -Trending renal function daily.   -Holding home ARB.   -Strict I/O  -Avoid nephrotoxic agents.    -Renally dose medications.  -creatinine down to 1.1 today  resolved    Type 2 diabetes mellitus, without long-term current use of insulin  -Most recent A1c 8 in 09/2020.   -Home regimen: Metformin.   -Noted issues with sub-optimal hyperglycemia while receiving Dexamethasone, however resolved after stopping steroids. Stopped scheduled Aspart on 9/30/20 with blood glucose in 120s and patient no longer receiving steroids.   -Glucose at goal between 140-180 currently.   -Continue Detemir 10 U daily with LDSCI.   -Diabetic diet.   -POCT glucose checks with meals and bedtime.   -Goal -180.     Non-sustained  ventricular tachycardia  - Noted 6 beat run of VT on 9/18/20 at ~ 6 am. No episodes since.  - Goal Mg > 2 and K+ > 4  - Telemetry ordered.     Leukocytosis  -  on admission and 129 prior to starting Hydroxyurea  - Hydroxyurea stopped 9/20/20 with normal WBC count.  -See assessment for AML.     Pancytopenia due to antineoplastic chemotherapy  - Transfuse if platelet count<10. If patient develops active bleed, transfuse for goal >50.  - Transfuse for hgb < 7  - Opportunistic infection prophylaxis with Acyclovir, Levofloxacin, and Voriconazole. Follow-up voriconazole level ordered on 10/05.           Neoplastic pleural effusion  -Initially presented with fevers and fatigue with significant leukocytosis and blasts with CT chest on 09/17 after transfer here showing abnormal opacity involving the left lower lobe with a prominent area of dense confluent opacity that may relate to an area of dense consolidation, with surrounding prominent confluent infiltrates/airspace disease, as well as a small to moderate left pleural effusion. He was treated with Vancomycin and Zosyn from 09/15 to 09/22.   -Pulmonology consulted on 09/24 after repeat CXR showed progression of left sided pleural effusion. Status post thoracentesis on 09/24 with 1L of blood fluid removed with studies negative for infection, but showing abundant atypical mononuclear cells, suggestive of blasts.   -Vital signs stable on room air.   -Continue oppurutnistic infection prophylaxis with Levofloxacin.   -Holding home Apixaban in setting of thrombocytopenia.   -See assessment for AML.     Essential hypertension  -Home regimen: Amlodipine and Losartan.   -Held home ARB since 09/24 after development of SEBASTIAN.   -Blood pressure currently controled without anti-hypertensive       Permanent atrial fibrillation  -Status post pacemaker placement.   -Home regimen: Apixaban, Digoxin, and Metoprolol.    -Currently rate controlled.   -Baseline ECHO on 09/16 prior to  chemotherapy initiation with EF of 58%.   -Cardiology consulted on 09/17 for clearance for chemotherapy induction in the setting of acute leukemia; repeat ECHO on 09/17 notable for 16% left ventricular straining.   -Per cardiology, continued rate control with Metoprolol and Digoxin; decreased Metoprolol to 50 mg with HR paced in 60's.   -Holding Eliquis due to thrombocytopenia. Resume when appropriate.  -Keep Mg > 2 and K+ > 4.  -A follow up echocardiogram is recommended at the end of treatment with Adriamycin and at 6 months post treatment. We will plan of inpatient repeat echo only if patient becomes symptomatic per Dr. Thurman.        VTE Risk Mitigation (From admission, onward)         Ordered     heparin, porcine (PF) 100 unit/mL injection flush 300 Units  As needed (PRN)      09/25/20 1408     heparin (porcine) injection 1,000 Units  As needed (PRN)      09/22/20 0343     IP VTE HIGH RISK PATIENT  Once      09/16/20 0017     Place sequential compression device  Until discontinued      09/16/20 0017                Disposition: Remain in-patient following induction chemotherapy.     Fantasma Laguna MD  Bone Marrow Transplant  [facility]

## 2020-10-06 NOTE — PLAN OF CARE
Patient involved with plan of care and communicated needs throughout shift. AAOx4. VSS. No c/o pain. 1 unit RBC and Platelets ordered per MD to be given during day shift. Patient voids in urinal and ambulates independently but instructed to call for additional assistance. Nonskid socks in use, bed locked in lowest position, and pt belongings as well as call light within reach. Will continue to monitor with hourly rounds and clustered care to promote rest.

## 2020-10-06 NOTE — ASSESSMENT & PLAN NOTE
- Transfuse if platelet count<10. If patient develops active bleed, transfuse for goal >50.  - Transfuse for hgb < 7  - Opportunistic infection prophylaxis with Acyclovir, Levofloxacin, and Voriconazole. Follow-up voriconazole level ordered on 10/05.

## 2020-10-07 NOTE — PROGRESS NOTES
Pharmacokinetic Initial Assessment: IV Vancomycin    Assessment/Plan:    Initiate intravenous vancomycin with loading dose of 2500 mg once followed by a maintenance dose of vancomycin 2000mg IV every 12 hours  Desired empiric serum trough concentration is 10 to 15 mcg/mL  Draw vancomycin trough level 60 min prior to fourth dose on 10/8/20 at approximately 0730.  Pharmacy will continue to follow and monitor vancomycin.      Please contact pharmacy at extension [phone] with any questions regarding this assessment.     Thank you for the consult,   Oral Sierra       Patient brief summary:  Shell Diaz is a 55 y.o. male initiated on antimicrobial therapy with IV Vancomycin for treatment of suspected neutropenic fever    Drug Allergies:   Review of patient's allergies indicates:  No Known Allergies    Actual Body Weight:   127.5 kg    Renal Function:   Estimated Creatinine Clearance: 120.1 mL/min (based on SCr of 1 mg/dL).,     Dialysis Method (if applicable):  N/A    CBC (last 72 hours):  Recent Labs   Lab Result Units 10/04/20  0446 10/05/20  0431 10/06/20  0330   WBC K/uL 0.40* 0.39* 0.35*   Hemoglobin g/dL 6.8* 7.2* 6.6*   Hematocrit % 19.8* 21.0* 18.7*   Platelets K/uL 17* 13* 7*   Gran% % 0.0* 0.0* 0.0*   Lymph% % 100.0* 100.0* 100.0*   Mono% % 0.0* 0.0* 0.0*   Eosinophil% % 0.0 0.0 0.0   Basophil% % 0.0 0.0 0.0   Differential Method  Manual Automated Automated       Metabolic Panel (last 72 hours):  Recent Labs   Lab Result Units 10/04/20  0446 10/05/20  0431 10/06/20  0330   Sodium mmol/L 140 136 135*   Potassium mmol/L 3.2* 3.4* 3.4*   Chloride mmol/L 104 103 101   CO2 mmol/L 27 26 27   Glucose mg/dL 116* 119* 133*   BUN, Bld mg/dL 17 15 15   Creatinine mg/dL 1.0 1.1 1.0   Albumin g/dL 2.7* 2.7* 2.6*   Total Bilirubin mg/dL 1.4* 1.4* 1.0   Alkaline Phosphatase U/L 64 69 66   AST U/L 12 9* 7*   ALT U/L 21 18 13   Magnesium mg/dL 1.8 1.7 1.5*   Phosphorus mg/dL 3.1 3.0 2.9       Drug levels (last 3 results):  No  results for input(s): VANCOMYCINRA, VANCOMYCINPE, VANCOMYCINTR in the last 72 hours.    Microbiologic Results:  Microbiology Results (last 7 days)       Procedure Component Value Units Date/Time    Blood culture [[id number]]     Order Status: No result Specimen: Blood     Blood culture [[id number]]     Order Status: No result Specimen: Blood     Fungus culture [[id number]] Collected: 09/24/20 1154    Order Status: Completed Specimen: Body Fluid from Pleural Fluid Updated: 09/30/20 1138     Fungus (Mycology) Culture Culture in progress

## 2020-10-07 NOTE — NURSING
KAITLYN Tijerina notified pt's HR increased to 170s when ambulating to BR. Pt is asymptomatic and HR quickly returns to 80's when he returned to chair. Pt's rhythm remains in a fib when HR is elevated or WNL.

## 2020-10-07 NOTE — ASSESSMENT & PLAN NOTE
-Most recent A1c 8 in 09/2020.   -Home regimen: Metformin.   -Noted issues with sub-optimal hyperglycemia while receiving Dexamethasone, however resolved after stopping steroids. Stopped scheduled Aspart on 9/30/20 with blood glucose in 120s and patient no longer receiving steroids.   -Glucose at goal between 140-180 currently.   -Continue Detemir 10 U daily with LDSCI.   -Diabetic diet.   -POCT glucose checks with meals and bedtime.   -blood glucose stable at this time

## 2020-10-07 NOTE — PROGRESS NOTES
10/07/20 0343   Vital Signs   Temp (!) 100.4 °F (38 °C)   Temp src Oral   MD notified of intermittent temperature elevations throughout shift. Patient remains asymptomatic. No action to be taken at this time, per MD. Will continue to monitor.

## 2020-10-07 NOTE — PROGRESS NOTES
10/07/20 0151   Vital Signs   Temp 100.3 °F (37.9 °C)   Temp src Oral   Patient complaining of chills associated with elevated temp. MD notified. Administered second dose of PRN Tylenol this shift. Will continue to monitor.

## 2020-10-07 NOTE — PROGRESS NOTES
10/06/20 1915   Vital Signs   Temp (!) 102.2 °F (39 °C)   Temp src Oral   Pulse 96   Heart Rate Source Monitor   Resp (!) 23   SpO2 (!) 94 %   BP (!) 153/81   MAP (mmHg) 111   BP Location Right arm   Patient Position Lying   Elevated temperature, patient experiencing chills. MD notified. Administered PRN Tylenol & IV Antibiotics, and lab took blood cultures--all per MD.

## 2020-10-07 NOTE — ASSESSMENT & PLAN NOTE
-Developed secondary to SEBASTIAN and treated with SEVELAMER from 09/28 to 10/01 with improvement.   -Noted to be elevated to 6 on morning of 10/03.   -Continue to trend daily prior to possible resuming SEVELAMER. No indication for resuming at this time.   Resolved

## 2020-10-07 NOTE — ASSESSMENT & PLAN NOTE
- Corrected calcium slightly low at 8.6 on 10/05; normalized as of 10/06 with initiation of daily calcium supplement.   - Continue daily supplement.   - daily CMP

## 2020-10-07 NOTE — PLAN OF CARE
Patient involved with plan of care and communicated needs throughout shift. AAOx4. Elevated temperatures throughout shift, addressed with PRN Tylenol and workup per MD. Elevated heart rate when ambulating. All other VSS. No c/o pain. Electrolytes replaced. Patient voids in urinal and ambulates independently but instructed to call for additional assistance. Nonskid socks in use, bed locked in lowest position, and pt belongings as well as call light within reach. Will continue to monitor with hourly rounds and clustered care to promote rest.

## 2020-10-07 NOTE — ASSESSMENT & PLAN NOTE
- Transfuse if platelet count<10. If patient develops active bleed, transfuse for goal >50.  - Transfuse for hgb < 7  - Opportunistic infection prophylaxis with Acyclovir, Levofloxacin, and Voriconazole. Vori level from 10/5 1.2.

## 2020-10-07 NOTE — PROGRESS NOTES
[facility]  Hematology  Bone Marrow Transplant  Progress Note    Patient Name: Shell Diaz  Admission Date: 9/16/2020  Hospital Length of Stay: 21 days  Code Status: Full Code    Subjective:     Interval History: Day 13 of 7+3. Planning for BMBx tomorrow. ANC 10. Spiked temp of 102.2 yesterday. Started on vanc and cefepime. Had SEBASTIAN earlier in admission. Will need to monitor renal function closely while on vanc. Blood cx with ngtd. Will check cxr today. Had unsustained temp of 100.4 over night. K+ 3.3. replaceing. t-bili stable at 1.4.    Objective:     Vital Signs (Most Recent):  Temp: 98.5 °F (36.9 °C) (10/07/20 0728)  Pulse: 82 (10/07/20 0728)  Resp: 18 (10/07/20 0728)  BP: (!) 152/79 (10/07/20 0850)  SpO2: (!) 93 % (10/07/20 0728) Vital Signs (24h Range):  Temp:  [97.3 °F (36.3 °C)-102.2 °F (39 °C)] 98.5 °F (36.9 °C)  Pulse:  [62-98] 82  Resp:  [16-23] 18  SpO2:  [93 %-99 %] 93 %  BP: (113-153)/(59-83) 152/79     Weight: 128 kg (282 lb 3 oz)  Body mass index is 35.27 kg/m².  Body surface area is 2.6 meters squared.    ECOG SCORE         [unfilled]    Intake/Output - Last 3 Shifts       10/05 0700 - 10/06 0659 10/06 0700 - 10/07 0659 10/07 0700 - 10/08 0659    P.O. 1440 240     I.V. (mL/kg) 1083 (8.5) 1200 (9.4)     Blood  509     IV Piggyback   500    Total Intake(mL/kg) 2523 (19.8) 1949 (15.2) 500 (3.9)    Urine (mL/kg/hr) 3125 (1) 3950 (1.3)     Stool 0      Total Output 3125 3950     Net -602 -2001 +500           Urine Occurrence 1 x      Stool Occurrence 1 x            Physical Exam  Constitutional:       Appearance: He is well-developed.   HENT:      Head: Normocephalic and atraumatic.      Mouth/Throat:      Pharynx: No oropharyngeal exudate.   Eyes:      General:         Right eye: No discharge.         Left eye: No discharge.      Conjunctiva/sclera: Conjunctivae normal.      Pupils: Pupils are equal, round, and reactive to light.   Neck:      Musculoskeletal: Normal range of motion  and neck supple.   Cardiovascular:      Rate and Rhythm: Normal rate. Rhythm irregular.      Heart sounds: Normal heart sounds. No murmur.   Pulmonary:      Effort: Pulmonary effort is normal. No respiratory distress.      Breath sounds: No wheezing or rales.      Comments: clr/diminished left base    Abdominal:      General: Bowel sounds are normal. There is no distension.      Palpations: Abdomen is soft.      Tenderness: There is no abdominal tenderness.   Musculoskeletal: Normal range of motion.         General: No deformity.   Skin:     General: Skin is warm and dry.      Findings: No erythema or rash.      Comments: Right chest wall vas cath. Dressing c/d/i with no sign of infection noted.   Neurological:      Mental Status: He is alert and oriented to person, place, and time.   Psychiatric:         Behavior: Behavior normal.         Thought Content: Thought content normal.         Judgment: Judgment normal.         Significant Labs:   CBC:   Recent Labs   Lab 10/06/20  0330 10/07/20  0350   WBC 0.35* 0.33*   HGB 6.6* 7.1*   HCT 18.7* 20.1*   PLT 7* 21*    and CMP:   Recent Labs   Lab 10/06/20  0330 10/07/20  0350   * 134*   K 3.4* 3.3*    100   CO2 27 25   * 121*   BUN 15 16   CREATININE 1.0 1.0   CALCIUM 7.7* 7.5*   PROT 5.0* 5.2*   ALBUMIN 2.6* 2.8*   BILITOT 1.0 1.4*   ALKPHOS 66 68   AST 7* 10   ALT 13 15   ANIONGAP 7* 9   EGFRNONAA >60.0 >60.0       Diagnostic Results:  I have reviewed all pertinent imaging results/findings within the past 24 hours.    Assessment/Plan:     * Acute myeloid leukemia not having achieved remission  This is a 55 year old male who presented to [facility] on 09/16/2020 for suspected acute leukemia after manual differential demonstrated blasts at outside hospital. He underwent bone marrow biopsy on 09/16 showing AML with flow cytometry showing increased population of myeloid blasts showing expression of CD4, CD9, CD11c, CD15, CD13, CD33(97%), CD64,  (partial), HLA-DR, and cytoplasmic myeloperoxidase. Fish results - t(10;11), ngs - No pathogenic genetic alteration was detected. Associated with neoplastic effusion of left lower lung. Status post Lopes catheter placement on 09/21.     Plan:   -Initiated 7+3 on 09/25 with Idarubicin and Cytarabine; today is day #13.   -Anticipated repeat bone marrow biopsy on 10/08/2020.   -Allopurinol 300 mg BID with IVF for TLS prophylaxis.   -Oppurtunistic infection prophylaxis with Acyclovir, Levofloxacin, and Voriconazole.   -Baseline ECHO on 09/16/20 with EF of 58%.   -Transfuse cyroglobulin if fibrinogen <150  -Transfuse if platelets <10, Hgb<7 and/or patient actively bleeding  -He is a stem cell transplant candidate. Sent HLA typing earlier in admission. Sent sibling contact info to transplant coordinator.  -aPTT, PT/INR, fibrinogen, uric acid, and phosphorous twice weekly to assess for TLS and DIC.   -See assessment for neoplastic effusion.     SEBASTIAN (acute kidney injury)  -Noted to develop SEBASTIAN on 09/23 with Cr peaking at 2.3 on 09/26.   -Nephrology consulted on 09/24 with suspicion for multifactorial SEBASTIAN secondary to Acyclovir, Losartan, contrast-induced from CT chest on 9/17 (Cr increase can be delayed 72-96 hours after contrast), and gradual decrease in hemoglobin. Most likely from ATN.   -Improved back to baseline by 10/01 with use of IVF and avoidance of nephrotoxic agents; nephrology signed off at that time.   -Making good urine output daily without signs of hypervolemia.   -Trending renal function daily.   -Holding home ARB.   -Strict I/O  -Avoid nephrotoxic agents.    -Renally dose medications.  -creatinine down to 1.0 today  Resolved, but will need to monitor renal function closely as vanc was resumed    Permanent atrial fibrillation  -Status post pacemaker placement.   -Home regimen: Apixaban, Digoxin, and Metoprolol.    -Currently rate controlled.   -Baseline ECHO on 09/16 prior to chemotherapy initiation  with EF of 58%.   -Cardiology consulted on 09/17 for clearance for chemotherapy induction in the setting of acute leukemia; repeat ECHO on 09/17 notable for 16% left ventricular straining.   -Per cardiology, continued rate control with Metoprolol and Digoxin; decreased Metoprolol to 50 mg with HR paced in 60's.   -Holding Eliquis due to thrombocytopenia. Resume when appropriate.  -Keep Mg > 2 and K+ > 4.  -A follow up echocardiogram is recommended at the end of treatment with Adriamycin and at 6 months post treatment. We will plan of inpatient repeat echo only if patient becomes symptomatic per Dr. Thurman.    Pancytopenia due to antineoplastic chemotherapy  - Transfuse if platelet count<10. If patient develops active bleed, transfuse for goal >50.  - Transfuse for hgb < 7  - Opportunistic infection prophylaxis with Acyclovir, Levofloxacin, and Voriconazole. Vori level from 10/5 1.2.        Hypocalcemia  - Corrected calcium slightly low at 8.6 on 10/05; normalized as of 10/06 with initiation of daily calcium supplement.   - Continue daily supplement.   - daily CMP    Hyperbilirubinemia  -T-bili up to 1.5 from 0.8 9/30/20 and then to 2.0 on 10/1  -may be 2/2 chemo vs transfusion  -T-bili stable at 1.4 today  -will continue to trend for now    Hyperphosphatemia  -Developed secondary to SEBASTIAN and treated with SEVELAMER from 09/28 to 10/01 with improvement.   -Noted to be elevated to 6 on morning of 10/03.   -Continue to trend daily prior to possible resuming SEVELAMER. No indication for resuming at this time.   Resolved      Type 2 diabetes mellitus, without long-term current use of insulin  -Most recent A1c 8 in 09/2020.   -Home regimen: Metformin.   -Noted issues with sub-optimal hyperglycemia while receiving Dexamethasone, however resolved after stopping steroids. Stopped scheduled Aspart on 9/30/20 with blood glucose in 120s and patient no longer receiving steroids.   -Glucose at goal between 140-180 currently.    -Continue Detemir 10 U daily with LDSCI.   -Diabetic diet.   -POCT glucose checks with meals and bedtime.   -blood glucose stable at this time     Non-sustained ventricular tachycardia  - Noted 6 beat run of VT on 9/18/20 at ~ 6 am. No episodes since.  - Goal Mg > 2 and K+ > 4  - Telemetry ordered.     Leukocytosis  -  on admission and 129 prior to starting Hydroxyurea  - Hydroxyurea stopped 9/20/20 with normal WBC count.  -See assessment for AML.     Neoplastic pleural effusion  -Initially presented with fevers and fatigue with significant leukocytosis and blasts with CT chest on 09/17 after transfer here showing abnormal opacity involving the left lower lobe with a prominent area of dense confluent opacity that may relate to an area of dense consolidation, with surrounding prominent confluent infiltrates/airspace disease, as well as a small to moderate left pleural effusion. He was treated with Vancomycin and Zosyn from 09/15 to 09/22.   -Pulmonology consulted on 09/24 after repeat CXR showed progression of left sided pleural effusion. Status post thoracentesis on 09/24 with 1L of blood fluid removed with studies negative for infection, but showing abundant atypical mononuclear cells, suggestive of blasts.   -Vital signs stable on room air.   -Continue oppurutnistic infection prophylaxis with Levofloxacin.   -Holding home Apixaban in setting of thrombocytopenia.   -See assessment for AML.     Essential hypertension  -Home regimen: Amlodipine and Losartan.   -Held home ARB since 09/24 after development of SEBASTIAN.   -Blood pressure currently stable off ARB          VTE Risk Mitigation (From admission, onward)         Ordered     heparin, porcine (PF) 100 unit/mL injection flush 300 Units  As needed (PRN)      09/25/20 1408     heparin (porcine) injection 1,000 Units  As needed (PRN)      09/22/20 0343     IP VTE HIGH RISK PATIENT  Once      09/16/20 0017     Place sequential compression device  Until  discontinued      09/16/20 0017                Disposition: Inpatient for induction for AML.    Jeni Holt, KAITLYN  Bone Marrow Transplant  [facility]

## 2020-10-07 NOTE — ASSESSMENT & PLAN NOTE
This is a 55 year old male who presented to [facility] on 09/16/2020 for suspected acute leukemia after manual differential demonstrated blasts at outside hospital. He underwent bone marrow biopsy on 09/16 showing AML with flow cytometry showing increased population of myeloid blasts showing expression of CD4, CD9, CD11c, CD15, CD13, CD33(97%), CD64, (partial), HLA-DR, and cytoplasmic myeloperoxidase. Fish results - t(10;11), ngs - No pathogenic genetic alteration was detected. Associated with neoplastic effusion of left lower lung. Status post Lopes catheter placement on 09/21.     Plan:   -Initiated 7+3 on 09/25 with Idarubicin and Cytarabine; today is day #13.   -Anticipated repeat bone marrow biopsy on 10/08/2020.   -Allopurinol 300 mg BID with IVF for TLS prophylaxis.   -Oppurtunistic infection prophylaxis with Acyclovir, Levofloxacin, and Voriconazole.   -Baseline ECHO on 09/16/20 with EF of 58%.   -Transfuse cyroglobulin if fibrinogen <150  -Transfuse if platelets <10, Hgb<7 and/or patient actively bleeding  -He is a stem cell transplant candidate. Sent HLA typing earlier in admission. Sent sibling contact info to transplant coordinator.  -aPTT, PT/INR, fibrinogen, uric acid, and phosphorous twice weekly to assess for TLS and DIC.   -See assessment for neoplastic effusion.

## 2020-10-07 NOTE — ASSESSMENT & PLAN NOTE
-Noted to develop SEBASTIAN on 09/23 with Cr peaking at 2.3 on 09/26.   -Nephrology consulted on 09/24 with suspicion for multifactorial SEBASTIAN secondary to Acyclovir, Losartan, contrast-induced from CT chest on 9/17 (Cr increase can be delayed 72-96 hours after contrast), and gradual decrease in hemoglobin. Most likely from ATN.   -Improved back to baseline by 10/01 with use of IVF and avoidance of nephrotoxic agents; nephrology signed off at that time.   -Making good urine output daily without signs of hypervolemia.   -Trending renal function daily.   -Holding home ARB.   -Strict I/O  -Avoid nephrotoxic agents.    -Renally dose medications.  -creatinine down to 1.0 today  Resolved, but will need to monitor renal function closely as vanc was resumed

## 2020-10-07 NOTE — SUBJECTIVE & OBJECTIVE
Subjective:     Interval History: Day 13 of 7+3. Planning for BMBx tomorrow. ANC 10. Spiked temp of 102.2 yesterday. Started on vanc and cefepime. Had SEBASTIAN earlier in admission. Will need to monitor renal function closely while on vanc. Blood cx with ngtd. Will check cxr today. Had unsustained temp of 100.4 over night. K+ 3.3. replaceing. t-bili stable at 1.4.    Objective:     Vital Signs (Most Recent):  Temp: 98.5 °F (36.9 °C) (10/07/20 0728)  Pulse: 82 (10/07/20 0728)  Resp: 18 (10/07/20 0728)  BP: (!) 152/79 (10/07/20 0850)  SpO2: (!) 93 % (10/07/20 0728) Vital Signs (24h Range):  Temp:  [97.3 °F (36.3 °C)-102.2 °F (39 °C)] 98.5 °F (36.9 °C)  Pulse:  [62-98] 82  Resp:  [16-23] 18  SpO2:  [93 %-99 %] 93 %  BP: (113-153)/(59-83) 152/79     Weight: 128 kg (282 lb 3 oz)  Body mass index is 35.27 kg/m².  Body surface area is 2.6 meters squared.    ECOG SCORE         [unfilled]    Intake/Output - Last 3 Shifts       10/05 0700 - 10/06 0659 10/06 0700 - 10/07 0659 10/07 0700 - 10/08 0659    P.O. 1440 240     I.V. (mL/kg) 1083 (8.5) 1200 (9.4)     Blood  509     IV Piggyback   500    Total Intake(mL/kg) 2523 (19.8) 1949 (15.2) 500 (3.9)    Urine (mL/kg/hr) 3125 (1) 3950 (1.3)     Stool 0      Total Output 3125 3950     Net -602 -2001 +500           Urine Occurrence 1 x      Stool Occurrence 1 x            Physical Exam  Constitutional:       Appearance: He is well-developed.   HENT:      Head: Normocephalic and atraumatic.      Mouth/Throat:      Pharynx: No oropharyngeal exudate.   Eyes:      General:         Right eye: No discharge.         Left eye: No discharge.      Conjunctiva/sclera: Conjunctivae normal.      Pupils: Pupils are equal, round, and reactive to light.   Neck:      Musculoskeletal: Normal range of motion and neck supple.   Cardiovascular:      Rate and Rhythm: Normal rate. Rhythm irregular.      Heart sounds: Normal heart sounds. No murmur.   Pulmonary:      Effort: Pulmonary effort is normal. No  respiratory distress.      Breath sounds: No wheezing or rales.      Comments: clr/diminished left base    Abdominal:      General: Bowel sounds are normal. There is no distension.      Palpations: Abdomen is soft.      Tenderness: There is no abdominal tenderness.   Musculoskeletal: Normal range of motion.         General: No deformity.   Skin:     General: Skin is warm and dry.      Findings: No erythema or rash.      Comments: Right chest wall vas cath. Dressing c/d/i with no sign of infection noted.   Neurological:      Mental Status: He is alert and oriented to person, place, and time.   Psychiatric:         Behavior: Behavior normal.         Thought Content: Thought content normal.         Judgment: Judgment normal.         Significant Labs:   CBC:   Recent Labs   Lab 10/06/20  0330 10/07/20  0350   WBC 0.35* 0.33*   HGB 6.6* 7.1*   HCT 18.7* 20.1*   PLT 7* 21*    and CMP:   Recent Labs   Lab 10/06/20  0330 10/07/20  0350   * 134*   K 3.4* 3.3*    100   CO2 27 25   * 121*   BUN 15 16   CREATININE 1.0 1.0   CALCIUM 7.7* 7.5*   PROT 5.0* 5.2*   ALBUMIN 2.6* 2.8*   BILITOT 1.0 1.4*   ALKPHOS 66 68   AST 7* 10   ALT 13 15   ANIONGAP 7* 9   EGFRNONAA >60.0 >60.0       Diagnostic Results:  I have reviewed all pertinent imaging results/findings within the past 24 hours.

## 2020-10-07 NOTE — ASSESSMENT & PLAN NOTE
-Home regimen: Amlodipine and Losartan.   -Held home ARB since 09/24 after development of SEBASTIAN.   -Blood pressure currently stable off ARB

## 2020-10-08 PROBLEM — R50.81 NEUTROPENIC FEVER: Status: ACTIVE | Noted: 2020-01-01

## 2020-10-08 PROBLEM — R78.81 STAPHYLOCOCCUS AUREUS BACTEREMIA: Status: ACTIVE | Noted: 2020-01-01

## 2020-10-08 PROBLEM — D70.9 NEUTROPENIC FEVER: Status: ACTIVE | Noted: 2020-01-01

## 2020-10-08 PROBLEM — B95.61 STAPHYLOCOCCUS AUREUS BACTEREMIA: Status: ACTIVE | Noted: 2020-01-01

## 2020-10-08 NOTE — PROGRESS NOTES
"[facility]  Adult Nutrition  Progress Note    SUMMARY       Recommendations    1. Continue with diabetic diet (2800 scott) plus Boost Glucose Control TID   2. PO encouragement   3. RD to follow    Goals: Pt meets 75% of EEN and EPN by RD follow up  Nutrition Goal Status: new  Communication of RD Recs: other (comment)    Reason for Assessment    Reason For Assessment: RD follow-up  Diagnosis: cancer diagnosis/related complications(AML)  Relevant Medical History: DM2, HTN, Afib s/p pacemaker  Interdisciplinary Rounds: did not attend  General Information Comments: Pt is eating about 25-50% of his meals. He states his appetite is slowly returning but his food tastes different to him now. He does drink his Boost Glucose Control TID. His wife does bring him some outside food also, which he really likes. He is knowledgeable about food safety and we discussed the DM diet. Pt states he has no n/v/d/c. Pt has lost 22 lb since admit (9/16)- that is just 3 1/2 weeks. He was not that upset with losing the wt but explained to him that we do not want more wt loss and that we will be watching his wts and po intake.   Nutrition Discharge Planning: Discharge on carb consistent diet.    Nutrition Risk Screen    Nutrition Risk Screen: no indicators present    Nutrition/Diet History    Spiritual, Cultural Beliefs, Sabianist Practices, Values that Affect Care: no    Anthropometrics    Temp: 98.6 °F (37 °C)  Height: 6' 3" (190.5 cm)  Height (inches): 75 in  Weight Method: Standard Scale  Weight: 126.5 kg (278 lb 12.4 oz)  Weight (lb): 278.77 lb  Ideal Body Weight (IBW), Male: 196 lb  % Ideal Body Weight, Male (lb): 151.02 %  BMI (Calculated): 34.8  BMI Grade: 35 - 39.9 - obesity - grade II       Lab/Procedures/Meds    Pertinent Labs Reviewed: reviewed  Pertinent Labs Comments: K3.4, Alb2.7  Pertinent Medications Reviewed: reviewed  Pertinent Medications Comments: Insulin, metoprolol, ondansetron    Physical " Findings/Assessment         Estimated/Assessed Needs    Weight Used For Calorie Calculations: 126.5 kg (278 lb 14.1 oz)  Energy Calorie Requirements (kcal): 2622(1.2 activity factor)  Energy Need Method: [place]-St Jeor  Protein Requirements: 126-152 g/ day  Weight Used For Protein Calculations: 126.5 kg (278 lb 14.1 oz)     Estimated Fluid Requirement Method: RDA Method  RDA Method (mL): 2622  CHO Requirement: 327      Nutrition Prescription Ordered    Current Diet Order: Diabetic 2800 kcal  Oral Nutrition Supplement: Boost Glucose Control TID    Evaluation of Received Nutrient/Fluid Intake    I/O: -12 L since admit  Comments: LBM 10/7  Tolerance: tolerating  % Intake of Estimated Energy Needs: 25 - 50 %  % Meal Intake: 25 - 50 %    Nutrition Risk    Level of Risk/Frequency of Follow-up: low(1xweek)     Assessment and Plan  Nutrition Problem  Altered nutrition-related lab values     Related to (etiology):   Blood glucose     Signs and Symptoms (as evidenced by):   A1C 8%, Glu 111      Interventions(treatment strategy):  Collaboration of care with providers.  Modified diet: Carbohydrates  Diet education  Commercial beverage- Boost Glucose Control     Nutrition Diagnosis Status:   Continues    Monitor and Evaluation    Food and Nutrient Intake: energy intake, food and beverage intake  Food and Nutrient Adminstration: diet order  Knowledge/Beliefs/Attitudes: food and nutrition knowledge/skill  Anthropometric Measurements: weight, weight change, body mass index  Biochemical Data, Medical Tests and Procedures: electrolyte and renal panel, gastrointestinal profile, glucose/endocrine profile, inflammatory profile, lipid profile  Nutrition-Focused Physical Findings: overall appearance     Malnutrition Assessment         Nutrition Follow-Up    RD Follow-up?: Yes

## 2020-10-08 NOTE — PROGRESS NOTES
Pt seen for follow up. Wife requested update on disability form completion; physician portion complete with preperation and transmission to insurance company expected this afternoon.  No other needs identified at this time. Will continue to follow and assist as needed.

## 2020-10-08 NOTE — PROGRESS NOTES
[facility]  Hematology  Bone Marrow Transplant  Progress Note    Patient Name: Shell Diaz  Admission Date: 9/16/2020  Hospital Length of Stay: 22 days  Code Status: Full Code     Hospital Course:   Mr. Shell Diaz was admitted to [facility] on 09/16/2020 as a transfer for suspicion for acute leukemia following presentation at OSH for fevers with smear showing significant leukocytosis () and blasts on peripheral smear. He underwent bone marrow biopsy on 09/16 that was diagnostic of AML. Lopes catheter was placed on 09/21 and he was initiated on induction therapy on 09/25 with Idarubicin and Cytarabine. Leukocytosis improved with IVF and Hydroxyurea. His presentation was also notable for concern for left lower lobe pneumonia with effusion that was initially treated with broad spectrum antibiotics until 09/22 without improvement. Pulmonology was consulted on 09/24 and patient underwent thoracentesis with fluid analysis showing blasts. Hospital course also associated with development of SEBASTIAN by 09/23 prompting nephrology consultation; etiology attributed to likely ATN; resolved by 10/01. On evening of 10/06, patient developed neutropenic fever with Cefepime and Vancomycin initiated at that time; infectious work-up significant for blood cultures positive gram-positive cocci. Vital signs remain stable on room air and afebrile since 10/07. Continuing supportive management with intermittent transfusions.     Subjective:     Interval History:  Day 14 of 7+3. Patient continues to feel well and has no complaints. Blood cultures from 10/06 noted to be positive for S.aureus with sensitivities pending; remains on Vanc/Cefepime (day #3). Labs notable for downtrend in Hgb to 6.5 and 1 U PRBC ordered. Renal function remains WNL with Vancomycin. Bone marrow biopsy to be repeated today.     Objective:     Vital Signs (Most Recent):  Temp: 98.3 °F (36.8 °C) (10/08/20 1158)  Pulse: 70 (10/08/20  1158)  Resp: 17 (10/08/20 1158)  BP: 120/65 (10/08/20 0755)  SpO2: 98 % (10/08/20 1158) Vital Signs (24h Range):  Temp:  [98.2 °F (36.8 °C)-98.7 °F (37.1 °C)] 98.3 °F (36.8 °C)  Pulse:  [63-92] 70  Resp:  [17-18] 17  SpO2:  [93 %-98 %] 98 %  BP: (120-159)/(65-77) 120/65     Weight: 126.5 kg (278 lb 12.4 oz)  Body mass index is 34.84 kg/m².  Body surface area is 2.59 meters squared.    ECOG SCORE           Intake/Output - Last 3 Shifts       10/06 0700 - 10/07 0659 10/07 0700 - 10/08 0659 10/08 0700 - 10/09 0659    P.O. 240 1920 240    I.V. (mL/kg) 1200 (9.4) 2345.7 (18.6)     Blood 509      IV Piggyback  1000     Total Intake(mL/kg) 1949 (15.2) 5265.7 (41.7) 240 (1.9)    Urine (mL/kg/hr) 3950 (1.3) 4650 (1.5) 900 (1.1)    Stool  0     Total Output 3950 4650 900    Net -2001 +615.7 -660           Stool Occurrence  1 x           Physical Exam  Constitutional:       Appearance: He is well-developed.   HENT:      Head: Normocephalic and atraumatic.      Mouth/Throat:      Pharynx: No oropharyngeal exudate.   Eyes:      General:         Right eye: No discharge.         Left eye: No discharge.      Conjunctiva/sclera: Conjunctivae normal.      Pupils: Pupils are equal, round, and reactive to light.   Neck:      Musculoskeletal: Normal range of motion and neck supple.   Cardiovascular:      Rate and Rhythm: Normal rate. Rhythm irregularly irregular.      Heart sounds: Normal heart sounds. No murmur.   Pulmonary:      Effort: Pulmonary effort is normal.      Breath sounds: Normal breath sounds.      Comments:     Abdominal:      General: Bowel sounds are normal. There is no distension.      Palpations: Abdomen is soft.      Tenderness: There is no abdominal tenderness.   Musculoskeletal: Normal range of motion.         General: No deformity.   Skin:     General: Skin is warm and dry.      Findings: No erythema or rash.      Comments: Lopes catheter in right upper chest with clean, dry, and intact dressing without erythema  or tenderness.    Neurological:      Mental Status: He is alert and oriented to person, place, and time.   Psychiatric:         Behavior: Behavior normal.         Thought Content: Thought content normal.         Judgment: Judgment normal.          Significant Labs:   All pertinent labs from the last 24 hours have been reviewed.    Diagnostic Results:  I have reviewed all pertinent imaging results/findings within the past 24 hours.    Assessment/Plan:     * Acute myeloid leukemia not having achieved remission  This is a 55 year old male who presented to [facility] on 09/16/2020 for suspected acute leukemia after manual differential demonstrated blasts at outside hospital. He underwent bone marrow biopsy on 09/16 showing AML with flow cytometry showing increased population of myeloid blasts showing expression of CD4, CD9, CD11c, CD15, CD13, CD33(97%), CD64, (partial), HLA-DR, and cytoplasmic myeloperoxidase. Fish results - t(10;11), ngs - No pathogenic genetic alteration was detected. Associated with neoplastic effusion of left lower lung. Status post Lopes catheter placement on 09/21.     Plan:   -Initiated 7+3 on 09/25 with Idarubicin and Cytarabine; today is day #14.   -Repeat bone marrow biopsy today.   -Allopurinol 300 mg BID with IVF for TLS prophylaxis.   -Oppurtunistic infection prophylaxis with Acyclovir, and Voriconazole; holding Levofloxacin with broad spectrum anti-biotic initiation on 10/06.   -Baseline ECHO on 09/16/20 with EF of 58%.   -Transfuse cyroglobulin if fibrinogen <150  -Transfuse if platelets <10, Hgb<7 and/or patient actively bleeding  -He is a stem cell transplant candidate. Sent HLA typing earlier in admission. Sent sibling contact info to transplant coordinator.  -aPTT, PT/INR, fibrinogen, uric acid, and phosphorous twice weekly to assess for TLS and DIC.   -See assessment for neoplastic effusion.     Neutropenic fever  -Noted to develop a fever on evening of 10/06 prompting infectious  work-up with blood cultures, UA, and CXR and initiation of Cefepime and Vancomycin at that time.   -As of 10/08, blood cultures positive for S.aureus with sensitivities pending; CXR and UA were unremarkable; Lopes catheter without obvious signs of infection.     Plan:   -Repeat blood cultures ordered.   -Follow-up sensitivities.   -Continue Cefepime and Vancomycin (day #3).   -Anticipated ID consult with need for prolonged antibiotic course.     Staphylococcus aureus bacteremia  -See assessment for neutropenic fever.     Hypocalcemia  - Corrected calcium slightly low at 8.6 on 10/05; normalized as of 10/06 with initiation of daily calcium supplement.     Plan:   - Calcium level ordered daily.     Hyperbilirubinemia  -T-bili up to 1.5 from 0.8 9/30/20 and then to 2.0 on 10/1  -may be 2/2 chemo vs transfusion  -T-bili stable at 1.4 today  -will continue to trend for now    Hyperphosphatemia  -Developed secondary to SEBASTIAN and treated with SEVELAMER from 09/28 to 10/01 with improvement.     Resolved      SEBASTIAN (acute kidney injury)  -Noted to develop SEBASTIAN on 09/23 with Cr peaking at 2.3 on 09/26.   -Nephrology consulted on 09/24 with suspicion for multifactorial SEBASTIAN secondary to Acyclovir, Losartan, contrast-induced from CT chest on 9/17 (Cr increase can be delayed 72-96 hours after contrast), and gradual decrease in hemoglobin. Most likely from ATN.   -Improved back to baseline by 10/01 with use of IVF and avoidance of nephrotoxic agents; nephrology signed off at that time.      Resolved.     Plan:   -Making good urine output daily without signs of hypervolemia.   -Trending renal function daily; monitor renal function closely as Vancomycin was resumed on 10/06.  -Holding home ARB.   -Strict I/O  -Avoid nephrotoxic agents.    -Renally dose medications.    Type 2 diabetes mellitus, without long-term current use of insulin  -Most recent A1c 8 in 09/2020.   -Home regimen: Metformin.   -Noted issues with sub-optimal  hyperglycemia while receiving Dexamethasone, however resolved after stopping steroids. Stopped scheduled Aspart on 9/30/20 with blood glucose in 120s and patient no longer receiving steroids.   -Noted new worsening hyperglycemia on 10/08 that may be secondary to new bacteremia.     Plan:   -Continue Detemir 10 U daily with LDSCI.   -Diabetic diet.   -POCT glucose checks with meals and bedtime.   -Goal glucose 140-180.     Non-sustained ventricular tachycardia  - Noted 6 beat run of VT on 9/18/20 at ~ 6 am. No episodes since.    Plan:   - Goal Mg > 2 and K+ > 4  - Telemetry ordered.     Leukocytosis  -  on admission and 129 prior to starting Hydroxyurea  - Hydroxyurea stopped 9/20/20 with normal WBC count.  -See assessment for AML.     Pancytopenia due to antineoplastic chemotherapy  Plan:   - Transfuse if platelet count <10. If patient develops active bleed, transfuse for goal >50.  - Transfuse for Hgb < 7.  - Opportunistic infection prophylaxis with Acyclovir, Levofloxacin, and Voriconazole.   - Monitoring voriconazole level; most recent level from 10/5 1.2.        Neoplastic pleural effusion  -Initially presented with fevers and fatigue with significant leukocytosis and blasts with CT chest on 09/17 after transfer here showing abnormal opacity involving the left lower lobe with a prominent area of dense confluent opacity that may relate to an area of dense consolidation, with surrounding prominent confluent infiltrates/airspace disease, as well as a small to moderate left pleural effusion. He was treated with Vancomycin and Zosyn from 09/15 to 09/22.   -Pulmonology consulted on 09/24 after repeat CXR showed progression of left sided pleural effusion. Status post thoracentesis on 09/24 with 1L of blood fluid removed with studies negative for infection, but showing abundant atypical mononuclear cells, suggestive of blasts.   -Repeat CXR on 10/07 with decrease in size of effusion.   -Vital signs stable on room  air.     Plan:   -Continue oppurutnistic infection prophylaxis with Levofloxacin.   -Holding home Apixaban in setting of thrombocytopenia.   -See assessment for AML.     Essential hypertension  -Home regimen: Amlodipine and Losartan.     Plan:   -Held home ARB since 09/24 after development of SEBASTIAN and blood pressure currently stable without anti-hypertensive agents ordered.       Permanent atrial fibrillation  -Status post pacemaker placement.   -Home regimen: Apixaban, Digoxin, and Metoprolol.    -Currently rate controlled.   -Baseline ECHO on 09/16 prior to chemotherapy initiation with EF of 58%. Cardiology consulted on 09/17 for clearance for chemotherapy induction in the setting of acute leukemia; repeat ECHO on 09/17 notable for 16% left ventricular straining.     Plan:   -Per cardiology, continued rate control with Metoprolol and Digoxin; decreased Metoprolol to 50 mg with HR paced in 60's.   -Holding Apixaban due to thrombocytopenia; resume when appropriate.  -Keep Mg > 2 and K+ > 4.  -A follow up echocardiogram is recommended at the end of treatment with Adriamycin and at 6 months post treatment. We will plan of inpatient repeat echo only if patient becomes symptomatic per Dr. Thurman.        VTE Risk Mitigation (From admission, onward)         Ordered     heparin, porcine (PF) 100 unit/mL injection flush 300 Units  As needed (PRN)      09/25/20 1408     heparin (porcine) injection 1,000 Units  As needed (PRN)      09/22/20 0343     IP VTE HIGH RISK PATIENT  Once      09/16/20 0017     Place sequential compression device  Until discontinued      09/16/20 0017                Disposition: Remain in-patient for chemotherapy initiation.     Fantasma Laguna MD  Bone Marrow Transplant  [facility]

## 2020-10-08 NOTE — ASSESSMENT & PLAN NOTE
Plan:   - Transfuse if platelet count <10. If patient develops active bleed, transfuse for goal >50.  - Transfuse for Hgb < 7.  - Opportunistic infection prophylaxis with Acyclovir, Levofloxacin, and Voriconazole.   - Monitoring voriconazole level; most recent level from 10/5 1.2.

## 2020-10-08 NOTE — ASSESSMENT & PLAN NOTE
-Most recent A1c 8 in 09/2020.   -Home regimen: Metformin.   -Noted issues with sub-optimal hyperglycemia while receiving Dexamethasone, however resolved after stopping steroids. Stopped scheduled Aspart on 9/30/20 with blood glucose in 120s and patient no longer receiving steroids.   -Noted new worsening hyperglycemia on 10/08 that may be secondary to new bacteremia.     Plan:   -Continue Detemir 10 U daily with LDSCI.   -Diabetic diet.   -POCT glucose checks with meals and bedtime.   -Goal glucose 140-180.

## 2020-10-08 NOTE — SUBJECTIVE & OBJECTIVE
Subjective:     Interval History:  Day 14 of 7+3. Patient continues to feel well and has no complaints. Blood cultures from 10/06 noted to be positive for S.aureus with sensitivities pending; remains on Vanc/Cefepime (day #3). Labs notable for downtrend in Hgb to 6.5 and 1 U PRBC ordered. Renal function remains WNL with Vancomycin. Bone marrow biopsy to be repeated today.     Objective:     Vital Signs (Most Recent):  Temp: 98.3 °F (36.8 °C) (10/08/20 1158)  Pulse: 70 (10/08/20 1158)  Resp: 17 (10/08/20 1158)  BP: 120/65 (10/08/20 0755)  SpO2: 98 % (10/08/20 1158) Vital Signs (24h Range):  Temp:  [98.2 °F (36.8 °C)-98.7 °F (37.1 °C)] 98.3 °F (36.8 °C)  Pulse:  [63-92] 70  Resp:  [17-18] 17  SpO2:  [93 %-98 %] 98 %  BP: (120-159)/(65-77) 120/65     Weight: 126.5 kg (278 lb 12.4 oz)  Body mass index is 34.84 kg/m².  Body surface area is 2.59 meters squared.    ECOG SCORE           Intake/Output - Last 3 Shifts       10/06 0700 - 10/07 0659 10/07 0700 - 10/08 0659 10/08 0700 - 10/09 0659    P.O. 240 1920 240    I.V. (mL/kg) 1200 (9.4) 2345.7 (18.6)     Blood 509      IV Piggyback  1000     Total Intake(mL/kg) 1949 (15.2) 5265.7 (41.7) 240 (1.9)    Urine (mL/kg/hr) 3950 (1.3) 4650 (1.5) 900 (1.1)    Stool  0     Total Output 3950 4650 900    Net -2001 +615.7 -660           Stool Occurrence  1 x           Physical Exam  Constitutional:       Appearance: He is well-developed.   HENT:      Head: Normocephalic and atraumatic.      Mouth/Throat:      Pharynx: No oropharyngeal exudate.   Eyes:      General:         Right eye: No discharge.         Left eye: No discharge.      Conjunctiva/sclera: Conjunctivae normal.      Pupils: Pupils are equal, round, and reactive to light.   Neck:      Musculoskeletal: Normal range of motion and neck supple.   Cardiovascular:      Rate and Rhythm: Normal rate. Rhythm irregularly irregular.      Heart sounds: Normal heart sounds. No murmur.   Pulmonary:      Effort: Pulmonary effort is  normal.      Breath sounds: Normal breath sounds.      Comments:     Abdominal:      General: Bowel sounds are normal. There is no distension.      Palpations: Abdomen is soft.      Tenderness: There is no abdominal tenderness.   Musculoskeletal: Normal range of motion.         General: No deformity.   Skin:     General: Skin is warm and dry.      Findings: No erythema or rash.      Comments: Lopes catheter in right upper chest with clean, dry, and intact dressing without erythema or tenderness.    Neurological:      Mental Status: He is alert and oriented to person, place, and time.   Psychiatric:         Behavior: Behavior normal.         Thought Content: Thought content normal.         Judgment: Judgment normal.          Significant Labs:   All pertinent labs from the last 24 hours have been reviewed.    Diagnostic Results:  I have reviewed all pertinent imaging results/findings within the past 24 hours.

## 2020-10-08 NOTE — PT/OT/SLP PROGRESS
Occupational Therapy   Treatment    Name: Shell Diaz  MRN: [id number]  Admitting Diagnosis:  Acute myeloid leukemia not having achieved remission  17 Days Post-Op    Recommendations:     Discharge Recommendations: home  Discharge Equipment Recommendations:  none  Barriers to discharge:  None    Assessment:     Shell Diaz is a 55 y.o. male with a medical diagnosis of Acute myeloid leukemia not having achieved remission.  He presents up in chair and in good spirits.  Pt has been performing daily therex and has continued safe and indep self care.,  Pt will continue to benefit from skilled OT 1xweek during treatment.m . Performance deficits affecting function are impaired endurance.     Rehab Prognosis:  Good; patient would benefit from acute skilled OT services to address these deficits and reach maximum level of function.       Plan:     Patient to be seen 1 x/week to address the above listed problems via self-care/home management, therapeutic activities, therapeutic exercises  · Plan of Care Expires: 11/01/20  · Plan of Care Reviewed with: patient    Subjective     Pain/Comfort:  · Pain Rating 1: 0/10    Objective:     Communicated with: RN prior to session.  Patient found supine with peripheral IV upon OT entry to room.    General Precautions: Standard, fall   Orthopedic Precautions:N/A   Braces: N/A     Occupational Performance:     Bed Mobility:    · Pt indep with bed mobility     Functional Mobility/Transfers:  · Functional Mobility: Pt able to demonstrate safe and effective in room, bathroom and hallway mobility     Activities of Daily Living:  · Pt able to complete self care without asssit       [facility] 6 Click ADL: 24    Treatment & Education:  Pt educated on safety, role of OT, importance of increased participation in self care for gains , expectations for participation, expectations for gains, POC, energy conservation, caregiver strain. White board updated.   - UE therex     Patient left supine with  all lines intactEducation:      GOALS:   Multidisciplinary Problems     Occupational Therapy Goals        Problem: Occupational Therapy Goal    Goal Priority Disciplines Outcome Interventions   Occupational Therapy Goal     OT, PT/OT Ongoing, Progressing    Description: Goals to be met by: 10/25    Patient will increase functional independence with ADLs by performing:    Feeding with [place]. MET  UE Dressing with [place].  LE Dressing with [place]. MET  Grooming while standing with [place].  Toileting from toilet with [place] for hygiene and clothing management.                      Time Tracking:     OT Date of Treatment: 10/08/20  OT Start Time: 0925  OT Stop Time: 0950  OT Total Time (min): 25 min    Billable Minutes:Therapeutic Activity 25    Melissa Jarvis, OT  10/8/2020

## 2020-10-08 NOTE — ASSESSMENT & PLAN NOTE
-Status post pacemaker placement.   -Home regimen: Apixaban, Digoxin, and Metoprolol.    -Currently rate controlled.   -Baseline ECHO on 09/16 prior to chemotherapy initiation with EF of 58%. Cardiology consulted on 09/17 for clearance for chemotherapy induction in the setting of acute leukemia; repeat ECHO on 09/17 notable for 16% left ventricular straining.     Plan:   -Per cardiology, continued rate control with Metoprolol and Digoxin; decreased Metoprolol to 50 mg with HR paced in 60's.   -Holding Apixaban due to thrombocytopenia; resume when appropriate.  -Keep Mg > 2 and K+ > 4.  -A follow up echocardiogram is recommended at the end of treatment with Adriamycin and at 6 months post treatment. We will plan of inpatient repeat echo only if patient becomes symptomatic per Dr. Thurman.

## 2020-10-08 NOTE — PLAN OF CARE
Side rails up x2; call bell in place; bed in lowest, locked position; skid proof socks on; no evidence of skin breakdown; care plan explained to patient; pt remains free of injury.  Pt tolerating small amounts of po, c/o nausea zofran po given pt stated he had relief. Voids, ambulates, no BM today. Denies pain or diarrhea. IVF infusing, vancomycin and cefepime given as scheduled. CBG monitored no insulin coverage needed. Telemetry monitoring in progress, pt continues in controlled a fib. HR will increase to 130's -170 with ambulation, once he returns bed or the chair HR returns to 60-92. Pt remains asymptomatic team is aware. CXR PA and lateral completed. VSS and afebrile.

## 2020-10-08 NOTE — ASSESSMENT & PLAN NOTE
-Home regimen: Amlodipine and Losartan.     Plan:   -Held home ARB since 09/24 after development of SEBASTIAN and blood pressure currently stable without anti-hypertensive agents ordered.

## 2020-10-08 NOTE — ASSESSMENT & PLAN NOTE
-Noted to develop SEBASTIAN on 09/23 with Cr peaking at 2.3 on 09/26.   -Nephrology consulted on 09/24 with suspicion for multifactorial SEBASTIAN secondary to Acyclovir, Losartan, contrast-induced from CT chest on 9/17 (Cr increase can be delayed 72-96 hours after contrast), and gradual decrease in hemoglobin. Most likely from ATN.   -Improved back to baseline by 10/01 with use of IVF and avoidance of nephrotoxic agents; nephrology signed off at that time.      Resolved.     Plan:   -Making good urine output daily without signs of hypervolemia.   -Trending renal function daily; monitor renal function closely as Vancomycin was resumed on 10/06.  -Holding home ARB.   -Strict I/O  -Avoid nephrotoxic agents.    -Renally dose medications.

## 2020-10-08 NOTE — ASSESSMENT & PLAN NOTE
This is a 55 year old male who presented to [facility] on 09/16/2020 for suspected acute leukemia after manual differential demonstrated blasts at outside hospital. He underwent bone marrow biopsy on 09/16 showing AML with flow cytometry showing increased population of myeloid blasts showing expression of CD4, CD9, CD11c, CD15, CD13, CD33(97%), CD64, (partial), HLA-DR, and cytoplasmic myeloperoxidase. Fish results - t(10;11), ngs - No pathogenic genetic alteration was detected. Associated with neoplastic effusion of left lower lung. Status post Lopes catheter placement on 09/21.     Plan:   -Initiated 7+3 on 09/25 with Idarubicin and Cytarabine; today is day #14.   -Repeat bone marrow biopsy today.   -Allopurinol 300 mg BID with IVF for TLS prophylaxis.   -Oppurtunistic infection prophylaxis with Acyclovir, and Voriconazole; holding Levofloxacin with broad spectrum anti-biotic initiation on 10/06.   -Baseline ECHO on 09/16/20 with EF of 58%.   -Transfuse cyroglobulin if fibrinogen <150  -Transfuse if platelets <10, Hgb<7 and/or patient actively bleeding  -He is a stem cell transplant candidate. Sent HLA typing earlier in admission. Sent sibling contact info to transplant coordinator.  -aPTT, PT/INR, fibrinogen, uric acid, and phosphorous twice weekly to assess for TLS and DIC.   -See assessment for neoplastic effusion.

## 2020-10-08 NOTE — ASSESSMENT & PLAN NOTE
- Corrected calcium slightly low at 8.6 on 10/05; normalized as of 10/06 with initiation of daily calcium supplement.     Plan:   - Calcium level ordered daily.

## 2020-10-08 NOTE — PLAN OF CARE
Recommendations     1. Continue with diabetic diet (2800 scott) plus Boost Glucose Control TID   2. PO encouragement   3. RD to follow     Goals: Pt meets 75% of EEN and EPN by RD follow up  Nutrition Goal Status: new

## 2020-10-08 NOTE — PLAN OF CARE
Goals addressed and unmet.  Cont with POC  Melissa Jarvis OT  10/8/2020    Problem: Occupational Therapy Goal  Goal: Occupational Therapy Goal  Description: Goals to be met by: 10/25    Patient will increase functional independence with ADLs by performing:    Feeding with [place]. MET  UE Dressing with [place].  LE Dressing with [place]. MET  Grooming while standing with [place].  Toileting from toilet with [place] for hygiene and clothing management.     Outcome: Ongoing, Progressing

## 2020-10-08 NOTE — PROGRESS NOTES
Pharmacokinetic Assessment Follow Up: IV Vancomycin    Vancomycin serum concentration assessment(s) & Plan:    · Trough was drawn ~4 hours early and resulted at 19.8 mcg/mL (goal:15-20 mcg/mL)   · Will continue IV vancomycin at 2000 mg Q12H   · Ordered a trough 60 mins prior to the 4th dose on 10/9/20 at 0730      Drug levels (last 3 results):  Recent Labs   Lab Result Units 10/08/20  0416   Vancomycin-Trough ug/mL 19.8       Pharmacy will continue to follow and monitor vancomycin.    Please contact pharmacy at extension [phone] for questions regarding this assessment.    Thank you for the consult,   Karina Luna PharmD        Patient brief summary:  Shell Diaz is a 55 y.o. male initiated on antimicrobial therapy with IV Vancomycin for treatment of GPCs growing in blood/neutropenic fever       Drug Allergies:   Review of patient's allergies indicates:  No Known Allergies    Actual Body Weight:   126.5 kg     Renal Function:   Estimated Creatinine Clearance: 119.6 mL/min (based on SCr of 1 mg/dL).,     Dialysis Method (if applicable):  N/A    CBC (last 72 hours):  Recent Labs   Lab Result Units 10/06/20  0330 10/07/20  0350 10/08/20  0416   WBC K/uL 0.35* 0.33* 0.36*   Hemoglobin g/dL 6.6* 7.1* 6.5*   Hematocrit % 18.7* 20.1* 19.0*   Platelets K/uL 7* 21* 12*   Gran% % 0.0* 3.0* 2.8*   Lymph% % 100.0* 97.0* 97.2*   Mono% % 0.0* 0.0* 0.0*   Eosinophil% % 0.0 0.0 0.0   Basophil% % 0.0 0.0 0.0   Differential Method  Automated Automated Automated       Metabolic Panel (last 72 hours):  Recent Labs   Lab Result Units 10/06/20  0330 10/07/20  0350 10/08/20  0416   Sodium mmol/L 135* 134* 137   Potassium mmol/L 3.4* 3.3* 3.4*   Chloride mmol/L 101 100 102   CO2 mmol/L 27 25 26   Glucose mg/dL 133* 121* 111*   BUN, Bld mg/dL 15 16 13   Creatinine mg/dL 1.0 1.0 1.0   Albumin g/dL 2.6* 2.8* 2.7*   Total Bilirubin mg/dL 1.0 1.4* 1.0   Alkaline Phosphatase U/L 66 68 66   AST U/L 7* 10 9*   ALT U/L 13 15 16   Magnesium  mg/dL 1.5* 1.5* 1.7   Phosphorus mg/dL 2.9 2.9 3.3       Vancomycin Administrations:  vancomycin given in the last 96 hours                   vancomycin 2 g in dextrose 5 % 500 mL IVPB (mg) 2,000 mg New Bag 10/07/20 2009     2,000 mg New Bag  0820    vancomycin (VANCOCIN) 2,500 mg in dextrose 5 % 500 mL IVPB (mg) 2,500 mg New Bag 10/06/20 2104                Microbiologic Results:  Microbiology Results (last 7 days)     Procedure Component Value Units Date/Time    Blood culture [[id number]]     Order Status: Sent Specimen: Blood     Blood culture [[id number]]     Order Status: Sent Specimen: Blood     Blood culture [[id number]] Collected: 10/06/20 2203    Order Status: Completed Specimen: Blood Updated: 10/07/20 1838     Blood Culture, Routine Gram stain aer bottle: Gram positive cocci in clusters resembling Staph      Positive results previously called 10/07/2020    Blood culture [[id number]] Collected: 10/06/20 2203    Order Status: Completed Specimen: Blood Updated: 10/07/20 1701     Blood Culture, Routine Gram stain aer bottle: Gram positive cocci in clusters resembling Staph      Results called to and read back by:Marcela Flowers RN 10/07/2020  17:01

## 2020-10-08 NOTE — ASSESSMENT & PLAN NOTE
-Noted to develop a fever on evening of 10/06 prompting infectious work-up with blood cultures, UA, and CXR and initiation of Cefepime and Vancomycin at that time.   -As of 10/08, blood cultures positive for S.aureus with sensitivities pending; CXR and UA were unremarkable; Lopes catheter without obvious signs of infection.     Plan:   -Repeat blood cultures ordered.   -Follow-up sensitivities.   -Continue Cefepime and Vancomycin (day #3).   -Anticipated ID consult with need for prolonged antibiotic course.

## 2020-10-08 NOTE — ASSESSMENT & PLAN NOTE
-Initially presented with fevers and fatigue with significant leukocytosis and blasts with CT chest on 09/17 after transfer here showing abnormal opacity involving the left lower lobe with a prominent area of dense confluent opacity that may relate to an area of dense consolidation, with surrounding prominent confluent infiltrates/airspace disease, as well as a small to moderate left pleural effusion. He was treated with Vancomycin and Zosyn from 09/15 to 09/22.   -Pulmonology consulted on 09/24 after repeat CXR showed progression of left sided pleural effusion. Status post thoracentesis on 09/24 with 1L of blood fluid removed with studies negative for infection, but showing abundant atypical mononuclear cells, suggestive of blasts.   -Repeat CXR on 10/07 with decrease in size of effusion.   -Vital signs stable on room air.     Plan:   -Continue oppurutnistic infection prophylaxis with Levofloxacin.   -Holding home Apixaban in setting of thrombocytopenia.   -See assessment for AML.

## 2020-10-09 PROBLEM — D72.829 LEUKOCYTOSIS: Status: RESOLVED | Noted: 2020-01-01 | Resolved: 2020-01-01

## 2020-10-09 PROBLEM — B95.7 COAGULASE NEGATIVE STAPHYLOCOCCUS BACTEREMIA: Status: ACTIVE | Noted: 2020-01-01

## 2020-10-09 NOTE — ASSESSMENT & PLAN NOTE
-Developed secondary to SEBASTIAN and treated with SEVELAMER from 09/28 to 10/01 with improvement.     Resolved.

## 2020-10-09 NOTE — PROGRESS NOTES
[facility]  Hematology  Bone Marrow Transplant  Progress Note    Patient Name: Shell Diaz  Admission Date: 9/16/2020  Hospital Length of Stay: 23 days  Code Status: Full Code     Hospital Course:   Mr. Shell Diaz was admitted to [facility] on 09/16/2020 as a transfer for suspicion for acute leukemia following presentation at OSH for fevers with smear showing significant leukocytosis () and blasts on peripheral smear. He underwent bone marrow biopsy on 09/16 that was diagnostic of AML. Lopes catheter was placed on 09/21 and he was initiated on induction therapy on 09/25 with Idarubicin and Cytarabine. Leukocytosis improved with IVF and Hydroxyurea. His presentation was also notable for concern for left lower lobe pneumonia with effusion that was initially treated with broad spectrum antibiotics until 09/22 without improvement. Pulmonology was consulted on 09/24 and patient underwent thoracentesis with fluid analysis showing blasts. Hospital course also associated with development of SEBASTIAN by 09/23 prompting nephrology consultation; etiology attributed to likely ATN; resolved by 10/01. On evening of 10/06, patient developed neutropenic fever with Cefepime and Vancomycin initiated at that time; infectious work-up significant for blood cultures positive gram-positive cocci. Vital signs remain stable on room air and afebrile since 10/07. Bone marrow biopsy repeated on 10/08 with results in process. Continuing supportive management with intermittent transfusions.    Subjective:     Interval History:  Day 15 of 7+3. Patient continues to feel well and has no complaints. Blood cultures from 10/06 noted to be positive for STAPHYLOCOCCUS HAEMOLYTICUS with sensitivities pending; remains on Vanc/Cefepime (day #4). Labs notable for downtrend in Hgb to 6.8 and 1 U PRBC ordered. Renal function remains WNL with Vancomycin. ID consulted for treatment recommendations with bacteremia. Bone marrow  biopsy results from 10/08 in process.     Objective:     Vital Signs (Most Recent):  Temp: 98.4 °F (36.9 °C) (10/09/20 0640)  Pulse: 72 (10/09/20 0700)  Resp: 16 (10/09/20 0640)  BP: 126/60 (10/09/20 0640)  SpO2: (!) 94 % (10/09/20 0640) Vital Signs (24h Range):  Temp:  [98.2 °F (36.8 °C)-100 °F (37.8 °C)] 98.4 °F (36.9 °C)  Pulse:  [66-98] 72  Resp:  [16-18] 16  SpO2:  [93 %-98 %] 94 %  BP: (115-149)/(60-80) 126/60     Weight: 125.8 kg (277 lb 3.7 oz)  Body mass index is 34.65 kg/m².  Body surface area is 2.58 meters squared.    ECOG SCORE           Intake/Output - Last 3 Shifts       10/07 0700 - 10/08 0659 10/08 0700 - 10/09 0659 10/09 0700 - 10/10 0659    P.O. 1920 3495     I.V. (mL/kg) 2345.7 (18.6) 1903.3 (15.1)     Blood  236     IV Piggyback 1000 1000     Total Intake(mL/kg) 5265.7 (41.7) 6634.3 (52.8)     Urine (mL/kg/hr) 4650 (1.5) 6000 (2)     Stool 0 0     Total Output 4650 6000     Net +615.7 +634.3            Stool Occurrence 1 x 2 x           Physical Exam  Constitutional:       Appearance: He is well-developed.   HENT:      Head: Normocephalic and atraumatic.      Mouth/Throat:      Pharynx: No oropharyngeal exudate.   Eyes:      General:         Right eye: No discharge.         Left eye: No discharge.      Conjunctiva/sclera: Conjunctivae normal.      Pupils: Pupils are equal, round, and reactive to light.   Neck:      Musculoskeletal: Normal range of motion and neck supple.   Cardiovascular:      Rate and Rhythm: Normal rate. Rhythm irregularly irregular.      Heart sounds: Normal heart sounds. No murmur.   Pulmonary:      Effort: Pulmonary effort is normal.      Breath sounds: Normal breath sounds.      Comments:     Abdominal:      General: Bowel sounds are normal. There is no distension.      Palpations: Abdomen is soft.      Tenderness: There is no abdominal tenderness.   Musculoskeletal: Normal range of motion.         General: No deformity.      Comments: 4 X 4 cm bandage over lower sacrum  without erythema or tenderness to palpation.    Skin:     General: Skin is warm and dry.      Findings: No erythema or rash.      Comments: Lopes catheter in right upper chest with clean, dry, and intact dressing without erythema or tenderness.    Neurological:      Mental Status: He is alert and oriented to person, place, and time.   Psychiatric:         Behavior: Behavior normal.         Thought Content: Thought content normal.         Judgment: Judgment normal.          Significant Labs:   All pertinent labs from the last 24 hours have been reviewed.    Diagnostic Results:  I have reviewed all pertinent imaging results/findings within the past 24 hours.    Assessment/Plan:     * Acute myeloid leukemia not having achieved remission  This is a 55 year old male who presented to [facility] on 09/16/2020 for suspected acute leukemia after manual differential demonstrated blasts at outside hospital. He underwent bone marrow biopsy on 09/16 showing AML with flow cytometry showing increased population of myeloid blasts showing expression of CD4, CD9, CD11c, CD15, CD13, CD33(97%), CD64, (partial), HLA-DR, and cytoplasmic myeloperoxidase. Fish results - t(10;11), ngs - No pathogenic genetic alteration was detected. Associated with neoplastic effusion of left lower lung. Status post Lopes catheter placement on 09/21.     Plan:   -Initiated 7+3 on 09/25 with Idarubicin and Cytarabine; today is day #15.   -Follow-up bone marrow biopsy repeated on 10/08.   -Allopurinol 300 mg BID with IVF for TLS prophylaxis.   -Oppurtunistic infection prophylaxis with Acyclovir, and Voriconazole; holding Levofloxacin with broad spectrum anti-biotic initiation on 10/06.   -Baseline ECHO on 09/16/20 with EF of 58%.   -Transfuse cyroglobulin if fibrinogen <150  -Transfuse if platelets <10, Hgb<7 and/or patient actively bleeding  -He is a stem cell transplant candidate. Sent HLA typing earlier in admission. Sent sibling contact info to  transplant coordinator.  -aPTT, PT/INR, fibrinogen, uric acid, and phosphorous twice weekly to assess for TLS and DIC.   -See assessment for neoplastic effusion.     Neutropenic fever  -Noted to develop a fever on evening of 10/06 prompting infectious work-up with blood cultures, UA, and CXR and initiation of Cefepime and Vancomycin at that time.   -As of 10/08, blood cultures positive for STAPHYLOCOCCUS HAEMOLYTICUS  with sensitivities pending; CXR and UA were unremarkable; Lopes catheter without obvious signs of infection.   -Blood cultures negative as of 10/08.     Plan:   -Follow-up sensitivities.   -Continue Cefepime and Vancomycin (day #4).   -ID consulted with need for prolonged antibiotic course.     Coagulase negative Staphylococcus bacteremia  -See assessment for neutropenic fever.     Hypocalcemia  - Corrected calcium slightly low at 8.6 on 10/05; normalized as of 10/06 with initiation of daily calcium supplement.     Plan:   - Calcium level ordered daily.     Hyperbilirubinemia  -Slight indirect hyperbilirubinemia noted on 09/30 and since normalized.   -Likely secondary to chemotherapy initiation vs intermittent transfusions.   -Synthetic liver function remains WNL.     Plan:   -Trending liver function daily.     Hyperphosphatemia  -Developed secondary to SEBASTIAN and treated with SEVELAMER from 09/28 to 10/01 with improvement.     Resolved.      SEBASTIAN (acute kidney injury)  -Noted to develop SEBASTIAN on 09/23 with Cr peaking at 2.3 on 09/26.   -Nephrology consulted on 09/24 with suspicion for multifactorial SEBASTIAN secondary to Acyclovir, Losartan, contrast-induced from CT chest on 9/17 (Cr increase can be delayed 72-96 hours after contrast), and gradual decrease in hemoglobin. Most likely from ATN.   -Improved back to baseline by 10/01 with use of IVF and avoidance of nephrotoxic agents; nephrology signed off at that time.      Resolved.     Plan:   -Making good urine output daily without signs of hypervolemia.    -Trending renal function daily; monitor renal function closely as Vancomycin was resumed on 10/06.  -Holding home ARB.   -Strict I/O  -Avoid nephrotoxic agents.    -Renally dose medications.    Type 2 diabetes mellitus without complication, without long-term current use of insulin  -Most recent A1c 8 in 09/2020.   -Home regimen: Metformin.   -Noted issues with sub-optimal hyperglycemia while receiving Dexamethasone, however resolved after stopping steroids. Stopped scheduled Aspart on 9/30/20 with blood glucose in 120s and patient no longer receiving steroids.   -Noted new worsening hyperglycemia on 10/08 that may be secondary to new bacteremia, but resolved as of 10/09.     Plan:   -Continue Detemir 10 U daily with LDSCI.   -Diabetic diet.   -POCT glucose checks with meals and bedtime.   -Goal glucose 140-180.     Non-sustained ventricular tachycardia  - Noted 6 beat run of VT on 9/18/20 at ~ 6 am. No episodes since.    Plan:   - Goal Mg > 2 and K+ > 4  - Telemetry ordered.     Pancytopenia due to antineoplastic chemotherapy  Plan:   - Transfuse if platelet count <10. If patient develops active bleed, transfuse for goal >50.  - Transfuse for Hgb < 7.  - Opportunistic infection prophylaxis with Acyclovir, Levofloxacin, and Voriconazole.   - Monitoring voriconazole level; most recent level from 10/5 1.2.        Neoplastic pleural effusion  -Initially presented with fevers and fatigue with significant leukocytosis and blasts with CT chest on 09/17 after transfer here showing abnormal opacity involving the left lower lobe with a prominent area of dense confluent opacity that may relate to an area of dense consolidation, with surrounding prominent confluent infiltrates/airspace disease, as well as a small to moderate left pleural effusion. He was treated with Vancomycin and Zosyn from 09/15 to 09/22.   -Pulmonology consulted on 09/24 after repeat CXR showed progression of left sided pleural effusion. Status post  thoracentesis on 09/24 with 1L of blood fluid removed with studies negative for infection, but showing abundant atypical mononuclear cells, suggestive of blasts.   -Repeat CXR on 10/07 with decrease in size of effusion.   -Vital signs stable on room air.     Plan:   -Continue oppurutnistic infection prophylaxis with Levofloxacin.   -Holding home Apixaban in setting of thrombocytopenia.   -See assessment for AML.     Essential hypertension  -Home regimen: Amlodipine and Losartan.     Plan:   -Held home ARB since 09/24 after development of SEBASTIAN and blood pressure currently stable without anti-hypertensive agents ordered.       Permanent atrial fibrillation  -Status post pacemaker placement.   -Home regimen: Apixaban, Digoxin, and Metoprolol.    -Currently rate controlled.   -Baseline ECHO on 09/16 prior to chemotherapy initiation with EF of 58%. Cardiology consulted on 09/17 for clearance for chemotherapy induction in the setting of acute leukemia; repeat ECHO on 09/17 notable for 16% left ventricular straining.     Plan:   -Per cardiology, continued rate control with Metoprolol and Digoxin; decreased Metoprolol to 50 mg with HR paced in 60's.   -Holding Apixaban due to thrombocytopenia; will resume when appropriate.  -Keep Mg > 2 and K+ > 4.  -A follow up echocardiogram is recommended at the end of treatment with Adriamycin and at 6 months post treatment. We will plan of inpatient repeat echo only if patient becomes symptomatic per Dr. Thurman.        VTE Risk Mitigation (From admission, onward)         Ordered     heparin, porcine (PF) 100 unit/mL injection flush 300 Units  As needed (PRN)      09/25/20 1408     heparin (porcine) injection 1,000 Units  As needed (PRN)      09/22/20 0343     IP VTE HIGH RISK PATIENT  Once      09/16/20 0017     Place sequential compression device  Until discontinued      09/16/20 0017                Disposition: Remain in-patient for chemotherapy initiation.     Fantasma Laguna,  MD  Bone Marrow Transplant  [facility]

## 2020-10-09 NOTE — ASSESSMENT & PLAN NOTE
-Noted to develop a fever on evening of 10/06 prompting infectious work-up with blood cultures, UA, and CXR and initiation of Cefepime and Vancomycin at that time.   -As of 10/08, blood cultures positive for STAPHYLOCOCCUS HAEMOLYTICUS  with sensitivities pending; CXR and UA were unremarkable; Lopes catheter without obvious signs of infection.   -Blood cultures negative as of 10/08.     Plan:   -Follow-up sensitivities.   -Continue Cefepime and Vancomycin (day #4).   -ID consulted with need for prolonged antibiotic course.

## 2020-10-09 NOTE — SUBJECTIVE & OBJECTIVE
Subjective:     Interval History:  Day 15 of 7+3. Patient continues to feel well and has no complaints. Blood cultures from 10/06 noted to be positive for STAPHYLOCOCCUS HAEMOLYTICUS with sensitivities pending; remains on Vanc/Cefepime (day #4). Labs notable for downtrend in Hgb to 6.8 and 1 U PRBC ordered. Renal function remains WNL with Vancomycin. ID consulted for treatment recommendations with bacteremia. Bone marrow biopsy results from 10/08 in process.     Objective:     Vital Signs (Most Recent):  Temp: 98.4 °F (36.9 °C) (10/09/20 0640)  Pulse: 72 (10/09/20 0700)  Resp: 16 (10/09/20 0640)  BP: 126/60 (10/09/20 0640)  SpO2: (!) 94 % (10/09/20 0640) Vital Signs (24h Range):  Temp:  [98.2 °F (36.8 °C)-100 °F (37.8 °C)] 98.4 °F (36.9 °C)  Pulse:  [66-98] 72  Resp:  [16-18] 16  SpO2:  [93 %-98 %] 94 %  BP: (115-149)/(60-80) 126/60     Weight: 125.8 kg (277 lb 3.7 oz)  Body mass index is 34.65 kg/m².  Body surface area is 2.58 meters squared.    ECOG SCORE           Intake/Output - Last 3 Shifts       10/07 0700 - 10/08 0659 10/08 0700 - 10/09 0659 10/09 0700 - 10/10 0659    P.O. 1920 3495     I.V. (mL/kg) 2345.7 (18.6) 1903.3 (15.1)     Blood  236     IV Piggyback 1000 1000     Total Intake(mL/kg) 5265.7 (41.7) 6634.3 (52.8)     Urine (mL/kg/hr) 4650 (1.5) 6000 (2)     Stool 0 0     Total Output 4650 6000     Net +615.7 +634.3            Stool Occurrence 1 x 2 x           Physical Exam  Constitutional:       Appearance: He is well-developed.   HENT:      Head: Normocephalic and atraumatic.      Mouth/Throat:      Pharynx: No oropharyngeal exudate.   Eyes:      General:         Right eye: No discharge.         Left eye: No discharge.      Conjunctiva/sclera: Conjunctivae normal.      Pupils: Pupils are equal, round, and reactive to light.   Neck:      Musculoskeletal: Normal range of motion and neck supple.   Cardiovascular:      Rate and Rhythm: Normal rate. Rhythm irregularly irregular.      Heart sounds:  Normal heart sounds. No murmur.   Pulmonary:      Effort: Pulmonary effort is normal.      Breath sounds: Normal breath sounds.      Comments:     Abdominal:      General: Bowel sounds are normal. There is no distension.      Palpations: Abdomen is soft.      Tenderness: There is no abdominal tenderness.   Musculoskeletal: Normal range of motion.         General: No deformity.      Comments: 4 X 4 cm bandage over lower sacrum without erythema or tenderness to palpation.    Skin:     General: Skin is warm and dry.      Findings: No erythema or rash.      Comments: Lopes catheter in right upper chest with clean, dry, and intact dressing without erythema or tenderness.    Neurological:      Mental Status: He is alert and oriented to person, place, and time.   Psychiatric:         Behavior: Behavior normal.         Thought Content: Thought content normal.         Judgment: Judgment normal.          Significant Labs:   All pertinent labs from the last 24 hours have been reviewed.    Diagnostic Results:  I have reviewed all pertinent imaging results/findings within the past 24 hours.

## 2020-10-09 NOTE — CONSULTS
[facility]  Infectious Disease  Consult Note    Patient Name: Shell Diaz  MRN: [id number]  Admission Date: 9/16/2020  Hospital Length of Stay: 23 days  Attending Physician: Demetria Ugalde MD  Primary Care Provider: Alva Rubio MD     Isolation Status: No active isolations    Patient information was obtained from patient, past medical records and ER records.      Inpatient consult to Infectious Diseases  Consult performed by: Marii Tee MD  Consult ordered by: Fantasma Laguna MD        Assessment/Plan:     Coagulase negative Staphylococcus bacteremia  56y/o M pt w PMHx of A.fib (pacemaker in place), cardiomyopathy, HTN, new diagnosis of AML and malignant pleural effusion (ad[place] [facility] [place]/2020 as a transfer for w fevers with smear showing significant leukocytosis () and blasts on peripheral smear, now s/p BM bx on 09/16 that was diagnostic of AML).  Lopes catheter was placed on 09/21 and he was initiated on induction therapy on 09/25 with Idarubicin and Cytarabine. Presented with neutropenic fever on 10/6, started on vanc and cefepime. ID consulted for staph haemolyticus in 2 sets of blood cultures (10/6). Repeat BCx 10/7 remains NGTD.   --source possibly skin (has skin eruption at dorsum of rt foot that look like folliculitis and lesion on 1st toe). Low suspicion for line infection (no erythema, purulence or tenderness)  --TTE 9/16- no evidence of vegetations  --CXR 10/7- decrease in small Lt pleural effusion     10/6 BCx x 2- staph haemolyticus (both sets with same susceptibilities)  10/7 BCx- NGTD  UA unremarkable    Abx:   cefepime 10/6-  Vancomycin 10/7-    Recommendations:   --continue vancomycin for 14 days from date of negative blood cx (end date 10/21/20)  --per guidelines, no need to remove long-term central line at this time.  --can switch cefepime to fluoroquinolone ppx once afebrile for 72hrs   --repeat blood cx after completion of IV abx  to make sure pt cleared bacteremia given that he has pacemaker and a central line.       OPAT  Infection: staph haemolyticus bacteremia      Discharge antibiotics:   vancomycin    End date of IV antibiotics: 10/21/20    Weekly outpatient laboratory on Monday or Tuesday while on IV antibiotics.    CBC   CMP   · Vancomycin trough. Target 15-20      If vancomycin trough is not at target (15-20) prior to discharge, the please perform vancomycin trough before their fourth outpatient dose.    Fax laboratory results to [facility] at [phone] with attn: Nay    Outpatient Infectious Diseases clinic follow up will be arranged and found in patient calendar.    Prior to discharge, please ensure the patient's follow-up has been scheduled.  If there is still no follow-up scheduled in Infectious Diseases clinic, please send an EPIC message to Esha Lacey in Infectious Diseases.              Thank you for your consult. I will sign off. Please contact us if you have any additional questions.    Marii Tee MD  Infectious Disease  [facility]    Subjective:     Principal Problem: Acute myeloid leukemia not having achieved remission    HPI: Mr. Diaz is 54y/o M pt w PMHx of A.fib (pacemaker in place, on Eliquis), cardiomyopathy, HTN who was admitted to [facility] on 09/16/2020 as a transfer for suspicion for acute leukemia following presentation at OSH for fevers with smear showing significant leukocytosis () and blasts on peripheral smear. He underwent bone marrow biopsy on 09/16 that was diagnostic of AML. Lopes catheter was placed on 09/21 and he was initiated on induction therapy on 09/25 with Idarubicin and Cytarabine. Leukocytosis improved with IVF and Hydroxyurea. His presentation was also notable for concern for LLL PNA with effusion that was initially treated with broad spectrum antibiotics until 09/22 without improvement. Pulmonology was consulted on 09/24 and  patient underwent thoracentesis with fluid analysis showing blasts. Hospital course also associated with development of SEBASTIAN by 09/23 prompting nephrology consultation; etiology attributed to likely ATN; resolved by 10/01. On evening of 10/06, patient developed neutropenic fever with Cefepime and Vancomycin initiated at that time; infectious work-up significant for blood cultures positive gram-positive cocci. Vital signs remain stable on room air and afebrile since 10/07. ID consulted for staph haemolyticus bacteremia.    Past Medical History:   Diagnosis Date    Atrial fibrillation     Hypertension        Past Surgical History:   Procedure Laterality Date    INSERTION OF STAUFFER CATHETER Right 9/21/2020    Procedure: INSERTION, CATHETER, CENTRAL VENOUS, STAUFFER;  Surgeon: Ti Starkey MD;  Location: [facility] OR [address] [place] [address];  Service: General;  Laterality: Right;       Review of patient's allergies indicates:  No Known Allergies    Medications:  Medications Prior to Admission   Medication Sig    amLODIPine (NORVASC) 5 MG tablet Take 5 mg by mouth once daily.    atorvastatin (LIPITOR) 40 MG tablet Take 40 mg by mouth once daily.    losartan (COZAAR) 50 MG tablet Take 50 mg by mouth once daily.    metFORMIN (GLUCOPHAGE) 500 MG tablet Take 500 mg by mouth 2 (two) times daily with meals.    methylPREDNISolone (MEDROL) 4 MG Tab Take 4 mg by mouth once daily.    metoprolol succinate (TOPROL-XL) 50 MG 24 hr tablet Take 50 mg by mouth once daily.    terazosin (HYTRIN) 5 MG capsule Take 5 mg by mouth every evening.    [DISCONTINUED] metoprolol tartrate (LOPRESSOR) 25 MG tablet Take 25 mg by mouth 2 (two) times daily.    apixaban (ELIQUIS) 5 mg Tab Take 5 mg by mouth 2 (two) times daily.    cefdinir (OMNICEF) 300 MG capsule Take 300 mg by mouth 2 (two) times daily.    digoxin (LANOXIN) 250 mcg tablet Take 250 mcg by mouth once daily.     Antibiotics (From admission, onward)    Start     Stop Route Frequency Ordered     10/07/20 0830  vancomycin 2 g in dextrose 5 % 500 mL IVPB      -- IV Every 12 hours (non-standard times) 10/06/20 1933    10/06/20 2030  ceFEPIme injection 2 g      -- IV Every 8 hours (non-standard times) 10/06/20 1924    10/06/20 2023  vancomycin - pharmacy to dose  (vancomycin IVPB)      -- IV pharmacy to manage frequency 10/06/20 1924    10/06/20 0900  mupirocin 2 % ointment      10/11 0859 Nasl 2 times daily 10/06/20 0430        Antifungals (From admission, onward)    Start     Stop Route Frequency Ordered    09/29/20 0900  voriconazole tablet 200 mg      -- Oral 2 times daily 09/28/20 0855        Antivirals (From admission, onward)        Stop Route Frequency     acyclovir      -- Oral 2 times daily             There is no immunization history on file for this patient.    Family History     Problem Relation (Age of Onset)    Cancer Father    Heart disease Mother, Father        Social History     Socioeconomic History    Marital status:      Spouse name: Not on file    Number of children: Not on file    Years of education: Not on file    Highest education level: Not on file   Occupational History    Not on file   Social Needs    Financial resource strain: Not on file    Food insecurity     Worry: Not on file     Inability: Not on file    Transportation needs     Medical: Not on file     Non-medical: Not on file   Tobacco Use    Smoking status: Former Smoker   Substance and Sexual Activity    Alcohol use: Not on file    Drug use: Not on file    Sexual activity: Not on file   Lifestyle    Physical activity     Days per week: Not on file     Minutes per session: Not on file    Stress: Not on file   Relationships    Social connections     Talks on phone: Not on file     Gets together: Not on file     Attends Yazidism service: Not on file     Active member of club or organization: Not on file     Attends meetings of clubs or organizations: Not on file     Relationship status: Not on file    Other Topics Concern    Not on file   Social History Narrative    Not on file     Review of Systems   Constitutional: Negative for chills and fever.   HENT: Negative for congestion and sore throat.    Eyes: Negative for visual disturbance.   Respiratory: Negative for cough and shortness of breath.    Cardiovascular: Negative for chest pain and leg swelling.   Gastrointestinal: Negative for abdominal pain, diarrhea and vomiting.   Genitourinary: Negative for difficulty urinating and dysuria.   Musculoskeletal: Negative for arthralgias and back pain.   Skin: Positive for rash.   Neurological: Negative for dizziness, light-headedness and headaches.   Psychiatric/Behavioral: Negative for agitation and confusion.     Objective:     Vital Signs (Most Recent):  Temp: 98.5 °F (36.9 °C) (10/09/20 1518)  Pulse: 91 (10/09/20 1518)  Resp: 19 (10/09/20 1518)  BP: (!) 152/70 (10/09/20 1518)  SpO2: (!) 93 % (10/09/20 1518) Vital Signs (24h Range):  Temp:  [98.1 °F (36.7 °C)-100 °F (37.8 °C)] 98.5 °F (36.9 °C)  Pulse:  [72-98] 91  Resp:  [16-19] 19  SpO2:  [93 %-100 %] 93 %  BP: (122-152)/(60-80) 152/70     Weight: 125.8 kg (277 lb 3.7 oz)  Body mass index is 34.65 kg/m².    Estimated Creatinine Clearance: 149 mL/min (based on SCr of 0.8 mg/dL).    Physical Exam  Constitutional:       Appearance: Normal appearance.   HENT:      Head: Normocephalic and atraumatic.      Nose: Nose normal. No congestion or rhinorrhea.      Mouth/Throat:      Mouth: Mucous membranes are dry.   Eyes:      Extraocular Movements: Extraocular movements intact.      Pupils: Pupils are equal, round, and reactive to light.   Neck:      Musculoskeletal: Normal range of motion and neck supple.      Comments: tunneled catheter at left chest without erythema or purulence, no tenderness  Cardiovascular:      Rate and Rhythm: Normal rate and regular rhythm.   Pulmonary:      Effort: Pulmonary effort is normal.      Breath sounds: Normal breath sounds.    Abdominal:      General: Abdomen is flat.      Palpations: Abdomen is soft.   Musculoskeletal: Normal range of motion.         General: No swelling.   Skin:     General: Skin is warm and dry.   Neurological:      General: No focal deficit present.      Mental Status: He is alert and oriented to person, place, and time.   Psychiatric:         Mood and Affect: Mood normal.         Behavior: Behavior normal.         Significant Labs: All pertinent labs within the past 24 hours have been reviewed.    Significant Imaging: I have reviewed all pertinent imaging results/findings within the past 24 hours.

## 2020-10-09 NOTE — ASSESSMENT & PLAN NOTE
-Most recent A1c 8 in 09/2020.   -Home regimen: Metformin.   -Noted issues with sub-optimal hyperglycemia while receiving Dexamethasone, however resolved after stopping steroids. Stopped scheduled Aspart on 9/30/20 with blood glucose in 120s and patient no longer receiving steroids.   -Noted new worsening hyperglycemia on 10/08 that may be secondary to new bacteremia, but resolved as of 10/09.     Plan:   -Continue Detemir 10 U daily with LDSCI.   -Diabetic diet.   -POCT glucose checks with meals and bedtime.   -Goal glucose 140-180.

## 2020-10-09 NOTE — PLAN OF CARE
POC reviewed with patient and wife, questions answered and concerns addressed. Tolerating ADA diet well and taking oral supplements in between meals. Telemetry-A Fib with occasional PVC's. Ambulating independently in room and bowie. Received 1 UPRBC's today without any difficulty. VSS, voiding adequate amount cyu. Maintenance IVF infusing. In no acute distress; WCM.

## 2020-10-09 NOTE — ASSESSMENT & PLAN NOTE
-Slight indirect hyperbilirubinemia noted on 09/30 and since normalized.   -Likely secondary to chemotherapy initiation vs intermittent transfusions.   -Synthetic liver function remains WNL.     Plan:   -Trending liver function daily.

## 2020-10-09 NOTE — SUBJECTIVE & OBJECTIVE
Past Medical History:   Diagnosis Date    Atrial fibrillation     Hypertension        Past Surgical History:   Procedure Laterality Date    INSERTION OF STAUFFER CATHETER Right 9/21/2020    Procedure: INSERTION, CATHETER, CENTRAL VENOUS, STAUFFER;  Surgeon: Ti Starkey MD;  Location: [facility] OR [address] [place] [address];  Service: General;  Laterality: Right;       Review of patient's allergies indicates:  No Known Allergies    Medications:  Medications Prior to Admission   Medication Sig    amLODIPine (NORVASC) 5 MG tablet Take 5 mg by mouth once daily.    atorvastatin (LIPITOR) 40 MG tablet Take 40 mg by mouth once daily.    losartan (COZAAR) 50 MG tablet Take 50 mg by mouth once daily.    metFORMIN (GLUCOPHAGE) 500 MG tablet Take 500 mg by mouth 2 (two) times daily with meals.    methylPREDNISolone (MEDROL) 4 MG Tab Take 4 mg by mouth once daily.    metoprolol succinate (TOPROL-XL) 50 MG 24 hr tablet Take 50 mg by mouth once daily.    terazosin (HYTRIN) 5 MG capsule Take 5 mg by mouth every evening.    [DISCONTINUED] metoprolol tartrate (LOPRESSOR) 25 MG tablet Take 25 mg by mouth 2 (two) times daily.    apixaban (ELIQUIS) 5 mg Tab Take 5 mg by mouth 2 (two) times daily.    cefdinir (OMNICEF) 300 MG capsule Take 300 mg by mouth 2 (two) times daily.    digoxin (LANOXIN) 250 mcg tablet Take 250 mcg by mouth once daily.     Antibiotics (From admission, onward)    Start     Stop Route Frequency Ordered    10/07/20 0830  vancomycin 2 g in dextrose 5 % 500 mL IVPB      -- IV Every 12 hours (non-standard times) 10/06/20 1933    10/06/20 2030  ceFEPIme injection 2 g      -- IV Every 8 hours (non-standard times) 10/06/20 1924    10/06/20 2023  vancomycin - pharmacy to dose  (vancomycin IVPB)      -- IV pharmacy to manage frequency 10/06/20 1924    10/06/20 0900  mupirocin 2 % ointment      10/11 0859 Nasl 2 times daily 10/06/20 0430        Antifungals (From admission, onward)    Start     Stop Route Frequency Ordered     09/29/20 0900  voriconazole tablet 200 mg      -- Oral 2 times daily 09/28/20 0855        Antivirals (From admission, onward)        Stop Route Frequency     acyclovir      -- Oral 2 times daily             There is no immunization history on file for this patient.    Family History     Problem Relation (Age of Onset)    Cancer Father    Heart disease Mother, Father        Social History     Socioeconomic History    Marital status:      Spouse name: Not on file    Number of children: Not on file    Years of education: Not on file    Highest education level: Not on file   Occupational History    Not on file   Social Needs    Financial resource strain: Not on file    Food insecurity     Worry: Not on file     Inability: Not on file    Transportation needs     Medical: Not on file     Non-medical: Not on file   Tobacco Use    Smoking status: Former Smoker   Substance and Sexual Activity    Alcohol use: Not on file    Drug use: Not on file    Sexual activity: Not on file   Lifestyle    Physical activity     Days per week: Not on file     Minutes per session: Not on file    Stress: Not on file   Relationships    Social connections     Talks on phone: Not on file     Gets together: Not on file     Attends Latter day service: Not on file     Active member of club or organization: Not on file     Attends meetings of clubs or organizations: Not on file     Relationship status: Not on file   Other Topics Concern    Not on file   Social History Narrative    Not on file     Review of Systems   Constitutional: Negative for chills and fever.   HENT: Negative for congestion and sore throat.    Eyes: Negative for visual disturbance.   Respiratory: Negative for cough and shortness of breath.    Cardiovascular: Negative for chest pain and leg swelling.   Gastrointestinal: Negative for abdominal pain, diarrhea and vomiting.   Genitourinary: Negative for difficulty urinating and dysuria.   Musculoskeletal:  Negative for arthralgias and back pain.   Skin: Positive for rash.   Neurological: Negative for dizziness, light-headedness and headaches.   Psychiatric/Behavioral: Negative for agitation and confusion.     Objective:     Vital Signs (Most Recent):  Temp: 98.5 °F (36.9 °C) (10/09/20 1518)  Pulse: 91 (10/09/20 1518)  Resp: 19 (10/09/20 1518)  BP: (!) 152/70 (10/09/20 1518)  SpO2: (!) 93 % (10/09/20 1518) Vital Signs (24h Range):  Temp:  [98.1 °F (36.7 °C)-100 °F (37.8 °C)] 98.5 °F (36.9 °C)  Pulse:  [72-98] 91  Resp:  [16-19] 19  SpO2:  [93 %-100 %] 93 %  BP: (122-152)/(60-80) 152/70     Weight: 125.8 kg (277 lb 3.7 oz)  Body mass index is 34.65 kg/m².    Estimated Creatinine Clearance: 149 mL/min (based on SCr of 0.8 mg/dL).    Physical Exam  Constitutional:       Appearance: Normal appearance.   HENT:      Head: Normocephalic and atraumatic.      Nose: Nose normal. No congestion or rhinorrhea.      Mouth/Throat:      Mouth: Mucous membranes are dry.   Eyes:      Extraocular Movements: Extraocular movements intact.      Pupils: Pupils are equal, round, and reactive to light.   Neck:      Musculoskeletal: Normal range of motion and neck supple.      Comments: tunneled catheter at left chest without erythema or purulence, no tenderness  Cardiovascular:      Rate and Rhythm: Normal rate and regular rhythm.   Pulmonary:      Effort: Pulmonary effort is normal.      Breath sounds: Normal breath sounds.   Abdominal:      General: Abdomen is flat.      Palpations: Abdomen is soft.   Musculoskeletal: Normal range of motion.         General: No swelling.   Skin:     General: Skin is warm and dry.   Neurological:      General: No focal deficit present.      Mental Status: He is alert and oriented to person, place, and time.   Psychiatric:         Mood and Affect: Mood normal.         Behavior: Behavior normal.         Significant Labs: All pertinent labs within the past 24 hours have been reviewed.    Significant Imaging: I  have reviewed all pertinent imaging results/findings within the past 24 hours.

## 2020-10-09 NOTE — ASSESSMENT & PLAN NOTE
54y/o M pt w PMHx of Ryan (pacemaker in place), cardiomyopathy, HTN, new diagnosis of AML and malignant pleural effusion ([place] [facility] [place] as a transfer for w fevers with smear showing significant leukocytosis () and blasts on peripheral smear, now s/p BM bx on 09/16 that was diagnostic of AML).  Lopes catheter was placed on 09/21 and he was initiated on induction therapy on 09/25 with Idarubicin and Cytarabine. Presented with neutropenic fever on 10/6, started on vanc and cefepime. ID consulted for staph haemolyticus in 2 sets of blood cultures (10/6). Repeat BCx 10/7 remains NGTD.   --source possibly skin (has skin eruption at dorsum of rt foot that look like folliculitis and lesion on 1st toe). Low suspicion for line infection (no erythema, purulence or tenderness)  --TTE 9/16- no evidence of vegetations  --CXR 10/7- decrease in small Lt pleural effusion     10/6 BCx x 2- staph haemolyticus (both sets with same susceptibilities)  10/7 BCx- NGTD  UA unremarkable    Abx:   cefepime 10/6-  Vancomycin 10/7-    Recommendations:   --continue vancomycin for 14 days from date of negative blood cx (end date 10/21/20)  --per guidelines, no need to remove long-term central line at this time.  --can switch cefepime to fluoroquinolone ppx once afebrile for 72hrs   --repeat blood cx after completion of IV abx to make sure pt cleared bacteremia given that he has pacemaker and a central line.

## 2020-10-09 NOTE — HPI
Mr. Diaz is 54y/o M pt w PMHx of A.fib (pacemaker in place, on Eliquis), cardiomyopathy, HTN who was admitted to [facility] on 09/16/2020 as a transfer for suspicion for acute leukemia following presentation at OSH for fevers with smear showing significant leukocytosis () and blasts on peripheral smear. He underwent bone marrow biopsy on 09/16 that was diagnostic of AML. Lopes catheter was placed on 09/21 and he was initiated on induction therapy on 09/25 with Idarubicin and Cytarabine. Leukocytosis improved with IVF and Hydroxyurea. His presentation was also notable for concern for LLL PNA with effusion that was initially treated with broad spectrum antibiotics until 09/22 without improvement. Pulmonology was consulted on 09/24 and patient underwent thoracentesis with fluid analysis showing blasts. Hospital course also associated with development of SEBASTIAN by 09/23 prompting nephrology consultation; etiology attributed to likely ATN; resolved by 10/01. On evening of 10/06, patient developed neutropenic fever with Cefepime and Vancomycin initiated at that time; infectious work-up significant for blood cultures positive gram-positive cocci. Vital signs remain stable on room air and afebrile since 10/07. ID consulted for staph haemolyticus bacteremia.

## 2020-10-09 NOTE — ASSESSMENT & PLAN NOTE
This is a 55 year old male who presented to [facility] on 09/16/2020 for suspected acute leukemia after manual differential demonstrated blasts at outside hospital. He underwent bone marrow biopsy on 09/16 showing AML with flow cytometry showing increased population of myeloid blasts showing expression of CD4, CD9, CD11c, CD15, CD13, CD33(97%), CD64, (partial), HLA-DR, and cytoplasmic myeloperoxidase. Fish results - t(10;11), ngs - No pathogenic genetic alteration was detected. Associated with neoplastic effusion of left lower lung. Status post Lopes catheter placement on 09/21.     Plan:   -Initiated 7+3 on 09/25 with Idarubicin and Cytarabine; today is day #15.   -Follow-up bone marrow biopsy repeated on 10/08.   -Allopurinol 300 mg BID with IVF for TLS prophylaxis.   -Oppurtunistic infection prophylaxis with Acyclovir, and Voriconazole; holding Levofloxacin with broad spectrum anti-biotic initiation on 10/06.   -Baseline ECHO on 09/16/20 with EF of 58%.   -Transfuse cyroglobulin if fibrinogen <150  -Transfuse if platelets <10, Hgb<7 and/or patient actively bleeding  -He is a stem cell transplant candidate. Sent HLA typing earlier in admission. Sent sibling contact info to transplant coordinator.  -aPTT, PT/INR, fibrinogen, uric acid, and phosphorous twice weekly to assess for TLS and DIC.   -See assessment for neoplastic effusion.

## 2020-10-09 NOTE — PLAN OF CARE
Plan of care discussed with patient at start of shift. Free from falls and injuries. Resting quietly with eyes closed. Respirations even, unlabored. Skin warm and dry. Denies pain. Denies nausea. TMAX 100. Telemetry in use with a fib noted. Frequent checks for pain and safety maintained. Bed in lowest position, wheels locked, side rails up x's 2, call light in reach. Instructed to call for assistance as needed, verbalizes understanding. Will continue to monitor.

## 2020-10-09 NOTE — PT/OT/SLP PROGRESS
Physical Therapy      Patient Name:  Shell Diaz   MRN:  [id number]    Patient not seen today secondary to (pt. to be receiving blood and not feeling well). Will follow-up on Monday, 10/12/2020.    Thiago Ruffin, PT   10/9/2020

## 2020-10-09 NOTE — PROGRESS NOTES
Pharmacokinetic Assessment Follow Up: IV Vancomycin    Vancomycin serum concentration assessment(s) & Plan :    · Vancomycin trough resulted at 23.9 mcg/mL (15-20 mcg/mL)   · Trough was drawn ~4 hours early   · Will continue current IV vancomycin 2000 mg Q12H   · Ordered a trough to be drawn 60 mins prior to the next dose on 10/10/20 at 0830  · Will adjust dose based off level      Drug levels (last 3 results):  Recent Labs   Lab Result Units 10/08/20  0416 10/08/20  0814 10/09/20  0427   Vancomycin-Trough ug/mL 19.8 15.3 23.9*       Pharmacy will continue to follow and monitor vancomycin.    Please contact pharmacy at extension [phone] for questions regarding this assessment.    Thank you for the consult,   Karina Luna PharmD        Patient brief summary:    Shell Diaz is a 55 y.o. male initiated on antimicrobial therapy with IV Vancomycin for treatment of bacteremia         Drug Allergies:   Review of patient's allergies indicates:  No Known Allergies    Actual Body Weight:   125.8 kg     Renal Function:   Estimated Creatinine Clearance: 149 mL/min (based on SCr of 0.8 mg/dL).,     Dialysis Method (if applicable):  N/A     CBC (last 72 hours):  Recent Labs   Lab Result Units 10/07/20  0350 10/08/20  0416 10/09/20  0427   WBC K/uL 0.33* 0.36* 0.42*   Hemoglobin g/dL 7.1* 6.5* 6.8*   Hematocrit % 20.1* 19.0* 19.4*   Platelets K/uL 21* 12* 8*   Gran% % 3.0* 2.8* 0.0*   Lymph% % 97.0* 97.2* 97.6*   Mono% % 0.0* 0.0* 2.4*   Eosinophil% % 0.0 0.0 0.0   Basophil% % 0.0 0.0 0.0   Differential Method  Automated Automated Automated       Metabolic Panel (last 72 hours):  Recent Labs   Lab Result Units 10/07/20  0350 10/08/20  0416 10/09/20  0427   Sodium mmol/L 134* 137 139   Potassium mmol/L 3.3* 3.4* 3.7   Chloride mmol/L 100 102 104   CO2 mmol/L 25 26 26   Glucose mg/dL 121* 111* 119*   BUN, Bld mg/dL 16 13 13   Creatinine mg/dL 1.0 1.0 0.8   Albumin g/dL 2.8* 2.7* 2.7*   Total Bilirubin mg/dL 1.4* 1.0 1.2*    Alkaline Phosphatase U/L 68 66 69   AST U/L 10 9* 10   ALT U/L 15 16 16   Magnesium mg/dL 1.5* 1.7 1.8   Phosphorus mg/dL 2.9 3.3 3.5       Vancomycin Administrations:  vancomycin given in the last 96 hours                   vancomycin 2 g in dextrose 5 % 500 mL IVPB (mg) 2,000 mg New Bag 10/08/20 2015     2,000 mg New Bag  0949     2,000 mg New Bag 10/07/20 2009     2,000 mg New Bag  0820    vancomycin (VANCOCIN) 2,500 mg in dextrose 5 % 500 mL IVPB (mg) 2,500 mg New Bag 10/06/20 2104                Microbiologic Results:  Microbiology Results (last 7 days)     Procedure Component Value Units Date/Time    Blood culture [[id number]] Collected: 10/08/20 0814    Order Status: Completed Specimen: Blood from Antecubital, Right Arm Updated: 10/08/20 1715     Blood Culture, Routine No Growth to date    Blood culture [[id number]] Collected: 10/08/20 0814    Order Status: Completed Specimen: Blood Updated: 10/08/20 1715     Blood Culture, Routine No Growth to date    Blood culture [[id number]]  (Abnormal) Collected: 10/06/20 2203    Order Status: Completed Specimen: Blood Updated: 10/08/20 1354     Blood Culture, Routine Gram stain aer bottle: Gram positive cocci in clusters resembling Staph      Positive results previously called 10/07/2020      STAPHYLOCOCCUS HAEMOLYTICUS  Susceptibility pending      Blood culture [[id number]]  (Abnormal) Collected: 10/06/20 2203    Order Status: Completed Specimen: Blood Updated: 10/08/20 1353     Blood Culture, Routine Gram stain aer bottle: Gram positive cocci in clusters resembling Staph      Results called to and read back by:Marcela Flowers RN 10/07/2020  17:01      STAPHYLOCOCCUS HAEMOLYTICUS  Susceptibility pending

## 2020-10-10 PROBLEM — K64.4 EXTERNAL HEMORRHOIDS WITHOUT COMPLICATION: Status: ACTIVE | Noted: 2020-01-01

## 2020-10-10 NOTE — ASSESSMENT & PLAN NOTE
-On-going issue since prior to admission that wasn't reported until 10/10.     Plan:   -Topical Hydrocortisone cream ordered PRN.

## 2020-10-10 NOTE — PLAN OF CARE
No acute events overnight. tmax 99.2. VSS. On cefepime and vanco. IVF continued. Denies any nausea, pain, diarrhea. Plan of care reviewed with pt. All questions answered. Bed low and locked. Nonskid footwear when oob. Instructed to call nursing for assistance. Call light and personal belongings within reach.

## 2020-10-10 NOTE — SUBJECTIVE & OBJECTIVE
Subjective:     Interval History: Day #16 of 7+3. No acute events overnight. This morning at bedside, patient reports pain with bowel movements that he attributes to re-occurrence of external hemorrhoids that were present on admission, however he denies bloody bowel movements or blood with wiping. He is overall feeling well and is tolerating PO, urinating normally, and having normal formed bowel movements. ID evaluated patient on 10/09 given coagulase-negative Staph bacteremia with recommendations to continue Vancomycin for 14 day total course (estimated end date 10/21). Cefepime de-escalated to Levofloxacin prophylaxis as patient has remained afebrile for >72 hours. On review of labs, Hgb down to 6.6 and additional 1 U PRBC ordered. Day #14 bone marrow biopsy from 10/08 remains in process.     Objective:     Vital Signs (Most Recent):  Temp: 98.2 °F (36.8 °C) (10/10/20 0630)  Pulse: 73 (10/10/20 0630)  Resp: 16 (10/10/20 0630)  BP: 126/66 (10/10/20 0630)  SpO2: 95 % (10/10/20 0630) Vital Signs (24h Range):  Temp:  [98.1 °F (36.7 °C)-99 °F (37.2 °C)] 98.2 °F (36.8 °C)  Pulse:  [63-91] 73  Resp:  [16-19] 16  SpO2:  [93 %-100 %] 95 %  BP: (122-152)/(63-75) 126/66     Weight: 126.9 kg (279 lb 14 oz)  Body mass index is 34.98 kg/m².  Body surface area is 2.59 meters squared.    ECOG SCORE           Intake/Output - Last 3 Shifts       10/08 0700 - 10/09 0659 10/09 0700 - 10/10 0659 10/10 0700 - 10/11 0659    P.O. 3495 1760     I.V. (mL/kg) 1903.3 (15.1) 1145 (9)     Blood 236 1050     IV Piggyback 1000 1000     Total Intake(mL/kg) 6634.3 (52.8) 4955 (39)     Urine (mL/kg/hr) 6000 (2) 3725 (1.2)     Stool 0 0     Total Output 6000 3725     Net +634.3 +1230            Urine Occurrence  2 x     Stool Occurrence 2 x 2 x           Physical Exam  Constitutional:       Appearance: He is well-developed.   HENT:      Head: Normocephalic and atraumatic.      Mouth/Throat:      Pharynx: No oropharyngeal exudate.   Eyes:       General:         Right eye: No discharge.         Left eye: No discharge.      Conjunctiva/sclera: Conjunctivae normal.      Pupils: Pupils are equal, round, and reactive to light.   Neck:      Musculoskeletal: Normal range of motion and neck supple.   Cardiovascular:      Rate and Rhythm: Normal rate. Rhythm irregularly irregular.      Heart sounds: Normal heart sounds. No murmur.   Pulmonary:      Effort: Pulmonary effort is normal.      Breath sounds: Normal breath sounds.      Comments:     Abdominal:      General: Bowel sounds are normal. There is no distension.      Palpations: Abdomen is soft.      Tenderness: There is no abdominal tenderness.   Musculoskeletal: Normal range of motion.         General: No deformity.      Comments: 4 X 4 cm bandage over lower sacrum without erythema or tenderness to palpation.    Skin:     General: Skin is warm and dry.      Findings: No erythema or rash.      Comments: Lopes catheter in right upper chest with clean, dry, and intact dressing without erythema or tenderness.    Neurological:      Mental Status: He is alert and oriented to person, place, and time.   Psychiatric:         Behavior: Behavior normal.         Thought Content: Thought content normal.         Judgment: Judgment normal.         Significant Labs:   All pertinent labs from the last 24 hours have been reviewed.    Diagnostic Results:  I have reviewed all pertinent imaging results/findings within the past 24 hours.

## 2020-10-10 NOTE — ASSESSMENT & PLAN NOTE
This is a 55 year old male who presented to [facility] on 09/16/2020 for suspected acute leukemia after manual differential demonstrated blasts at outside hospital. He underwent bone marrow biopsy on 09/16 showing AML with flow cytometry showing increased population of myeloid blasts showing expression of CD4, CD9, CD11c, CD15, CD13, CD33(97%), CD64, (partial), HLA-DR, and cytoplasmic myeloperoxidase. Fish results - t(10;11), ngs - No pathogenic genetic alteration was detected. Associated with neoplastic effusion of left lower lung. Status post Lopes catheter placement on 09/21. Baseline ECHO from 09/16 with EF of 58%.     Plan:   -Initiated 7+3 on 09/25 with Idarubicin and Cytarabine; today is day #16.   -Follow-up bone marrow biopsy repeated on 10/08.   -Allopurinol 300 mg BID with IVF for TLS prophylaxis.   -Oppurtunistic infection prophylaxis with Acyclovir, Levofloxacin, and Voriconazole.    -Transfuse cyroglobulin if fibrinogen <150  -Transfuse if platelets <10, Hgb<7 and/or patient actively bleeding  -He is a stem cell transplant candidate. Sent HLA typing earlier in admission. Sent sibling contact info to transplant coordinator.  -aPTT, PT/INR, fibrinogen, uric acid, and phosphorous twice weekly to assess for TLS and DIC.   -See assessment for neoplastic effusion.

## 2020-10-10 NOTE — ASSESSMENT & PLAN NOTE
-Status post pacemaker placement.   -Home regimen: Apixaban, Digoxin, and Metoprolol.    -Currently rate controlled.   -Baseline ECHO on 09/16 prior to chemotherapy initiation with EF of 58%. Cardiology consulted on 09/17 for clearance for chemotherapy induction in the setting of acute leukemia; repeat ECHO on 09/17 notable for 16% left ventricular straining.     Plan:   -Per cardiology, continued rate control with Metoprolol and Digoxin; decreased Metoprolol to 50 mg with HR paced in 60's.   -Holding Apixaban due to thrombocytopenia; will resume when appropriate.  -Keep Mg > 2 and K+ > 4.  -A follow up echocardiogram is recommended at the end of treatment with Adriamycin and at 6 months post treatment. We will plan of inpatient repeat echo only if patient becomes symptomatic per Dr. Thurman.

## 2020-10-10 NOTE — PLAN OF CARE
POC reviewed with patient, questions answered and concerns addressed. VSS, ambulating in room and bowie independently. Tolerating ADA but appetite fair; taking boost in between meals. Received 1UPRBC's without any difficulty. Voiding adequate amount cyu and had BM today. IVF maintained. In no acute distress;will continue to monitor.

## 2020-10-10 NOTE — PROGRESS NOTES
[facility]  Hematology  Bone Marrow Transplant  Progress Note    Patient Name: Shell Diaz  Admission Date: 9/16/2020  Hospital Length of Stay: 24 days  Code Status: Full Code     Hospital Course:   Mr. Shell Diaz was admitted to [facility] on 09/16/2020 as a transfer for suspicion for acute leukemia following presentation at OSH for fevers with smear showing significant leukocytosis () and blasts on peripheral smear. He underwent bone marrow biopsy on 09/16 that was diagnostic of AML. Cardiology evaluated patient on 10/17 for assistance with management of prior permanent atrial fibrillation with rate control optimized. Lopes catheter was placed on 09/21 and he was initiated on induction therapy on 09/25 with Idarubicin and Cytarabine. Leukocytosis improved with IVF and Hydroxyurea. His presentation was also notable for concern for left lower lobe pneumonia with effusion that was initially treated with broad spectrum antibiotics until 09/22 without improvement. Pulmonology was consulted on 09/24 and patient underwent thoracentesis with fluid analysis showing blasts. Hospital course also associated with development of SEBASTIAN by 09/23 prompting nephrology consultation; etiology attributed to likely ATN; resolved by 10/01. On evening of 10/06, patient developed neutropenic fever with Cefepime and Vancomycin initiated at that time; infectious work-up significant for blood cultures positive for coagulase negative Staph. ID consulted on 10/09 for treatment recommendations. Vital signs remain stable on room air and afebrile since 10/07. Bone marrow biopsy repeated on 10/08 with results in process. Continuing supportive management for pancytopenia with intermittent transfusions.    Subjective:     Interval History: Day #16 of 7+3. No acute events overnight. This morning at bedside, patient reports pain with bowel movements that he attributes to re-occurrence of external hemorrhoids that were  present on admission, however he denies bloody bowel movements or blood with wiping. He is overall feeling well and is tolerating PO, urinating normally, and having normal formed bowel movements. ID evaluated patient on 10/09 given coagulase-negative Staph bacteremia with recommendations to continue Vancomycin for 14 day total course (estimated end date 10/21). Cefepime de-escalated to Levofloxacin prophylaxis as patient has remained afebrile for >72 hours. On review of labs, Hgb down to 6.6 and additional 1 U PRBC ordered. Day #14 bone marrow biopsy from 10/08 remains in process.     Objective:     Vital Signs (Most Recent):  Temp: 98.2 °F (36.8 °C) (10/10/20 0630)  Pulse: 73 (10/10/20 0630)  Resp: 16 (10/10/20 0630)  BP: 126/66 (10/10/20 0630)  SpO2: 95 % (10/10/20 0630) Vital Signs (24h Range):  Temp:  [98.1 °F (36.7 °C)-99 °F (37.2 °C)] 98.2 °F (36.8 °C)  Pulse:  [63-91] 73  Resp:  [16-19] 16  SpO2:  [93 %-100 %] 95 %  BP: (122-152)/(63-75) 126/66     Weight: 126.9 kg (279 lb 14 oz)  Body mass index is 34.98 kg/m².  Body surface area is 2.59 meters squared.    ECOG SCORE           Intake/Output - Last 3 Shifts       10/08 0700 - 10/09 0659 10/09 0700 - 10/10 0659 10/10 0700 - 10/11 0659    P.O. 3495 1760     I.V. (mL/kg) 1903.3 (15.1) 1145 (9)     Blood 236 1050     IV Piggyback 1000 1000     Total Intake(mL/kg) 6634.3 (52.8) 4955 (39)     Urine (mL/kg/hr) 6000 (2) 3725 (1.2)     Stool 0 0     Total Output 6000 3725     Net +634.3 +1230            Urine Occurrence  2 x     Stool Occurrence 2 x 2 x           Physical Exam  Constitutional:       Appearance: He is well-developed.   HENT:      Head: Normocephalic and atraumatic.      Mouth/Throat:      Pharynx: No oropharyngeal exudate.   Eyes:      General:         Right eye: No discharge.         Left eye: No discharge.      Conjunctiva/sclera: Conjunctivae normal.      Pupils: Pupils are equal, round, and reactive to light.   Neck:      Musculoskeletal: Normal  range of motion and neck supple.   Cardiovascular:      Rate and Rhythm: Normal rate. Rhythm irregularly irregular.      Heart sounds: Normal heart sounds. No murmur.   Pulmonary:      Effort: Pulmonary effort is normal.      Breath sounds: Normal breath sounds.      Comments:     Abdominal:      General: Bowel sounds are normal. There is no distension.      Palpations: Abdomen is soft.      Tenderness: There is no abdominal tenderness.   Musculoskeletal: Normal range of motion.         General: No deformity.      Comments: 4 X 4 cm bandage over lower sacrum without erythema or tenderness to palpation.    Skin:     General: Skin is warm and dry.      Findings: No erythema or rash.      Comments: Lopes catheter in right upper chest with clean, dry, and intact dressing without erythema or tenderness.    Neurological:      Mental Status: He is alert and oriented to person, place, and time.   Psychiatric:         Behavior: Behavior normal.         Thought Content: Thought content normal.         Judgment: Judgment normal.         Significant Labs:   All pertinent labs from the last 24 hours have been reviewed.    Diagnostic Results:  I have reviewed all pertinent imaging results/findings within the past 24 hours.    Assessment/Plan:     * Acute myeloid leukemia not having achieved remission  This is a 55 year old male who presented to [facility] on 09/16/2020 for suspected acute leukemia after manual differential demonstrated blasts at outside hospital. He underwent bone marrow biopsy on 09/16 showing AML with flow cytometry showing increased population of myeloid blasts showing expression of CD4, CD9, CD11c, CD15, CD13, CD33(97%), CD64, (partial), HLA-DR, and cytoplasmic myeloperoxidase. Fish results - t(10;11), ngs - No pathogenic genetic alteration was detected. Associated with neoplastic effusion of left lower lung. Status post Lopes catheter placement on 09/21. Baseline ECHO from 09/16 with EF of 58%.      Plan:   -Initiated 7+3 on 09/25 with Idarubicin and Cytarabine; today is day #16.   -Follow-up bone marrow biopsy repeated on 10/08.   -Allopurinol 300 mg BID with IVF for TLS prophylaxis.   -Oppurtunistic infection prophylaxis with Acyclovir, Levofloxacin, and Voriconazole.    -Transfuse cyroglobulin if fibrinogen <150  -Transfuse if platelets <10, Hgb<7 and/or patient actively bleeding  -He is a stem cell transplant candidate. Sent HLA typing earlier in admission. Sent sibling contact info to transplant coordinator.  -aPTT, PT/INR, fibrinogen, uric acid, and phosphorous twice weekly to assess for TLS and DIC.   -See assessment for neoplastic effusion.     External hemorrhoids without complication  -On-going issue since prior to admission that wasn't reported until 10/10.     Plan:   -Topical Hydrocortisone cream ordered PRN.     Neutropenic fever  -Noted to develop a fever on evening of 10/06 prompting infectious work-up with blood cultures, UA, and CXR and initiation of Cefepime and Vancomycin at that time.   -As of 10/08, blood cultures positive for STAPHYLOCOCCUS HAEMOLYTICUS  with sensitivities pending; CXR and UA were unremarkable; Lopes catheter without obvious signs of infection.   -Blood cultures negative as of 10/08.     Plan:   -ID consulted on 10/09 with recommendations to continue Vancomycin for 14 day total course; estimated end date of 10/21/2020.   -De-escalate Cefepime to Levofloxacin as patient has remained afebrile for >72 hours.   -Follow-up repeat blood cultures.     Coagulase negative Staphylococcus bacteremia  -See assessment for neutropenic fever.     Hypocalcemia  - Corrected calcium slightly low at 8.6 on 10/05; normalized as of 10/06 with initiation of daily calcium supplement.     Plan:   - Calcium level ordered daily.     Hyperbilirubinemia  -Slight indirect hyperbilirubinemia noted on 09/30 and since normalized.   -Likely secondary to chemotherapy initiation vs intermittent  transfusions.   -Synthetic liver function remains WNL.     Plan:   -Trending liver function daily.     Hyperphosphatemia  -Developed secondary to SEBASTIAN and treated with SEVELAMER from 09/28 to 10/01 with improvement.     Resolved.      SEBASTIAN (acute kidney injury)  -Noted to develop SEBASTIAN on 09/23 with Cr peaking at 2.3 on 09/26.   -Nephrology consulted on 09/24 with suspicion for multifactorial SEBASTIAN secondary to Acyclovir, Losartan, contrast-induced from CT chest on 9/17 (Cr increase can be delayed 72-96 hours after contrast), and gradual decrease in hemoglobin. Most likely from ATN.   -Improved back to baseline by 10/01 with use of IVF and avoidance of nephrotoxic agents; nephrology signed off at that time.      Resolved.     Plan:   -Making good urine output daily without signs of hypervolemia.   -Trending renal function daily; monitor renal function closely as Vancomycin was resumed on 10/06.  -Holding home ARB.   -Strict I/O  -Avoid nephrotoxic agents.    -Renally dose medications.    Type 2 diabetes mellitus without complication, without long-term current use of insulin  -Most recent A1c 8 in 09/2020.   -Home regimen: Metformin.   -Noted issues with sub-optimal hyperglycemia while receiving Dexamethasone, however resolved after stopping steroids. Stopped scheduled Aspart on 9/30/20 with blood glucose in 120s and patient no longer receiving steroids.   -Noted new worsening hyperglycemia on 10/08 that may have been secondary to new bacteremia, but resolved as of 10/09.     Plan:   -Continue Detemir 10 U daily with LDSCI.   -Diabetic diet.   -POCT glucose checks with meals and bedtime.   -Goal glucose 140-180.     Non-sustained ventricular tachycardia  - Noted 6 beat run of VT on 9/18/20 at ~ 6 am. No episodes since.    Plan:   - Goal Mg > 2 and K+ > 4  - Telemetry ordered.     Pancytopenia due to antineoplastic chemotherapy  Plan:   - Transfuse if platelet count <10. If patient develops active bleed, transfuse for goal  >50.  - Transfuse for Hgb < 7.  - Opportunistic infection prophylaxis with Acyclovir, Levofloxacin, and Voriconazole.   - Monitoring voriconazole level; most recent level from 10/5 1.2.        Neoplastic pleural effusion  -Initially presented with fevers and fatigue with significant leukocytosis and blasts with CT chest on 09/17 after transfer here showing abnormal opacity involving the left lower lobe with a prominent area of dense confluent opacity that may relate to an area of dense consolidation, with surrounding prominent confluent infiltrates/airspace disease, as well as a small to moderate left pleural effusion. He was treated with Vancomycin and Zosyn from 09/15 to 09/22.   -Pulmonology consulted on 09/24 after repeat CXR showed progression of left sided pleural effusion. Status post thoracentesis on 09/24 with 1L of blood fluid removed with studies negative for infection, but showing abundant atypical mononuclear cells, suggestive of blasts.   -Repeat CXR on 10/07 with decrease in size of effusion.   -Vital signs stable on room air.     Plan:   -Continue oppurutnistic infection prophylaxis with Levofloxacin.   -Holding home Apixaban in setting of thrombocytopenia.   -See assessment for AML.     Essential hypertension  -Home regimen: Amlodipine and Losartan.     Plan:   -Held home ARB since 09/24 after development of SEBASTIAN and blood pressure currently stable without anti-hypertensive agents ordered.       Permanent atrial fibrillation  -Status post pacemaker placement.   -Home regimen: Apixaban, Digoxin, and Metoprolol.    -Currently rate controlled.   -Baseline ECHO on 09/16 prior to chemotherapy initiation with EF of 58%. Cardiology consulted on 09/17 for clearance for chemotherapy induction in the setting of acute leukemia; repeat ECHO on 09/17 notable for 16% left ventricular straining.     Plan:   -Per cardiology, continued rate control with Metoprolol and Digoxin; decreased Metoprolol to 50 mg with HR  paced in 60's.   -Holding Apixaban due to thrombocytopenia; will resume when appropriate.  -Keep Mg > 2 and K+ > 4.  -A follow up echocardiogram is recommended at the end of treatment with Adriamycin and at 6 months post treatment. We will plan of inpatient repeat echo only if patient becomes symptomatic per Dr. Thurman.        VTE Risk Mitigation (From admission, onward)         Ordered     heparin, porcine (PF) 100 unit/mL injection flush 300 Units  As needed (PRN)      09/25/20 1408     heparin (porcine) injection 1,000 Units  As needed (PRN)      09/22/20 0343     IP VTE HIGH RISK PATIENT  Once      09/16/20 0017     Place sequential compression device  Until discontinued      09/16/20 0017                Disposition: Remain in-patient for chemotherapy initiation.     Fantasma Laguna MD  Bone Marrow Transplant  [facility]

## 2020-10-10 NOTE — PROGRESS NOTES
Pharmacokinetic Assessment Follow Up: IV Vancomycin    Vancomycin serum concentration assessment(s):    The trough level was drawn 12.5 hours after the last dose. Expect trough level to be higher than what is documented.    Vancomycin Regimen Plan:    Change regimen to Vancomycin 1750 mg IV every 12 hours with next serum trough concentration measured at 1200 prior to 4th dose on 10/12    Drug levels (last 3 results):  Recent Labs   Lab Result Units 10/08/20  0814 10/09/20  0427 10/10/20  1011   Vancomycin-Trough ug/mL 15.3 23.9* 19.0       Pharmacy will continue to follow and monitor vancomycin.    Please contact pharmacy at extension [phone] for questions regarding this assessment.    Thank you for the consult,   Ruba Harrell       Patient brief summary:  Shell iDaz is a 55 y.o. male initiated on antimicrobial therapy with IV Vancomycin for treatment of bacteremia    The patient's current regimen is 1750mg q12h    Drug Allergies:   Review of patient's allergies indicates:  No Known Allergies    Actual Body Weight:   126.9kg    Renal Function:   Estimated Creatinine Clearance: 133.1 mL/min (based on SCr of 0.9 mg/dL).,     Dialysis Method (if applicable):  N/A    CBC (last 72 hours):  Recent Labs   Lab Result Units 10/08/20  0416 10/09/20  0427 10/10/20  0354   WBC K/uL 0.36* 0.42* 0.43*   Hemoglobin g/dL 6.5* 6.8* 6.6*   Hematocrit % 19.0* 19.4* 19.1*   Platelets K/uL 12* 8* 18*   Gran% % 2.8* 0.0* 0.0*   Lymph% % 97.2* 97.6* 100.0*   Mono% % 0.0* 2.4* 0.0*   Eosinophil% % 0.0 0.0 0.0   Basophil% % 0.0 0.0 0.0   Differential Method  Automated Automated Automated       Metabolic Panel (last 72 hours):  Recent Labs   Lab Result Units 10/08/20  0416 10/09/20  0427 10/10/20  0354   Sodium mmol/L 137 139 139   Potassium mmol/L 3.4* 3.7 3.5   Chloride mmol/L 102 104 104   CO2 mmol/L 26 26 28   Glucose mg/dL 111* 119* 119*   BUN, Bld mg/dL 13 13 12   Creatinine mg/dL 1.0 0.8 0.9   Albumin g/dL 2.7* 2.7* 2.7*   Total  Bilirubin mg/dL 1.0 1.2* 1.1*   Alkaline Phosphatase U/L 66 69 69   AST U/L 9* 10 9*   ALT U/L 16 16 16   Magnesium mg/dL 1.7 1.8 1.7   Phosphorus mg/dL 3.3 3.5 3.2       Vancomycin Administrations:  vancomycin given in the last 96 hours                   vancomycin 2 g in dextrose 5 % 500 mL IVPB (mg) 2,000 mg New Bag 10/10/20 1005     2,000 mg New Bag 10/09/20 2127     2,000 mg New Bag  0930     2,000 mg New Bag 10/08/20 2015     2,000 mg New Bag  0949     2,000 mg New Bag 10/07/20 2009     2,000 mg New Bag  0820    vancomycin (VANCOCIN) 2,500 mg in dextrose 5 % 500 mL IVPB (mg) 2,500 mg New Bag 10/06/20 2104                Microbiologic Results:  Microbiology Results (last 7 days)     Procedure Component Value Units Date/Time    Blood culture [[id number]] Collected: 10/08/20 0814    Order Status: Completed Specimen: Blood from Antecubital, Right Arm Updated: 10/10/20 1012     Blood Culture, Routine No Growth to date      No Growth to date      No Growth to date    Blood culture [[id number]] Collected: 10/08/20 0814    Order Status: Completed Specimen: Blood Updated: 10/10/20 1012     Blood Culture, Routine No Growth to date      No Growth to date      No Growth to date    Blood culture [[id number]]  (Abnormal)  (Susceptibility) Collected: 10/06/20 2203    Order Status: Completed Specimen: Blood Updated: 10/09/20 1209     Blood Culture, Routine Gram stain aer bottle: Gram positive cocci in clusters resembling Staph      Positive results previously called 10/07/2020      STAPHYLOCOCCUS HAEMOLYTICUS    Blood culture [[id number]]  (Abnormal)  (Susceptibility) Collected: 10/06/20 2203    Order Status: Completed Specimen: Blood Updated: 10/09/20 1208     Blood Culture, Routine Gram stain aer bottle: Gram positive cocci in clusters resembling Staph      Results called to and read back by:Marcela Flowers RN 10/07/2020  17:01      STAPHYLOCOCCUS HAEMOLYTICUS

## 2020-10-10 NOTE — ASSESSMENT & PLAN NOTE
-Noted to develop a fever on evening of 10/06 prompting infectious work-up with blood cultures, UA, and CXR and initiation of Cefepime and Vancomycin at that time.   -As of 10/08, blood cultures positive for STAPHYLOCOCCUS HAEMOLYTICUS  with sensitivities pending; CXR and UA were unremarkable; Lopes catheter without obvious signs of infection.   -Blood cultures negative as of 10/08.     Plan:   -ID consulted on 10/09 with recommendations to continue Vancomycin for 14 day total course; estimated end date of 10/21/2020.   -De-escalate Cefepime to Levofloxacin as patient has remained afebrile for >72 hours.   -Follow-up repeat blood cultures.

## 2020-10-10 NOTE — ASSESSMENT & PLAN NOTE
-Most recent A1c 8 in 09/2020.   -Home regimen: Metformin.   -Noted issues with sub-optimal hyperglycemia while receiving Dexamethasone, however resolved after stopping steroids. Stopped scheduled Aspart on 9/30/20 with blood glucose in 120s and patient no longer receiving steroids.   -Noted new worsening hyperglycemia on 10/08 that may have been secondary to new bacteremia, but resolved as of 10/09.     Plan:   -Continue Detemir 10 U daily with LDSCI.   -Diabetic diet.   -POCT glucose checks with meals and bedtime.   -Goal glucose 140-180.

## 2020-10-11 NOTE — PROGRESS NOTES
[facility]  Hematology  Bone Marrow Transplant  Progress Note    Patient Name: Shell Diaz  Admission Date: 9/16/2020  Hospital Length of Stay: 25 days  Code Status: Full Code     Hospital Course:   Mr. Shell Diaz was admitted to [facility] on 09/16/2020 as a transfer for suspicion for acute leukemia following presentation at OSH for fevers with smear showing significant leukocytosis () and blasts on peripheral smear. He underwent bone marrow biopsy on 09/16 that was diagnostic of AML. Cardiology evaluated patient on 10/17 for assistance with management of prior permanent atrial fibrillation with rate control optimized. Lopes catheter was placed on 09/21 and he was initiated on induction therapy on 09/25 with Idarubicin and Cytarabine. Leukocytosis improved with IVF and Hydroxyurea. His presentation was also notable for concern for left lower lobe pneumonia with effusion that was initially treated with broad spectrum antibiotics until 09/22 without improvement. Pulmonology was consulted on 09/24 and patient underwent thoracentesis with fluid analysis showing blasts. Hospital course also associated with development of SEBASTIAN by 09/23 prompting nephrology consultation; etiology attributed to likely ATN; resolved by 10/01. On evening of 10/06, patient developed neutropenic fever with Cefepime and Vancomycin initiated at that time; infectious work-up significant for blood cultures positive for coagulase negative Staph. ID consulted on 10/09 with recommendations to continue Vancomycin until 10/21/2020. Vital signs remain stable on room air and afebrile since 10/07. Bone marrow biopsy repeated on 10/08 with results in process. Continuing supportive management for pancytopenia with intermittent transfusions.    Subjective:     Interval History: Day #17 of 7+3. No acute events overnight. This morning at bedside, patient reports feeling well and had no complaints. On review of labs, Hgb 7 and  platelets 12; no transfusions ordered. Repeat blood cultures from 10/08 remain NGTD. Day #14 bone marrow biopsy from 10/08 remains in process.     Objective:     Vital Signs (Most Recent):  Temp: 98.1 °F (36.7 °C) (10/11/20 0737)  Pulse: 78 (10/11/20 0823)  Resp: 20 (10/11/20 0737)  BP: 124/68 (10/11/20 0737)  SpO2: 95 % (10/11/20 0737) Vital Signs (24h Range):  Temp:  [98.1 °F (36.7 °C)-99.7 °F (37.6 °C)] 98.1 °F (36.7 °C)  Pulse:  [] 78  Resp:  [16-20] 20  SpO2:  [91 %-98 %] 95 %  BP: (124-159)/(59-87) 124/68     Weight: 126 kg (277 lb 10.7 oz)  Body mass index is 34.71 kg/m².  Body surface area is 2.58 meters squared.    ECOG SCORE           Intake/Output - Last 3 Shifts       10/09 0700 - 10/10 0659 10/10 0700 - 10/11 0659 10/11 0700 - 10/12 0659    P.O. 1760 240 240    I.V. (mL/kg) 1145 (9) 703.3 (5.6) 813.3 (6.5)    Blood 1050 350     IV Piggyback 1000 1000     Total Intake(mL/kg) 4955 (39) 2293.3 (18.2) 1053.3 (8.4)    Urine (mL/kg/hr) 3725 (1.2) 3300 (1.1)     Stool 0      Total Output 3725 3300     Net +1230 -1006.7 +1053.3           Urine Occurrence 2 x      Stool Occurrence 2 x            Physical Exam  Constitutional:       Appearance: He is well-developed.   HENT:      Head: Normocephalic and atraumatic.      Mouth/Throat:      Pharynx: No oropharyngeal exudate.   Eyes:      General:         Right eye: No discharge.         Left eye: No discharge.      Conjunctiva/sclera: Conjunctivae normal.      Pupils: Pupils are equal, round, and reactive to light.   Neck:      Musculoskeletal: Normal range of motion and neck supple.   Cardiovascular:      Rate and Rhythm: Normal rate. Rhythm irregularly irregular.      Heart sounds: Normal heart sounds. No murmur.   Pulmonary:      Effort: Pulmonary effort is normal.      Breath sounds: Normal breath sounds.      Comments:     Abdominal:      General: Bowel sounds are normal. There is no distension.      Palpations: Abdomen is soft.      Tenderness: There is  no abdominal tenderness.   Musculoskeletal:      Right lower leg: No edema.      Left lower leg: No edema.      Comments: 4 X 4 cm bandage over lower sacrum without erythema or tenderness to palpation.    Skin:     General: Skin is warm and dry.      Findings: No erythema or rash.      Comments: Lopes catheter in right upper chest with clean, dry, and intact dressing without erythema or tenderness.    Neurological:      Mental Status: He is alert and oriented to person, place, and time.   Psychiatric:         Behavior: Behavior normal.         Thought Content: Thought content normal.         Judgment: Judgment normal.         Significant Labs:   All pertinent labs from the last 24 hours have been reviewed.    Diagnostic Results:  I have reviewed all pertinent imaging results/findings within the past 24 hours.    Assessment/Plan:     * Acute myeloid leukemia not having achieved remission  This is a 55 year old male who presented to [facility] on 09/16/2020 for suspected acute leukemia after manual differential demonstrated blasts at outside hospital. He underwent bone marrow biopsy on 09/16 showing AML with flow cytometry showing increased population of myeloid blasts showing expression of CD4, CD9, CD11c, CD15, CD13, CD33(97%), CD64, (partial), HLA-DR, and cytoplasmic myeloperoxidase. Fish results - t(10;11), ngs - No pathogenic genetic alteration was detected. Associated with neoplastic effusion of left lower lung. Status post Lopes catheter placement on 09/21. Baseline ECHO from 09/16 with EF of 58%.     Plan:   -Initiated 7+3 on 09/25 with Idarubicin and Cytarabine; today is day #17.   -Follow-up bone marrow biopsy repeated on 10/08.   -Allopurinol 300 mg BID with IVF for TLS prophylaxis.   -Oppurtunistic infection prophylaxis with Acyclovir, Levofloxacin, and Voriconazole.    -Transfuse cyroglobulin if fibrinogen <150  -Transfuse if platelets <10, Hgb<7 and/or patient actively bleeding  -He is a stem cell  transplant candidate. Sent HLA typing earlier in admission. Sent sibling contact info to transplant coordinator.  -aPTT, PT/INR, fibrinogen, uric acid, and phosphorous twice weekly to assess for TLS and DIC.   -See assessment for neoplastic effusion.     External hemorrhoids without complication  -On-going issue since prior to admission that wasn't reported until 10/10.     Plan:   -Topical Hydrocortisone cream ordered PRN.     Neutropenic fever  -Noted to develop a fever on evening of 10/06 prompting infectious work-up with blood cultures, UA, and CXR and initiation of Cefepime and Vancomycin at that time.   -As of 10/08, blood cultures positive for STAPHYLOCOCCUS HAEMOLYTICUS  with sensitivities pending; CXR and UA were unremarkable; Lopes catheter without obvious signs of infection.   -Blood cultures negative as of 10/08.     Plan:   -ID consulted on 10/09 with recommendations to continue Vancomycin for 14 day total course; estimated end date of 10/21/2020.   -Continue outpatient infection prophylaxis.   -Follow-up repeat blood cultures.     Coagulase negative Staphylococcus bacteremia  -See assessment for neutropenic fever.     Hypocalcemia  - Corrected calcium slightly low at 8.6 on 10/05; normalized as of 10/06 with initiation of daily calcium supplement.     Plan:   - Calcium level ordered daily.     Hyperbilirubinemia  -Slight indirect hyperbilirubinemia noted on 09/30 and since normalized.   -Likely secondary to chemotherapy initiation vs intermittent transfusions.   -Synthetic liver function remains WNL.     Plan:   -Trending liver function daily.     Hyperphosphatemia  -Developed secondary to SEBASTIAN and treated with SEVELAMER from 09/28 to 10/01 with improvement.     Resolved.      SEBASTIAN (acute kidney injury)  -Noted to develop SEBASTIAN on 09/23 with Cr peaking at 2.3 on 09/26.   -Nephrology consulted on 09/24 with suspicion for multifactorial SEBASTIAN secondary to Acyclovir, Losartan, contrast-induced from CT chest  on 9/17 (Cr increase can be delayed 72-96 hours after contrast), and gradual decrease in hemoglobin. Most likely from ATN.   -Improved back to baseline by 10/01 with use of IVF and avoidance of nephrotoxic agents; nephrology signed off at that time.      Resolved.     Plan:   -Making good urine output daily without signs of hypervolemia.   -Trending renal function daily; monitor renal function closely as Vancomycin was resumed on 10/06.  -Holding home ARB.   -Strict I/O  -Avoid nephrotoxic agents.    -Renally dose medications.    Type 2 diabetes mellitus without complication, without long-term current use of insulin  -Most recent A1c 8 in 09/2020.   -Home regimen: Metformin.   -Noted issues with sub-optimal hyperglycemia while receiving Dexamethasone, however resolved after stopping steroids. Stopped scheduled Aspart on 9/30/20 with blood glucose in 120s and patient no longer receiving steroids.   -Noted new worsening hyperglycemia on 10/08 that may have been secondary to new bacteremia, but resolved as of 10/09.     Plan:   -Continue Detemir 10 U daily with LDSCI.   -Diabetic diet.   -POCT glucose checks with meals and bedtime.   -Goal glucose 140-180.     Non-sustained ventricular tachycardia  - Noted 6 beat run of VT on 9/18/20 at ~ 6 am. No episodes since.    Plan:   - Goal Mg > 2 and K+ > 4  - Telemetry ordered.     Pancytopenia due to antineoplastic chemotherapy  Plan:   - Transfuse if platelet count <10. If patient develops active bleed, transfuse for goal >50.  - Transfuse for Hgb < 7.  - Opportunistic infection prophylaxis with Acyclovir, Levofloxacin, and Voriconazole.   - Monitoring Voriconazole level; most recent level from 10/5 1.2.        Neoplastic pleural effusion  -Initially presented with fevers and fatigue with significant leukocytosis and blasts with CT chest on 09/17 after transfer here showing abnormal opacity involving the left lower lobe with a prominent area of dense confluent opacity that  may relate to an area of dense consolidation, with surrounding prominent confluent infiltrates/airspace disease, as well as a small to moderate left pleural effusion. He was treated with Vancomycin and Zosyn from 09/15 to 09/22.   -Pulmonology consulted on 09/24 after repeat CXR showed progression of left sided pleural effusion. Status post thoracentesis on 09/24 with 1L of blood fluid removed with studies negative for infection, but showing abundant atypical mononuclear cells, suggestive of blasts.   -Repeat CXR on 10/07 with decrease in size of effusion.   -Vital signs stable on room air.     Plan:   -Continue oppurutnistic infection prophylaxis with Levofloxacin.   -Holding home Apixaban in setting of thrombocytopenia.   -See assessment for AML.     Essential hypertension  -Home regimen: Amlodipine and Losartan.     Plan:   -Held home ARB since 09/24 after development of SEBASTIAN and blood pressure currently stable without anti-hypertensive agents ordered.       Permanent atrial fibrillation  -Status post pacemaker placement.   -Home regimen: Apixaban, Digoxin, and Metoprolol.    -Currently rate controlled.   -Baseline ECHO on 09/16 prior to chemotherapy initiation with EF of 58%. Cardiology consulted on 09/17 for clearance for chemotherapy induction in the setting of acute leukemia; repeat ECHO on 09/17 notable for 16% left ventricular straining.     Plan:   -Per cardiology, continued rate control with Metoprolol and Digoxin; decreased Metoprolol to 50 mg with HR paced in 60's.   -Holding Apixaban due to thrombocytopenia; will resume when appropriate.  -Keep Mg > 2 and K+ > 4.  -A follow up echocardiogram is recommended at the end of treatment with Adriamycin and at 6 months post treatment. We will plan of inpatient repeat echo only if patient becomes symptomatic per Dr. Thurman.        VTE Risk Mitigation (From admission, onward)         Ordered     heparin, porcine (PF) 100 unit/mL injection flush 300 Units  As  needed (PRN)      09/25/20 1408     heparin (porcine) injection 1,000 Units  As needed (PRN)      09/22/20 0343     IP VTE HIGH RISK PATIENT  Once      09/16/20 0017     Place sequential compression device  Until discontinued      09/16/20 0017                Disposition: Remain in-patient following chemotherapy induction.     Fantasma Laguna MD  Bone Marrow Transplant  [facility]

## 2020-10-11 NOTE — PLAN OF CARE
Day 17 of 7+3. Plan of care reviewed with patient this shift. No new complaints. VS stable. Central line dressing change completed this shift. Maintaining saturations on room air. Mg and K replaced. IVF continued. Telemetry monitoring maintained. All questions and concerns addressed. Will continue to monitor.

## 2020-10-11 NOTE — SUBJECTIVE & OBJECTIVE
Subjective:     Interval History: Day #17 of 7+3. No acute events overnight. This morning at bedside, patient reports feeling well and had no complaints. On review of labs, Hgb 7 and platelets 12; no transfusions ordered. Repeat blood cultures from 10/08 remain NGTD. Day #14 bone marrow biopsy from 10/08 remains in process.     Objective:     Vital Signs (Most Recent):  Temp: 98.1 °F (36.7 °C) (10/11/20 0737)  Pulse: 78 (10/11/20 0823)  Resp: 20 (10/11/20 0737)  BP: 124/68 (10/11/20 0737)  SpO2: 95 % (10/11/20 0737) Vital Signs (24h Range):  Temp:  [98.1 °F (36.7 °C)-99.7 °F (37.6 °C)] 98.1 °F (36.7 °C)  Pulse:  [] 78  Resp:  [16-20] 20  SpO2:  [91 %-98 %] 95 %  BP: (124-159)/(59-87) 124/68     Weight: 126 kg (277 lb 10.7 oz)  Body mass index is 34.71 kg/m².  Body surface area is 2.58 meters squared.    ECOG SCORE           Intake/Output - Last 3 Shifts       10/09 0700 - 10/10 0659 10/10 0700 - 10/11 0659 10/11 0700 - 10/12 0659    P.O. 1760 240 240    I.V. (mL/kg) 1145 (9) 703.3 (5.6) 813.3 (6.5)    Blood 1050 350     IV Piggyback 1000 1000     Total Intake(mL/kg) 4955 (39) 2293.3 (18.2) 1053.3 (8.4)    Urine (mL/kg/hr) 3725 (1.2) 3300 (1.1)     Stool 0      Total Output 3725 3300     Net +1230 -1006.7 +1053.3           Urine Occurrence 2 x      Stool Occurrence 2 x            Physical Exam  Constitutional:       Appearance: He is well-developed.   HENT:      Head: Normocephalic and atraumatic.      Mouth/Throat:      Pharynx: No oropharyngeal exudate.   Eyes:      General:         Right eye: No discharge.         Left eye: No discharge.      Conjunctiva/sclera: Conjunctivae normal.      Pupils: Pupils are equal, round, and reactive to light.   Neck:      Musculoskeletal: Normal range of motion and neck supple.   Cardiovascular:      Rate and Rhythm: Normal rate. Rhythm irregularly irregular.      Heart sounds: Normal heart sounds. No murmur.   Pulmonary:      Effort: Pulmonary effort is normal.       Breath sounds: Normal breath sounds.      Comments:     Abdominal:      General: Bowel sounds are normal. There is no distension.      Palpations: Abdomen is soft.      Tenderness: There is no abdominal tenderness.   Musculoskeletal:      Right lower leg: No edema.      Left lower leg: No edema.      Comments: 4 X 4 cm bandage over lower sacrum without erythema or tenderness to palpation.    Skin:     General: Skin is warm and dry.      Findings: No erythema or rash.      Comments: Lopes catheter in right upper chest with clean, dry, and intact dressing without erythema or tenderness.    Neurological:      Mental Status: He is alert and oriented to person, place, and time.   Psychiatric:         Behavior: Behavior normal.         Thought Content: Thought content normal.         Judgment: Judgment normal.         Significant Labs:   All pertinent labs from the last 24 hours have been reviewed.    Diagnostic Results:  I have reviewed all pertinent imaging results/findings within the past 24 hours.

## 2020-10-11 NOTE — ASSESSMENT & PLAN NOTE
-Noted to develop a fever on evening of 10/06 prompting infectious work-up with blood cultures, UA, and CXR and initiation of Cefepime and Vancomycin at that time.   -As of 10/08, blood cultures positive for STAPHYLOCOCCUS HAEMOLYTICUS  with sensitivities pending; CXR and UA were unremarkable; Lopes catheter without obvious signs of infection.   -Blood cultures negative as of 10/08.     Plan:   -ID consulted on 10/09 with recommendations to continue Vancomycin for 14 day total course; estimated end date of 10/21/2020.   -Continue outpatient infection prophylaxis.   -Follow-up repeat blood cultures.

## 2020-10-11 NOTE — ASSESSMENT & PLAN NOTE
This is a 55 year old male who presented to [facility] on 09/16/2020 for suspected acute leukemia after manual differential demonstrated blasts at outside hospital. He underwent bone marrow biopsy on 09/16 showing AML with flow cytometry showing increased population of myeloid blasts showing expression of CD4, CD9, CD11c, CD15, CD13, CD33(97%), CD64, (partial), HLA-DR, and cytoplasmic myeloperoxidase. Fish results - t(10;11), ngs - No pathogenic genetic alteration was detected. Associated with neoplastic effusion of left lower lung. Status post Lopes catheter placement on 09/21. Baseline ECHO from 09/16 with EF of 58%.     Plan:   -Initiated 7+3 on 09/25 with Idarubicin and Cytarabine; today is day #17.   -Follow-up bone marrow biopsy repeated on 10/08.   -Allopurinol 300 mg BID with IVF for TLS prophylaxis.   -Oppurtunistic infection prophylaxis with Acyclovir, Levofloxacin, and Voriconazole.    -Transfuse cyroglobulin if fibrinogen <150  -Transfuse if platelets <10, Hgb<7 and/or patient actively bleeding  -He is a stem cell transplant candidate. Sent HLA typing earlier in admission. Sent sibling contact info to transplant coordinator.  -aPTT, PT/INR, fibrinogen, uric acid, and phosphorous twice weekly to assess for TLS and DIC.   -See assessment for neoplastic effusion.

## 2020-10-11 NOTE — PLAN OF CARE
Plan of care reviewed with patient at start of shift and PRN.Pt remains free from falls and injuries. Resting quietly with eyes closed. Respirations even, unlabored. Skin warm and dry. Denies pain. Denies nausea. VSS remained afebrile. Telemetry monitoring continued. Frequent rounding for pain and safety maintained. Bed locked in lowest position,  side rails up x's 2, call light in reach. Instructed to call for assistance as needed, verbalizes understanding. Will continue to monitor.

## 2020-10-12 NOTE — PLAN OF CARE
Plan of care reviewed with patient at start of shift and PRN.Pt remains free from falls and injuries. Day 18 7&3. Denies pain. Denies nausea. VSS remained afebrile.Vanc continued. Telemetry monitoring continued. Frequent rounding for pain and safety maintained. Bed locked in lowest position,  side rails up x's 2, call light in reach. Instructed to call for assistance as needed, verbalizes understanding. Will continue to monitor. Lytes replaced and transfusions ordered.

## 2020-10-12 NOTE — PLAN OF CARE
Problem: Physical Therapy Goal  Goal: Physical Therapy Goal  Description: Goals to be met by: 10/9/2020, extended to 10/23/2020    Patient will increase functional independence with mobility by performin. Supine to sit with Set-up Rolla  2. Sit to supine with Set-up Rolla  3. Sit to stand transfer with Supervision  4. Bed to chair transfer with Supervision  5. Gait  x 200 feet with Supervision.   6. Lower extremity exercise program x15 reps per handout, with supervision    10/12/2020 1332 by Thiago Ruffin, PT  Outcome: Ongoing, Progressing

## 2020-10-12 NOTE — SUBJECTIVE & OBJECTIVE
Subjective:     Interval History: Day #18 of 7+3. No acute events overnight. This morning at bedside, patient reports feeling well and had no complaints.  RBCs and platelets repleted.  Repeat blood cultures from 10/08 remain NGTD. Day #14 bone marrow biopsy from 10/08 remains in process.     Objective:     Vital Signs (Most Recent):  Temp: 98 °F (36.7 °C) (10/12/20 1130)  Pulse: 71 (10/12/20 1130)  Resp: 16 (10/12/20 1130)  BP: (!) 142/77 (10/12/20 1130)  SpO2: 98 % (10/12/20 1130) Vital Signs (24h Range):  Temp:  [97.9 °F (36.6 °C)-98.4 °F (36.9 °C)] 98 °F (36.7 °C)  Pulse:  [] 71  Resp:  [15-17] 16  SpO2:  [93 %-99 %] 98 %  BP: (120-148)/(63-99) 142/77     Weight: 125.7 kg (277 lb 0.1 oz)  Body mass index is 34.62 kg/m².  Body surface area is 2.58 meters squared.    ECOG SCORE           Intake/Output - Last 3 Shifts       10/10 0700 - 10/11 0659 10/11 0700 - 10/12 0659 10/12 0700 - 10/13 0659    P.O. 240 720     I.V. (mL/kg) 703.3 (5.6) 2270 (18.1)     Blood 350  4310    IV Piggyback 1000 1000     Total Intake(mL/kg) 2293.3 (18.2) 3990 (31.8) 4310 (34.3)    Urine (mL/kg/hr) 3300 (1.1) 2825 (0.9) 1000 (1.3)    Stool  0     Total Output 3300 2825 1000    Net -1006.7 +1165 +3310           Urine Occurrence  2 x     Stool Occurrence  1 x           Physical Exam  Constitutional:       Appearance: He is well-developed.   HENT:      Head: Normocephalic and atraumatic.      Mouth/Throat:      Pharynx: No oropharyngeal exudate.   Eyes:      General:         Right eye: No discharge.         Left eye: No discharge.      Conjunctiva/sclera: Conjunctivae normal.      Pupils: Pupils are equal, round, and reactive to light.   Neck:      Musculoskeletal: Normal range of motion and neck supple.   Cardiovascular:      Rate and Rhythm: Normal rate. Rhythm irregularly irregular.      Heart sounds: Normal heart sounds. No murmur.   Pulmonary:      Effort: Pulmonary effort is normal.      Breath sounds: Normal breath sounds.       Comments:     Abdominal:      General: Bowel sounds are normal. There is no distension.      Palpations: Abdomen is soft.      Tenderness: There is no abdominal tenderness.   Musculoskeletal:      Right lower leg: No edema.      Left lower leg: No edema.      Comments: 4 X 4 cm bandage over lower sacrum without erythema or tenderness to palpation.    Skin:     General: Skin is warm and dry.      Findings: No erythema or rash.      Comments: Lopes catheter in right upper chest with clean, dry, and intact dressing without erythema or tenderness.    Neurological:      Mental Status: He is alert and oriented to person, place, and time.   Psychiatric:         Behavior: Behavior normal.         Thought Content: Thought content normal.         Judgment: Judgment normal.         Significant Labs:   All pertinent labs from the last 24 hours have been reviewed.    Diagnostic Results:  I have reviewed all pertinent imaging results/findings within the past 24 hours.

## 2020-10-12 NOTE — PT/OT/SLP PROGRESS
Physical Therapy Treatment    Patient Name:  Shell Diaz   MRN:  [id number]    Recommendations:     Discharge Recommendations:  home   Discharge Equipment Recommendations: none   Barriers to discharge: None    Assessment:     Shell Diaz is a 55 y.o. male admitted with a medical diagnosis of Acute myeloid leukemia not having achieved remission.  He presents with the following impairments/functional limitations:  impaired endurance(at risk for deconditioning) Pt. cooperative and tolerated treatment well. Pt. progressing with mobility.    Rehab Prognosis: Good; patient would benefit from acute skilled PT services to address these deficits and reach maximum level of function.    Recent Surgery: Procedure(s) (LRB):  INSERTION, CATHETER, CENTRAL VENOUS, STAUFFER (Right) 21 Days Post-Op    Plan:     During this hospitalization, patient to be seen 1 x/week to address the identified rehab impairments via gait training, therapeutic activities, therapeutic exercises and progress toward the following goals:    · Plan of Care Expires:  10/25/20    Subjective     Chief Complaint: none, however telemetry has HR in 140s during gait.  Patient/Family Comments/goals: to go home  Pain/Comfort:  · Pain Rating 1: 0/10  · Pain Rating Post-Intervention 1: 0/10      Objective:     Communicated with nursing prior to session.  Patient found up in chair with central line upon PT entry to room.     General Precautions: Standard, fall   Orthopedic Precautions:N/A   Braces:       Functional Mobility:  · Transfers:     · Sit to Stand:  supervision with no AD  · Bed to Chair: supervision with  no AD  using  Stand Pivot  · Gait: 500' with Supervision without AD or LOB  · Balance: good      AM-PAC 6 CLICK MOBILITY  Turning over in bed (including adjusting bedclothes, sheets and blankets)?: 4  Sitting down on and standing up from a chair with arms (e.g., wheelchair, bedside commode, etc.): 4  Moving from lying on back to sitting on the side of  the bed?: 4  Moving to and from a bed to a chair (including a wheelchair)?: 4  Need to walk in hospital room?: 4  Climbing 3-5 steps with a railing?: 4  Basic Mobility Total Score: 24       Therapeutic Activities and Exercises:   Discussed pt.'s progress, goals, and POC.    Patient left up in chair with all lines intact and call button in reach..    GOALS:   Multidisciplinary Problems     Physical Therapy Goals        Problem: Physical Therapy Goal    Goal Priority Disciplines Outcome Goal Variances Interventions   Physical Therapy Goal     PT, PT/OT Ongoing, Progressing     Description: Goals to be met by: 10/9/2020, extended to 10/23/2020    Patient will increase functional independence with mobility by performin. Supine to sit with Set-up [place]  2. Sit to supine with Set-up Wise  3. Sit to stand transfer with Supervision  4. Bed to chair transfer with Supervision  5. Gait  x 200 feet with Supervision.   6. Lower extremity exercise program x15 reps per handout, with supervision                     Time Tracking:     PT Received On: 10/12/20  PT Start Time: 1041     PT Stop Time: 1049  PT Total Time (min): 8 min     Billable Minutes: Gait Training 8    Treatment Type: Treatment  PT/PTA: PT           Thiago Ruffin, PT  10/12/2020

## 2020-10-12 NOTE — PLAN OF CARE
Pt AAO; independent. Vital signs stable and pt remained afebrile throughout shift. Patient runs tachycardic with any exertion with HR climbing as high as 160 to 180. Recovers to 70 to 80's with rest. Rapid team aware .  Today is day 8 18 of 7+ 3 Pt receiving  IV fluids a t current nielson of 100/hr. Patient received platelets on previous night shift and one unit PRBC on this shift. .  Accuchecks performed ACHS;    Pt remained free from falls during shift . Spouse at bedside .  Bed lowest position and locked.  Call light in reach.  [facility]

## 2020-10-12 NOTE — PROGRESS NOTES
[facility]  Hematology  Bone Marrow Transplant  Progress Note    Patient Name: Shell Diaz  Admission Date: 9/16/2020  Hospital Length of Stay: 26 days  Code Status: Full Code    Subjective:     Interval History: Day #18 of 7+3. No acute events overnight. This morning at bedside, patient reports feeling well and had no complaints.  RBCs and platelets repleted.  Repeat blood cultures from 10/08 remain NGTD. Day #14 bone marrow biopsy from 10/08 remains in process.     Objective:     Vital Signs (Most Recent):  Temp: 98 °F (36.7 °C) (10/12/20 1130)  Pulse: 71 (10/12/20 1130)  Resp: 16 (10/12/20 1130)  BP: (!) 142/77 (10/12/20 1130)  SpO2: 98 % (10/12/20 1130) Vital Signs (24h Range):  Temp:  [97.9 °F (36.6 °C)-98.4 °F (36.9 °C)] 98 °F (36.7 °C)  Pulse:  [] 71  Resp:  [15-17] 16  SpO2:  [93 %-99 %] 98 %  BP: (120-148)/(63-99) 142/77     Weight: 125.7 kg (277 lb 0.1 oz)  Body mass index is 34.62 kg/m².  Body surface area is 2.58 meters squared.    ECOG SCORE           Intake/Output - Last 3 Shifts       10/10 0700 - 10/11 0659 10/11 0700 - 10/12 0659 10/12 0700 - 10/13 0659    P.O. 240 720     I.V. (mL/kg) 703.3 (5.6) 2270 (18.1)     Blood 350  4310    IV Piggyback 1000 1000     Total Intake(mL/kg) 2293.3 (18.2) 3990 (31.8) 4310 (34.3)    Urine (mL/kg/hr) 3300 (1.1) 2825 (0.9) 1000 (1.3)    Stool  0     Total Output 3300 2825 1000    Net -1006.7 +1165 +3310           Urine Occurrence  2 x     Stool Occurrence  1 x           Physical Exam  Constitutional:       Appearance: He is well-developed.   HENT:      Head: Normocephalic and atraumatic.      Mouth/Throat:      Pharynx: No oropharyngeal exudate.   Eyes:      General:         Right eye: No discharge.         Left eye: No discharge.      Conjunctiva/sclera: Conjunctivae normal.      Pupils: Pupils are equal, round, and reactive to light.   Neck:      Musculoskeletal: Normal range of motion and neck supple.   Cardiovascular:      Rate and  Rhythm: Normal rate. Rhythm irregularly irregular.      Heart sounds: Normal heart sounds. No murmur.   Pulmonary:      Effort: Pulmonary effort is normal.      Breath sounds: Normal breath sounds.      Comments:     Abdominal:      General: Bowel sounds are normal. There is no distension.      Palpations: Abdomen is soft.      Tenderness: There is no abdominal tenderness.   Musculoskeletal:      Right lower leg: No edema.      Left lower leg: No edema.      Comments: 4 X 4 cm bandage over lower sacrum without erythema or tenderness to palpation.    Skin:     General: Skin is warm and dry.      Findings: No erythema or rash.      Comments: Lopes catheter in right upper chest with clean, dry, and intact dressing without erythema or tenderness.    Neurological:      Mental Status: He is alert and oriented to person, place, and time.   Psychiatric:         Behavior: Behavior normal.         Thought Content: Thought content normal.         Judgment: Judgment normal.         Significant Labs:   All pertinent labs from the last 24 hours have been reviewed.    Diagnostic Results:  I have reviewed all pertinent imaging results/findings within the past 24 hours.    Assessment/Plan:     * Acute myeloid leukemia not having achieved remission  This is a 55 year old male who presented to [facility] on 09/16/2020 for suspected acute leukemia after manual differential demonstrated blasts at outside hospital. He underwent bone marrow biopsy on 09/16 showing AML with flow cytometry showing increased population of myeloid blasts showing expression of CD4, CD9, CD11c, CD15, CD13, CD33(97%), CD64, (partial), HLA-DR, and cytoplasmic myeloperoxidase. Fish results - t(10;11), ngs - No pathogenic genetic alteration was detected. Associated with neoplastic effusion of left lower lung. Status post Lopes catheter placement on 09/21. Baseline ECHO from 09/16 with EF of 58%.     Plan:   -Initiated 7+3 on 09/25 with Idarubicin and  Cytarabine; today is day #18.   -Follow-up bone marrow biopsy repeated on 10/08.   -Allopurinol 300 mg BID with IVF for TLS prophylaxis.   -Oppurtunistic infection prophylaxis with Acyclovir, Levofloxacin, and Voriconazole.    -Transfuse cyroglobulin if fibrinogen <150  -Transfuse if platelets <10, Hgb<7 and/or patient actively bleeding  -He is a stem cell transplant candidate. Sent HLA typing earlier in admission. Sent sibling contact info to transplant coordinator.  -aPTT, PT/INR, fibrinogen, uric acid, and phosphorous twice weekly to assess for TLS and DIC.   -See assessment for neoplastic effusion.     External hemorrhoids without complication  -On-going issue since prior to admission that wasn't reported until 10/10.     Plan:   -Topical Hydrocortisone cream ordered PRN.     Neutropenic fever  -Noted to develop a fever on evening of 10/06 prompting infectious work-up with blood cultures, UA, and CXR and initiation of Cefepime and Vancomycin at that time.   -As of 10/08, blood cultures positive for STAPHYLOCOCCUS HAEMOLYTICUS  with sensitivities pending; CXR and UA were unremarkable; Lopes catheter without obvious signs of infection.   -Blood cultures negative as of 10/08.     Plan:   -ID consulted on 10/09 with recommendations to continue Vancomycin for 14 day total course; estimated end date of 10/21/2020.   -Continue outpatient infection prophylaxis.   -Follow-up repeat blood cultures.     Coagulase negative Staphylococcus bacteremia  -See assessment for neutropenic fever.     Hypocalcemia  - Corrected calcium slightly low at 8.6 on 10/05; normalized as of 10/06 with initiation of daily calcium supplement.     Plan:   - Calcium level ordered daily.     Hyperbilirubinemia  -Slight indirect hyperbilirubinemia noted on 09/30 and since normalized.   -Likely secondary to chemotherapy initiation vs intermittent transfusions.   -Synthetic liver function remains WNL.     Plan:   -Trending liver function daily.      Hyperphosphatemia  -Developed secondary to SEBASTIAN and treated with SEVELAMER from 09/28 to 10/01 with improvement.     Resolved.      SEBASTIAN (acute kidney injury)  -Noted to develop SEBASTIAN on 09/23 with Cr peaking at 2.3 on 09/26.   -Nephrology consulted on 09/24 with suspicion for multifactorial SEBASTIAN secondary to Acyclovir, Losartan, contrast-induced from CT chest on 9/17 (Cr increase can be delayed 72-96 hours after contrast), and gradual decrease in hemoglobin. Most likely from ATN.   -Improved back to baseline by 10/01 with use of IVF and avoidance of nephrotoxic agents; nephrology signed off at that time.      Resolved.     Plan:   -Making good urine output daily without signs of hypervolemia.   -Trending renal function daily; monitor renal function closely as Vancomycin was resumed on 10/06.  -Holding home ARB.   -Strict I/O  -Avoid nephrotoxic agents.    -Renally dose medications.    Type 2 diabetes mellitus without complication, without long-term current use of insulin  -Most recent A1c 8 in 09/2020.   -Home regimen: Metformin.   -Noted issues with sub-optimal hyperglycemia while receiving Dexamethasone, however resolved after stopping steroids. Stopped scheduled Aspart on 9/30/20 with blood glucose in 120s and patient no longer receiving steroids.   -Noted new worsening hyperglycemia on 10/08 that may have been secondary to new bacteremia, but resolved as of 10/09.     Plan:   -Continue Detemir 10 U daily with LDSCI.   -Diabetic diet.   -POCT glucose checks with meals and bedtime.   -Goal glucose 140-180.     Non-sustained ventricular tachycardia  - Noted 6 beat run of VT on 9/18/20 at ~ 6 am. No episodes since.    Plan:   - Goal Mg > 2 and K+ > 4  - Telemetry ordered.     Pancytopenia due to antineoplastic chemotherapy  Plan:   - Transfuse if platelet count <10. If patient develops active bleed, transfuse for goal >50.  - Transfuse for Hgb < 7.  - Opportunistic infection prophylaxis with Acyclovir, Levofloxacin,  and Voriconazole.   - Monitoring Voriconazole level; most recent level from 10/5 1.2.        Neoplastic pleural effusion  -Initially presented with fevers and fatigue with significant leukocytosis and blasts with CT chest on 09/17 after transfer here showing abnormal opacity involving the left lower lobe with a prominent area of dense confluent opacity that may relate to an area of dense consolidation, with surrounding prominent confluent infiltrates/airspace disease, as well as a small to moderate left pleural effusion. He was treated with Vancomycin and Zosyn from 09/15 to 09/22.   -Pulmonology consulted on 09/24 after repeat CXR showed progression of left sided pleural effusion. Status post thoracentesis on 09/24 with 1L of blood fluid removed with studies negative for infection, but showing abundant atypical mononuclear cells, suggestive of blasts.   -Repeat CXR on 10/07 with decrease in size of effusion.   -Vital signs stable on room air.     Plan:   -Continue oppurutnistic infection prophylaxis with Levofloxacin.   -Holding home Apixaban in setting of thrombocytopenia.   -See assessment for AML.     Essential hypertension  -Home regimen: Amlodipine and Losartan.     Plan:   -Held home ARB since 09/24 after development of SEBASTIAN and blood pressure currently stable without anti-hypertensive agents ordered.       Permanent atrial fibrillation  -Status post pacemaker placement.   -Home regimen: Apixaban, Digoxin, and Metoprolol.    -Currently rate controlled.   -Baseline ECHO on 09/16 prior to chemotherapy initiation with EF of 58%. Cardiology consulted on 09/17 for clearance for chemotherapy induction in the setting of acute leukemia; repeat ECHO on 09/17 notable for 16% left ventricular straining.     Plan:   -Per cardiology, continued rate control with Metoprolol and Digoxin; decreased Metoprolol to 50 mg with HR paced in 60's.   -Holding Apixaban due to thrombocytopenia; will resume when appropriate.  -Keep Mg >  2 and K+ > 4.  -A follow up echocardiogram is recommended at the end of treatment with Adriamycin and at 6 months post treatment. We will plan of inpatient repeat echo only if patient becomes symptomatic per Dr. Thurman.        VTE Risk Mitigation (From admission, onward)         Ordered     heparin, porcine (PF) 100 unit/mL injection flush 300 Units  As needed (PRN)      09/25/20 1408     heparin (porcine) injection 1,000 Units  As needed (PRN)      09/22/20 0343     IP VTE HIGH RISK PATIENT  Once      09/16/20 0017     Place sequential compression device  Until discontinued      09/16/20 0017                Disposition: Remain in-patient for chemotherapy initiation.     Ruab Dai MD  Bone Marrow Transplant  [facility]

## 2020-10-12 NOTE — ASSESSMENT & PLAN NOTE
This is a 55 year old male who presented to [facility] on 09/16/2020 for suspected acute leukemia after manual differential demonstrated blasts at outside hospital. He underwent bone marrow biopsy on 09/16 showing AML with flow cytometry showing increased population of myeloid blasts showing expression of CD4, CD9, CD11c, CD15, CD13, CD33(97%), CD64, (partial), HLA-DR, and cytoplasmic myeloperoxidase. Fish results - t(10;11), ngs - No pathogenic genetic alteration was detected. Associated with neoplastic effusion of left lower lung. Status post Lopes catheter placement on 09/21. Baseline ECHO from 09/16 with EF of 58%.     Plan:   -Initiated 7+3 on 09/25 with Idarubicin and Cytarabine; today is day #18.   -Follow-up bone marrow biopsy repeated on 10/08.   -Allopurinol 300 mg BID with IVF for TLS prophylaxis.   -Oppurtunistic infection prophylaxis with Acyclovir, Levofloxacin, and Voriconazole.    -Transfuse cyroglobulin if fibrinogen <150  -Transfuse if platelets <10, Hgb<7 and/or patient actively bleeding  -He is a stem cell transplant candidate. Sent HLA typing earlier in admission. Sent sibling contact info to transplant coordinator.  -aPTT, PT/INR, fibrinogen, uric acid, and phosphorous twice weekly to assess for TLS and DIC.   -See assessment for neoplastic effusion.

## 2020-10-12 NOTE — PROGRESS NOTES
Pharmacokinetic Assessment Follow Up: IV Vancomycin    Vancomycin serum concentration assessment(s) & Plan:    · Trough resulted at 19.7 mcg/mL which is within goal of 15-20 mcg/mL  ·  Will continue IV vancomycin 1750 mg Q12H   · Ordered a trough to be drawn 60 mins prior to dose on 10/13/20 at 1200.       Drug levels (last 3 results):  Recent Labs   Lab Result Units 10/10/20  1011 10/12/20  1149   Vancomycin-Trough ug/mL 19.0 19.7       Pharmacy will continue to follow and monitor vancomycin.    Please contact pharmacy at extension [phone] for questions regarding this assessment.    Thank you for the consult,   Karina Luna PharmD        Patient brief summary:  Shell Diaz is a 55 y.o. male initiated on antimicrobial therapy with IV Vancomycin for treatment of bacteremia      Drug Allergies:   Review of patient's allergies indicates:  No Known Allergies    Actual Body Weight:   125.7 kg    Renal Function:   Estimated Creatinine Clearance: 149 mL/min (based on SCr of 0.8 mg/dL).,     Dialysis Method (if applicable):  N/A    CBC (last 72 hours):  Recent Labs   Lab Result Units 10/10/20  0354 10/11/20  0418 10/12/20  0415   WBC K/uL 0.43* 0.37* 0.40*   Hemoglobin g/dL 6.6* 7.0* 6.8*   Hematocrit % 19.1* 20.0* 19.5*   Platelets K/uL 18* 12* 9*   Gran% % 0.0* 2.7* 2.5*   Lymph% % 100.0* 97.3* 97.5*   Mono% % 0.0* 0.0* 0.0*   Eosinophil% % 0.0 0.0 0.0   Basophil% % 0.0 0.0 0.0   Differential Method  Automated Automated Automated       Metabolic Panel (last 72 hours):  Recent Labs   Lab Result Units 10/10/20  0354 10/11/20  0418 10/12/20  0415   Sodium mmol/L 139 138 138   Potassium mmol/L 3.5 3.5 3.5   Chloride mmol/L 104 104 104   CO2 mmol/L 28 27 26   Glucose mg/dL 119* 127* 121*   BUN, Bld mg/dL 12 10 10   Creatinine mg/dL 0.9 0.8 0.8   Albumin g/dL 2.7* 2.7* 2.7*   Total Bilirubin mg/dL 1.1* 1.0 1.0   Alkaline Phosphatase U/L 69 76 77   AST U/L 9* 10 9*   ALT U/L 16 15 15   Magnesium mg/dL 1.7 1.8 1.7    Phosphorus mg/dL 3.2 3.5 3.7       Vancomycin Administrations:  vancomycin given in the last 96 hours                   vancomycin 1.75 g in 5 % dextrose 500 mL IVPB (mg) 1,750 mg New Bag 10/12/20 0108     1,750 mg New Bag 10/11/20 1244     1,750 mg New Bag  0053    vancomycin 2 g in dextrose 5 % 500 mL IVPB (mg) 2,000 mg New Bag 10/10/20 1005     2,000 mg New Bag 10/09/20 2127     2,000 mg New Bag  0930     2,000 mg New Bag 10/08/20 2015                Microbiologic Results:  Microbiology Results (last 7 days)     Procedure Component Value Units Date/Time    Blood culture [[id number]] Collected: 10/08/20 0814    Order Status: Completed Specimen: Blood from Antecubital, Right Arm Updated: 10/12/20 1012     Blood Culture, Routine No Growth to date      No Growth to date      No Growth to date      No Growth to date      No Growth to date    Blood culture [[id number]] Collected: 10/08/20 0814    Order Status: Completed Specimen: Blood Updated: 10/12/20 1012     Blood Culture, Routine No Growth to date      No Growth to date      No Growth to date      No Growth to date      No Growth to date    Blood culture [[id number]]  (Abnormal)  (Susceptibility) Collected: 10/06/20 2203    Order Status: Completed Specimen: Blood Updated: 10/09/20 1209     Blood Culture, Routine Gram stain aer bottle: Gram positive cocci in clusters resembling Staph      Positive results previously called 10/07/2020      STAPHYLOCOCCUS HAEMOLYTICUS    Blood culture [[id number]]  (Abnormal)  (Susceptibility) Collected: 10/06/20 2203    Order Status: Completed Specimen: Blood Updated: 10/09/20 1208     Blood Culture, Routine Gram stain aer bottle: Gram positive cocci in clusters resembling Staph      Results called to and read back by:Marcela Flowers RN 10/07/2020  17:01      STAPHYLOCOCCUS HAEMOLYTICUS

## 2020-10-13 NOTE — ASSESSMENT & PLAN NOTE
-Status post pacemaker placement.   -Home regimen: Apixaban, Digoxin, and Metoprolol.    -Currently rate controlled.   -Baseline ECHO on 09/16 prior to chemotherapy initiation with EF of 58%. Cardiology consulted on 09/17 for clearance for chemotherapy induction in the setting of acute leukemia; repeat ECHO on 09/17 notable for 16% left ventricular straining.     Plan:   -Per cardiology, continued rate control with Metoprolol and Digoxin; decreased Metoprolol to 50 mg with HR paced in 60's.   -Holding Apixaban due to thrombocytopenia; will resume when appropriate.  -Keep Mg > 2 and K+ > 4.  -A follow up echocardiogram is recommended at the end of treatment with Adriamycin and at 6 months post treatment. We will plan of inpatient repeat echo only if patient becomes symptomatic per Dr. Thurman.  -repeated echo on 10/13 EF 45-50%

## 2020-10-13 NOTE — PLAN OF CARE
Pt AAO; independent. Vital signs stable and pt remained afebrile throughout shift. Today is day 18 of 7+ 3   patient is not in remission   and start a new round of induction chemo.  A new echo has been ordered.  Pt receiving Vancomycin  and Continuous IV fluids are in place .   No blood products today   . Electrolytes replaced orally. Accu checks performed ACHS; no aspart coverage needed. Pt remained free from falls during shift.  Bed lowest position and locked.  Call light in reach.  [facility]

## 2020-10-13 NOTE — ASSESSMENT & PLAN NOTE
-Home regimen: Amlodipine and Losartan.     Plan:   -Held home ARB since 09/24 after development of SEBASTIAN   -Restarted ARB at half of home dosing on 10/12

## 2020-10-13 NOTE — SUBJECTIVE & OBJECTIVE
Subjective:     Interval History: No acute events overnight. This morning at bedside, patient reports feeling well and had no complaints.  Repeat blood cultures from 10/08 remain NGTD. Day #14 bone marrow biopsy w/o remission.     Objective:     Vital Signs (Most Recent):  Temp: 97.4 °F (36.3 °C) (10/13/20 1114)  Pulse: 70 (10/13/20 1114)  Resp: 16 (10/13/20 1114)  BP: 129/74 (10/13/20 1114)  SpO2: (!) 94 % (10/13/20 1114) Vital Signs (24h Range):  Temp:  [97.4 °F (36.3 °C)-99.6 °F (37.6 °C)] 97.4 °F (36.3 °C)  Pulse:  [64-92] 70  Resp:  [16-18] 16  SpO2:  [93 %-97 %] 94 %  BP: (129-156)/(64-94) 129/74     Weight: 125.2 kg (276 lb 0.3 oz)  Body mass index is 34.5 kg/m².  Body surface area is 2.57 meters squared.    ECOG SCORE           Intake/Output - Last 3 Shifts       10/11 0700 - 10/12 0659 10/12 0700 - 10/13 0659 10/13 0700 - 10/14 0659    P.O. 720  237    I.V. (mL/kg) 2270 (18.1)  1000 (8)    Blood  4548     IV Piggyback 1000      Total Intake(mL/kg) 3990 (31.8) 4548 (36.3) 1237 (9.9)    Urine (mL/kg/hr) 2825 (0.9) 2775 (0.9) 850 (1)    Stool 0      Total Output 2825 2775 850    Net +1165 +1773 +387           Urine Occurrence 2 x      Stool Occurrence 1 x            Physical Exam  Constitutional:       Appearance: He is well-developed.   HENT:      Head: Normocephalic and atraumatic.      Mouth/Throat:      Pharynx: No oropharyngeal exudate.   Eyes:      General:         Right eye: No discharge.         Left eye: No discharge.      Conjunctiva/sclera: Conjunctivae normal.      Pupils: Pupils are equal, round, and reactive to light.   Neck:      Musculoskeletal: Normal range of motion and neck supple.   Cardiovascular:      Rate and Rhythm: Normal rate. Rhythm irregularly irregular.      Heart sounds: Normal heart sounds. No murmur.   Pulmonary:      Effort: Pulmonary effort is normal.      Breath sounds: Normal breath sounds.      Comments:     Abdominal:      General: Bowel sounds are normal. There is no  distension.      Palpations: Abdomen is soft.      Tenderness: There is no abdominal tenderness.   Musculoskeletal:      Right lower leg: No edema.      Left lower leg: No edema.      Comments: 4 X 4 cm bandage over lower sacrum without erythema or tenderness to palpation.    Skin:     General: Skin is warm and dry.      Findings: No erythema or rash.      Comments: Lopes catheter in right upper chest with clean, dry, and intact dressing without erythema or tenderness.    Neurological:      Mental Status: He is alert and oriented to person, place, and time.   Psychiatric:         Behavior: Behavior normal.         Thought Content: Thought content normal.         Judgment: Judgment normal.         Significant Labs:   All pertinent labs from the last 24 hours have been reviewed.    Diagnostic Results:  I have reviewed all pertinent imaging results/findings within the past 24 hours.

## 2020-10-13 NOTE — PLAN OF CARE
Plan of care reviewed with patient at start of shift and PRN.Pt remains free from falls and injuries. Day 19 7&3. Denies pain. Denies nausea. VSS remained afebrile. Vancomycin continued.Telemetry monitoring continued. Frequent rounding for pain and safety maintained. Bed locked in lowest position,  side rails up x's 2, call light in reach. IVF cont. Glucose monitoring cont w no coverage needed. iInstructed to call for assistance as needed, verbalizes understanding. Will continue to monitor.

## 2020-10-13 NOTE — PHYSICIAN QUERY
PT Name: Shell Diaz  MR #: [id number]     Documentation Clarification      CDS: Jade Al RN     Contact information:[email or web address] or (cell) [phone]    This form is a permanent document in the medical record.     Query Date: October 13, 2020    By submitting this query, we are merely seeking further clarification of documentation. Please utilize your independent clinical judgment when addressing the question(s) below.    The Medical Record reflects the following:    Supporting Clinical Findings Location in Medical Record   Permanent atrial fibrillation  -Status post pacemaker placement.   -Home regimen: Apixaban, Digoxin, and Metoprolol.    -Currently rate controlled.   -Baseline ECHO on 09/16 prior to chemotherapy initiation with EF of 58%. Cardiology consulted on 09/17 for clearance for chemotherapy induction in the setting of acute leukemia; repeat ECHO on 09/17 notable for 16% left ventricular straining.    BMT PN 10/12   Paroxysmal atrial fibrillation  Patient states that he had pacemaker placed for this.   Previsouly ECHO with EF 58 %  Holding Eliquis due to low platelet count. Resume when appropriate.  EKG from 9/16/20 showing afib with RVR  HR now controlled  Cardiology consulted due to cardiac history as patient will receive anthracycline if he is induced for AML  Cardiology repeated ehco 9/17 to assess for ventricle straining. 16% LV straining noted on echo   BMT PN 10/13   Vent. Rate : 123 BPM     Atrial Rate : 187 BPM      P-R Int : 000 ms          QRS Dur : 106 ms       QT Int : 310 ms       P-R-T Axes : 000 -15 158 degrees      QTc Int : 443 ms     Atrial fibrillation with rapid ventricular response   Nonspecific ST and T wave abnormality       EKG 9/16 @ 0209   Vent. Rate : 137 BPM     Atrial Rate : 000 BPM      P-R Int : 000 ms          QRS Dur : 108 ms       QT Int : 300 ms       P-R-T Axes : 000 -04 154 degrees      QTc Int : 453 ms     Atrial fibrillation with rapid  ventricular response   Nonspecific ST and T wave abnormality     EKG 9/16 @ 7167                                                                            Provider, please clarify the conflicting documentation regarding A-fib.     [x ]  Paroxysmal A-fib     [   ]  Permanent A-fib     [   ] Other (please specify): ____________     [  ] Clinically undetermined

## 2020-10-13 NOTE — ASSESSMENT & PLAN NOTE
This is a 55 year old male who presented to [facility] on 09/16/2020 for suspected acute leukemia after manual differential demonstrated blasts at outside hospital. He underwent bone marrow biopsy on 09/16 showing AML with flow cytometry showing increased population of myeloid blasts showing expression of CD4, CD9, CD11c, CD15, CD13, CD33(97%), CD64, (partial), HLA-DR, and cytoplasmic myeloperoxidase. Fish results - t(10;11), ngs - No pathogenic genetic alteration was detected. Associated with neoplastic effusion of left lower lung. Status post Lopes catheter placement on 09/21. Baseline ECHO from 09/16 with EF of 58%.     Plan:   -Initiated 7+3 on 09/25 with Idarubicin and Cytarabine; today is day #19.   -Follow-up bone marrow biopsy repeated on 10/08: no remission.   -Will begin another round of induction chemo.   -Allopurinol 300 mg BID with IVF for TLS prophylaxis.   -Oppurtunistic infection prophylaxis with Acyclovir, Levofloxacin, and Voriconazole.    -Transfuse cyroglobulin if fibrinogen <150  -Transfuse if platelets <10, Hgb<7 and/or patient actively bleeding  -He is a stem cell transplant candidate. Sent HLA typing earlier in admission. Sent sibling contact info to transplant coordinator.  -aPTT, PT/INR, fibrinogen, uric acid, and phosphorous twice weekly to assess for TLS and DIC.   -See assessment for neoplastic effusion.

## 2020-10-13 NOTE — PROGRESS NOTES
[facility]  Hematology  Bone Marrow Transplant  Progress Note    Patient Name: Shell Diaz  Admission Date: 9/16/2020  Hospital Length of Stay: 27 days  Code Status: Full Code    Subjective:     Interval History: No acute events overnight. This morning at bedside, patient reports feeling well and had no complaints.  Repeat blood cultures from 10/08 remain NGTD. Day #14 bone marrow biopsy w/o remission.     Objective:     Vital Signs (Most Recent):  Temp: 97.4 °F (36.3 °C) (10/13/20 1114)  Pulse: 70 (10/13/20 1114)  Resp: 16 (10/13/20 1114)  BP: 129/74 (10/13/20 1114)  SpO2: (!) 94 % (10/13/20 1114) Vital Signs (24h Range):  Temp:  [97.4 °F (36.3 °C)-99.6 °F (37.6 °C)] 97.4 °F (36.3 °C)  Pulse:  [64-92] 70  Resp:  [16-18] 16  SpO2:  [93 %-97 %] 94 %  BP: (129-156)/(64-94) 129/74     Weight: 125.2 kg (276 lb 0.3 oz)  Body mass index is 34.5 kg/m².  Body surface area is 2.57 meters squared.    ECOG SCORE           Intake/Output - Last 3 Shifts       10/11 0700 - 10/12 0659 10/12 0700 - 10/13 0659 10/13 0700 - 10/14 0659    P.O. 720  237    I.V. (mL/kg) 2270 (18.1)  1000 (8)    Blood  4548     IV Piggyback 1000      Total Intake(mL/kg) 3990 (31.8) 4548 (36.3) 1237 (9.9)    Urine (mL/kg/hr) 2825 (0.9) 2775 (0.9) 850 (1)    Stool 0      Total Output 2825 2775 850    Net +1165 +1773 +387           Urine Occurrence 2 x      Stool Occurrence 1 x            Physical Exam  Constitutional:       Appearance: He is well-developed.   HENT:      Head: Normocephalic and atraumatic.      Mouth/Throat:      Pharynx: No oropharyngeal exudate.   Eyes:      General:         Right eye: No discharge.         Left eye: No discharge.      Conjunctiva/sclera: Conjunctivae normal.      Pupils: Pupils are equal, round, and reactive to light.   Neck:      Musculoskeletal: Normal range of motion and neck supple.   Cardiovascular:      Rate and Rhythm: Normal rate. Rhythm irregularly irregular.      Heart sounds: Normal  heart sounds. No murmur.   Pulmonary:      Effort: Pulmonary effort is normal.      Breath sounds: Normal breath sounds.      Comments:     Abdominal:      General: Bowel sounds are normal. There is no distension.      Palpations: Abdomen is soft.      Tenderness: There is no abdominal tenderness.   Musculoskeletal:      Right lower leg: No edema.      Left lower leg: No edema.      Comments: 4 X 4 cm bandage over lower sacrum without erythema or tenderness to palpation.    Skin:     General: Skin is warm and dry.      Findings: No erythema or rash.      Comments: Lopes catheter in right upper chest with clean, dry, and intact dressing without erythema or tenderness.    Neurological:      Mental Status: He is alert and oriented to person, place, and time.   Psychiatric:         Behavior: Behavior normal.         Thought Content: Thought content normal.         Judgment: Judgment normal.         Significant Labs:   All pertinent labs from the last 24 hours have been reviewed.    Diagnostic Results:  I have reviewed all pertinent imaging results/findings within the past 24 hours.    Assessment/Plan:     * Acute myeloid leukemia not having achieved remission  This is a 55 year old male who presented to [facility] on 09/16/2020 for suspected acute leukemia after manual differential demonstrated blasts at outside hospital. He underwent bone marrow biopsy on 09/16 showing AML with flow cytometry showing increased population of myeloid blasts showing expression of CD4, CD9, CD11c, CD15, CD13, CD33(97%), CD64, (partial), HLA-DR, and cytoplasmic myeloperoxidase. Fish results - t(10;11), ngs - No pathogenic genetic alteration was detected. Associated with neoplastic effusion of left lower lung. Status post Lopes catheter placement on 09/21. Baseline ECHO from 09/16 with EF of 58%.     Plan:   -Initiated 7+3 on 09/25 with Idarubicin and Cytarabine; today is day #19.   -Follow-up bone marrow biopsy repeated on 10/08: no  remission.   -Considering another round of in-pt induction chemo vs out-pt treatment    -Repeat echo ordered  -Allopurinol 300 mg BID with IVF for TLS prophylaxis.   -Oppurtunistic infection prophylaxis with Acyclovir, Levofloxacin, and Voriconazole.    -Transfuse cyroglobulin if fibrinogen <150  -Transfuse if platelets <10, Hgb<7 and/or patient actively bleeding  -He is a stem cell transplant candidate. Sent HLA typing earlier in admission. Sent sibling contact info to transplant coordinator.  -aPTT, PT/INR, fibrinogen, uric acid, and phosphorous twice weekly to assess for TLS and DIC.   -See assessment for neoplastic effusion.     External hemorrhoids without complication  -On-going issue since prior to admission that wasn't reported until 10/10.     Plan:   -Topical Hydrocortisone cream ordered PRN.     Neutropenic fever  -Noted to develop a fever on evening of 10/06 prompting infectious work-up with blood cultures, UA, and CXR and initiation of Cefepime and Vancomycin at that time.   -As of 10/08, blood cultures positive for STAPHYLOCOCCUS HAEMOLYTICUS  with sensitivities pending; CXR and UA were unremarkable; Lopes catheter without obvious signs of infection.   -Blood cultures negative as of 10/08.     Plan:   -ID consulted on 10/09 with recommendations to continue Vancomycin for 14 day total course; estimated end date of 10/21/2020.   -Continue outpatient infection prophylaxis.   -Follow-up repeat blood cultures.     Coagulase negative Staphylococcus bacteremia  -See assessment for neutropenic fever.     Hypocalcemia  - Corrected calcium slightly low at 8.6 on 10/05; normalized as of 10/06 with initiation of daily calcium supplement.     Plan:   - Calcium level ordered daily.     Hyperbilirubinemia  -Slight indirect hyperbilirubinemia noted on 09/30 and since normalized.   -Likely secondary to chemotherapy initiation vs intermittent transfusions.   -Synthetic liver function remains WNL.     Plan:   -Trending  liver function daily.     Hyperphosphatemia  -Developed secondary to SEBASTIAN and treated with SEVELAMER from 09/28 to 10/01 with improvement.     Resolved.      SEBASTIAN (acute kidney injury)  -Noted to develop SEBASTIAN on 09/23 with Cr peaking at 2.3 on 09/26.   -Nephrology consulted on 09/24 with suspicion for multifactorial SEBASTIAN secondary to Acyclovir, Losartan, contrast-induced from CT chest on 9/17 (Cr increase can be delayed 72-96 hours after contrast), and gradual decrease in hemoglobin. Most likely from ATN.   -Improved back to baseline by 10/01 with use of IVF and avoidance of nephrotoxic agents; nephrology signed off at that time.      Resolved.     Plan:   -Making good urine output daily without signs of hypervolemia.   -Trending renal function daily; monitor renal function closely as Vancomycin was resumed on 10/06.  -Holding home ARB.   -Strict I/O  -Avoid nephrotoxic agents.    -Renally dose medications.    Type 2 diabetes mellitus without complication, without long-term current use of insulin  -Most recent A1c 8 in 09/2020.   -Home regimen: Metformin.   -Noted issues with sub-optimal hyperglycemia while receiving Dexamethasone, however resolved after stopping steroids. Stopped scheduled Aspart on 9/30/20 with blood glucose in 120s and patient no longer receiving steroids.   -Noted new worsening hyperglycemia on 10/08 that may have been secondary to new bacteremia, but resolved as of 10/09.     Plan:   -Continue Detemir 10 U daily with LDSCI.   -Diabetic diet.   -POCT glucose checks with meals and bedtime.   -Goal glucose 140-180.     Non-sustained ventricular tachycardia  - Noted 6 beat run of VT on 9/18/20 at ~ 6 am. No episodes since.    Plan:   - Goal Mg > 2 and K+ > 4  - Telemetry ordered.     Pancytopenia due to antineoplastic chemotherapy  Plan:   - Transfuse if platelet count <10. If patient develops active bleed, transfuse for goal >50.  - Transfuse for Hgb < 7.  - Opportunistic infection prophylaxis with  Acyclovir, Levofloxacin, and Voriconazole.   - Monitoring Voriconazole level; most recent level from 10/5 1.2.        Neoplastic pleural effusion  -Initially presented with fevers and fatigue with significant leukocytosis and blasts with CT chest on 09/17 after transfer here showing abnormal opacity involving the left lower lobe with a prominent area of dense confluent opacity that may relate to an area of dense consolidation, with surrounding prominent confluent infiltrates/airspace disease, as well as a small to moderate left pleural effusion. He was treated with Vancomycin and Zosyn from 09/15 to 09/22.   -Pulmonology consulted on 09/24 after repeat CXR showed progression of left sided pleural effusion. Status post thoracentesis on 09/24 with 1L of blood fluid removed with studies negative for infection, but showing abundant atypical mononuclear cells, suggestive of blasts.   -Repeat CXR on 10/07 with decrease in size of effusion.   -Vital signs stable on room air.     Plan:   -Continue oppurutnistic infection prophylaxis with Levofloxacin.   -Holding home Apixaban in setting of thrombocytopenia.   -See assessment for AML.     Essential hypertension  -Home regimen: Amlodipine and Losartan.     Plan:   -Held home ARB since 09/24 after development of SEBASTIAN   -Restarted ARB at half of home dosing on 10/12      Permanent atrial fibrillation  -Status post pacemaker placement.   -Home regimen: Apixaban, Digoxin, and Metoprolol.    -Currently rate controlled.   -Baseline ECHO on 09/16 prior to chemotherapy initiation with EF of 58%. Cardiology consulted on 09/17 for clearance for chemotherapy induction in the setting of acute leukemia; repeat ECHO on 09/17 notable for 16% left ventricular straining.     Plan:   -Per cardiology, continued rate control with Metoprolol and Digoxin; decreased Metoprolol to 50 mg with HR paced in 60's.   -Holding Apixaban due to thrombocytopenia; will resume when appropriate.  -Keep Mg > 2 and  K+ > 4.  -A follow up echocardiogram is recommended at the end of treatment with Adriamycin and at 6 months post treatment. We will plan of inpatient repeat echo only if patient becomes symptomatic per Dr. Thurman.  -Repeat echo ordered        VTE Risk Mitigation (From admission, onward)         Ordered     heparin, porcine (PF) 100 unit/mL injection flush 300 Units  As needed (PRN)      09/25/20 1408     heparin (porcine) injection 1,000 Units  As needed (PRN)      09/22/20 0343     IP VTE HIGH RISK PATIENT  Once      09/16/20 0017     Place sequential compression device  Until discontinued      09/16/20 0017                Disposition: in patient chemo    Ruba Dai MD  Bone Marrow Transplant  [facility]

## 2020-10-13 NOTE — PROGRESS NOTES
Pharmacokinetic Assessment Follow Up: IV Vancomycin    Vancomycin serum concentration assessment(s) & Plan:    · Trough resulted at 18.1 mcg/mL with a goal of 15-20 mcg/mL   · Will continue IV vancomycin 1750 mg Q12H   · Will watch renal function and order trough in next 5-7 days or sooner if clinically unstable     Drug levels (last 3 results):  Recent Labs   Lab Result Units 10/12/20  1149 10/13/20  1218   Vancomycin-Trough ug/mL 19.7 18.1       Pharmacy will continue to follow and monitor vancomycin.    Please contact pharmacy at extension [phone] for questions regarding this assessment.    Thank you for the consult,   Karina Luna, Aneesh        Patient brief summary:  Shell Diaz is a 55 y.o. male initiated on antimicrobial therapy with IV Vancomycin for treatment of bacteremia      Drug Allergies:   Review of patient's allergies indicates:  No Known Allergies    Actual Body Weight:   125.2 kg    Renal Function:   Estimated Creatinine Clearance: 132.2 mL/min (based on SCr of 0.9 mg/dL).,     Dialysis Method (if applicable):  N/A    CBC (last 72 hours):  Recent Labs   Lab Result Units 10/11/20  0418 10/12/20  0415 10/13/20  0430   WBC K/uL 0.37* 0.40* 0.34*   Hemoglobin g/dL 7.0* 6.8* 7.1*   Hematocrit % 20.0* 19.5* 21.0*   Platelets K/uL 12* 9* 19*   Gran% % 2.7* 2.5* 0.0*   Lymph% % 97.3* 97.5* 100.0*   Mono% % 0.0* 0.0* 0.0*   Eosinophil% % 0.0 0.0 0.0   Basophil% % 0.0 0.0 0.0   Differential Method  Automated Automated Automated       Metabolic Panel (last 72 hours):  Recent Labs   Lab Result Units 10/11/20  0418 10/12/20  0415 10/13/20  0430   Sodium mmol/L 138 138 137   Potassium mmol/L 3.5 3.5 3.6   Chloride mmol/L 104 104 104   CO2 mmol/L 27 26 25   Glucose mg/dL 127* 121* 129*   BUN, Bld mg/dL 10 10 11   Creatinine mg/dL 0.8 0.8 0.9   Albumin g/dL 2.7* 2.7* 2.7*   Total Bilirubin mg/dL 1.0 1.0 1.0   Alkaline Phosphatase U/L 76 77 81   AST U/L 10 9* 10   ALT U/L 15 15 17   Magnesium mg/dL 1.8 1.7 1.7    Phosphorus mg/dL 3.5 3.7 3.6       Vancomycin Administrations:  vancomycin given in the last 96 hours                   vancomycin 1.75 g in 5 % dextrose 500 mL IVPB (mg) 1,750 mg New Bag 10/13/20 1244     1,750 mg New Bag  0100     1,750 mg New Bag 10/12/20 1332     1,750 mg New Bag  0108     1,750 mg New Bag 10/11/20 1244     1,750 mg New Bag  0053    vancomycin 2 g in dextrose 5 % 500 mL IVPB (mg) 2,000 mg New Bag 10/10/20 1005     2,000 mg New Bag 10/09/20 2127                Microbiologic Results:  Microbiology Results (last 7 days)     Procedure Component Value Units Date/Time    Fungus culture [[id number]] Collected: 09/24/20 1154    Order Status: Completed Specimen: Body Fluid from Pleural Fluid Updated: 10/13/20 1243     Fungus (Mycology) Culture Culture in progress      No fungus isolated after 2 weeks    Blood culture [[id number]] Collected: 10/08/20 0814    Order Status: Completed Specimen: Blood from Antecubital, Right Arm Updated: 10/13/20 1012     Blood Culture, Routine No growth after 5 days.    Blood culture [[id number]] Collected: 10/08/20 0814    Order Status: Completed Specimen: Blood Updated: 10/13/20 1012     Blood Culture, Routine No growth after 5 days.    Blood culture [[id number]]  (Abnormal)  (Susceptibility) Collected: 10/06/20 2203    Order Status: Completed Specimen: Blood Updated: 10/09/20 1209     Blood Culture, Routine Gram stain aer bottle: Gram positive cocci in clusters resembling Staph      Positive results previously called 10/07/2020      STAPHYLOCOCCUS HAEMOLYTICUS    Blood culture [[id number]]  (Abnormal)  (Susceptibility) Collected: 10/06/20 2203    Order Status: Completed Specimen: Blood Updated: 10/09/20 1208     Blood Culture, Routine Gram stain aer bottle: Gram positive cocci in clusters resembling Staph      Results called to and read back by:Marcela Flowers RN 10/07/2020  17:01      STAPHYLOCOCCUS HAEMOLYTICUS

## 2020-10-13 NOTE — ASSESSMENT & PLAN NOTE
This is a 55 year old male who presented to [facility] on 09/16/2020 for suspected acute leukemia after manual differential demonstrated blasts at outside hospital. He underwent bone marrow biopsy on 09/16 showing AML with flow cytometry showing increased population of myeloid blasts showing expression of CD4, CD9, CD11c, CD15, CD13, CD33(97%), CD64, (partial), HLA-DR, and cytoplasmic myeloperoxidase. Fish results - t(10;11), ngs - No pathogenic genetic alteration was detected. Associated with neoplastic effusion of left lower lung. Status post Lopes catheter placement on 09/21. Baseline ECHO from 09/16 with EF of 58%.     Plan:   -Initiated 7+3 on 09/25 with Idarubicin and Cytarabine; today is day #20   -Follow-up bone marrow biopsy repeated on 10/08: no remission.   -Considering Decitabine/Venetoclax out-pt treatment on 10/19 following 's f/u  -Allopurinol 300 mg BID with IVF for TLS prophylaxis.   -Oppurtunistic infection prophylaxis with Acyclovir, Levofloxacin, and Voriconazole.    -Transfuse cyroglobulin if fibrinogen <150  -Transfuse if platelets <10, Hgb<7 and/or patient actively bleeding  -He is a stem cell transplant candidate. Sent HLA typing earlier in admission. Sent sibling contact info to transplant coordinator.  -aPTT, PT/INR, fibrinogen, uric acid, and phosphorous twice weekly to assess for TLS and DIC.   -See assessment for neoplastic effusion.

## 2020-10-14 NOTE — PLAN OF CARE
Patient is progressing and involved with plan of care. Frequent rounds made to assess pain and safety. Pt communicating needs throughout shift. Up ad chuck. Tachy when standing or moving. IVF continued as ordered. Vanc continued as ordered. Poor appetite, voiding without difficulty. No c/o pain. Pt to receive 1 U PRBC this morning. Blood glucose below 200. All vitals stable; no acute events this shift. Pt. Remaining free from falls or injury throughout shift; bed in lowest position; side rails up X2; call light within reach; pt instructed to call for assistance as needed - verbalized understanding. Q2h rounding on patient. Will continue to monitor.

## 2020-10-14 NOTE — PROGRESS NOTES
[facility]  Hematology  Bone Marrow Transplant  Progress Note    Patient Name: Shell Diaz  Admission Date: 9/16/2020  Hospital Length of Stay: 28 days  Code Status: Full Code    Subjective:     Interval History: Day #20 of 7+3 induction. Day +14 Bone Bx  Shows 34% blast. Plan for outpatient decitabine/venetoclax on 10/19 following 's f/u. Remains pancytopenic with ANC 10. Remains afebrile now. Received 1 unit of PRBC for Hgb 6.9. Replacing mg and K+.     Objective:     Vital Signs (Most Recent):  Temp: 97.8 °F (36.6 °C) (10/14/20 1104)  Pulse: 91 (10/14/20 1104)  Resp: 16 (10/14/20 1104)  BP: (!) 145/72 (10/14/20 1104)  SpO2: 96 % (10/14/20 1104) Vital Signs (24h Range):  Temp:  [97.8 °F (36.6 °C)-99.4 °F (37.4 °C)] 97.8 °F (36.6 °C)  Pulse:  [64-91] 91  Resp:  [16-18] 16  SpO2:  [92 %-97 %] 96 %  BP: (129-166)/(64-94) 145/72     Weight: 124.1 kg (273 lb 9.5 oz)  Body mass index is 34.2 kg/m².  Body surface area is 2.56 meters squared.    ECOG SCORE           Intake/Output - Last 3 Shifts       10/12 0700 - 10/13 0659 10/13 0700 - 10/14 0659 10/14 0700 - 10/15 0659    P.O.  592 360    I.V. (mL/kg)  3200 (25.8) 401.7 (3.2)    Blood 4548 0 350    IV Piggyback  500     Total Intake(mL/kg) 4548 (36.3) 4292 (34.6) 1111.7 (9)    Urine (mL/kg/hr) 2775 (0.9) 3800 (1.3) 1850 (3)    Stool       Total Output 2775 3800 1850    Net +1773 +492 -738.3                 Physical Exam  Constitutional:       Appearance: He is well-developed.   HENT:      Head: Normocephalic and atraumatic.      Mouth/Throat:      Pharynx: No oropharyngeal exudate.   Eyes:      General:         Right eye: No discharge.         Left eye: No discharge.      Conjunctiva/sclera: Conjunctivae normal.      Pupils: Pupils are equal, round, and reactive to light.   Neck:      Musculoskeletal: Normal range of motion and neck supple.   Cardiovascular:      Rate and Rhythm: Normal rate. Rhythm irregularly irregular.      Heart  sounds: Normal heart sounds. No murmur.   Pulmonary:      Effort: Pulmonary effort is normal.      Breath sounds: Normal breath sounds.      Comments:     Abdominal:      General: Bowel sounds are normal. There is no distension.      Palpations: Abdomen is soft.      Tenderness: There is no abdominal tenderness.   Musculoskeletal:      Right lower leg: No edema.      Left lower leg: No edema.      Comments: 4 X 4 cm bandage over lower sacrum without erythema or tenderness to palpation.    Skin:     General: Skin is warm and dry.      Findings: No erythema or rash.      Comments: Lopes catheter in right upper chest with clean, dry, and intact dressing without erythema or tenderness.    Neurological:      Mental Status: He is alert and oriented to person, place, and time.   Psychiatric:         Behavior: Behavior normal.         Thought Content: Thought content normal.         Judgment: Judgment normal.         Significant Labs:   All pertinent labs from the last 24 hours have been reviewed.    Diagnostic Results:  I have reviewed all pertinent imaging results/findings within the past 24 hours.    Assessment/Plan:     * Acute myeloid leukemia not having achieved remission  This is a 55 year old male who presented to [facility] on 09/16/2020 for suspected acute leukemia after manual differential demonstrated blasts at outside hospital. He underwent bone marrow biopsy on 09/16 showing AML with flow cytometry showing increased population of myeloid blasts showing expression of CD4, CD9, CD11c, CD15, CD13, CD33(97%), CD64, (partial), HLA-DR, and cytoplasmic myeloperoxidase. Fish results - t(10;11), ngs - No pathogenic genetic alteration was detected. Associated with neoplastic effusion of left lower lung. Status post Lopes catheter placement on 09/21. Baseline ECHO from 09/16 with EF of 58%.     Plan:   -Initiated 7+3 on 09/25 with Idarubicin and Cytarabine; today is day #20   -Follow-up bone marrow biopsy repeated  on 10/08: no remission.   -Considering Decitabine/Venetoclax out-pt treatment on 10/19 following 's f/u  -Allopurinol 300 mg BID with IVF for TLS prophylaxis.   -Oppurtunistic infection prophylaxis with Acyclovir, Levofloxacin, and Voriconazole.    -Transfuse cyroglobulin if fibrinogen <150  -Transfuse if platelets <10, Hgb<7 and/or patient actively bleeding  -He is a stem cell transplant candidate. Sent HLA typing earlier in admission. Sent sibling contact info to transplant coordinator.  -aPTT, PT/INR, fibrinogen, uric acid, and phosphorous twice weekly to assess for TLS and DIC.   -See assessment for neoplastic effusion.     Pancytopenia due to antineoplastic chemotherapy  Plan:   - Transfuse if platelet count <10. If patient develops active bleed, transfuse for goal >50.  - Transfuse for Hgb < 7.  - Opportunistic infection prophylaxis with Acyclovir, Levofloxacin, and Voriconazole.   - Monitoring Voriconazole level; most recent level from 10/5 1.2.        Neutropenic fever  -Noted to develop a fever on evening of 10/06 prompting infectious work-up with blood cultures, UA, and CXR and initiation of Cefepime and Vancomycin at that time.   -As of 10/08, blood cultures positive for STAPHYLOCOCCUS HAEMOLYTICUS  with sensitivities pending; CXR and UA were unremarkable; Lopes catheter without obvious signs of infection.   -Blood cultures negative as of 10/08.     Plan:   -ID consulted on 10/09 with recommendations to continue Vancomycin for 14 day total course; estimated end date of 10/21/2020.   -Continue outpatient infection prophylaxis.   -Follow-up repeat blood cultures.     External hemorrhoids without complication  -On-going issue since prior to admission that wasn't reported until 10/10.     Plan:   -Topical Hydrocortisone cream ordered PRN.     Coagulase negative Staphylococcus bacteremia  -See assessment for neutropenic fever.     Hypocalcemia  - Corrected calcium slightly low at 8.6 on 10/05;  normalized as of 10/06 with initiation of daily calcium supplement.     Plan:   - Calcium level ordered daily.     Hyperbilirubinemia  -Slight indirect hyperbilirubinemia noted on 09/30 and since normalized.   -Likely secondary to chemotherapy initiation vs intermittent transfusions.   -Synthetic liver function remains WNL.     Plan:   -Trending liver function daily.     Hyperphosphatemia  -Developed secondary to SEBASTIAN and treated with SEVELAMER from 09/28 to 10/01 with improvement.     Resolved.      SEBASTIAN (acute kidney injury)  -Noted to develop SEBASTIAN on 09/23 with Cr peaking at 2.3 on 09/26.   -Nephrology consulted on 09/24 with suspicion for multifactorial SEBASTIAN secondary to Acyclovir, Losartan, contrast-induced from CT chest on 9/17 (Cr increase can be delayed 72-96 hours after contrast), and gradual decrease in hemoglobin. Most likely from ATN.   -Improved back to baseline by 10/01 with use of IVF and avoidance of nephrotoxic agents; nephrology signed off at that time.      Resolved.     Plan:   -Making good urine output daily without signs of hypervolemia.   -Trending renal function daily; monitor renal function closely as Vancomycin was resumed on 10/06.  -Holding home ARB.   -Strict I/O  -Avoid nephrotoxic agents.    -Renally dose medications.    Type 2 diabetes mellitus without complication, without long-term current use of insulin  -Most recent A1c 8 in 09/2020.   -Home regimen: Metformin.   -Noted issues with sub-optimal hyperglycemia while receiving Dexamethasone, however resolved after stopping steroids. Stopped scheduled Aspart on 9/30/20 with blood glucose in 120s and patient no longer receiving steroids.   -Noted new worsening hyperglycemia on 10/08 that may have been secondary to new bacteremia, but resolved as of 10/09.     Plan:   -Continue Detemir 10 U daily with LDSCI.   -Diabetic diet.   -POCT glucose checks with meals and bedtime.   -Goal glucose 140-180.     Non-sustained ventricular tachycardia  -  Noted 6 beat run of VT on 9/18/20 at ~ 6 am. No episodes since.    Plan:   - Goal Mg > 2 and K+ > 4  - Telemetry ordered.     Neoplastic pleural effusion  -Initially presented with fevers and fatigue with significant leukocytosis and blasts with CT chest on 09/17 after transfer here showing abnormal opacity involving the left lower lobe with a prominent area of dense confluent opacity that may relate to an area of dense consolidation, with surrounding prominent confluent infiltrates/airspace disease, as well as a small to moderate left pleural effusion. He was treated with Vancomycin and Zosyn from 09/15 to 09/22.   -Pulmonology consulted on 09/24 after repeat CXR showed progression of left sided pleural effusion. Status post thoracentesis on 09/24 with 1L of blood fluid removed with studies negative for infection, but showing abundant atypical mononuclear cells, suggestive of blasts.   -Repeat CXR on 10/07 with decrease in size of effusion.   -Vital signs stable on room air.     Plan:   -Continue oppurutnistic infection prophylaxis with Levofloxacin.   -Holding home Apixaban in setting of thrombocytopenia.   -See assessment for AML.     Essential hypertension  -Home regimen: Amlodipine and Losartan.     Plan:   -Held home ARB since 09/24 after development of SEBASTIAN   -Restarted ARB at half of home dosing on 10/12      Permanent atrial fibrillation  -Status post pacemaker placement.   -Home regimen: Apixaban, Digoxin, and Metoprolol.    -Currently rate controlled.   -Baseline ECHO on 09/16 prior to chemotherapy initiation with EF of 58%. Cardiology consulted on 09/17 for clearance for chemotherapy induction in the setting of acute leukemia; repeat ECHO on 09/17 notable for 16% left ventricular straining.     Plan:   -Per cardiology, continued rate control with Metoprolol and Digoxin; decreased Metoprolol to 50 mg with HR paced in 60's.   -Holding Apixaban due to thrombocytopenia; will resume when appropriate.  -Keep Mg  > 2 and K+ > 4.  -A follow up echocardiogram is recommended at the end of treatment with Adriamycin and at 6 months post treatment. We will plan of inpatient repeat echo only if patient becomes symptomatic per Dr. Thurman.  -repeated echo on 10/13 EF 45-50%        VTE Risk Mitigation (From admission, onward)         Ordered     heparin, porcine (PF) 100 unit/mL injection flush 300 Units  As needed (PRN)      09/25/20 1408     heparin (porcine) injection 1,000 Units  As needed (PRN)      09/22/20 0343     IP VTE HIGH RISK PATIENT  Once      09/16/20 0017     Place sequential compression device  Until discontinued      09/16/20 0017                Disposition: Remains inpatient, plan to discharge tomorrow.    Lilliana Fraizer NP  Bone Marrow Transplant  [facility]

## 2020-10-14 NOTE — SUBJECTIVE & OBJECTIVE
Subjective:     Interval History: Day #20 of 7+3 induction. Day +14 Bone Bx  Shows 34% blast. Plan for outpatient decitabine/venetoclax on 10/19 following 's f/u. Remains pancytopenic with ANC 10. Remains afebrile now. Received 1 unit of PRBC for Hgb 6.9. Replacing mg and phos.     Objective:     Vital Signs (Most Recent):  Temp: 97.8 °F (36.6 °C) (10/14/20 1104)  Pulse: 91 (10/14/20 1104)  Resp: 16 (10/14/20 1104)  BP: (!) 145/72 (10/14/20 1104)  SpO2: 96 % (10/14/20 1104) Vital Signs (24h Range):  Temp:  [97.8 °F (36.6 °C)-99.4 °F (37.4 °C)] 97.8 °F (36.6 °C)  Pulse:  [64-91] 91  Resp:  [16-18] 16  SpO2:  [92 %-97 %] 96 %  BP: (129-166)/(64-94) 145/72     Weight: 124.1 kg (273 lb 9.5 oz)  Body mass index is 34.2 kg/m².  Body surface area is 2.56 meters squared.    ECOG SCORE           Intake/Output - Last 3 Shifts       10/12 0700 - 10/13 0659 10/13 0700 - 10/14 0659 10/14 0700 - 10/15 0659    P.O.  592 360    I.V. (mL/kg)  3200 (25.8) 401.7 (3.2)    Blood 4548 0 350    IV Piggyback  500     Total Intake(mL/kg) 4548 (36.3) 4292 (34.6) 1111.7 (9)    Urine (mL/kg/hr) 2775 (0.9) 3800 (1.3) 1850 (3)    Stool       Total Output 2775 3800 1850    Net +1773 +492 -738.3                 Physical Exam  Constitutional:       Appearance: He is well-developed.   HENT:      Head: Normocephalic and atraumatic.      Mouth/Throat:      Pharynx: No oropharyngeal exudate.   Eyes:      General:         Right eye: No discharge.         Left eye: No discharge.      Conjunctiva/sclera: Conjunctivae normal.      Pupils: Pupils are equal, round, and reactive to light.   Neck:      Musculoskeletal: Normal range of motion and neck supple.   Cardiovascular:      Rate and Rhythm: Normal rate. Rhythm irregularly irregular.      Heart sounds: Normal heart sounds. No murmur.   Pulmonary:      Effort: Pulmonary effort is normal.      Breath sounds: Normal breath sounds.      Comments:     Abdominal:      General: Bowel sounds are  normal. There is no distension.      Palpations: Abdomen is soft.      Tenderness: There is no abdominal tenderness.   Musculoskeletal:      Right lower leg: No edema.      Left lower leg: No edema.      Comments: 4 X 4 cm bandage over lower sacrum without erythema or tenderness to palpation.    Skin:     General: Skin is warm and dry.      Findings: No erythema or rash.      Comments: Lopes catheter in right upper chest with clean, dry, and intact dressing without erythema or tenderness.    Neurological:      Mental Status: He is alert and oriented to person, place, and time.   Psychiatric:         Behavior: Behavior normal.         Thought Content: Thought content normal.         Judgment: Judgment normal.         Significant Labs:   All pertinent labs from the last 24 hours have been reviewed.    Diagnostic Results:  I have reviewed all pertinent imaging results/findings within the past 24 hours.

## 2020-10-14 NOTE — PLAN OF CARE
Pt involved in plan of care and communicating needs throughout shift.  Family at bedside and involved in care.  Pt up in room independently without c/o pain or discomfort today.  Pt day 19 of 7/3 induction without achieving remission. Pt with poor appetite, boost encouraged.  IVF infusing, oral fluids being promoted.  Neutropenic precautions maintained. Electrolytes given per protocol.   All VSS; no acute events so far this shift.  Pt remaining free from falls or injury throughout shift; bed in lowest position; call light within reach.  Pt instructed to call for assistance as needed.  Q1H rounding done on pt. Pt anticipating D/C tomorrow to begin outpatient chemotherapy.

## 2020-10-14 NOTE — PLAN OF CARE
[facility]    HOME HEALTH ORDERS  FACE TO FACE ENCOUNTER    Patient Name: Shell Diaz  YOB: 1965    Primary Oncologist:    PCP Address: [facility], [place], [place], [address]  PCP Phone Number: [phone]  PCP Fax: [phone]  Encounter Date: 10/14/2020    Admit to Home Health    Diagnoses:  Active Hospital Problems    Diagnosis  POA    *Acute myeloid leukemia not having achieved remission [C92.00]  Yes     Priority: 1 - High    Pancytopenia due to antineoplastic chemotherapy [D61.810, T45.1X5A]  Yes     Priority: 2     Neutropenic fever [D70.9, R50.81]  No     Priority: 3     External hemorrhoids without complication [K64.4]  Yes    Coagulase negative Staphylococcus bacteremia [R78.81, B95.7]  No    Hypocalcemia [E83.51]  No    Hyperbilirubinemia [E80.6]  No    Hyperphosphatemia [E83.39]  No    SEBASTIAN (acute kidney injury) [N17.9]  No    Non-sustained ventricular tachycardia [I47.2]  No    Type 2 diabetes mellitus without complication, without long-term current use of insulin [E11.9]  Yes    Permanent atrial fibrillation [I48.21]  Yes    Essential hypertension [I10]  Yes    Neoplastic pleural effusion [J94.8]  Yes      Resolved Hospital Problems    Diagnosis Date Resolved POA    Hyperphosphatemia [E83.39] 09/28/2020 Yes    Hypokalemia [E87.6] 09/20/2020 Unknown    Acute respiratory failure with hypoxia [J96.01] 09/26/2020 Yes    Fluid overload [E87.70] 09/19/2020 No    Leukocytosis [D72.829] 10/09/2020 Yes    Anemia [D64.9] 09/22/2020 Yes       Future Appointments   Date Time Provider Department Center   10/22/2020 11:30 AM Marii Tee MD [facility] [address]   11/3/2020  3:10 PM Jose Luis [facility]sington [facility]           I have seen and examined this patient face to face today. My clinical findings that support the need for the home health skilled services and home bound status are the following:  Medical  restrictions requiring assistance of another human to leave home due to  IV infusion Needs.    Allergies:Review of patient's allergies indicates:  No Known Allergies    Diet: cardiac diet    Activities: activity as tolerated    Nursing:   SN to complete comprehensive assessment including routine vital signs. Instruct on disease process and s/s of complications to report to MD. Review/verify medication list sent home with the patient at time of discharge  and instruct patient/caregiver as needed. Frequency may be adjusted depending on start of care date.    Notify MD if SBP > 160 or < 90; DBP > 90 or < 50; HR > 120 or < 50; Temp > 100.4      MISCELLANEOUS CARE:  Home Infusion Therapy:   SN to perform Infusion Therapy/Central Line Care.  Review Central Line Care & Central Line Flush with patient.    Administer (drug and dose): Vancomycin 1.75 g in 5% dextrose 500 ml IVPB   Last dose given: 12 pm    Home dose due: 10/15/20 11.50 pm  End date 10/21/20  Vanc Trough Goal 15- 20 mcg/ml     Scrub the Hub: Prior to accessing the line, always perform a 30 second alcohol scrub  Each lumen of the central line is to be flushed at least daily with 10 mL Normal Saline and 3 mL Heparin flush (10 units/mL)  Skilled Nurse (SN) may draw blood from IV access  Blood Draw Procedure:   - Aspirate at least 5 mL of blood   - Discard   - Obtain specimen   - Change injection cap   - Flush with 20 mL Normal Saline followed by a                 3-5 mL Heparin flush (10 units/mL)  Central :   - Sterile dressing changes are done weekly and as needed.   - Use chlor-hexadine scrub to cleanse site, apply Biopatch to insertion site,       apply securement device dressing   - Injection caps are changed weekly and after EVERY lab draw.   - If sterile gauze is under dressing to control oozing,                 dressing change must be performed every 24 hours until gauze is not needed.      LABS     Weekly outpatient laboratory on Monday  or Tuesday while on IV antibiotics.   · CBC  · CMP   · Vancomycin trough. Target 15-20     Fax laboratory results to [facility] at [phone] with attn: Nay  repeat blood cx after completion of IV abx       WOUND CARE ORDERS  n/a      Medications: Review discharge medications with patient and family and provide education.      Current Discharge Medication List      START taking these medications    Details   acyclovir (ZOVIRAX) 200 MG capsule Take 2 capsules (400 mg total) by mouth 2 (two) times daily.  Qty: 120 capsule, Refills: 11      levoFLOXacin (LEVAQUIN) 500 MG tablet Take 1 tablet (500 mg total) by mouth once daily.  Qty: 30 tablet, Refills: 2      magnesium oxide (MAG-OX) 400 mg (241.3 mg magnesium) tablet Take 2 tablets (800 mg total) by mouth once daily.  Qty: 30 tablet, Refills: 0      potassium chloride SA (K-DUR,KLOR-CON) 20 MEQ tablet Take 2 tablets (40 mEq total) by mouth once daily.  Qty: 60 tablet, Refills: 0      vancomycin HCl in 5 % dextrose (VANCOMYCIN IN 5 % DEXTROSE) 1.75 gram/500 mL Soln Inject 500 mLs (1,750 mg total) into the vein every 12 (twelve) hours. for 7 days  Refills: 0      venetoclax (VENCLEXTA) 100 mg Tab Take 100 mg by mouth once daily.  Qty: 28 tablet, Refills: 0    Associated Diagnoses: Acute myeloid leukemia not having achieved remission      voriconazole (VFEND) 200 MG Tab Take 1 tablet (200 mg total) by mouth 2 (two) times daily.  Qty: 60 tablet, Refills: 2         CONTINUE these medications which have CHANGED    Details   apixaban (ELIQUIS) 5 mg Tab Take 1 tablet (5 mg total) by mouth 2 (two) times daily. HOLD until okayed to take by oncologist.      losartan (COZAAR) 25 MG tablet Take 1 tablet (25 mg total) by mouth once daily.  Qty: 90 tablet, Refills: 3    Comments: .         CONTINUE these medications which have NOT CHANGED    Details   amLODIPine (NORVASC) 5 MG tablet Take 5 mg by mouth once daily.    Comments: .      atorvastatin (LIPITOR) 40 MG  tablet Take 40 mg by mouth once daily.      metFORMIN (GLUCOPHAGE) 500 MG tablet Take 500 mg by mouth 2 (two) times daily with meals.      metoprolol succinate (TOPROL-XL) 50 MG 24 hr tablet Take 50 mg by mouth once daily.    Comments: .      terazosin (HYTRIN) 5 MG capsule Take 5 mg by mouth every evening.      digoxin (LANOXIN) 250 mcg tablet Take 250 mcg by mouth once daily.         STOP taking these medications       methylPREDNISolone (MEDROL) 4 MG Tab Comments:   Reason for Stopping:         metoprolol tartrate (LOPRESSOR) 25 MG tablet Comments:   Reason for Stopping:         cefdinir (OMNICEF) 300 MG capsule Comments:   Reason for Stopping:               I certify that this patient is confined to his home and needs intermittent skilled nursing care.

## 2020-10-14 NOTE — PT/OT/SLP PROGRESS
Occupational Therapy   Treatment    Name: Shell Diaz  MRN: [id number]  Admitting Diagnosis:  Acute myeloid leukemia not having achieved remission  23 Days Post-Op    Recommendations:     Discharge Recommendations: home  Discharge Equipment Recommendations:  none  Barriers to discharge:  None    Assessment:     Shell Diaz is a 55 y.o. male with a medical diagnosis of Acute myeloid leukemia not having achieved remission.  He presents up in chair and in good spirits.  Pt has been performing daily therex and has continued safe and indep self care.,  Pt will continue to benefit from skilled OT 1xweek during treatment.m . Performance deficits affecting function are impaired endurance.     Rehab Prognosis:  Good; patient would benefit from acute skilled OT services to address these deficits and reach maximum level of function.       Plan:     Patient to be seen 1 x/week to address the above listed problems via self-care/home management, therapeutic activities, therapeutic exercises  · Plan of Care Expires: 11/01/20  · Plan of Care Reviewed with: patient    Subjective     Pain/Comfort:  Pain Rating 1: 0/10    Objective:     Communicated with: RN prior to session.  Patient found supine with central line upon OT entry to room.    General Precautions: Standard, fall   Orthopedic Precautions:N/A   Braces: N/A     Occupational Performance:     Bed Mobility:    · Pt indep with bed mobility     Functional Mobility/Transfers:  · Functional Mobility: Pt able to demonstrate safe and effective in room, bathroom and hallway mobility     Activities of Daily Living:  · Pt able to complete self care without asssit       [facility] 6 Click ADL: 24    Treatment & Education:  Pt educated on safety, role of OT, importance of increased participation in self care for gains , expectations for participation, expectations for gains, POC, energy conservation, caregiver strain. White board updated.   - UE therex     Patient left supine with all  lines intactEducation:      GOALS:   Multidisciplinary Problems     Occupational Therapy Goals        Problem: Occupational Therapy Goal    Goal Priority Disciplines Outcome Interventions   Occupational Therapy Goal     OT, PT/OT Ongoing, Progressing    Description: Goals to be met by: 10/25    Patient will increase functional independence with ADLs by performing:    Feeding with [place]. MET  UE Dressing with [place].  LE Dressing with [place]. MET  Grooming while standing with [place].  Toileting from toilet with [place] for hygiene and clothing management.                      Time Tracking:     OT Date of Treatment: 10/14/20  OT Start Time: 0840  OT Stop Time: 0905  OT Total Time (min): 25 min    Billable Minutes:Therapeutic Activity 25    Melissa Jarvis, JUSTIN  10/14/2020

## 2020-10-14 NOTE — PROGRESS NOTES
Left message for Insurance provider services requesting callback for list of in-network home health and home infusion agencies in patient's area.  Awaiting response.  Will follow.

## 2020-10-14 NOTE — PLAN OF CARE
Goals addressed and unmet.  Cont with POC  Melissa Jarvis OT  10/14/2020    Problem: Occupational Therapy Goal  Goal: Occupational Therapy Goal  Description: Goals to be met by: 10/25    Patient will increase functional independence with ADLs by performing:    Feeding with [place]. MET  UE Dressing with [place].  LE Dressing with [place]. MET  Grooming while standing with [place].  Toileting from toilet with [place] for hygiene and clothing management.     Outcome: Ongoing, Progressing

## 2020-10-15 NOTE — PROGRESS NOTES
Spoke to Deisy at [facility]; medications to be delivered to home before midnight dose due.  Teaching complete; patient comfortable administering first dose.  Notified Vanessa townsend [facility]; first visit scheduled for 0800 tomorrow.  Safe discharge plan in place.  Will follow.

## 2020-10-15 NOTE — PROGRESS NOTES
Referrals sent via [facility] to [facility] , and [facility].  Accepted for service by Vanessa jiménez [facility]  ([phone]) who requests notification of accepting Infusion service.  Accepted pending insurance verification by [facility]; awaiting confirmation and plan for medication delivery.  Will follow.

## 2020-10-15 NOTE — DISCHARGE SUMMARY
"[facility]  Hematology  Bone Marrow Transplant  Discharge Summary      Patient Name: Shell Diaz  MRN: [id number]  Admission Date: 9/16/2020  Hospital Length of Stay: 29 days  Discharge Date and Time:  10/15/2020 2:02 PM  Attending Physician: Demetria Ugalde MD   Discharging Provider: Ruba Dai MD  Primary Care Provider: Alva Rubio MD    HPI: Mr. Diaz is a 55 year old man with A.fib (pacemaker in place, on Eliquis), cardiomyopathy, HTN who presented to [facility] with fevers and fatigue. Patient says that the fevers started on Saturday night and were as high as 102F. He took Tylenol, which improved the fevers. He then went to urgent care and tested negative for CoVID. He received antibiotics and was sent home. He started coughing up blood a couple of nights ago. When he mentioned this to a family member who is a healthcare worker, he was advised to go to the ED. Patient says that he has been feeling weak to the point that he "can't walk". He has associated chills, night sweats, and occasional pain in his left shoulder that he attributes to positional sleeping. He also has had diarrhea since Saturday that has been occurring about twice a day but no blood in it. He has lost his sense of taste and before Saturday, he noticed that he lost his appetite as well. He has slight headaches and starting about 2 months ago, he has had "sharp spasms" from his feet to his head but has not recently experienced this. He has noticed easy bruising. He denies chest pain, SOB, abdominal pain, nausea, vomiting, urinary issues, leg swelling.     Patient cannot recall all of his medical conditions and the medications that he is currently on (says he is on 9 meds). He states that he has a pacemaker that was placed for his A.fib and says he is on Eliquis, losartan and metoprolol but does not know the dosing. His family history is notable for father having lung cancer. He is a former smoker and " "stopped 20 years ago. Prior to that, he smoked "on and off" for about 10 years. He currently lives in [place] with his wife and daughter.     At OSH, patient received tylenol, IVF, a dose of vancomycin, zosyn and hydrea 4g. Labs were notable for leukocytosis (WBC 95.7), anemia, thrombocytopenia, elevated INR, D-dimer. CXR showed "cardiomegaly with no acute pulmonary process". Urine and blood cultures were in process. Manual differential demonstrated high number of blasts (100).     Patient will be admitted to the Hematology-BMT service for further evaluation and treatment.    Procedure(s) (LRB):  INSERTION, CATHETER, CENTRAL VENOUS, STAUFFER (Right)     Hospital Course: 09/16/2020: Transferred over night for [place] due to suspected leukemia. Receiving Hydrea 4 grams bid and allopurinol 300 mg bid. No evidence of TLS or DIC at this time. WBC 129K this am. plt 12K. Patient with hemoptysis. Will give 1 unit plt and check CT of chest. Will get echo and EKG and perform BMBx today. Having fevers. Continue vanc and zosyn. Blood and urine cx pending.  09/17/2020: BMBx performed yesterday. Results pending. CT of chest suggestive of LLL PNA. Continue vanc. Also showing mild left pleural effusion and mild pericardial effusion. History of afib. Went into afib with RVR (HR 130s) yesterday. Rate-controlled since resuming home metoprolol and digoxin. Given cardiac history (afib, cardiomyopathy, pacemaker) consulted cardiology as patient may receive anthracycline, which is cardiotoxic. Will attempt to get records from home cardiologist. Echo performed yesterday with EF of 58%. Cardiology repeating echo to check for ventricle straining. Replacing K+ for K+ of 3.1. Robitussin ordered for cough. Continue oxy for left, lateral abd pain, which is likely due to PNA.  09/18/2020: BMBx showing AML. Cytogenetic pending. 13 lb weight gain since yesterday. Decreased IVF rate and ordered lasix. Was tachypnic on exam but denied feeling " short of breath. Continues to be on supplemental O2. LLL more clear today. Will continue IV abx for 7 day and then switch to LVQ ppx. WBC count down to 55K today. Decreased hydrea dose to 2 grams BID. No evidence of TLS or DIC at this time. Repeat echo performed yesterday to assess for ventricle straining. 16% LV strain per echo report. Cards recs repeating complete echo post chemo completion; obtaining outside cardiology records (requested); resuming home losartan (resumed); continuing home digoxin and metoprolol (continuing both); maintaining tele throughout chemo; and resuming eliquis when feasible (contraindicated at this time due to thrombocytopenia/hemoptysis). Had a 6 beat run of v-tach this morning. Denied sensation of heart racing. Strip placed in chart, and cardiology notified via Secure Chat. Replacing K+ for K+ of 3.4. gen surg consulted for line placement. Hopeful that line will be placed on Monday morning. Will make NPO after midnight Sunday. ~13 lb weight gain since yesterday. Decreased IVF rate and diuresing with IV lasix x 1 dose.  09/19/2020 Patient remains on 2L NC, does still have hemoptysis and intermittent left pleuritic chest pain. Hydroxyurea stopped  09/20/2020 Still on 2L NC, 1U Plt today, plan for Lopes tomorrow with GS  09/21/2020: Lopes placed today. Tolerated procedure well. Day 6 of 7 of IV abx. Will switch to ppx abx after tomorrow. LLL continues to be clr/diminished, but improving. Cough persists. Chemo plan pending cytology results from BMBx. Blood cx still with NGTD. Continues to be afebrile. VSS. afib rate-controlled.  09/22/2020: FISH and NGS still pending. Per pathologist, will likely not result ([place] send-out) until late this week to early next week due to COVID delay. Considering discharging tomorrow and having him f/u with Dr. Fields early next week for possible admission for induction. Will complete IV abx for PNA today. Will discharge on ppx. ANC 0. Continues to be  afebrile. VSS. A-fib rate-controlled. Central line was placed yesterday, but appears to be vas cath, not a Lopes. 10 lb weight gain since admission. Stopped IVF and gave 20 mg IV lasix.  09/23/2020: FISH and NGS still pending. Was tentatively planning for discharge today, but now has mild SEBASTIAN (creatinine 1.6; previously 0.8) so we will continue to monitor inpatient. SEBASTIAN likely 2/2 IV abx. Completed vanc and zosyn yesterday. Started ppx with LVQ today. Resumed IVF. Will need to monitor closely for fluid overload. Continues to have hemoptysis. Giving 1 unit plts today. O2 dropping to upper 80s on room air at rest. Tachycardic with exertion. Will recheck CXR.   09/24/2020: FISH resulted. Plan is to induce with 7+3 as early as tomorrow. CXR from yesterday showing left pleural effusion. pulm consulted. thora performed at bedside today. Bloody pleural fluid removed. Sent for flow, cytology, micro, etc. Started IVF yesterday for SEBASTIAN but subsequently stopped due to concern for fluid overload. Checked BNP and 203. Creatinine up to 2.0. consulted nephrology. ANC 60 today.   09/25/2020 FISH result found adverse risk cg t(10;11). Plan to dc cancelled and pt consented for induction with 7+3, treatment to start today. Underwent thora for unilateral pleural effusion yesterday, 1L bloody pleural fluid removed and sent for studies including cytology. Nephrology consulted for sebastian.    09/26/2020 Day 2 of 7+3, Denies any symptoms. Still has SEBASTIAN but with good UOP  09/27/2020 Day 3 of 7+3, tolerating well no complaints. SEBASTIAN improving  09/28/2020: Day 4 of 7+3 for AML. Tolerating chemo well thus far. Creatinine slowly down-trending. Weight stable. Good urine output. Oral intake is fair. No evidence of mucositis on exam. Phos 7.0. started phos binder. Blood glucose in 200s. Increased levemir dose to 10 units nightly. Receiving 1 unit prbc today for hgb of 6.5. Will start vori tomorrow.  09/29/2020 Day 5 of 7+3 for AML. Plan to start  Voriconazole today. Serum creatinine improved this am to 1.8, had significant uop of > 5L yesterday. Tolerating chemo well. Ordered 1 pack platelets this AM.   09/30/2020: Day 6 of 7+3 for AML. Continues to be afebrile. VSS. Continues to be on room air. Cytology from pleural fluid and flow suggestive of blasts. Micro still with NGTD. Blood glucose stable. Likely elevated 2/2 steroids. Stopped scheduled novolog. Will continue scheduled levemir for now. Creatinine down to 1.6. phos continues to be elevated at 5.4. Will continue phos binder. T-bili up to 1.5 from 0.8 today. Will monitor for now. Hgb 6.7. Transfusing 1 unit prbc. ANC today.  10/01/2020: Day 7 of 7+3. Doing well. Afebrile. VSS. Blood glucose stable. Creatinine down to 1.4 today. IVF changed to 1/2 NS per nephrology recs. Nephrology signed off, and patient will f/u with them outpatient in 4 weeks. t-bili up to 2.0 from 1.5 today. Will continue to trend. ANC is 0.  10/02/2020: Day 8 of 7+3. Continues to be afebrile. VSS. Appetite diminished. Boost ordered with meals. ANC 10 today. Transfusing 1 unit prbc for hgb of 6.4. Creatinine down to 1.3 today. Continue 1/2 NS at 100 per nephrology rec. T-bili down to 1.3.   10/03/2020: Day #9 of 7+3. Vital signs remains WNL on room air. On bedside interview, patient had no complaints and was feeling well. On review of labs, Hgb was down to 6.7 and platelets down to 6; one unit of each ordered. Renal function WNL this morning with good urine output overnight.   10/05/2020: Day 11 of 7+3. Continues to due well. Afebrile. VSS. Oral intake fair. Will maintain IVF. SEBASTIAN resolved. Creatinine is 1.1 today. t-bili stable at 1.4. vori level collected today and is in process at this time. ANC continues to be 0. Planning for day 14 BMBx on 10/8/20.  10/06/2020: Day 12 of 7+3. Overall, reports feeling well. He is ambulating and using exercise bike in room without difficulty. His labs are significant for Hgb and platelets  downtrending to 6.6 and 7; one unit of each ordered. ANC unchanged at 0.   10/07/2020: Day 13 of 7+3. Planning for BMBx tomorrow. ANC 10. Spiked temp of 102.2 yesterday. Started on vanc and cefepime. Had SEBASTIAN earlier in admission. Will need to monitor renal function closely while on vanc. Blood cx with ngtd. Will check cxr today. Had unsustained temp of 100.4 over night. K+ 3.3. replaceing. t-bili stable at 1.4.  10/08/2020: Day 14 of 7+3. Patient continues to feel well. Blood cultures from 10/06 noted to be positive for GPC's with sensitivities pending; remains on Vanc/Cefepime. Labs notable for downtrend in Hgb to 6.5 and 1 U PRBC ordered. Renal function remains WNL with Vancomycin. Bone marrow biopsy repeated.   10/09/2020: Day 15 of 7+3. Patient continues to feel well and has no complaints. Blood cultures from 10/06 noted to be positive for STAPHYLOCOCCUS HAEMOLYTICUS with sensitivities pending; remains on Vanc/Cefepime (day #4). Labs notable for downtrend in Hgb to 6.8 and 1 U PRBC ordered. Renal function remains WNL with Vancomycin. ID consulted; recommended 14 day total course of Vancomycin until 10/21/2020. Bone marrow biopsy results from 10/08 in process.   10/10/2020: Day #16 of 7+3. No acute events overnight. This morning at bedside, patient reports pain with bowel movements that he attributes to re-occurrence of external hemorrhoids that were present on admission; PRN rectal cream ordered. Cefepime de-escalated to Levofloxacin prophylaxis as patient has remained afebrile for >72 hours. On review of labs, Hgb down to 6.6 and additional 1 U PRBC ordered. Day #14 bone marrow biopsy from 10/08 remains in process.   10/14/2020 Day #20 of 7+3 induction. Day +14 Bone Bx  Shows 34% blast. Plan for outpatient decitabine/venetoclax on 10/19 following 's f/u. Remains pancytopenic with ANC 10. Remains afebrile now. Received 1 unit of PRBC for Hgb 6.9. Replacing mg and phos.   10/15/2020 Day #21 of 7+3  induction. Day +15 Bone Bx  Shows 34% blast. Plan for outpatient decitabine/venetoclax on 10/19 following 's f/u. Remains pancytopenic with ANC 10. Remains afebrile now.  D/c home pending home health.       Consults (From admission, onward)        Status Ordering Provider     Inpatient consult to General Surgery  Once     Provider:  (Not yet assigned)    Completed LUIS A DUMAS     Inpatient consult to Infectious Diseases  Once     Provider:  (Not yet assigned)    Completed KRISTIE العراقي     Inpatient consult to Nephrology  Once     Provider:  (Not yet assigned)    Completed LUIS A DUMAS     Inpatient consult to Pulmonology  Once     Provider:  (Not yet assigned)    Completed LUIS A DUMAS     Pharmacy to dose Vancomycin consult  Once     Provider:  (Not yet assigned)    Acknowledged MAYTE BOB          Significant Diagnostic Studies: Labs:   CMP   Recent Labs   Lab 10/14/20  0355 10/15/20  0325    136   K 3.6 3.4*    103   CO2 25 25   * 155*   BUN 12 14   CREATININE 0.8 0.9   CALCIUM 8.1* 8.2*   PROT 5.5* 5.7*   ALBUMIN 2.7* 2.9*   BILITOT 1.0 1.0   ALKPHOS 84 89   AST 10 12   ALT 17 18   ANIONGAP 9 8   ESTGFRAFRICA >60.0 >60.0   EGFRNONAA >60.0 >60.0   , CBC   Recent Labs   Lab 10/14/20  0355 10/15/20  0325   WBC 0.36* 0.36*   HGB 6.9* 7.1*   HCT 19.5* 19.9*   PLT 13* 10*   , INR   Lab Results   Component Value Date    INR 1.2 10/15/2020    INR 1.1 10/12/2020    INR 1.1 10/08/2020    and A1C:   Recent Labs   Lab 09/20/20  0518   HGBA1C 8.0*     Microbiology:   Blood Culture   Lab Results   Component Value Date    LABBLOO No growth after 5 days. 10/08/2020    LABBLOO No growth after 5 days. 10/08/2020       Pending Diagnostic Studies:     None        Final Active Diagnoses:    Diagnosis Date Noted POA    PRINCIPAL PROBLEM:  Acute myeloid leukemia not having achieved remission [C92.00] 09/16/2020 Yes    External hemorrhoids without complication [K64.4]  10/10/2020 Yes    Coagulase negative Staphylococcus bacteremia [R78.81, B95.7] 10/08/2020 No    Neutropenic fever [D70.9, R50.81] 10/08/2020 No    Hypocalcemia [E83.51] 10/05/2020 No    Hyperbilirubinemia [E80.6] 09/30/2020 No    Hyperphosphatemia [E83.39] 09/28/2020 No    SEBASTIAN (acute kidney injury) [N17.9] 09/23/2020 No    Non-sustained ventricular tachycardia [I47.2] 09/18/2020 No    Type 2 diabetes mellitus without complication, without long-term current use of insulin [E11.9] 09/18/2020 Yes    Permanent atrial fibrillation [I48.21] 09/16/2020 Yes    Essential hypertension [I10] 09/16/2020 Yes    Neoplastic pleural effusion [J94.8] 09/16/2020 Yes    Pancytopenia due to antineoplastic chemotherapy [D61.810, T45.1X5A] 09/16/2020 Yes      Problems Resolved During this Admission:    Diagnosis Date Noted Date Resolved POA    Hyperphosphatemia [E83.39] 09/26/2020 09/28/2020 Yes    Hypokalemia [E87.6] 09/19/2020 09/20/2020 Unknown    Acute respiratory failure with hypoxia [J96.01] 09/19/2020 09/26/2020 Yes    Fluid overload [E87.70] 09/18/2020 09/19/2020 No    Leukocytosis [D72.829] 09/17/2020 10/09/2020 Yes    Anemia [D64.9] 09/16/2020 09/22/2020 Yes      Discharged Condition: stable    Disposition: Home or Self Care    Follow Up:    Patient Instructions:      CBC auto differential   Standing Status: Future Standing Exp. Date: 11/21/21     Comprehensive metabolic panel   Standing Status: Future Standing Exp. Date: 11/21/21     Magnesium   Standing Status: Future Standing Exp. Date: 11/21/21     Phosphorus   Standing Status: Future Standing Exp. Date: 11/21/21     CBC auto differential   Standing Status: Future Standing Exp. Date: 11/22/21     Comprehensive metabolic panel   Standing Status: Future Standing Exp. Date: 11/22/21     Magnesium   Standing Status: Future Standing Exp. Date: 11/22/21     Phosphorus   Standing Status: Future Standing Exp. Date: 11/22/21     Ambulatory referral/consult to  Nephrology   Standing Status: Future   Referral Priority: Routine Referral Type: Consultation   Referral Reason: Specialty Services Required   Requested Specialty: Nephrology   Number of Visits Requested: 1     Ambulatory referral/consult to Nephrology   Standing Status: Future   Referral Priority: Routine Referral Type: Consultation   Referral Reason: Specialty Services Required   Requested Specialty: Nephrology   Number of Visits Requested: 1     Type & Screen   Standing Status: Future Standing Exp. Date: 11/21/21     Type & Screen   Standing Status: Future Standing Exp. Date: 11/22/21     Activity as tolerated     Medications:  Reconciled Home Medications:      Medication List      START taking these medications    acyclovir 200 MG capsule  Commonly known as: ZOVIRAX  Take 2 capsules (400 mg total) by mouth 2 (two) times daily.     levoFLOXacin 500 MG tablet  Commonly known as: LEVAQUIN  Take 1 tablet (500 mg total) by mouth once daily.     magnesium oxide 400 mg (241.3 mg magnesium) tablet  Commonly known as: MAG-OX  Take 2 tablets (800 mg total) by mouth once daily.     potassium chloride SA 20 MEQ tablet  Commonly known as: K-DUR,KLOR-CON  Take 2 tablets (40 mEq total) by mouth once daily.     vancomycin in 5 % dextrose 1.75 gram/500 mL Soln  Inject 500 mLs (1,750 mg total) into the vein every 12 (twelve) hours. for 7 days     voriconazole 200 MG Tab  Commonly known as: VFEND  Take 1 tablet (200 mg total) by mouth 2 (two) times daily.        CHANGE how you take these medications    apixaban 5 mg Tab  Commonly known as: ELIQUIS  Take 1 tablet (5 mg total) by mouth 2 (two) times daily. HOLD until okayed to take by oncologist.  What changed: additional instructions     losartan 25 MG tablet  Commonly known as: COZAAR  Take 1 tablet (25 mg total) by mouth once daily.  What changed:   · medication strength  · how much to take        CONTINUE taking these medications    amLODIPine 5 MG tablet  Commonly known as:  NORVASC  Take 5 mg by mouth once daily.     atorvastatin 40 MG tablet  Commonly known as: LIPITOR  Take 40 mg by mouth once daily.     LANOXIN 250 mcg tablet  Generic drug: digoxin  Take 250 mcg by mouth once daily.     metFORMIN 500 MG tablet  Commonly known as: GLUCOPHAGE  Take 500 mg by mouth 2 (two) times daily with meals.     metoprolol succinate 50 MG 24 hr tablet  Commonly known as: TOPROL-XL  Take 50 mg by mouth once daily.     terazosin 5 MG capsule  Commonly known as: HYTRIN  Take 5 mg by mouth every evening.        STOP taking these medications    cefdinir 300 MG capsule  Commonly known as: OMNICEF     methylPREDNISolone 4 MG Tab  Commonly known as: MEDROL     metoprolol tartrate 25 MG tablet  Commonly known as: LOPRESSOR        ASK your doctor about these medications    venetoclax 100 mg Tab  Commonly known as: VENCLEXTA  Take 100 mg by mouth once daily.            Ruba Dai MD  Bone Marrow Transplant  [facility]

## 2020-10-15 NOTE — ASSESSMENT & PLAN NOTE
This is a 55 year old male who presented to [facility] on 09/16/2020 for suspected acute leukemia after manual differential demonstrated blasts at outside hospital. He underwent bone marrow biopsy on 09/16 showing AML with flow cytometry showing increased population of myeloid blasts showing expression of CD4, CD9, CD11c, CD15, CD13, CD33(97%), CD64, (partial), HLA-DR, and cytoplasmic myeloperoxidase. Fish results - t(10;11), ngs - No pathogenic genetic alteration was detected. Associated with neoplastic effusion of left lower lung. Status post Lopes catheter placement on 09/21. Baseline ECHO from 09/16 with EF of 58%.     Plan:   -Initiated 7+3 on 09/25 with Idarubicin and Cytarabine; today is day #21  -Follow-up bone marrow biopsy repeated on 10/08: no remission.   -Considering Decitabine/Venetoclax out-pt treatment on 10/19 following 's f/u  -Allopurinol 300 mg BID with IVF for TLS prophylaxis.   -Oppurtunistic infection prophylaxis with Acyclovir, Levofloxacin, and Voriconazole.    -Transfuse cyroglobulin if fibrinogen <150  -Transfuse if platelets <10, Hgb<7 and/or patient actively bleeding  -He is a stem cell transplant candidate. Sent HLA typing earlier in admission. Sent sibling contact info to transplant coordinator.  -aPTT, PT/INR, fibrinogen, uric acid, and phosphorous twice weekly to assess for TLS and DIC.   -See assessment for neoplastic effusion.

## 2020-10-15 NOTE — SUBJECTIVE & OBJECTIVE
Subjective:     Interval History: Day #21 of 7+3 induction. Day +15 Bone Bx  Shows 34% blast. Plan for outpatient decitabine/venetoclax on 10/19 following 's f/u. Remains pancytopenic with ANC 10. Remains afebrile now. Discharge today pending HH.      Objective:     Vital Signs (Most Recent):  Temp: 99.5 °F (37.5 °C) (10/15/20 0411)  Pulse: 75 (10/15/20 0411)  Resp: 18 (10/15/20 0411)  BP: 134/65 (10/15/20 0411)  SpO2: 95 % (10/15/20 0411) Vital Signs (24h Range):  Temp:  [97.8 °F (36.6 °C)-99.5 °F (37.5 °C)] 99.5 °F (37.5 °C)  Pulse:  [] 75  Resp:  [16-18] 18  SpO2:  [95 %-97 %] 95 %  BP: (134-166)/(63-94) 134/65     Weight: 124 kg (273 lb 5.9 oz)  Body mass index is 34.17 kg/m².  Body surface area is 2.56 meters squared.    ECOG SCORE           Intake/Output - Last 3 Shifts       10/13 0700 - 10/14 0659 10/14 0700 - 10/15 0659 10/15 0700 - 10/16 0659    P.O. 592 1020     I.V. (mL/kg) 3200 (25.8) 2500 (20.2)     Blood 0 350     IV Piggyback 500 1000     Total Intake(mL/kg) 4292 (34.6) 4870 (39.3)     Urine (mL/kg/hr) 3800 (1.3) 4300 (1.4)     Stool  0     Total Output 3800 4300     Net +492 +570            Urine Occurrence  1 x     Stool Occurrence  1 x           Physical Exam  Constitutional:       Appearance: He is well-developed.   HENT:      Head: Normocephalic and atraumatic.      Mouth/Throat:      Pharynx: No oropharyngeal exudate.   Eyes:      General:         Right eye: No discharge.         Left eye: No discharge.      Conjunctiva/sclera: Conjunctivae normal.      Pupils: Pupils are equal, round, and reactive to light.   Neck:      Musculoskeletal: Normal range of motion and neck supple.   Cardiovascular:      Rate and Rhythm: Normal rate. Rhythm irregularly irregular.      Heart sounds: Normal heart sounds. No murmur.   Pulmonary:      Effort: Pulmonary effort is normal.      Breath sounds: Normal breath sounds.      Comments:     Abdominal:      General: Bowel sounds are normal.  There is no distension.      Palpations: Abdomen is soft.      Tenderness: There is no abdominal tenderness.   Musculoskeletal:      Right lower leg: No edema.      Left lower leg: No edema.      Comments: 4 X 4 cm bandage over lower sacrum without erythema or tenderness to palpation.    Skin:     General: Skin is warm and dry.      Findings: No erythema or rash.      Comments: Lopes catheter in right upper chest with clean, dry, and intact dressing without erythema or tenderness.    Neurological:      Mental Status: He is alert and oriented to person, place, and time.   Psychiatric:         Behavior: Behavior normal.         Thought Content: Thought content normal.         Judgment: Judgment normal.         Significant Labs:   All pertinent labs from the last 24 hours have been reviewed.    Diagnostic Results:  I have reviewed all pertinent imaging results/findings within the past 24 hours.

## 2020-10-15 NOTE — NURSING
Road Test  Oxygen-Patient tolerating room air, no distress.  Ambulation-Patient up with no assistance.  Devices-Patient going home with no devices  Tolerating-Diabetic diet.  Elimination-Patient voiding without difficulty.  Self Care-Performs self care without assistance.  Teaching-Verbal and written discharge teaching given.     Patient tolerates room air, ambulates with no assistance, regular diet, voiding without difficulty, and is going home with no devices. Vascath left in place per MD order. Dressing change completed. Home vancomycin to be delivered to patient's house today. Discharge paperwork discussed. Medications reviewed. Patient has all belongings and has no questions at this time.

## 2020-10-15 NOTE — PLAN OF CARE
Patient is progressing and involved with plan of care. Frequent rounds made to assess pain and safety. Pt communicating needs throughout shift. Up ad chuck. Tachy when standing or moving. IVF continued as ordered. Vanc continued as ordered. Poor appetite, voiding without difficulty. No c/o pain. Blood glucose below 200. All vitals stable; no acute events this shift. Pt. Remaining free from falls or injury throughout shift; bed in lowest position; side rails up X2; call light within reach; pt instructed to call for assistance as needed - verbalized understanding. Q2h rounding on patient. Will continue to monitor.

## 2020-10-15 NOTE — PROGRESS NOTES
[facility]  Hematology  Bone Marrow Transplant  Progress Note    Patient Name: Shell Diaz  Admission Date: 9/16/2020  Hospital Length of Stay: 29 days  Code Status: Full Code    Subjective:     Interval History: Day #21 of 7+3 induction. Day +15 Bone Bx  Shows 34% blast. Plan for outpatient decitabine/venetoclax on 10/19 following 's f/u. Remains pancytopenic with ANC 10. Remains afebrile now. Discharge today pending .      Objective:     Vital Signs (Most Recent):  Temp: 99.5 °F (37.5 °C) (10/15/20 0411)  Pulse: 75 (10/15/20 0411)  Resp: 18 (10/15/20 0411)  BP: 134/65 (10/15/20 0411)  SpO2: 95 % (10/15/20 0411) Vital Signs (24h Range):  Temp:  [97.8 °F (36.6 °C)-99.5 °F (37.5 °C)] 99.5 °F (37.5 °C)  Pulse:  [] 75  Resp:  [16-18] 18  SpO2:  [95 %-97 %] 95 %  BP: (134-166)/(63-94) 134/65     Weight: 124 kg (273 lb 5.9 oz)  Body mass index is 34.17 kg/m².  Body surface area is 2.56 meters squared.    ECOG SCORE           Intake/Output - Last 3 Shifts       10/13 0700 - 10/14 0659 10/14 0700 - 10/15 0659 10/15 0700 - 10/16 0659    P.O. 592 1020     I.V. (mL/kg) 3200 (25.8) 2500 (20.2)     Blood 0 350     IV Piggyback 500 1000     Total Intake(mL/kg) 4292 (34.6) 4870 (39.3)     Urine (mL/kg/hr) 3800 (1.3) 4300 (1.4)     Stool  0     Total Output 3800 4300     Net +492 +570            Urine Occurrence  1 x     Stool Occurrence  1 x           Physical Exam  Constitutional:       Appearance: He is well-developed.   HENT:      Head: Normocephalic and atraumatic.      Mouth/Throat:      Pharynx: No oropharyngeal exudate.   Eyes:      General:         Right eye: No discharge.         Left eye: No discharge.      Conjunctiva/sclera: Conjunctivae normal.      Pupils: Pupils are equal, round, and reactive to light.   Neck:      Musculoskeletal: Normal range of motion and neck supple.   Cardiovascular:      Rate and Rhythm: Normal rate. Rhythm irregularly irregular.      Heart sounds:  Normal heart sounds. No murmur.   Pulmonary:      Effort: Pulmonary effort is normal.      Breath sounds: Normal breath sounds.      Comments:     Abdominal:      General: Bowel sounds are normal. There is no distension.      Palpations: Abdomen is soft.      Tenderness: There is no abdominal tenderness.   Musculoskeletal:      Right lower leg: No edema.      Left lower leg: No edema.      Comments: 4 X 4 cm bandage over lower sacrum without erythema or tenderness to palpation.    Skin:     General: Skin is warm and dry.      Findings: No erythema or rash.      Comments: Lopes catheter in right upper chest with clean, dry, and intact dressing without erythema or tenderness.    Neurological:      Mental Status: He is alert and oriented to person, place, and time.   Psychiatric:         Behavior: Behavior normal.         Thought Content: Thought content normal.         Judgment: Judgment normal.         Significant Labs:   All pertinent labs from the last 24 hours have been reviewed.    Diagnostic Results:  I have reviewed all pertinent imaging results/findings within the past 24 hours.    Assessment/Plan:     * Acute myeloid leukemia not having achieved remission  This is a 55 year old male who presented to [facility] on 09/16/2020 for suspected acute leukemia after manual differential demonstrated blasts at outside hospital. He underwent bone marrow biopsy on 09/16 showing AML with flow cytometry showing increased population of myeloid blasts showing expression of CD4, CD9, CD11c, CD15, CD13, CD33(97%), CD64, (partial), HLA-DR, and cytoplasmic myeloperoxidase. Fish results - t(10;11), ngs - No pathogenic genetic alteration was detected. Associated with neoplastic effusion of left lower lung. Status post Lopes catheter placement on 09/21. Baseline ECHO from 09/16 with EF of 58%.     Plan:   -Initiated 7+3 on 09/25 with Idarubicin and Cytarabine; today is day #21  -Follow-up bone marrow biopsy repeated on 10/08:  no remission.   -Considering Decitabine/Venetoclax out-pt treatment on 10/19 following 's f/u  -Allopurinol 300 mg BID with IVF for TLS prophylaxis.   -Oppurtunistic infection prophylaxis with Acyclovir, Levofloxacin, and Voriconazole.    -Transfuse cyroglobulin if fibrinogen <150  -Transfuse if platelets <10, Hgb<7 and/or patient actively bleeding  -He is a stem cell transplant candidate. Sent HLA typing earlier in admission. Sent sibling contact info to transplant coordinator.  -aPTT, PT/INR, fibrinogen, uric acid, and phosphorous twice weekly to assess for TLS and DIC.   -See assessment for neoplastic effusion.     External hemorrhoids without complication  -On-going issue since prior to admission that wasn't reported until 10/10.     Plan:   -Topical Hydrocortisone cream ordered PRN.     Neutropenic fever  -Noted to develop a fever on evening of 10/06 prompting infectious work-up with blood cultures, UA, and CXR and initiation of Cefepime and Vancomycin at that time.   -As of 10/08, blood cultures positive for STAPHYLOCOCCUS HAEMOLYTICUS  with sensitivities pending; CXR and UA were unremarkable; Lopes catheter without obvious signs of infection.   -Blood cultures negative as of 10/08.     Plan:   -ID consulted on 10/09 with recommendations to continue Vancomycin for 14 day total course; estimated end date of 10/21/2020.   -Continue outpatient infection prophylaxis.   -Follow-up repeat blood cultures.     Coagulase negative Staphylococcus bacteremia  -See assessment for neutropenic fever.     Hypocalcemia  - Corrected calcium slightly low at 8.6 on 10/05; normalized as of 10/06 with initiation of daily calcium supplement.     Plan:   - Calcium level ordered daily.     Hyperbilirubinemia  -Slight indirect hyperbilirubinemia noted on 09/30 and since normalized.   -Likely secondary to chemotherapy initiation vs intermittent transfusions.   -Synthetic liver function remains WNL.     Plan:   -Trending  liver function daily.     Hyperphosphatemia  -Developed secondary to SEBASTIAN and treated with SEVELAMER from 09/28 to 10/01 with improvement.     Resolved.      SEBASTIAN (acute kidney injury)  -Noted to develop SEBASTIAN on 09/23 with Cr peaking at 2.3 on 09/26.   -Nephrology consulted on 09/24 with suspicion for multifactorial SEBASTIAN secondary to Acyclovir, Losartan, contrast-induced from CT chest on 9/17 (Cr increase can be delayed 72-96 hours after contrast), and gradual decrease in hemoglobin. Most likely from ATN.   -Improved back to baseline by 10/01 with use of IVF and avoidance of nephrotoxic agents; nephrology signed off at that time.      Resolved.     Plan:   -Making good urine output daily without signs of hypervolemia.   -Trending renal function daily; monitor renal function closely as Vancomycin was resumed on 10/06.  -Holding home ARB.   -Strict I/O  -Avoid nephrotoxic agents.    -Renally dose medications.    Type 2 diabetes mellitus without complication, without long-term current use of insulin  -Most recent A1c 8 in 09/2020.   -Home regimen: Metformin.   -Noted issues with sub-optimal hyperglycemia while receiving Dexamethasone, however resolved after stopping steroids. Stopped scheduled Aspart on 9/30/20 with blood glucose in 120s and patient no longer receiving steroids.   -Noted new worsening hyperglycemia on 10/08 that may have been secondary to new bacteremia, but resolved as of 10/09.     Plan:   -Continue Detemir 10 U daily with LDSCI.   -Diabetic diet.   -POCT glucose checks with meals and bedtime.   -Goal glucose 140-180.     Non-sustained ventricular tachycardia  - Noted 6 beat run of VT on 9/18/20 at ~ 6 am. No episodes since.    Plan:   - Goal Mg > 2 and K+ > 4  - Telemetry ordered.     Pancytopenia due to antineoplastic chemotherapy  Plan:   - Transfuse if platelet count <10. If patient develops active bleed, transfuse for goal >50.  - Transfuse for Hgb < 7.  - Opportunistic infection prophylaxis with  Acyclovir, Levofloxacin, and Voriconazole.   - Monitoring Voriconazole level; most recent level from 10/5 1.2.        Neoplastic pleural effusion  -Initially presented with fevers and fatigue with significant leukocytosis and blasts with CT chest on 09/17 after transfer here showing abnormal opacity involving the left lower lobe with a prominent area of dense confluent opacity that may relate to an area of dense consolidation, with surrounding prominent confluent infiltrates/airspace disease, as well as a small to moderate left pleural effusion. He was treated with Vancomycin and Zosyn from 09/15 to 09/22.   -Pulmonology consulted on 09/24 after repeat CXR showed progression of left sided pleural effusion. Status post thoracentesis on 09/24 with 1L of blood fluid removed with studies negative for infection, but showing abundant atypical mononuclear cells, suggestive of blasts.   -Repeat CXR on 10/07 with decrease in size of effusion.   -Vital signs stable on room air.     Plan:   -Continue oppurutnistic infection prophylaxis with Levofloxacin.   -Holding home Apixaban in setting of thrombocytopenia.   -See assessment for AML.     Essential hypertension  -Home regimen: Amlodipine and Losartan.     Plan:   -Held home ARB since 09/24 after development of SEBASTIAN   -Restarted ARB at half of home dosing on 10/12      Permanent atrial fibrillation  -Status post pacemaker placement.   -Home regimen: Apixaban, Digoxin, and Metoprolol.    -Currently rate controlled.   -Baseline ECHO on 09/16 prior to chemotherapy initiation with EF of 58%. Cardiology consulted on 09/17 for clearance for chemotherapy induction in the setting of acute leukemia; repeat ECHO on 09/17 notable for 16% left ventricular straining.     Plan:   -Per cardiology, continued rate control with Metoprolol and Digoxin; decreased Metoprolol to 50 mg with HR paced in 60's.   -Holding Apixaban due to thrombocytopenia; will resume when appropriate.  -Keep Mg > 2 and  K+ > 4.  -A follow up echocardiogram is recommended at the end of treatment with Adriamycin and at 6 months post treatment. We will plan of inpatient repeat echo only if patient becomes symptomatic per Dr. Thurman.  -repeated echo on 10/13 EF 45-50%        VTE Risk Mitigation (From admission, onward)         Ordered     heparin (porcine) injection 1,000 Units  As needed (PRN)      09/22/20 0343     IP VTE HIGH RISK PATIENT  Once      09/16/20 0017     Place sequential compression device  Until discontinued      09/16/20 0017                Disposition: d/c pending     Ruba Dai MD  Bone Marrow Transplant  [facility]

## 2020-10-15 NOTE — PROGRESS NOTES
Accepted by [facility] who confirmed they are in-network. She is already on-site and will meet with patient for education/teaching shortly.   Awaiting confirmation of delivery plan for medications.  Will follow.

## 2020-10-19 NOTE — PLAN OF CARE
Pt admitted for 1 unit Platelets and 1 Unit PRBC. Labs reviewed and Pt received Platelets and PRBC, tolerated well. Plan of care reviewed and Pt instructed to contact MD with further concerns. Pt given AVS and discharged @ 13:25 in W/C

## 2020-10-19 NOTE — PROGRESS NOTES
Hematology Oncology  Initial Visit Note    Patient: Shell Diaz  MRN: [id number]  Date: 10/23/2020    Chief Complaint: Hospital Follow Up and Leukemia      Oncologic History:   - presented to OSH with fever, fatigue, weakness and hemoptysis Noted to have a WBC >100k therefore transferred to [facility] for suspected leukemia  - BM biopsy done 9/17/2020 revealing AML, Non M3, 46,XY,+X,edi(X;10)(p10;q10),+1,edi(1;10)(q10;q10),del(11)(q21q23)[20} AML FISH (BM) Interpretation: The result is abnormal and indicates MLLT10/MLL(KMT2A) fusion in 90.8% of nuclei.  - Thoracentesis done revealing malignant pleural effusion.   - started 7+3 on 9/25/2020   - Restaging bone marrow biopsy at day 14 revealed persistent AML.  - discharged with plan for Decitabine/Ventoclax outpatient    History of Afib (pacemaker in place and on eliquis), cardiomyopathy, HTN  Subjective:     Interval History: Mr. Diaz is a 55 y.o. male with newly diagnosed AML who presents today for hospital follow-up and to start treatment with decitabine/venetoclax . Decitabine has not been approved by insurance and patient hasn't received Venetoclax yet. Patient is currently on IV Vanc q 12 for staphylococcus hemolyticus which he will complete in 2 days. Today he denies any pain, bleeding, sob, fevers, chills, n/v/c/d. He complains of weakness and fatigue and some dizziness with standing. He reports he is eating and drinking well.    Past Medical History:   Past Medical History:   Diagnosis Date    Atrial fibrillation     Hypertension        Past Surgical HIstory:   Past Surgical History:   Procedure Laterality Date    INSERTION OF STAUFFER CATHETER Right 9/21/2020    Procedure: INSERTION, CATHETER, CENTRAL VENOUS, STAUFFER;  Surgeon: Ti Starkey MD;  Location: [facility] OR [address] [place] [address];  Service: General;  Laterality: Right;       Family History:   Family History   Problem Relation Age of Onset    Heart disease Mother     Cancer Father     Heart disease Father         Social History:  reports that he has quit smoking. He does not have any smokeless tobacco history on file.    Medications:  Current Outpatient Medications   Medication Sig Dispense Refill    acyclovir (ZOVIRAX) 200 MG capsule Take 2 capsules (400 mg total) by mouth 2 (two) times daily. 60 capsule 1    amLODIPine (NORVASC) 5 MG tablet Take 5 mg by mouth once daily.      apixaban (ELIQUIS) 5 mg Tab Take 1 tablet (5 mg total) by mouth 2 (two) times daily. HOLD until okayed to take by oncologist.      atorvastatin (LIPITOR) 40 MG tablet Take 40 mg by mouth once daily.      digoxin (LANOXIN) 250 mcg tablet Take 250 mcg by mouth once daily.      levoFLOXacin (LEVAQUIN) 500 MG tablet Take 1 tablet (500 mg total) by mouth once daily. 30 tablet 1    losartan (COZAAR) 25 MG tablet Take 1 tablet (25 mg total) by mouth once daily. 30 tablet 1    magnesium oxide (MAG-OX) 400 mg (241.3 mg magnesium) tablet Take 2 tablets (800 mg total) by mouth once daily. 30 tablet 1    metFORMIN (GLUCOPHAGE) 500 MG tablet Take 500 mg by mouth 2 (two) times daily with meals.      metoprolol succinate (TOPROL-XL) 50 MG 24 hr tablet Take 50 mg by mouth once daily.      potassium chloride SA (K-DUR,KLOR-CON) 20 MEQ tablet Take 2 tablets (40 mEq total) by mouth once daily. 60 tablet 1    terazosin (HYTRIN) 5 MG capsule Take 5 mg by mouth every evening.      venetoclax (VENCLEXTA) 100 mg Tab Take 100 mg by mouth once daily. 28 tablet 0    voriconazole (VFEND) 200 MG Tab Take 1 tablet (200 mg total) by mouth 2 (two) times daily. 60 tablet 1     No current facility-administered medications for this visit.        Review of Systems   Constitutional: Positive for fatigue. Negative for activity change, appetite change, chills, diaphoresis, fever and unexpected weight change.   HENT: Negative for congestion, dental problem, mouth sores, nosebleeds, postnasal drip, rhinorrhea, sinus pressure, sore throat and trouble swallowing.    Eyes:  "Negative for photophobia and visual disturbance.   Respiratory: Negative for cough and shortness of breath.    Cardiovascular: Negative for chest pain, palpitations and leg swelling.   Gastrointestinal: Negative for abdominal distention, abdominal pain, blood in stool, constipation, diarrhea, nausea and vomiting.   Genitourinary: Negative for dysuria, frequency, hematuria and urgency.   Musculoskeletal: Negative for arthralgias, back pain and myalgias.   Skin: Negative for pallor and rash.   Allergic/Immunologic: Negative for immunocompromised state.   Neurological: Positive for dizziness and weakness. Negative for syncope, numbness and headaches.   Hematological: Negative for adenopathy. Does not bruise/bleed easily.   Psychiatric/Behavioral: Negative for confusion. The patient is not nervous/anxious.        ECOG Score:  1      Objective:     Vitals:    10/19/20 0856   BP: (Abnormal) 110/58   Pulse: 85   Resp: 16   Temp: 98 °F (36.7 °C)   TempSrc: Oral   SpO2: 100%   Weight: 122.4 kg (269 lb 13.5 oz)   Height: 6' 4" (1.93 m)       BMI: Body mass index is 32.85 kg/m².     Physical Exam  Vitals signs reviewed.   Constitutional:       General: He is not in acute distress.     Appearance: He is well-developed.   HENT:      Head: Normocephalic.      Mouth/Throat:      Pharynx: No oropharyngeal exudate.   Eyes:      General: No scleral icterus.     Conjunctiva/sclera: Conjunctivae normal.      Pupils: Pupils are equal, round, and reactive to light.   Neck:      Musculoskeletal: Normal range of motion and neck supple. Normal range of motion.      Thyroid: No thyromegaly.   Cardiovascular:      Rate and Rhythm: Normal rate and regular rhythm.      Heart sounds: Normal heart sounds.   Pulmonary:      Effort: Pulmonary effort is normal. No respiratory distress.      Breath sounds: Normal breath sounds.   Abdominal:      General: Bowel sounds are normal. There is no distension.      Palpations: Abdomen is soft.      " Tenderness: There is no abdominal tenderness.   Musculoskeletal: Normal range of motion.         General: No tenderness.   Lymphadenopathy:      Head:      Right side of head: No submandibular, preauricular, posterior auricular or occipital adenopathy.      Left side of head: No submandibular, preauricular, posterior auricular or occipital adenopathy.      Cervical: No cervical adenopathy.   Skin:     General: Skin is warm and dry.      Coloration: Skin is not pale.      Findings: Bruising present. No petechiae or rash (petechiae to the LE).      Comments: Lopes intact to the RCW with no redness or drainage   Neurological:      Mental Status: He is alert and oriented to person, place, and time.      Cranial Nerves: No cranial nerve deficit.      Coordination: Coordination normal.   Psychiatric:         Thought Content: Thought content normal.         Laboratory Data:  Office Visit on 10/19/2020   Component Date Value    UNIT NUMBER 10/19/2020 [id number]     Product Code 10/19/2020 [id number]     DISPENSE STATUS 10/19/2020 TRANSFUSED     CODING SYSTEM 10/19/2020 [id number]     Unit Blood Type Code 10/19/2020 0600     Unit Blood Type 10/19/2020 A NEG     Unit Expiration 10/19/2020 [id number]     UNIT NUMBER 10/19/2020 [id number]     Product Code 10/19/2020 [id number]     DISPENSE STATUS 10/19/2020 TRANSFUSED     CODING SYSTEM 10/19/2020 [id number]     Unit Blood Type Code 10/19/2020 6200     Unit Blood Type 10/19/2020 A POS     Unit Expiration 10/19/2020 [id number]    Infusion on 10/19/2020   Component Date Value    WBC 10/19/2020 0.40*    RBC 10/19/2020 2.08*    Hemoglobin 10/19/2020 6.3*    Hematocrit 10/19/2020 17.9*    Mean Corpuscular Volume 10/19/2020 86     Mean Corpuscular Hemoglo* 10/19/2020 30.3     Mean Corpuscular Hemoglo* 10/19/2020 35.2     RDW 10/19/2020 11.9     Platelets 10/19/2020 9*    MPV 10/19/2020 12.1     Immature Granulocytes 10/19/2020 0.0     Gran # (ANC)  10/19/2020 0.0*    Immature Grans (Abs) 10/19/2020 0.00     Lymph # 10/19/2020 0.4*    Mono # 10/19/2020 0.0*    Eos # 10/19/2020 0.0     Baso # 10/19/2020 0.00     nRBC 10/19/2020 0     Gran% 10/19/2020 5.0*    Lymph% 10/19/2020 92.5*    Mono% 10/19/2020 2.5*    Eosinophil% 10/19/2020 0.0     Basophil% 10/19/2020 0.0     Platelet Estimate 10/19/2020 Decreased*    Poik 10/19/2020 Slight     Hypo 10/19/2020 Occasional     Ovalocytes 10/19/2020 Occasional     Differential Method 10/19/2020 Automated     Sodium 10/19/2020 138     Potassium 10/19/2020 3.7     Chloride 10/19/2020 104     CO2 10/19/2020 25     Glucose 10/19/2020 132*    BUN, Bld 10/19/2020 18     Creatinine 10/19/2020 1.1     Calcium 10/19/2020 8.2*    Total Protein 10/19/2020 6.2     Albumin 10/19/2020 3.1*    Total Bilirubin 10/19/2020 1.3*    Alkaline Phosphatase 10/19/2020 97     AST 10/19/2020 11     ALT 10/19/2020 24     Anion Gap 10/19/2020 9     eGFR if African American 10/19/2020 >60.0     eGFR if non African Amer* 10/19/2020 >60.0     Magnesium 10/19/2020 1.7     Phosphorus 10/19/2020 4.1     Group & Rh 10/19/2020 A NEG     Indirect Hellen 10/19/2020 NEG    No results displayed because visit has over 200 results.           Assessment:     1. Acute myeloid leukemia not having achieved remission    2. Neoplastic pleural effusion    3. Pancytopenia due to antineoplastic chemotherapy    4. Permanent atrial fibrillation    5. Type 2 diabetes mellitus without complication, without long-term current use of insulin    6. Essential hypertension    7. Coagulase negative Staphylococcus bacteremia    8. Hyperbilirubinemia          Plan:     Acute myeloid leukemia not having achieved remission  - presented to [facility] on 09/16/2020 for suspected acute leukemia after manual differential demonstrated blasts at outside hospital.   - underwent bone marrow biopsy on 09/16 showing AML with flow cytometry showing increased  population of myeloid blasts showing expression of CD4, CD9, CD11c, CD15, CD13, CD33(97%), CD64, (partial), HLA-DR, and cytoplasmic myeloperoxidase. Fish results - t(10;11), ngs - No pathogenic genetic alteration was detected. Associated with neoplastic effusion of left lower lung.   - Status post Lopes catheter placement on 09/21.   - Baseline ECHO from 09/16 with EF of 58%.   - Initiated 7+3 on 09/25 with Idarubicin and Cytarabine  -Follow-up bone marrow biopsy repeated on 10/08:day 14 with persistent AML.  - plan for Decitabine/Venetoclax per Dr. Fields. Patient has not received Venetoclax yet, should be delivered within the week. Dose reduced to 100mg due to interaction with voriconazole. Decitabine has not been authorized by insurance. Will schedule to begin next week if authorization obtained.  - continue oppurtunistic infection prophylaxis with Acyclovir, Levofloxacin, and Voriconazole. -HLA typing and sibling contact previously sent to transplant coordinator.     Neoplastic pleural effusion  -CT chest on 09/17 showing abnormal opacity involving the left lower lobe with a prominent area of dense confluent opacity that may relate to an area of dense consolidation, with surrounding prominent confluent infiltrates/airspace disease, as well as a small to moderate left pleural effusion. He was treated with Vancomycin and Zosyn from 09/15 to 09/22.  -Pulmonology was consulted after repeat CXR showed progression of left sided pleural effusion. Status post thoracentesis on 09/24 with 1L of blood fluid removed with studies negative for infection, but showing abundant atypical mononuclear cells, suggestive of blasts.  - denies sob and O2 sats >92% on Ra     Pancytopenia due to antineoplastic chemotherapy and AML  - WBC 0.40, ANC 0, hgb 6.3 and platelets 9 today  - Transfuse if platelet count <10 and for Hgb < 7.  - will transfuse I unit platelets and 1 unit PRBC today      Staphylococcus bacteremia   - blood  cultures from 10/8 positive for STAPHYLOCOCCUS HAEMOLYTICUS    - discharged on Vancomycin 1750 mg q12 for 14 days (EOT 10/21/2020).   - repeat cultures NGTD  - has follow-up with ID on 10/22     Hyperbilirubinemia  -Slight indirect hyperbilirubinemia noted, bili 1.2 today. Liver enzymes wnl.  -Likely secondary to transfusions.      Type 2 diabetes mellitus without complication, without long-term current use of insulin  - continue Glucophage   - glucose 163 today    Essential hypertension  - discharged on losartan 25 mg daily, amlodipine 5 mg daily and terazosin qhs  - BP wnl today      Permanent atrial fibrillation  -Status post pacemaker placement, currently rate controlled  -Home regimen: Apixaban, Digoxin, and Metoprolol.  -Holding Apixaban due to thrombocytopenia; will resume when appropriate.  -Keep Mg > 2 and K+ > 4.  -A follow up echocardiogram is recommended at 6 months post treatment  -will obtain weekly Digoxin level per pharmacy recs due to interaction with voriconazole.    Disposition:  Labs three times a week already scheduled at [place] lab  Labs (CBC, CMP, T&S, uric acid, Digoxin) at [facility] infusion, appt with NP on 10/26 and Decitabine for 5 days 10/26-10/30      Cecily Larson NP  Hematology/BMT

## 2020-10-19 NOTE — NURSING
0800- Patient arrived on the unit for labs to be drawn from central line.  PAtient reports having been discharged from hospital recently, central line assessed and date to dressing noted as 10/15.  REd lumen flushed per patients request, samples collected and sent to lab.  Patient discharged to next appointment.

## 2020-10-19 NOTE — Clinical Note
Labs three times a week already scheduled at [place] lab  Labs (CBC, CMP, T&S, uric acid) at [facility] [place], appt with NP on 10/26 and Decitabine for 5 days 10/26-10/30

## 2020-10-21 NOTE — PROGRESS NOTES
Received call from wife concerned with insurance coverage for Venetoclax; contacted oncology pharmacist who confirmed prior authorization is still pending through specialty pharmacy.  Left voicemail for wife updating on status.  Will follow.

## 2020-10-21 NOTE — PROGRESS NOTES
Infectious Disease Clinic Note  10/22/2020        Subjective:       Patient ID: Shell Diaz is a 55 y.o. male being seen for an new visit.    Chief Complaint: Follow-up    HPI   54y/o M pt w PMHx of A.fib (pacemaker in place), cardiomyopathy, HTN, new diagnosis of AML and malignant pleural effusion (admitted to [facility] on 09/16/2020 as a transfer for w fevers with smear showing significant leukocytosis () and blasts on peripheral smear, now s/p BM bx on 09/16 that was diagnostic of AML).  Lopes catheter was placed on 09/21 and he was initiated on induction therapy on 09/25 with Idarubicin and Cytarabine. Presented with neutropenic fever on 10/6, started on vanc and cefepime. ID consulted for staph haemolyticus in 2 sets of blood cultures (10/6). Repeat BCx 10/7 was negative. Source possibly skin (had skin eruption at dorsum of rt foot that look like folliculitis and lesion on 1st toe). Low suspicion for line infection (no erythema, purulence or tenderness) at the time and per guidelines no indication for urgent removal. TTE 9/16- no evidence of vegetations. Discharged on 10/15 to complete on IV vanc with EOT 10/21.    Pt reports feeling lightheaded since this morning. No chest pain, palpitations, sob or pre-syncope. Took his BP meds this AM. Denies fevers, chills, vomiting or diarrhea. Had a boost and a gatorade for breakfast.    Family History   Problem Relation Age of Onset    Heart disease Mother     Cancer Father     Heart disease Father      Social History     Socioeconomic History    Marital status:      Spouse name: Not on file    Number of children: Not on file    Years of education: Not on file    Highest education level: Not on file   Occupational History    Not on file   Social Needs    Financial resource strain: Not on file    Food insecurity     Worry: Not on file     Inability: Not on file    Transportation needs     Medical: Not on file     Non-medical: Not on file   Tobacco  Use    Smoking status: Former Smoker   Substance and Sexual Activity    Alcohol use: Not on file    Drug use: Not on file    Sexual activity: Not on file   Lifestyle    Physical activity     Days per week: Not on file     Minutes per session: Not on file    Stress: Not on file   Relationships    Social connections     Talks on phone: Not on file     Gets together: Not on file     Attends Buddhism service: Not on file     Active member of club or organization: Not on file     Attends meetings of clubs or organizations: Not on file     Relationship status: Not on file   Other Topics Concern    Not on file   Social History Narrative    Not on file     Past Surgical History:   Procedure Laterality Date    INSERTION OF STAUFFER CATHETER Right 9/21/2020    Procedure: INSERTION, CATHETER, CENTRAL VENOUS, STAUFFER;  Surgeon: Ti Starkey MD;  [facility] OR [address] [place] [address];  Service: General;  Laterality: Right;       Patient's Medications   New Prescriptions    No medications on file   Previous Medications    ACYCLOVIR (ZOVIRAX) 200 MG CAPSULE    Take 2 capsules (400 mg total) by mouth 2 (two) times daily.    AMLODIPINE (NORVASC) 5 MG TABLET    Take 5 mg by mouth once daily.    APIXABAN (ELIQUIS) 5 MG TAB    Take 1 tablet (5 mg total) by mouth 2 (two) times daily. HOLD until okayed to take by oncologist.    ATORVASTATIN (LIPITOR) 40 MG TABLET    Take 40 mg by mouth once daily.    DIGOXIN (LANOXIN) 250 MCG TABLET    Take 250 mcg by mouth once daily.    LEVOFLOXACIN (LEVAQUIN) 500 MG TABLET    Take 1 tablet (500 mg total) by mouth once daily.    LOSARTAN (COZAAR) 25 MG TABLET    Take 1 tablet (25 mg total) by mouth once daily.    MAGNESIUM OXIDE (MAG-OX) 400 MG (241.3 MG MAGNESIUM) TABLET    Take 2 tablets (800 mg total) by mouth once daily.    METFORMIN (GLUCOPHAGE) 500 MG TABLET    Take 500 mg by mouth 2 (two) times daily with meals.    METOPROLOL SUCCINATE (TOPROL-XL) 50 MG 24 HR TABLET    Take 50 mg by mouth  "once daily.    POTASSIUM CHLORIDE SA (K-DUR,KLOR-CON) 20 MEQ TABLET    Take 2 tablets (40 mEq total) by mouth once daily.    TERAZOSIN (HYTRIN) 5 MG CAPSULE    Take 5 mg by mouth every evening.    VENETOCLAX (VENCLEXTA) 100 MG TAB    Take 100 mg by mouth once daily.    VORICONAZOLE (VFEND) 200 MG TAB    Take 1 tablet (200 mg total) by mouth 2 (two) times daily.   Modified Medications    No medications on file   Discontinued Medications    No medications on file       Patient Active Problem List    Diagnosis Date Noted    External hemorrhoids without complication 10/10/2020    Coagulase negative Staphylococcus bacteremia 10/08/2020    Neutropenic fever 10/08/2020    Hypocalcemia 10/05/2020    Bone marrow transplant candidate     Hyperbilirubinemia 09/30/2020    Hyperphosphatemia 09/28/2020    SEBASTIAN (acute kidney injury) 09/23/2020    Non-sustained ventricular tachycardia 09/18/2020    Type 2 diabetes mellitus without complication, without long-term current use of insulin 09/18/2020    Acute myeloid leukemia not having achieved remission 09/16/2020    Permanent atrial fibrillation 09/16/2020    Essential hypertension 09/16/2020    Neoplastic pleural effusion 09/16/2020    Pancytopenia due to antineoplastic chemotherapy 09/16/2020       Review of Systems   Review of Systems   Constitutional: Negative for chills and fever.   HENT: Negative for sinus pain and sore throat.    Respiratory: Negative for cough and shortness of breath.    Cardiovascular: Negative for chest pain, palpitations, orthopnea and leg swelling.   Gastrointestinal: Positive for nausea. Negative for constipation, diarrhea, melena and vomiting.   Genitourinary: Negative for dysuria and hematuria.   Musculoskeletal: Negative for falls and myalgias.   Neurological: Positive for dizziness. Negative for headaches.           Objective:      BP 97/63 (BP Location: Left arm)   Pulse 96   Temp 99.1 °F (37.3 °C) (Oral)   Ht 6' 4" (1.93 m)   Wt " "122.2 kg (269 lb 6.4 oz)   BMI 32.79 kg/m²   Estimated body mass index is 32.79 kg/m² as calculated from the following:    Height as of this encounter: 6' 4" (1.93 m).    Weight as of this encounter: 122.2 kg (269 lb 6.4 oz).    Physical Exam  Vitals signs reviewed.   Constitutional:       Appearance: He is ill-appearing.   HENT:      Head: Normocephalic and atraumatic.      Nose: Nose normal.      Mouth/Throat:      Mouth: Mucous membranes are moist.      Pharynx: No oropharyngeal exudate.   Eyes:      Conjunctiva/sclera: Conjunctivae normal.      Pupils: Pupils are equal, round, and reactive to light.   Neck:      Musculoskeletal: Normal range of motion.   Cardiovascular:      Rate and Rhythm: Normal rate and regular rhythm.   Pulmonary:      Effort: Pulmonary effort is normal.      Breath sounds: Normal breath sounds.   Abdominal:      General: Abdomen is flat.      Palpations: Abdomen is soft.   Musculoskeletal: Normal range of motion.         General: No swelling.   Skin:     General: Skin is warm and dry.      Comments: hickmann catheter rt side of chest without tenderness, erythema or purulence   Neurological:      General: No focal deficit present.      Mental Status: He is alert and oriented to person, place, and time.   Psychiatric:         Mood and Affect: Mood normal.         Behavior: Behavior normal.         Assessment:         1. Coagulase negative Staphylococcus bacteremia  Blood culture    Blood culture         Plan:       Shell was seen today for hospital follow-up.    Diagnoses and all orders for this visit:    Coagulase negative Staphylococcus bacteremia  --completed 14 days vanc on 10/21/20  --given that pt with lightheadedness and borderline BP will repeat BCx to make sure bacteremia has resolved.  -     Blood culture; Future  -     Blood culture; Future    Anemia  --hgb 6.9 with associated lightheadedness  --discussed with oncology; pt to go to onc clinic for blood transfusion      Case " discussed with Dr. Harrell.    Marii Tee PGY-4  Infectious Disease

## 2020-10-22 NOTE — PROGRESS NOTES
I have reviewed the notes, assessments, and/or procedures performed by Dr. Tee, I concur with her documentation of Shell Diaz.     Pt seen and examined - he states that he did not eat this morning and took his BP medication prior to arrival. BP on the lower end in clinic - repeated systolics in the 110s. Etiology of dizziness multifactorial (medication, anemia - Hb <7, decreased PO intake at home vs residual infection). Completed his vancomycin without issues and has been afebrile - will need to check surveillance blood cultures while off antibiotics prior to chemotherapy.

## 2020-10-22 NOTE — PLAN OF CARE
Pt tolerated 1u PRBC without adverse effects. VSS. Provided AVS & verbalized understanding of RTC date. DC with family via wheelchair.

## 2020-10-22 NOTE — TELEPHONE ENCOUNTER
"----- Message from Ursula Akers sent at 10/22/2020 10:09 AM CDT -----  Consult/Advisory:    Name Of Caller Shell  Contact Preference?: [phone]    Contact Preference?:  What is the nature of the call?:  -returning call to Brendan regarding appt for an infusion for today      Additional Notes:  "Thank you for all that you do for our patients'"           "

## 2020-10-25 NOTE — PROGRESS NOTES
Hematology Oncology  Follow Up Visit Note    Patient: Shell Diaz  MRN: [id number]  Date: 10/26/2020    Chief Complaint: Nausea, Fatigue, and Dizziness    Oncologic History: pt's primary oncologist is Dr. Jm Fields  - presented to OSH with fever, fatigue, weakness and hemoptysis Noted to have a WBC >100k therefore transferred to [facility] for suspected leukemia  - BM biopsy done 9/17/2020 revealing AML, Non M3, 46,XY,+X,edi(X;10)(p10;q10),+1,edi(1;10)(q10;q10),del(11)(q21q23)[20} AML FISH (BM) Interpretation: The result is abnormal and indicates MLLT10/MLL(KMT2A) fusion in 90.8% of nuclei.  - thoracentesis done revealing malignant pleural effusion.   - started 7+3 on 9/25/2020   - restaging bone marrow biopsy at day 14 revealed persistent AML.  - discharged with plan for Decitabine/Venetoclax outpatient  - 10/26/20 C1D1 Decitabine/Venetoclax    History of Afib (pacemaker in place and on eliquis), cardiomyopathy, HTN  Subjective:     Interval History: Mr. Diaz is a 55 y.o. male with persistent AML s/p 7+3 who presents today to begin C1D1 decitabine/venetoclax. He has not received venetoclax yet. 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 He continues to report fatigue, dizziness with position changes, and weakness. He is taking all his BP meds and notes low to normal BPs. He notes some nausea controlled with current regimen. He is eating and drinking. He denies any fevers, chills, chest pain, SOB, diarrhea, constipation, vomiting.    Past Medical History:   Past Medical History:   Diagnosis Date    Atrial fibrillation     Hypertension      Past Surgical HIstory:   Past Surgical History:   Procedure Laterality Date    INSERTION OF STAUFFER CATHETER Right 9/21/2020    Procedure: INSERTION, CATHETER, CENTRAL VENOUS, STAUFFER;  Surgeon: Ti Starkey MD;  Location: [facility] OR [address] [place] [address];  Service: General;  Laterality: Right;     Family History:   Family History   Problem Relation Age of Onset    Heart  disease Mother     Cancer Father     Heart disease Father      Social History:  reports that he has quit smoking. He does not have any smokeless tobacco history on file.    Medications:  Current Outpatient Medications   Medication Sig Dispense Refill    acyclovir (ZOVIRAX) 200 MG capsule Take 2 capsules (400 mg total) by mouth 2 (two) times daily. 60 capsule 1    amLODIPine (NORVASC) 5 MG tablet Take 5 mg by mouth once daily.      apixaban (ELIQUIS) 5 mg Tab Take 1 tablet (5 mg total) by mouth 2 (two) times daily. HOLD until okayed to take by oncologist.      atorvastatin (LIPITOR) 40 MG tablet Take 40 mg by mouth once daily.      digoxin (LANOXIN) 250 mcg tablet Take 250 mcg by mouth once daily.      levoFLOXacin (LEVAQUIN) 500 MG tablet Take 1 tablet (500 mg total) by mouth once daily. 30 tablet 1    losartan (COZAAR) 25 MG tablet Take 1 tablet (25 mg total) by mouth once daily. 30 tablet 1    magnesium oxide (MAG-OX) 400 mg (241.3 mg magnesium) tablet Take 2 tablets (800 mg total) by mouth once daily. 30 tablet 1    metFORMIN (GLUCOPHAGE) 500 MG tablet Take 500 mg by mouth 2 (two) times daily with meals.      metoprolol succinate (TOPROL-XL) 50 MG 24 hr tablet Take 50 mg by mouth once daily.      potassium chloride SA (K-DUR,KLOR-CON) 20 MEQ tablet Take 2 tablets (40 mEq total) by mouth once daily. 60 tablet 1    terazosin (HYTRIN) 5 MG capsule Take 5 mg by mouth every evening.      venetoclax (VENCLEXTA) 100 mg Tab Take 100 mg by mouth once daily. 28 tablet 0    voriconazole (VFEND) 200 MG Tab Take 1 tablet (200 mg total) by mouth 2 (two) times daily. 60 tablet 1     No current facility-administered medications for this visit.      Facility-Administered Medications Ordered in Other Visits   Medication Dose Route Frequency Provider Last Rate Last Dose    alteplase injection 2 mg  2 mg Intra-Catheter PRN Jm Fields MD        decitabine (DACOGEN) 50 mg in sodium chloride 0.9% 100 mL  "chemo infusion  20 mg/m2 (Treatment Plan Recorded) Intravenous 1 time in Clinic/HOD Jm Fields MD        heparin, porcine (PF) 100 unit/mL injection flush 500 Units  500 Units Intravenous PRN Jm Fields MD        sodium chloride 0.9% flush 10 mL  10 mL Intravenous PRN Jm Fields MD         Review of Systems   Constitutional: Positive for fatigue. Negative for activity change, appetite change, chills, diaphoresis, fever and unexpected weight change.   HENT: Negative for congestion, dental problem, mouth sores, nosebleeds, postnasal drip, rhinorrhea, sinus pressure, sore throat and trouble swallowing.    Eyes: Negative for photophobia and visual disturbance.   Respiratory: Negative for cough and shortness of breath.    Cardiovascular: Negative for chest pain, palpitations and leg swelling.   Gastrointestinal: Positive for nausea (mild). Negative for abdominal distention, abdominal pain, blood in stool, constipation, diarrhea and vomiting.   Genitourinary: Negative for dysuria, frequency, hematuria and urgency.   Musculoskeletal: Negative for arthralgias, back pain and myalgias.   Skin: Negative for pallor and rash.   Allergic/Immunologic: Negative for immunocompromised state.   Neurological: Positive for dizziness and weakness. Negative for syncope, numbness and headaches.   Hematological: Negative for adenopathy. Does not bruise/bleed easily.   Psychiatric/Behavioral: Negative for confusion. The patient is not nervous/anxious.      ECOG Score:  1    Objective:     Vitals:    10/26/20 0856   BP: 104/61   BP Location: Left arm   Patient Position: Sitting   BP Method: Large (Automatic)   Pulse: 80   Resp: 16   Temp: 97.9 °F (36.6 °C)   SpO2: 100%   Weight: 119.4 kg (263 lb 3.2 oz)   Height: 6' 4" (1.93 m)       BMI: Body mass index is 32.04 kg/m².     Physical Exam  Constitutional:       Appearance: Normal appearance. He is not diaphoretic.   HENT:      Head: Normocephalic and atraumatic. "   Eyes:      General: Lids are normal.   Neck:      Musculoskeletal: Normal range of motion.      Trachea: Trachea normal.   Cardiovascular:      Rate and Rhythm: Normal rate and regular rhythm.      Heart sounds: Normal heart sounds.      Comments: Low normal bp   Pulmonary:      Effort: Pulmonary effort is normal.      Breath sounds: Normal breath sounds.   Abdominal:      General: Bowel sounds are normal.   Musculoskeletal: Normal range of motion.   Skin:     General: Skin is dry.      Coloration: Skin is not pale.      Comments: Lopes to right chest wall is c/d/i   Neurological:      Mental Status: He is alert and oriented to person, place, and time.      Coordination: Coordination normal.      Gait: Gait normal.   Psychiatric:         Speech: Speech normal.         Behavior: Behavior normal. Behavior is cooperative.         Thought Content: Thought content normal.         Judgment: Judgment normal.       Laboratory Data:  Lab Results   Component Value Date    WBC 0.49 (LL) 10/26/2020    HGB 6.4 (L) 10/26/2020    HCT 18.5 (LL) 10/26/2020    MCV 85 10/26/2020    PLT 9 (LL) 10/26/2020       CMP  Sodium   Date Value Ref Range Status   10/26/2020 138 136 - 145 mmol/L Final     Potassium   Date Value Ref Range Status   10/26/2020 3.9 3.5 - 5.1 mmol/L Final     Chloride   Date Value Ref Range Status   10/26/2020 103 95 - 110 mmol/L Final     CO2   Date Value Ref Range Status   10/26/2020 27 23 - 29 mmol/L Final     Glucose   Date Value Ref Range Status   10/26/2020 142 (H) 70 - 110 mg/dL Final     BUN, Bld   Date Value Ref Range Status   10/26/2020 25 (H) 6 - 20 mg/dL Final     Creatinine   Date Value Ref Range Status   10/26/2020 1.5 (H) 0.5 - 1.4 mg/dL Final     Calcium   Date Value Ref Range Status   10/26/2020 8.4 (L) 8.7 - 10.5 mg/dL Final     Total Protein   Date Value Ref Range Status   10/26/2020 6.6 6.0 - 8.4 g/dL Final     Albumin   Date Value Ref Range Status   10/26/2020 3.4 (L) 3.5 - 5.2 g/dL Final      Total Bilirubin   Date Value Ref Range Status   10/26/2020 1.3 (H) 0.1 - 1.0 mg/dL Final     Comment:     For infants and newborns, interpretation of results should be based  on gestational age, weight and in agreement with clinical  observations.  Premature Infant recommended reference ranges:  Up to 24 hours.............<8.0 mg/dL  Up to 48 hours............<12.0 mg/dL  3-5 days..................<15.0 mg/dL  6-29 days.................<15.0 mg/dL       Alkaline Phosphatase   Date Value Ref Range Status   10/26/2020 109 55 - 135 U/L Final     AST   Date Value Ref Range Status   10/26/2020 12 10 - 40 U/L Final     ALT   Date Value Ref Range Status   10/26/2020 18 10 - 44 U/L Final     Anion Gap   Date Value Ref Range Status   10/26/2020 8 8 - 16 mmol/L Final     eGFR if    Date Value Ref Range Status   10/26/2020 59.7 (A) >60 mL/min/1.73 m^2 Final     eGFR if non    Date Value Ref Range Status   10/26/2020 51.7 (A) >60 mL/min/1.73 m^2 Final     Comment:     Calculation used to obtain the estimated glomerular filtration  rate (eGFR) is the CKD-EPI equation.           Assessment:     1. Acute myeloid leukemia not having achieved remission    2. Neoplastic pleural effusion    3. Pancytopenia due to antineoplastic chemotherapy    4. Coagulase negative Staphylococcus bacteremia    5. Hyperbilirubinemia    6. Type 2 diabetes mellitus without complication, without long-term current use of insulin    7. Essential hypertension    8. Permanent atrial fibrillation    9. SEBASTIAN (acute kidney injury)    10. Hypotension, unspecified hypotension type        Plan:     Acute myeloid leukemia not having achieved remission  - plan below per primary oncologist, Dr. Jm Fields  - [facility] for suspected acute leukemia after manual differential demonstrated blasts at outside hospital   - underwent bone marrow biopsy on 09/16/20 showing AML with flow cytometry showing  increased population of myeloid blasts showing expression of CD4, CD9, CD11c, CD15, CD13, CD33(97%), CD64, (partial), HLA-DR, and cytoplasmic myeloperoxidase. Fish results - t(10;11), ngs - No pathogenic genetic alteration was detected. Associated with neoplastic effusion of left lower lung  - lu catheter placed 09/21/20  - baseline ECHO from 09/16/20 with EF of 58%  - initiated 7+3 on 09/25 with Idarubicin and Cytarabine  - follow-up bone marrow biopsy repeated on 10/08/20 (day 14) with persistent AML  - chemo consent done and scanned into media tab 10/26/20   - will proceed with C1D1 Decitabine/Venetoclax today per Dr. Fields as it has now been authorized. He has not received venetoclax yet. PharmD reached out to speciality pharmacy. No ramp up needed as dose is reduced for vori interaction. Knows to take 6 hours post dig. Knows to start once he receives drug  - pt was unaware that decitabine would be for more than one cycle. This was clarified with pt today. Bm bx per Dr. Fields will be prior to C2 on 11/23/20  - continue oppurtunistic infection prophylaxis with Acyclovir, Levofloxacin, and Voriconazole  - HLA typing and sibling contact info previously sent to transplant coordinator     Neoplastic pleural effusion  - CT chest on 09/17 showing abnormal opacity involving the left lower lobe with a prominent area of dense confluent opacity that may relate to an area of dense consolidation, with surrounding prominent confluent infiltrates/airspace disease, as well as a small to moderate left pleural effusion. He was treated with Vancomycin and Zosyn from 09/15 to 09/22.  - pulmonology was consulted after repeat CXR showed progression of left sided pleural effusion. Status post thoracentesis on 09/24 with 1L of blood fluid removed with studies negative for infection, but showing abundant atypical mononuclear cells, suggestive of blasts.  - denies sob and O2 sats >92% on RA     Pancytopenia due to  antineoplastic chemotherapy and AML  - transfuse for platelet count <10K or bleeding and for Hgb <7  - transfuse 1 unit prbc and 1 unit plt today, orders placed    Staphylococcus bacteremia   - blood cultures from 10/8 positive for STAPHYLOCOCCUS HAEMOLYTICUS    - discharged on Vancomycin 1750 mg q12 for 14 days (EOT 10/21/2020). Completed now  - repeat cultures NGTD on 10/22/20 (done due to low bps)  - saw ID 10/22/20    Hyperbilirubinemia  - slight indirect hyperbilirubinemia noted, bili 1.3 today. Liver enzymes wnl  - likely secondary to transfusions     Type 2 diabetes mellitus without complication, without long-term current use of insulin  - continue glucophage     Essential hypertension  - home meds: losartan 25 mg daily, amlodipine 5 mg daily and terazosin qhs  - will stop amlodipine 10/26/20 due to low normal BPs and dizziness     Permanent atrial fibrillation  - status post pacemaker placement, currently rate controlled  - home regimen: Apixaban, Digoxin, and Metoprolol  - holding Apixaban due to thrombocytopenia; will resume when appropriate  - keeping Mg > 2 and K > 4  - a follow up echocardiogram is recommended at 6 months post treatment  - will obtain weekly Digoxin level per pharmacy recs due to interaction with voriconazole. Knows to take dig in AMs and hold until labs performed in AMs (on Mondays). Venetoclax to be take 6 hours post dig    SEBASTIAN/hypotension  - stop amlodipine 10/26/20  - creatinine trending up, 1.5 today  - drinking 1.5 pitchers of water daily  - will give 1 liter IVFs today if transfusions do not improved BP    F/U:  - 1 unit prbc, 1 unit plt, 1 liter NS  - appts for labs and chemo have been set through 10/30    --    - please schedule cbc, cmp in [place] every Wednesday and Friday for 1 month starting next week  - please schedule cbc, cmp, t&s, uric acid, and digoxin level from central line every Monday morning here with appts with Dr. Fields alternating with NP for 1 month  -  please schedule Decitabine for 5 days (11/23-11/27) <--Thanksgiving week so chemo will be on Monday  - bm bx 11/23/20  - call pt to let him know appts    >1 hr spent with this pt with >50% of time face to face with pt.    Fanny Allen, REBECA, NP  Hematology/Oncology

## 2020-10-26 NOTE — PLAN OF CARE
1635 Pt completed and tolerated 1 unit pRBCs, 1 unit platelets and C1D1 Dacogen. [facility] educational information provided. Reviewed potential side effects. Pt aware of when to contact providers office. Hydration encouraged. Pt line +blood return, flushed and hep locked provided. AVS provided and discussed. Pt to return tomorrow at 11 for C1D2. Pt aware. Escorted via WC out of infusion suite per wife.

## 2020-10-26 NOTE — Clinical Note
- please schedule cbc, cmp in [place] every Wednesday and Friday for 1 month starting next week  - please schedule cbc, cmp, t&s, uric acid, and digoxin level from central line every Monday morning here with appts with Dr. Fields alternating with NP for 1 month  - please schedule Decitabine for 5 days (11/23-11/27)  - call pt to let him know appts

## 2020-10-26 NOTE — PROGRESS NOTES
55 y.o. male with AML consented for chemotherapy. Regimen is decitabine with venetoclax as noted below. Written and verbal education on each drug provided. Potential side effects discussed. Pt signed chemotherapy consent and this was witnessed and placed in patient's chart. All questions answered.    Chemotherapy Treatment        Only medications are shown          Day 1, Cycle 1 (28-day cycle)  Planned for 10/26/2020                     Chemotherapy        decitabine (DACOGEN) 50 mg in sodium chloride 0.9% 100 mL chemo infusion     50 mg (rounded from 51.2 mg = 20 mg/m2 × 2.56 m2 Treatment plan BSA), Intravenous           Day 2, Cycle 1 (28-day cycle)  Planned for 10/27/2020                     Chemotherapy        decitabine (DACOGEN) 50 mg in sodium chloride 0.9% 100 mL chemo infusion     50 mg (rounded from 51.2 mg = 20 mg/m2 × 2.56 m2 Treatment plan BSA), Intravenous                 Day 3, Cycle 1 (28-day cycle)  Planned for 10/28/2020                     Chemotherapy        decitabine (DACOGEN) 50 mg in sodium chloride 0.9% 100 mL chemo infusion     50 mg (rounded from 51.2 mg = 20 mg/m2 × 2.56 m2 Treatment plan BSA), Intravenous                 Day 4, Cycle 1 (28-day cycle)  Planned for 10/29/2020                     Chemotherapy        decitabine (DACOGEN) 50 mg in sodium chloride 0.9% 100 mL chemo infusion     50 mg (rounded from 51.2 mg = 20 mg/m2 × 2.56 m2 Treatment plan BSA), Intravenous                 Day 5, Cycle 1 (28-day cycle)  Planned for 10/30/2020                     Chemotherapy        decitabine (DACOGEN) 50 mg in sodium chloride 0.9% 100 mL chemo infusion     50 mg (rounded from 51.2 mg = 20 mg/m2 × 2.56 m2 Treatment plan BSA), Intravenous                      Fanny Allen DNP, NP  Hematology/Oncology

## 2020-10-26 NOTE — PLAN OF CARE
1215 Pt arrived for 1 unit platelets, pRBCs and C1D1 Dacogen. Escorted to infusion chair via WC. Pt does report some weakness/fatigue, otherwise NAD noted. Meds, hx, axs, labs, tx plan reviewed in detail. Reclined in chair. Central line flushed and w +blood return. Blankets/snacks provided. WCTM pt closely.

## 2020-10-29 NOTE — PLAN OF CARE
Pt received Dacogen & 1u PRBCs today and tolerated well, without complications. Educated patient about Dacogen & 1u PRBCs (indications, side effects, possible reactions, chemotherapy precautions) and verbalized understanding.  VSS. CW Tunnel cath (blue port) positive for blood return, saline flushed, Heparin flush instilled to dwell and green capped. Pt DC with no distress noted, WC off of unit per family w/o event, pleased.

## 2020-10-29 NOTE — NURSING
Labs collected from red lumen of CVC.  Line flushed and heparinized after collection.  Cap replaced.  Dressing C/D/I.  Patient tolerated procedure without issue.  Specimens labeled and sent to lab.

## 2020-10-30 NOTE — PLAN OF CARE
Pt to clinic via wlc for Dacogen infusion. Pt's wife waiting downstairs in car for pt. Pt denies any sig complaints. Tunneled catheter noted to R chest wall. Dressing dry and intact. VS WNL. Dacogen  infused without difficult. Pt tolerated well. Line flushed with NS and heparin. Green cap placed on valve. Pt from clinic via WLC by escort. Wife with pt.

## 2020-10-30 NOTE — NURSING
Pt arrived for lab from cvc.  Blue lumen with good blood return, lab sent.  Line flushed.  Pt now waiting for infusion chair in wheelchair with his wife.

## 2020-10-31 NOTE — PLAN OF CARE
Pt tolerated Dacogen infusion well, with no complications or s/s of adverse reaction. VS stable throughout infusion. Right chest vascular catheter positive for blood return, flushed with normal saline and heparin and secured prior to discharge. Curos cap applied to luer lock. RTC 11/2/20 for labs and appt with Dr. Fields, pt verbalized understanding. Pt instructed to notify Dr. Fields's  concerns arise. Pt discharged with no distress noted, per wheelchair, accompanied by Irene Egan RN.

## 2020-11-02 NOTE — TELEPHONE ENCOUNTER
Specialty Pharmacy - Initial Clinical Assessment    Specialty Medication Orders Linked to Encounter      Most Recent Value   Medication #1  venetoclax (VENCLEXTA) 100 mg Tab (Order#[id number], Rx#[id number])        Contacted patient for Venclexta initial consult and set up initial fill. Patient gave permission to speak to wife about his medication.     Subjective    Shell Diaz is a 55 y.o. male, who is followed by the specialty pharmacy service for management and education.    Encounters since last clinical assessment   10/26/2020: Referral Authorization with Kinjal Arredondo  11/2/2020: Initial Clinical Assessment with Wyatt Duran PharmD   Clinical call attempts since last clinical assessment   No call attempts found.     Today he received education before his first fill with [facility].    Current Outpatient Medications   Medication Sig Note    acyclovir (ZOVIRAX) 200 MG capsule Take 2 capsules (400 mg total) by mouth 2 (two) times daily.     atorvastatin (LIPITOR) 40 MG tablet Take 40 mg by mouth once daily.     digoxin (LANOXIN) 250 mcg tablet Take 250 mcg by mouth once daily.     levoFLOXacin (LEVAQUIN) 500 MG tablet Take 1 tablet (500 mg total) by mouth once daily.     losartan (COZAAR) 25 MG tablet Take 1 tablet (25 mg total) by mouth once daily.     metFORMIN (GLUCOPHAGE) 500 MG tablet Take 500 mg by mouth 2 (two) times daily with meals.     metoprolol succinate (TOPROL-XL) 50 MG 24 hr tablet Take 50 mg by mouth once daily.     terazosin (HYTRIN) 5 MG capsule Take 5 mg by mouth every evening.     venetoclax (VENCLEXTA) 100 mg Tab Take 100 mg by mouth once daily.     voriconazole (VFEND) 200 MG Tab Take 1 tablet (200 mg total) by mouth 2 (two) times daily.     apixaban (ELIQUIS) 5 mg Tab Take 1 tablet (5 mg total) by mouth 2 (two) times daily. HOLD until okayed to take by oncologist. 11/2/2020: MD has patient holding medication for now.     potassium chloride SA  (K-DUR,KLOR-CON) 20 MEQ tablet Take 2 tablets (40 mEq total) by mouth once daily. 2020: MD has patient holding for now.   Last reviewed on 2020  3:21 PM by Valentine Purcell RN    Review of patient's allergies indicates:  No Known AllergiesLast reviewed on  2020 3:21 PM by Valentine Purcell    Drug Interactions    Drug interactions evaluated: yes  Clinically relevant drug interactions identified: yes   Interactions list: -Digoxin: Venclexta may increase the serum concentration of digoxin (Cat D).   -Voriconazole: Voriconazole may increase the serum concentration of venclexta (Cat D).   Drug management plan: -Digoxin: Avoid concomitant use of venetoclax and digoxin if possible. If combined, administer digoxin at least 6 hours before venetoclax to minimize the potential for an interaction.  digoxin level: 0.2 (low). MD is aware of DDI and will have patient get weekly digoxin levels   -Voriconazole:  If venetoclax is used for acute myeloid leukemia (AML), a strong CY inhibitor may be used during the initiation and ramp-up phase, but the venetoclax dose should be reduced as follows: 10 mg on day 1, 20 mg on day 2, 50 mg on day 3, and 100 mg on day 4. MD opted to reduce dose to Venclexta 100 mg without a titration phase.    Provided the patient with educational material regarding drug interactions: not applicable         Adverse Effects    Unable to perform a full ROS: Yes   Reason: other        Assessment Questions - Documented Responses      Most Recent Value   Assessment   Medication Reconciliation completed for patient  Yes   During the past 4 weeks, has patient missed any activities due to condition or medication?  No   During the past 4 weeks, did patient have any of the following urgent care visits?  None   Goals of Therapy Status  Discussed (new start)   Welcome packet contents reviewed and discussed with patient?  Yes   Assesment completed?  Yes   Plan  Therapy being initiated   Do you need to  "open a clinical intervention (i-vent)?  No   Do you want to schedule first shipment?  Yes   Medication #1 Assessment Info   Patient status  New medication   Is this medication appropriate for the patient?  Yes   Is this medication effective?  Not yet started        Refill Questions - Documented Responses      Most Recent Value   Relationship to patient of person spoken to?  Spouse/Significant Other   HIPAA/medical authority confirmed?  Yes   Can the patient store medication/sharps container properly (at the correct temperature, away from children/pets, etc.)?  Yes   Can the patient call emergency services (911) in the event of an emergency?  Yes   Does the patient have any concerns or questions about taking or administering this medication as prescribed?  No   How many doses does the patient have on hand?  0   How many days does the patient report on hand quantity will last?  0   How will the patient receive the medication?  Mail   When does the patient need to receive the medication?  11/02/20   Shipping Address  Home   Address in [facility] confirmed and updated if neccessary?  Yes   Expected Copay ($)  5   Is the patient able to afford the medication copay?  Yes   Payment Method  at pickup   Days supply of Refill  28   Refill activity completed?  Yes   Refill activity plan  Refill scheduled   Shipment/Pickup Date:  11/02/20        Objective    He has a past medical history of Atrial fibrillation and Hypertension.    Tried/failed medications: None    BP Readings from Last 4 Encounters:   11/02/20 114/76   11/02/20 102/61   10/31/20 118/71   10/30/20 120/77     Ht Readings from Last 4 Encounters:   11/02/20 6' 4" (1.93 m)   10/31/20 6' 4" (1.93 m)   10/29/20 6' 4" (1.93 m)   10/26/20 6' 4" (1.93 m)     Wt Readings from Last 4 Encounters:   11/02/20 124.1 kg (273 lb 9.5 oz)   10/31/20 119 kg (262 lb 5.6 oz)   10/29/20 119 kg (262 lb 5.6 oz)   10/26/20 119.4 kg (263 lb 3.2 oz)     Recent Labs   Lab Result " Units 11/02/20  1335 10/30/20  1330 10/29/20  1336 10/26/20  0803  10/21/20  1326   RBC M/uL 2.13 L 2.54 L 2.23 L 2.17 L   < >  --    Hemoglobin g/dL 6.3 L 7.4 L 6.6 L 6.4 L   < >  --    Hematocrit % 18.2 LL 21.3 L 19.0 LL 18.5 LL   < >  --    WBC K/uL 0.69 LL 0.74 LL 0.75 LL 0.49 LL   < >  --    Gran # (ANC) K/uL 0.1 L 0.1 L 0.1 L 0.1 L   < >  --    Gran % % 18.9 L 18.9 L 14.7 L 14.3 L   < >  --    Platelets K/uL 7 LL 17 LL 23 LL 9 LL   < >  --    Sodium mmol/L 142  --  139 138  --  138   Potassium mmol/L 4.1  --  4.1 3.9  --  4.1   Chloride mmol/L 109  --  103 103  --  103   Glucose mg/dL 122 H  --  128 H 142 H  --  163 H   BUN mg/dL 27 H  --  29 H 25 H  --  21 H   Creatinine mg/dL 1.3  --  1.4 1.5 H  --  1.2   Calcium mg/dL 8.7  --  9.2 8.4 L  --  8.5 L   Total Protein g/dL 6.1  --  6.5 6.6  --  6.4   Albumin g/dL 3.4 L  --  3.5 3.4 L  --  3.2 L   Total Bilirubin mg/dL 0.8  --  1.1 H 1.3 H  --  1.2 H   Alkaline Phosphatase U/L 97  --  107 109  --  91   AST U/L 15  --  12 12  --  13   ALT U/L 21  --  19 18  --  22    < > = values in this interval not displayed.     The goals of cancer treatment include:  · Achieving remission of cancer, if possible  · Reducing tumor size and spread of cancer, if remission is not possible  · Minimizing pain and symptoms of the cancer  · Preventing infection and other complications of treatment  · Promoting adequate nutrition  · Encouraging proper hydration  · Improving or maintaining quality of life  · Maintaining optimal therapy adherence  · Minimizing and managing side effects    Goals of Therapy Status: Discussed (new start)    Assessment/Plan  Patient plans to start therapy on 11/02/20.     Indication, dosage, appropriateness, effectiveness, safety and convenience of his specialty medication(s) were reviewed today.     Patient Counseling    Counseled the patient on the following: doses and administration discussed, safe handling, storage, and disposal discussed, possible  adverse effects and management discussed, possible drug and prescription drug interactions discussed, possible drug and OTC drug and food interactions discussed, lab monitoring and follow-up discussed, therapeutic rationale discussed, cost of medications and cost implications discussed, adherence and missed doses discussed, pharmacy contact information discussed       Provided information about the pharmacy (normal business hours, on-call pharmacist for medication related questions or concerns, periodic follow up calls, refill process) and shipment ([employer]Ex, packaging).    Instructed to store medication at room temperature in a cool, dry area out of reach from any pets and children.     Educated on proper handling precautions (avoid touching directly - use disposable gloves or the cap of bottle/vial or dosing cup). If contact is made, patient should wash hands immediately and thoroughly to ensure no transfer to others not intended for. Informed wife no one else should ever handle the medication directly with their hands.     Counseled patient to take Venclexta 100 mg - 1 tablet by mouth once daily with food and water around the same time each day. Wife plans to have the patient take the medication in the evening. Avoid grapefruit/grapefruit juice, seville oranges, and star fruit.    Labs reviewed from 11/2/2020. CBC is critically low, but stable (expected with his disease). CMP - stable. Renal and hepatic function is stable. No action needed. Therapy and dose are appropriate for Acute myeloid leukemia, newly diagnosed: 100 mg on day 1, 200 mg on day 2, 400 mg on day 3 and beyond. Dose reduction according to Cat D DDI with voriconazole: 10 mg on day 1, 20 mg on day 2, 50 mg on day 3, and 100 mg on day 4. MD opted to proceed with Venclexta 100 mg once daily as he states he did not believe the patient needed a titration phase as he already reduced the dose. Patient is taking in combination with decitabine.     Counseled  "on common SEs and warnings/precaution: thrombocytopenia/neutropenia/anemia (monitor with labs; already low given disease); increased risk for bleeding (monitor for abnormal bruising/bleeding - blood in urine/stools or nosebleeds); increased risk for infection and pneumonia (monitor for SOB, fever, achy chills, wet persistent cough, sore throat); fatigue/weakness (monitor for decreased energy levels); N/V (take with food; if anti-emetic is needed, notify [facility] to reach out to MD to request Rx); diarrhea (stay well hydrated and OTC Imodium - if still >4 loose stools/day, notify [facility] due to risk for dehydration); constipation (OTC Colace or Miralax); swelling (elevate legs above hips for 1 hr/day); rash (OTC hydrocortisone for red, itchy bumps - do NOT apply to open cuts/wounds or broken skin); abdominal pain (take with food); muscle aches and back pain (OTC Tylenol); cough (drink water and OTC throat lozenge); dizziness (rest and OTC meclizine if needed); muscle spasms (notify [facility] to reach out to MD for Rx).    Appetite: Eats 3 meals/day and snacks on yogurt or apple sauce in between meals.  Energy: Wife states patient's energy has "really come up". She states patient is physically active with some limitations (not able to do as much as before). Discussed increasing physical activity/exercise in order to improve energy threshold.   Pain: 0/10 - denies pain.    Wife had a great understanding of everything discussed. All questions were answered to her satisfaction. She is aware to contact [facility] if they need anything.    Tasks added this encounter   11/23/2020 - Refill Call (Auto Added)  1/24/2021 - Clinical - Follow Up Assesement (90 day)  11/9/2020 - 7 Day Post Start Touchbase   Tasks due within next 3 months   No tasks due.     Wyatt Duran, PharmD  [facility]  [address] [place] [id number]  Phone: [phone]  Fax: [phone]  "

## 2020-11-02 NOTE — TELEPHONE ENCOUNTER
Contacted patient for his [facility] initial consult and to set up shipment. Patient states he is currently at his appointment right now and would like a call back around 2:30 PM.

## 2020-11-02 NOTE — Clinical Note
Also can we please get schedule with ophthalmology (not optometrist)  urgently on 11/9 when will be in town for his other appts at [facility]

## 2020-11-02 NOTE — Clinical Note
-please leave all [place] [facility]  -please cancel all wed/Friday [place] labs and reschedule them to weekly thursdays labs at [place]   -all other appt to remain as is

## 2020-11-02 NOTE — PROGRESS NOTES
Hematology Oncology  Follow Up Visit Note    Patient: Shell Diaz  MRN: [id number]  Date: 11/3/2020    Chief Complaint: No chief complaint on file.    Oncologic History: pt's primary oncologist is Dr. 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  - thoracentesis done revealing malignant pleural effusion.   - started 7+3 on 9/25/2020   - restaging bone marrow biopsy at day 14 revealed persistent AML.  - discharged with plan for Decitabine/Venetoclax outpatient  - 10/26/20 C1D1 Decitabine/Venetoclax    History of Afib (pacemaker in place and on eliquis), cardiomyopathy, HTN  Subjective:     Interval History: Mr. Diaz is a 55 y.o. male with high risk CG aml; persistent   s/p 7+3 ; transitioned to italo/dacogen salvage; started dacogen (day 1-5 of 28 day cycle) on 10/26/20; has not received venetoclax but supposed to be mailed and received tomorrow; tolerating well thus far. Overall states he feels fatigued but otherwise doing well.  Notes some right eye blurry vision intermittently but states he had these symptoms preceding his AML diagnosis.   Comes to clinic with wife.      Past Medical History:   Past Medical History:   Diagnosis Date    Atrial fibrillation     Hypertension      Past Surgical HIstory:   Past Surgical History:   Procedure Laterality Date    INSERTION OF STAUFFER CATHETER Right 9/21/2020    Procedure: INSERTION, CATHETER, CENTRAL VENOUS, STAUFFER;  Surgeon: Ti Starkey MD;  Location: [facility] OR [address] [place] [address];  Service: General;  Laterality: Right;     Family History:   Family History   Problem Relation Age of Onset    Heart disease Mother     Cancer Father      Heart disease Father      Social History:  reports that he has quit smoking. He does not have any smokeless tobacco history on file.    Medications:  Current Outpatient Medications   Medication Sig Dispense Refill    acyclovir (ZOVIRAX) 200 MG capsule Take 2 capsules (400 mg total) by mouth 2 (two) times daily. 60 capsule 1    apixaban (ELIQUIS) 5 mg Tab Take 1 tablet (5 mg total) by mouth 2 (two) times daily. HOLD until okayed to take by oncologist.      atorvastatin (LIPITOR) 40 MG tablet Take 40 mg by mouth once daily.      digoxin (LANOXIN) 250 mcg tablet Take 250 mcg by mouth once daily.      levoFLOXacin (LEVAQUIN) 500 MG tablet Take 1 tablet (500 mg total) by mouth once daily. 30 tablet 1    losartan (COZAAR) 25 MG tablet Take 1 tablet (25 mg total) by mouth once daily. 30 tablet 1    metFORMIN (GLUCOPHAGE) 500 MG tablet Take 500 mg by mouth 2 (two) times daily with meals.      metoprolol succinate (TOPROL-XL) 50 MG 24 hr tablet Take 50 mg by mouth once daily.      potassium chloride SA (K-DUR,KLOR-CON) 20 MEQ tablet Take 2 tablets (40 mEq total) by mouth once daily. 60 tablet 1    terazosin (HYTRIN) 5 MG capsule Take 5 mg by mouth every evening.      venetoclax (VENCLEXTA) 100 mg Tab Take 100 mg by mouth once daily. 28 tablet 0    voriconazole (VFEND) 200 MG Tab Take 1 tablet (200 mg total) by mouth 2 (two) times daily. 60 tablet 1     No current facility-administered medications for this visit.      Review of Systems   Constitutional: Positive for fatigue. Negative for activity change, appetite change, chills, diaphoresis, fever and unexpected weight change.   HENT: Negative for congestion, dental problem, mouth sores, nosebleeds, postnasal drip, rhinorrhea, sinus pressure, sore throat and trouble swallowing.    Eyes: Negative for photophobia and visual disturbance.   Respiratory: Negative for cough and shortness of breath.    Cardiovascular: Negative for chest pain, palpitations and leg  "swelling.   Gastrointestinal: Positive for nausea (mild). Negative for abdominal distention, abdominal pain, blood in stool, constipation, diarrhea and vomiting.   Genitourinary: Negative for dysuria, frequency, hematuria and urgency.   Musculoskeletal: Negative for arthralgias, back pain and myalgias.   Skin: Negative for pallor and rash.   Allergic/Immunologic: Negative for immunocompromised state.   Neurological: Positive for dizziness and weakness. Negative for syncope, numbness and headaches.   Hematological: Negative for adenopathy. Does not bruise/bleed easily.   Psychiatric/Behavioral: Negative for confusion. The patient is not nervous/anxious.      ECOG Score:  1    Objective:     Vitals:    11/02/20 1355   BP: 102/61   Pulse: 84   Resp: 18   Temp: 97.9 °F (36.6 °C)   SpO2: 100%   Weight: 124.1 kg (273 lb 9.5 oz)   Height: 6' 4" (1.93 m)       BMI: Body mass index is 33.3 kg/m².     Physical Exam  Constitutional:       Appearance: Normal appearance. He is not diaphoretic.   HENT:      Head: Normocephalic and atraumatic.   Eyes:      General: Lids are normal.   Neck:      Musculoskeletal: Normal range of motion.      Trachea: Trachea normal.   Cardiovascular:      Rate and Rhythm: Normal rate and regular rhythm.      Heart sounds: Normal heart sounds.      Comments: Low normal bp   Pulmonary:      Effort: Pulmonary effort is normal.      Breath sounds: Normal breath sounds.   Abdominal:      General: Bowel sounds are normal.   Musculoskeletal: Normal range of motion.   Skin:     General: Skin is dry.      Coloration: Skin is not pale.      Comments: Lopes to right chest wall is c/d/i   Neurological:      Mental Status: He is alert and oriented to person, place, and time.      Coordination: Coordination normal.      Gait: Gait normal.   Psychiatric:         Speech: Speech normal.         Behavior: Behavior normal. Behavior is cooperative.         Thought Content: Thought content normal.         Judgment: " Judgment normal.       Laboratory Data:  Lab Results   Component Value Date    WBC 0.69 (LL) 11/02/2020    HGB 6.3 (L) 11/02/2020    HCT 18.2 (LL) 11/02/2020    MCV 85 11/02/2020    PLT 7 (LL) 11/02/2020       CMP  Sodium   Date Value Ref Range Status   11/02/2020 142 136 - 145 mmol/L Final     Potassium   Date Value Ref Range Status   11/02/2020 4.1 3.5 - 5.1 mmol/L Final     Chloride   Date Value Ref Range Status   11/02/2020 109 95 - 110 mmol/L Final     CO2   Date Value Ref Range Status   11/02/2020 26 23 - 29 mmol/L Final     Glucose   Date Value Ref Range Status   11/02/2020 122 (H) 70 - 110 mg/dL Final     BUN   Date Value Ref Range Status   11/02/2020 27 (H) 6 - 20 mg/dL Final     Creatinine   Date Value Ref Range Status   11/02/2020 1.3 0.5 - 1.4 mg/dL Final     Calcium   Date Value Ref Range Status   11/02/2020 8.7 8.7 - 10.5 mg/dL Final     Total Protein   Date Value Ref Range Status   11/02/2020 6.1 6.0 - 8.4 g/dL Final     Albumin   Date Value Ref Range Status   11/02/2020 3.4 (L) 3.5 - 5.2 g/dL Final     Total Bilirubin   Date Value Ref Range Status   11/02/2020 0.8 0.1 - 1.0 mg/dL Final     Comment:     For infants and newborns, interpretation of results should be based  on gestational age, weight and in agreement with clinical  observations.  Premature Infant recommended reference ranges:  Up to 24 hours.............<8.0 mg/dL  Up to 48 hours............<12.0 mg/dL  3-5 days..................<15.0 mg/dL  6-29 days.................<15.0 mg/dL       Alkaline Phosphatase   Date Value Ref Range Status   11/02/2020 97 55 - 135 U/L Final     AST   Date Value Ref Range Status   11/02/2020 15 10 - 40 U/L Final     ALT   Date Value Ref Range Status   11/02/2020 21 10 - 44 U/L Final     Anion Gap   Date Value Ref Range Status   11/02/2020 7 (L) 8 - 16 mmol/L Final     eGFR if    Date Value Ref Range Status   11/02/2020 >60.0 >60 mL/min/1.73 m^2 Final     eGFR if non    Date  Value Ref Range Status   11/02/2020 >60.0 >60 mL/min/1.73 m^2 Final     Comment:     Calculation used to obtain the estimated glomerular filtration  rate (eGFR) is the CKD-EPI equation.           Assessment:     1. Pancytopenia    2. Acute myeloid leukemia not having achieved remission    3. Stem cell transplant candidate    4. Atrial fibrillation, unspecified type    5. Blurry vision, right eye        Plan:     Acute myeloid leukemia not having achieved remission  -high risk AML with complex CG including  T(10;11) diagnosed sept 2020; sp 7+3 with residual disease  -transitioned to venetoclax/dacogen on 10/26/20; C1D1 dacogen 10/26/20; receives in mail and will start venetoclax tomorrow  -plan for post cycle 1 bone marrow biopsy on 11/23/20 to assess response  -typing multiple sibs now for planned alloSCT pending remission, adequate pre transplant eval, and donor        recurrent  pleural effusion  -   Sp thora x 2; improved clinically today; bilateral breath sounds present  -monitor     Pancytopenia due to antineoplastic chemotherapy and AML  -plan for mon/thurs labs and prn transfusions  -for ease Thursday labs to be in [place] and Monday labs app[place] [facility] [place]  -transfuse for hgb <7, plt <10; 1 unit plt, 1 unit prbc today   -continue acyc, levaquin, vori ppx     Type 2 diabetes mellitus without complication, without long-term current use of insulin  - continue glucophage     Essential hypertension  - home meds: losartan 25 mg daily, amlodipine 5 mg daily and terazosin qhs  - will stop amlodipine 10/26/20 due to low normal BPs and dizziness     Permanent atrial fibrillation  - status post pacemaker placement, currently rate controlled  - home regimen: Apixaban, Digoxin, and Metoprolol  - holding Apixaban due to thrombocytopenia; will resume when appropriate  - keeping Mg > 2 and K > 4  - a follow up echocardiogram is recommended at 6 months post treatment  - will obtain weekly Digoxin level per pharmacy recs due to  interaction with voriconazole. Knows to take dig in AMs and hold until labs performed in AMs (on Mondays). Venetoclax to be take 6 hours post dig; will remind pt as will  venetoclax tomorrow.    SEBASTIAN/hypotension  - stop amlodipine 10/26/20  -cr stable; encouraged 2L po  Intake daily      blurry vision  Right eye; refer to ophthalmology on Monday at [facility] [place] [place] to rule out leukemic involvement/hemorrhage    FU:     -please leave all Monday appts as is at [facility] [place]   -please cancel all wed/Friday [place] labs and reschedule them to weekly thursdays labs at [place]   -all other appt to remain as is

## 2020-11-03 NOTE — PLAN OF CARE
Pt tolerated 1u PRBC & platelets without adverse effects. VSS. Provided AVS & verbalized understanding of RTC date. DC ambulating independently.

## 2020-11-05 NOTE — PROGRESS NOTES
"HPI     Blurred Vision      Additional comments: pt states woke up with blurred VA - OD only x 3   days ago, "like looking through plastic" --- vision clear when went to bed   prior night //  hx of uveitis but no redness or pain --- pt on chemo x 6   weeks for leukemia              Spots and/or Floaters      Additional comments: has seen a few floaters since start of symptoms --   no light flashes or curtain              Comments     Hx of uveitis, followed by Dr. Duarte, no shots or injections    Hemoglobin A1C       Date                     Value               Ref Range             Status                09/20/2020               8.0 (H)             4.0 - 5.6 %           Final                        Last edited by Tobi Kumar, OD on 11/5/2020  1:32 PM. (History)            Assessment /Plan     For exam results, see Encounter Report.    Vitreous hemorrhage of right eye  -     Ambulatory referral/consult to Ophthalmology; Future; Expected date: 12/05/2020    Epiretinal membrane (ERM) of right eye  -     Ambulatory referral/consult to Ophthalmology; Future; Expected date: 12/05/2020  -     Posterior Segment OCT Retina-Both eyes    Retinopathy of both eyes  -     Ambulatory referral/consult to Ophthalmology; Future; Expected date: 12/05/2020  -     Posterior Segment OCT Retina-Both eyes      Vitreous heme OD with ERM and mac edema. Bilateral retinopathy, possible secondary to leukemia, no linda spots noted. Referred to Dr. Cortes for eval and treatment.                  "

## 2020-11-06 NOTE — PROGRESS NOTES
1 unit of PRBC ordered for Hgb 6.2 and platelet 6 k with premedication    Lilliana Frazier NP  Hematology/Oncology/BMT

## 2020-11-06 NOTE — PLAN OF CARE
1400 pt here for platelets and 1 unit PRBC, labs, hx, meds, allergies reviewed, pt with no complaints at this time, reclined in chair, continue to monitor

## 2020-11-07 NOTE — PLAN OF CARE
Assumed care of pt at 1630 from Sofy Abraham RN. Pt tolerated remainder of 1 unit PRBCs well, with no complications or s/s of adverse reaction. VS stable throughout transfusion. Right chest tunneled catheter + blood return, flushed with NS and heparin prior to discharge. Curos cap applied to luer lock. RTC 11/9/20, pt verbalized understanding. Pt instructed to notify Dr. Fields's office if concerns arise. Pt discharged with no distress noted, ambulating independently. AVS printed and given to pt.

## 2020-11-08 PROBLEM — I95.9 HYPOTENSION: Status: ACTIVE | Noted: 2020-01-01

## 2020-11-08 PROBLEM — H53.8 BLURRY VISION, RIGHT EYE: Status: ACTIVE | Noted: 2020-01-01

## 2020-11-09 NOTE — PLAN OF CARE
Pt got 1 unit PRBCs today and tolerated well, with no complications or s/s of adverse reaction. VS stable throughout transfusion. Right chest tunneled catheter positive for blood return, flushed blue lumen with normal saline and heparin prior to discharge. Curos cap applied to luer lock. RTC 11/12/2020 for labs, pt verbalized understanding. Pt instructed to notify Dr. Fields's office if concerns arise. Pt discharged with no distress noted, ambulating independently.

## 2020-11-09 NOTE — PROGRESS NOTES
Hematology Oncology  Follow Up Visit Note    Patient: Shell Diaz  MRN: [id number]  Date: 11/8/2020    Chief Complaint: No chief complaint on file.    Oncologic History: pt's primary oncologist is Dr. 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  - thoracentesis done revealing malignant pleural effusion.   - started 7+3 on 9/25/2020   - restaging bone marrow biopsy at day 14 revealed persistent AML.  - discharged with plan for Decitabine/Venetoclax outpatient  - 10/26/20 C1D1 Decitabine/Venetoclax    History of Afib (pacemaker in place and on eliquis), cardiomyopathy, HTN  Subjective:     Interval History: Mr. Diaz is a 55 y.o. male with high risk CG aml. He is s/p 7+3 without having achieved remission. He was subsequently transitioned to italo/dacogen salvage. He started dacogen (day 1-5 of 28 day cycle) on 10/26/20. He had not yet received venetoclax at previous visit but has since received it and started taking it 11/2/20. Tolerating treatment well thus far. Other than fatigue, he is doing well. Denies fevers, chills, cough, SOB, chest pain, bruising and bleeding, and n/v/d/c.Comes to clinic with wife.      Past Medical History:   Past Medical History:   Diagnosis Date    Atrial fibrillation     Hypertension      Past Surgical HIstory:   Past Surgical History:   Procedure Laterality Date    INSERTION OF STAUFFER CATHETER Right 9/21/2020    Procedure: INSERTION, CATHETER, CENTRAL VENOUS, STAUFFER;  Surgeon: Ti Starkey MD;  Location: [facility] OR [address] [place] [address];  Service: General;  Laterality: Right;     Family History:   Family History   Problem Relation Age of Onset     Heart disease Mother     Cancer Father     Heart disease Father      Social History:  reports that he has quit smoking. He does not have any smokeless tobacco history on file.    Medications:  Current Outpatient Medications   Medication Sig Dispense Refill    acyclovir (ZOVIRAX) 200 MG capsule Take 2 capsules (400 mg total) by mouth 2 (two) times daily. 60 capsule 1    apixaban (ELIQUIS) 5 mg Tab Take 1 tablet (5 mg total) by mouth 2 (two) times daily. HOLD until okayed to take by oncologist.      atorvastatin (LIPITOR) 40 MG tablet Take 40 mg by mouth once daily.      digoxin (LANOXIN) 250 mcg tablet Take 250 mcg by mouth once daily.      levoFLOXacin (LEVAQUIN) 500 MG tablet Take 1 tablet (500 mg total) by mouth once daily. 30 tablet 1    losartan (COZAAR) 25 MG tablet Take 1 tablet (25 mg total) by mouth once daily. 30 tablet 1    magnesium oxide (MAG-OX) 400 mg (241.3 mg magnesium) tablet Take 2 tablets (800 mg total) by mouth once daily. 60 tablet 1    metFORMIN (GLUCOPHAGE) 500 MG tablet Take 500 mg by mouth 2 (two) times daily with meals.      metoprolol succinate (TOPROL-XL) 50 MG 24 hr tablet Take 50 mg by mouth once daily.      potassium chloride SA (K-DUR,KLOR-CON) 20 MEQ tablet Take 2 tablets (40 mEq total) by mouth once daily. 60 tablet 1    terazosin (HYTRIN) 5 MG capsule Take 5 mg by mouth every evening.      venetoclax (VENCLEXTA) 100 mg Tab Take 100 mg by mouth once daily. 28 tablet 0    voriconazole (VFEND) 200 MG Tab Take 1 tablet (200 mg total) by mouth 2 (two) times daily. 60 tablet 1     No current facility-administered medications for this visit.      Review of Systems   Constitutional: Positive for fatigue. Negative for activity change, appetite change, chills, diaphoresis, fever and unexpected weight change.   HENT: Negative for congestion, dental problem, mouth sores, nosebleeds, postnasal drip, rhinorrhea, sinus pressure, sore throat and trouble swallowing.    Eyes: Negative  for photophobia and visual disturbance.   Respiratory: Negative for cough and shortness of breath.    Cardiovascular: Negative for chest pain, palpitations and leg swelling.   Gastrointestinal: Negative for abdominal distention, abdominal pain, blood in stool, constipation, diarrhea and vomiting. Nausea: mild.   Genitourinary: Negative for dysuria, frequency, hematuria and urgency.   Musculoskeletal: Negative for arthralgias, back pain and myalgias.   Skin: Negative for pallor and rash.   Allergic/Immunologic: Negative for immunocompromised state.   Neurological: Positive for weakness. Negative for syncope, numbness and headaches.   Hematological: Negative for adenopathy. Does not bruise/bleed easily.   Psychiatric/Behavioral: Negative for confusion. The patient is not nervous/anxious.      ECOG Score:  1    Objective:     There were no vitals filed for this visit.    BMI: There is no height or weight on file to calculate BMI.     Physical Exam  Constitutional:       Appearance: Normal appearance. He is not diaphoretic.   HENT:      Head: Normocephalic and atraumatic.   Eyes:      General: Lids are normal.   Neck:      Musculoskeletal: Normal range of motion.      Trachea: Trachea normal.   Cardiovascular:      Rate and Rhythm: Normal rate and regular rhythm.      Heart sounds: Normal heart sounds.   Pulmonary:      Effort: Pulmonary effort is normal.      Breath sounds: Normal breath sounds.   Abdominal:      General: Bowel sounds are normal.   Musculoskeletal: Normal range of motion.   Skin:     General: Skin is dry.      Coloration: Skin is not pale.      Comments: Lopes to right chest wall is c/d/i with no sign of infection noted.   Neurological:      Mental Status: He is alert and oriented to person, place, and time.      Coordination: Coordination normal.      Gait: Gait normal.   Psychiatric:         Speech: Speech normal.         Behavior: Behavior normal. Behavior is cooperative.         Thought Content:  Thought content normal.         Judgment: Judgment normal.       Laboratory Data:  Lab Results   Component Value Date    WBC 0.66 (LL) 11/05/2020    HGB 6.2 (L) 11/05/2020    HCT 18.3 (LL) 11/05/2020    MCV 86 11/05/2020    PLT 6 (LL) 11/05/2020       CMP  Sodium   Date Value Ref Range Status   11/05/2020 141 136 - 145 mmol/L Final     Potassium   Date Value Ref Range Status   11/05/2020 4.0 3.5 - 5.1 mmol/L Final     Chloride   Date Value Ref Range Status   11/05/2020 106 95 - 110 mmol/L Final     CO2   Date Value Ref Range Status   11/05/2020 26 23 - 29 mmol/L Final     Glucose   Date Value Ref Range Status   11/05/2020 128 (H) 70 - 110 mg/dL Final     BUN   Date Value Ref Range Status   11/05/2020 22 (H) 6 - 20 mg/dL Final     Creatinine   Date Value Ref Range Status   11/05/2020 1.3 0.5 - 1.4 mg/dL Final     Calcium   Date Value Ref Range Status   11/05/2020 8.7 8.7 - 10.5 mg/dL Final     Total Protein   Date Value Ref Range Status   11/05/2020 5.9 (L) 6.0 - 8.4 g/dL Final     Albumin   Date Value Ref Range Status   11/05/2020 3.2 (L) 3.5 - 5.2 g/dL Final     Total Bilirubin   Date Value Ref Range Status   11/05/2020 1.0 0.1 - 1.0 mg/dL Final     Comment:     For infants and newborns, interpretation of results should be based  on gestational age, weight and in agreement with clinical  observations.  Premature Infant recommended reference ranges:  Up to 24 hours.............<8.0 mg/dL  Up to 48 hours............<12.0 mg/dL  3-5 days..................<15.0 mg/dL  6-29 days.................<15.0 mg/dL       Alkaline Phosphatase   Date Value Ref Range Status   11/05/2020 95 55 - 135 U/L Final     AST   Date Value Ref Range Status   11/05/2020 12 10 - 40 U/L Final     ALT   Date Value Ref Range Status   11/05/2020 17 10 - 44 U/L Final     Anion Gap   Date Value Ref Range Status   11/05/2020 9 8 - 16 mmol/L Final     eGFR if    Date Value Ref Range Status   11/05/2020 >60.0 >60 mL/min/1.73 m^2 Final      eGFR if non    Date Value Ref Range Status   11/05/2020 >60.0 >60 mL/min/1.73 m^2 Final     Comment:     Calculation used to obtain the estimated glomerular filtration  rate (eGFR) is the CKD-EPI equation.           Assessment:     1. Acute myeloid leukemia not having achieved remission    2. Pancytopenia due to antineoplastic chemotherapy    3. Type 2 diabetes mellitus without complication, without long-term current use of insulin    4. Essential hypertension    5. Permanent atrial fibrillation    6. SEBASTIAN (acute kidney injury)    7. Hypotension, unspecified hypotension type    8. 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;11) diagnosed sept 2020; sp 7+3 with residual disease  -transitioned to venetoclax/dacogen on 10/26/20; C1D1 dacogen 10/26/20; started venetoclax 11/2/20  -plan for post cycle 1 bone marrow biopsy on 11/23/20 to assess response  -typing multiple sibs now for planned alloSCT pending remission, adequate pre transplant eval, and donor  - today is C1D15 of Dacogen  - C2D1 is 11/23/20     recurrent  pleural effusion  - Sp thora x 2  - clear bilateral breath sounds today  - monitor     Pancytopenia due to antineoplastic chemotherapy and AML  -continue mon/thurs labs and prn transfusions  -for ease Thursday labs are in [place] and Monday labs app[place] [facility]  -transfuse for hgb <7, plt <10   -continue acyc, levaquin, vori ppx   -will transfuse 1 unit prbc in infusion today    Type 2 diabetes mellitus without complication, without long-term current use of insulin  - continue glucophage     Essential hypertension  - home meds: losartan 25 mg daily, amlodipine 5 mg daily and terazosin qhs  -stopped amlodipine 10/26/20 due to low normal BPs and dizziness     Permanent atrial fibrillation  - status post pacemaker placement, currently rate controlled  - home regimen: Apixaban, Digoxin, and  Metoprolol  - holding Apixaban due to thrombocytopenia; will resume when appropriate  - keeping Mg > 2 and K > 4  - a follow up echocardiogram is recommended at 6 months post treatment  - will obtain weekly Digoxin level per pharmacy recs due to interaction with voriconazole. Knows to take dig in AMs and hold until labs performed in AMs (on Mondays). Venetoclax to be take 6 hours post dig  - his dig level is 0.1 today. States that he did not take dig today or yesterday and has missed other doses since starting venetoclax due to timing. Discussed taking dig in the morning and continuing venetoclax in the evenings  - he follows with local cardiologist and plans on making an appt    SEBASTIAN/hypotension  - stopped amlodipine 10/26/20  -cr 1.0  - continue to encouraged 2L po  Intake daily     Blurry vision  - right eye  - referred to ophthalmology on  to rule out leukemic involvement/hemorrhage. Has appt on 11/19/20    FU:     - Monday labs at [facility] including cbc, cmp, t&s, mag, uric acid, and digoxin (already scheduled through November).  - Thursday labs in [place] including cbc, cmp, and t&s (already scheduled through November)  - chemo chair for C2 of Dacogen 11/23-11/30. No chemo on 11/26 due to Thanksgiving holiday (already scheduled except on 11/26)     Jeni Holt, DOREENP  Hematology/Oncology/Bone Marrow Transplant

## 2020-11-10 NOTE — TELEPHONE ENCOUNTER
Contacted patient for Venclexta 7 day touchbase. Patient began therapy on Monday, 11/2. He takes Venclexta 100 mg - 1 tablet by mouth once daily at 8:30 PM after dinner. Denies missed doses. Avoids grapefruit/grapefruit juice, seville oranges, and star fruit. 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 He reports tolerating his medicaiton well and not having any side effects. Patient has no questions or concerns at this time. He is aware to contact [facility] if he needs anything.

## 2020-11-13 NOTE — TELEPHONE ENCOUNTER
----- Message from Eloise Sandoval sent at 11/13/2020 11:07 AM CST -----  Contact: Patient  Patient Advice/Staff Message       Reason for call: Calling to clarify appt, was told to go to [facility] for a transfusion today. The pt is there now, and the hospital is saying they don't have any orders.     Do you feel you need to be seen today:: Yes        Communication Preference: [phone]    Additional Information:

## 2020-11-13 NOTE — PROGRESS NOTES
1600 patient resting comfortably, no distress, no shortness of breath, no transfusion reaction noted.  1628 patient ambulated out of er, gait steady no distress

## 2020-11-16 NOTE — PLAN OF CARE
Pt ambulatory to clinic today for one unit PRBC's. CVC to R chest wall. Good blood return. Consent verified in chart. Blood infusing without difficulty. Report to HOUSTON Russo end of care. Pt in NAD.

## 2020-11-16 NOTE — TELEPHONE ENCOUNTER
"----- Message from Yelena Dooley sent at 11/16/2020  2:51 PM CST -----  Pharmacy Assist    Name of caller:  [facility] #[id number]   Contact Preference:  [phone]  Does Patient feel the need to see the MD today? No   Physician:   What is the nature of the call?    - script sent today by NP there's two different instructions on it.   Please call and advise which one is correct   losartan (COZAAR) 25 MG tablet    Additional Notes:   "Thank you for all that you do for our patients'"          "

## 2020-11-16 NOTE — PROGRESS NOTES
Hematology Oncology  Follow Up Visit Note    Patient: Shell Diaz  MRN: [id number]  Date: 11/15/2020    Chief Complaint: No chief complaint on file.    Oncologic History: pt's primary oncologist is Dr. 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  - thoracentesis done revealing malignant pleural effusion.   - started 7+3 on 9/25/2020   - restaging bone marrow biopsy at day 14 revealed persistent AML.  - discharged with plan for Decitabine/Venetoclax outpatient  - 10/26/20 C1D1 Decitabine/Venetoclax    History of Afib (pacemaker in place and on eliquis), cardiomyopathy, HTN  Subjective:     Interval History: Mr. Diaz is a 55 y.o. male with high risk CG aml. He is s/p 7+3 without having achieved remission. He was subsequently transitioned to italo/dacogen salvage. He started dacogen (day 1-5 of 28 day cycle) on 10/26/20. He started Venetoclax 11/2/20. Tolerating treatment well thus far. Continues to be fatigued  And have SOB on exertion but is otherwise doing well. Denies fevers, chills, cough, chest pain, bruising and bleeding, and n/v/d/c. Comes to clinic with wife.      Past Medical History:   Past Medical History:   Diagnosis Date    Atrial fibrillation     Hypertension      Past Surgical HIstory:   Past Surgical History:   Procedure Laterality Date    INSERTION OF STAUFFER CATHETER Right 9/21/2020    Procedure: INSERTION, CATHETER, CENTRAL VENOUS, STAUFFER;  Surgeon: Ti Starkey MD;  Location: [facility] OR [address] [place] [address];  Service: General;  Laterality: Right;     Family History:   Family History   Problem Relation Age of Onset    Heart disease Mother     Cancer Father      Heart disease Father      Social History:  reports that he has quit smoking. He does not have any smokeless tobacco history on file.    Medications:  Current Outpatient Medications   Medication Sig Dispense Refill    acyclovir (ZOVIRAX) 200 MG capsule Take 2 capsules (400 mg total) by mouth 2 (two) times daily. 60 capsule 1    apixaban (ELIQUIS) 5 mg Tab Take 1 tablet (5 mg total) by mouth 2 (two) times daily. HOLD until okayed to take by oncologist. (Patient not taking: Reported on 11/9/2020)      atorvastatin (LIPITOR) 40 MG tablet Take 40 mg by mouth once daily.      digoxin (LANOXIN) 250 mcg tablet Take 250 mcg by mouth once daily.      levoFLOXacin (LEVAQUIN) 500 MG tablet Take 1 tablet (500 mg total) by mouth once daily. 30 tablet 1    losartan (COZAAR) 25 MG tablet Take 1 tablet (25 mg total) by mouth once daily. 30 tablet 1    magnesium oxide (MAG-OX) 400 mg (241.3 mg magnesium) tablet Take 2 tablets (800 mg total) by mouth once daily. 60 tablet 1    metFORMIN (GLUCOPHAGE) 500 MG tablet Take 500 mg by mouth 2 (two) times daily with meals.      metoprolol succinate (TOPROL-XL) 50 MG 24 hr tablet Take 50 mg by mouth once daily.      potassium chloride SA (K-DUR,KLOR-CON) 20 MEQ tablet Take 2 tablets (40 mEq total) by mouth once daily. (Patient not taking: Reported on 11/9/2020) 60 tablet 1    terazosin (HYTRIN) 5 MG capsule Take 5 mg by mouth every evening.      venetoclax (VENCLEXTA) 100 mg Tab Take 100 mg by mouth once daily. 28 tablet 0     Current Facility-Administered Medications   Medication Dose Route Frequency Provider Last Rate Last Dose    0.9%  NaCl infusion (for blood administration)   Intravenous Once Mike Andrew MD   Stopped at 11/13/20 1557    heparin, porcine (PF) 100 unit/mL injection flush 500 Units  500 Units Intravenous PRN Jm Fields MD   500 Units at 11/13/20 1602     Review of Systems   Constitutional: Positive for fatigue. Negative for activity change,  appetite change, chills, diaphoresis, fever and unexpected weight change.   HENT: Negative for congestion, dental problem, mouth sores, nosebleeds, postnasal drip, rhinorrhea, sinus pressure, sore throat and trouble swallowing.    Eyes: Negative for photophobia and visual disturbance.   Respiratory: Positive for shortness of breath. Negative for cough.         Mild on exertion     Cardiovascular: Negative for chest pain, palpitations and leg swelling.   Gastrointestinal: Negative for abdominal distention, abdominal pain, blood in stool, constipation, diarrhea, nausea and vomiting.   Genitourinary: Negative for dysuria, frequency, hematuria and urgency.   Musculoskeletal: Negative for arthralgias, back pain and myalgias.   Skin: Negative for pallor and rash.   Allergic/Immunologic: Negative for immunocompromised state.   Neurological: Positive for weakness. Negative for syncope, numbness and headaches.   Hematological: Negative for adenopathy. Does not bruise/bleed easily.   Psychiatric/Behavioral: Negative for confusion. The patient is not nervous/anxious.      ECOG Score:  1    Objective:     There were no vitals filed for this visit.    BMI: There is no height or weight on file to calculate BMI.     Physical Exam  Constitutional:       Appearance: Normal appearance. He is not diaphoretic.   HENT:      Head: Normocephalic and atraumatic.   Eyes:      General: Lids are normal.   Neck:      Musculoskeletal: Normal range of motion.      Trachea: Trachea normal.   Cardiovascular:      Rate and Rhythm: Normal rate. Rhythm irregular.      Heart sounds: Normal heart sounds.   Pulmonary:      Effort: Pulmonary effort is normal.      Breath sounds: Normal breath sounds.   Abdominal:      General: Bowel sounds are normal.   Musculoskeletal: Normal range of motion.   Skin:     General: Skin is dry.      Coloration: Skin is not pale.      Comments: Lopes to right chest wall is c/d/i with no sign of infection noted.    Neurological:      Mental Status: He is alert and oriented to person, place, and time.      Coordination: Coordination normal.      Gait: Gait normal.   Psychiatric:         Speech: Speech normal.         Behavior: Behavior normal. Behavior is cooperative.         Thought Content: Thought content normal.         Judgment: Judgment normal.       Laboratory Data:  Lab Results   Component Value Date    WBC 0.53 (LL) 11/12/2020    HGB 6.9 (L) 11/12/2020    HCT 20.3 (L) 11/12/2020    MCV 87 11/12/2020    PLT 12 (LL) 11/12/2020       CMP  Sodium   Date Value Ref Range Status   11/12/2020 141 136 - 145 mmol/L Final     Potassium   Date Value Ref Range Status   11/12/2020 4.2 3.5 - 5.1 mmol/L Final     Chloride   Date Value Ref Range Status   11/12/2020 108 95 - 110 mmol/L Final     CO2   Date Value Ref Range Status   11/12/2020 23 23 - 29 mmol/L Final     Glucose   Date Value Ref Range Status   11/12/2020 111 (H) 70 - 110 mg/dL Final     BUN   Date Value Ref Range Status   11/12/2020 22 (H) 6 - 20 mg/dL Final     Creatinine   Date Value Ref Range Status   11/12/2020 1.1 0.5 - 1.4 mg/dL Final     Calcium   Date Value Ref Range Status   11/12/2020 9.0 8.7 - 10.5 mg/dL Final     Total Protein   Date Value Ref Range Status   11/12/2020 6.4 6.0 - 8.4 g/dL Final     Albumin   Date Value Ref Range Status   11/12/2020 3.5 3.5 - 5.2 g/dL Final     Total Bilirubin   Date Value Ref Range Status   11/12/2020 0.9 0.1 - 1.0 mg/dL Final     Comment:     For infants and newborns, interpretation of results should be based  on gestational age, weight and in agreement with clinical  observations.  Premature Infant recommended reference ranges:  Up to 24 hours.............<8.0 mg/dL  Up to 48 hours............<12.0 mg/dL  3-5 days..................<15.0 mg/dL  6-29 days.................<15.0 mg/dL       Alkaline Phosphatase   Date Value Ref Range Status   11/12/2020 98 55 - 135 U/L Final     AST   Date Value Ref Range Status   11/12/2020 11  10 - 40 U/L Final     ALT   Date Value Ref Range Status   11/12/2020 13 10 - 44 U/L Final     Anion Gap   Date Value Ref Range Status   11/12/2020 10 8 - 16 mmol/L Final     eGFR if    Date Value Ref Range Status   11/12/2020 >60.0 >60 mL/min/1.73 m^2 Final     eGFR if non    Date Value Ref Range Status   11/12/2020 >60.0 >60 mL/min/1.73 m^2 Final     Comment:     Calculation used to obtain the estimated glomerular filtration  rate (eGFR) is the CKD-EPI equation.           Assessment:     1. Acute myeloid leukemia not having achieved remission    2. Neoplastic pleural effusion    3. Pancytopenia due to antineoplastic chemotherapy    4. Type 2 diabetes mellitus without complication, without long-term current use of insulin    5. Essential hypertension    6. Permanent atrial fibrillation    7. Hypotension, unspecified hypotension type    8. SEBASTIAN (acute kidney injury)    9. 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;11) diagnosed sept 2020; sp 7+3 with residual disease  -transitioned to venetoclax/dacogen on 10/26/20; C1D1 dacogen 10/26/20; started venetoclax 11/2/20  -plan for post cycle 1 bone marrow biopsy on 11/23/20 to assess response  -typing multiple sibs now for planned alloSCT pending remission, adequate pre transplant eval, and donor  - today is C1D15 of Dacogen  - C2D1 is 11/23/20     recurrent  pleural effusion  - Sp thora x 2  - clear bilateral breath sounds today  - monitor     Pancytopenia due to antineoplastic chemotherapy and AML  -continue mon/thurs labs and prn transfusions  -for ease Thursday labs are in [place] and [place] [facility] [place]  -transfuse for hgb <7, plt <10   -continue acyc, levaquin, vori ppx   -will get 1 unit prbc in infusion today    Type 2 diabetes mellitus without complication, without long-term current use of insulin  - continue glucophage      Essential hypertension  - home meds: losartan 25 mg daily, amlodipine 5 mg daily and terazosin qhs  - stopped amlodipine 10/26/20 due to low normal BPs and dizziness     Permanent atrial fibrillation  - status post pacemaker placement, currently rate controlled  - home regimen: Apixaban, Digoxin, and Metoprolol  - holding Apixaban due to thrombocytopenia; will resume when appropriate  - keeping Mg > 2 and K > 4  - a follow up echocardiogram is recommended at 6 months post treatment  - will obtain weekly Digoxin level per pharmacy recs due to interaction with voriconazole. Knows to take dig in AMs and hold until labs performed in AMs (on Mondays). Venetoclax to be take 6 hours post dig  - his dig level is 1.0 today. Taking dig in the morning and continuing venetoclax in the evenings.  - he follows with local cardiologist and plans on making an appt    SEBASTIAN/hypotension  - stopped amlodipine 10/26/20  -cr 1.0  - continue to encouraged 2L po  Intake daily     Blurry vision  - right eye  - referred to ophthalmology on  to rule out leukemic involvement/hemorrhage. Has appt on 11/19/20    FU:     [place] [facility] [place] cmp, t&s, mag, uric acid, and digoxin (already scheduled through November).   - Thursday labs in [place] including cbc, cmp, and t&s (already scheduled through November)  - chemo chair for C2 of Dacogen 11/23-11/30. No chemo on 11/26 due to Thanksgiving holiday (already scheduled except on 11/26)     Jeni Holt, FNP  Hematology/Oncology/Bone Marrow Transplant

## 2020-11-17 NOTE — PLAN OF CARE
1730 accepted pt care to finish PRBC.  Pt received 1U PRBCs today and tolerated well, without complications. Educated patient about PRBCs (indications, side effects, possible reactions) and verbalized understanding.  VSS. Blair port positive for blood return, saline flushed, Heparin flush instilled to dwell, clamped and green capped, other lumen undisturbed. Pt DC with no distress noted, ambulated off of unit via self, w/o event, pleased.

## 2020-11-19 PROBLEM — H35.371 EPIRETINAL MEMBRANE, RIGHT: Status: ACTIVE | Noted: 2020-01-01

## 2020-11-19 PROBLEM — H43.11 VITREOUS HEMORRHAGE, RIGHT: Status: ACTIVE | Noted: 2020-01-01

## 2020-11-19 PROBLEM — H35.61 RETINAL HEMORRHAGE, RIGHT: Status: ACTIVE | Noted: 2020-01-01

## 2020-11-19 PROBLEM — H25.041 POSTERIOR SUBCAPSULAR AGE-RELATED CATARACT OF RIGHT EYE: Status: ACTIVE | Noted: 2020-01-01

## 2020-11-19 NOTE — LETTER
November 19, 2020      Tobi Kumar [address]  [facility] [address]           [facility]  [address] [place] [address]  Phone: [phone]  Fax: [phone]          Patient: Shell Diaz   MR Number: [id number]   YOB: 1965   Date of Visit: 11/19/2020       Dear Dr. Tobi Kumar:    Thank you for referring Shell Diaz to me for evaluation. Attached you will find relevant portions of my assessment and plan of care.    If you have questions, please do not hesitate to call me. I look forward to following Shell Diaz along with you.    Sincerely,    ARTURO Cortes MD    Enclosure  CC:  No Recipients    If you would like to receive this communication electronically, please contact [email or web address] or [phone] to request more information on [employer] Link access.    For providers and/or their staff who would like to refer a patient to [facility], please contact us through our one-stop-shop provider referral line, [facility] , at [phone].    If you feel you have received this communication in error or would no longer like to receive these types of communications, please e-mail [email or web address]

## 2020-11-19 NOTE — PROGRESS NOTES
HPI     Referred from Dr. Kumar    Patient states he had some blurry vision OD x 2 weeks. Denies flashes but has some floaters OD. Newly dx'd AL x 6wks. Currently doing chemo, awaiting BMT. Treated for Uveitis OD x 1 yr ago with Dr. Duarte. Denies pain.     No gtts.      Last edited by Petrona Yeh on 11/19/2020  2:59 PM. (History)        OCT - OD - ERM  OS - NFL heme    FP - Scatttered heme      A/P    1. ERM OD    Visually significant, will consider PPV following AML tx    2. VH/preretinal heme OU  Low platelet ct due to AML  IT area OD looks like deHbized blood rather than infiltrate FP today    3. PSC OD  CE eval when stable    4. AML  Chemo - BM eval this week to monitor      6-8 weeks OCT and dilate OU

## 2020-11-23 NOTE — PROGRESS NOTES
Ordered 1 unit prbc for hgb of 6.3.    Jeni Holt, FNP  Hematology/Oncology/Bone Marrow Transplant

## 2020-11-23 NOTE — PROGRESS NOTES
Hematology Oncology  Follow Up Visit Note    Patient: Shell Diaz  MRN: [id number]  Date: 11/23/2020    Chief Complaint: No chief complaint on file.    Oncologic History: pt's primary oncologist is Dr. 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  - thoracentesis done revealing malignant pleural effusion.   - started 7+3 on 9/25/2020   - restaging bone marrow biopsy at day 14 revealed persistent AML.  - discharged with plan for Decitabine/Venetoclax outpatient  - 10/26/20 C1D1 Decitabine/Venetoclax    History of Afib (pacemaker in place and on eliquis), cardiomyopathy, HTN  Subjective:     Interval History: Mr. Diaz is a 55 y.o. male with high risk complex CG aml. He is s/p 7+3 without having achieved remission. He was subsequently transitioned to italo/dacogen salvage. He started dacogen (day 1-5 of 28 day cycle) on 10/26/20. He started Venetoclax 11/2/20. Tolerating treatment well thus far. He presents today for labs, md appt, marrow biopsy and to start cycle 2.  He is with his wife.   Denies fever, chills, nightsweats, bleeding, brusing, lymphadenopathy, signs/symptoms of splenomegaly.        Past Medical History:   Past Medical History:   Diagnosis Date    Atrial fibrillation     Hypertension      Past Surgical HIstory:   Past Surgical History:   Procedure Laterality Date    INSERTION OF STAUFFER CATHETER Right 9/21/2020    Procedure: INSERTION, CATHETER, CENTRAL VENOUS, STAUFFER;  Surgeon: Ti Starkey MD;  Location: [facility] OR [address] [place] [address];  Service: General;  Laterality: Right;     Family History:   Family History   Problem Relation Age of Onset    Heart disease  Mother     Cancer Father     Heart disease Father      Social History:  reports that he has quit smoking. He does not have any smokeless tobacco history on file.    Medications:  Current Outpatient Medications   Medication Sig Dispense Refill    acyclovir (ZOVIRAX) 200 MG capsule Take 2 capsules (400 mg total) by mouth 2 (two) times daily. 60 capsule 1    atorvastatin (LIPITOR) 40 MG tablet Take 40 mg by mouth once daily.      digoxin (LANOXIN) 250 mcg tablet Take 250 mcg by mouth once daily.      levoFLOXacin (LEVAQUIN) 500 MG tablet Take 1 tablet (500 mg total) by mouth once daily. 30 tablet 1    losartan (COZAAR) 25 MG tablet Take 1 tablet (25 mg total) by mouth once daily. 30 tablet 1    magnesium oxide (MAG-OX) 400 mg (241.3 mg magnesium) tablet Take 2 tablets (800 mg total) by mouth once daily. 60 tablet 1    metFORMIN (GLUCOPHAGE) 500 MG tablet Take 500 mg by mouth 2 (two) times daily with meals.      metoprolol succinate (TOPROL-XL) 50 MG 24 hr tablet Take 50 mg by mouth once daily.      potassium chloride SA (K-DUR,KLOR-CON) 20 MEQ tablet Take 2 tablets (40 mEq total) by mouth once daily. 60 tablet 1    terazosin (HYTRIN) 5 MG capsule Take 5 mg by mouth every evening.      venetoclax (VENCLEXTA) 100 mg Tab Take 100 mg by mouth once daily. 28 tablet 0    voriconazole (VFEND) 200 MG Tab Take 1 tablet (200 mg total) by mouth 2 (two) times daily. 60 tablet 0    apixaban (ELIQUIS) 5 mg Tab Take 1 tablet (5 mg total) by mouth 2 (two) times daily. HOLD until okayed to take by oncologist. (Patient not taking: Reported on 11/9/2020)      voriconazole (VFEND) 50 MG Tab Take 2 tablets (100 mg total) by mouth 2 (two) times daily. Pt taking 1 tablet once daily 60 tablet 3     Current Facility-Administered Medications   Medication Dose Route Frequency Provider Last Rate Last Dose    0.9%  NaCl infusion (for blood administration)   Intravenous Once Mike Andrew MD   Stopped at 11/13/20 1557    heparin,  porcine (PF) 100 unit/mL injection flush 500 Units  500 Units Intravenous PRN Jm Fields MD   500 Units at 11/13/20 1602     Facility-Administered Medications Ordered in Other Visits   Medication Dose Route Frequency Provider Last Rate Last Dose    alteplase injection 2 mg  2 mg Intra-Catheter PRN Jm Fields MD        heparin, porcine (PF) 100 unit/mL injection flush 500 Units  500 Units Intravenous PRN Jm Fields MD        sodium chloride 0.9% flush 10 mL  10 mL Intravenous PRN Jm Fields MD         Review of Systems   Constitutional: Positive for fatigue. Negative for activity change, appetite change, chills, diaphoresis, fever and unexpected weight change.   HENT: Negative for congestion, dental problem, mouth sores, nosebleeds, postnasal drip, rhinorrhea, sinus pressure, sore throat and trouble swallowing.    Eyes: Negative for photophobia and visual disturbance.   Respiratory: Positive for shortness of breath. Negative for cough.         Mild on exertion     Cardiovascular: Negative for chest pain, palpitations and leg swelling.   Gastrointestinal: Negative for abdominal distention, abdominal pain, blood in stool, constipation, diarrhea, nausea and vomiting.   Genitourinary: Negative for dysuria, frequency, hematuria and urgency.   Musculoskeletal: Negative for arthralgias, back pain and myalgias.   Skin: Negative for pallor and rash.   Allergic/Immunologic: Negative for immunocompromised state.   Neurological: Positive for weakness. Negative for syncope, numbness and headaches.   Hematological: Negative for adenopathy. Does not bruise/bleed easily.   Psychiatric/Behavioral: Negative for confusion. The patient is not nervous/anxious.      ECOG Score:  1    Objective:     Vitals:    11/23/20 1015   BP: 138/83   BP Location: Left arm   Patient Position: Sitting   BP Method: Large (Automatic)   Pulse: 78   Resp: 17   Temp: 98.4 °F (36.9 °C)   TempSrc: Oral   SpO2: 100%  "  Weight: 125.5 kg (276 lb 10.8 oz)   Height: 6' 2.61" (1.895 m)       BMI: Body mass index is 34.94 kg/m².     Physical Exam  Constitutional:       Appearance: Normal appearance. He is not diaphoretic.   HENT:      Head: Normocephalic and atraumatic.   Eyes:      General: Lids are normal.   Neck:      Musculoskeletal: Normal range of motion.      Trachea: Trachea normal.   Cardiovascular:      Rate and Rhythm: Normal rate. Rhythm irregular.      Heart sounds: Normal heart sounds.   Pulmonary:      Effort: Pulmonary effort is normal.      Breath sounds: Normal breath sounds.   Abdominal:      General: Bowel sounds are normal.   Musculoskeletal: Normal range of motion.   Skin:     General: Skin is dry.      Coloration: Skin is not pale.      Comments: Lopes to right chest wall is c/d/i with no sign of infection noted.   Neurological:      Mental Status: He is alert and oriented to person, place, and time.      Coordination: Coordination normal.      Gait: Gait normal.   Psychiatric:         Speech: Speech normal.         Behavior: Behavior normal. Behavior is cooperative.         Thought Content: Thought content normal.         Judgment: Judgment normal.       Laboratory Data:  Lab Results   Component Value Date    WBC 0.39 (LL) 11/23/2020    HGB 6.3 (L) 11/23/2020    HCT 18.8 (LL) 11/23/2020    MCV 90 11/23/2020    PLT 45 (L) 11/23/2020       CMP  Sodium   Date Value Ref Range Status   11/23/2020 138 136 - 145 mmol/L Final     Potassium   Date Value Ref Range Status   11/23/2020 4.2 3.5 - 5.1 mmol/L Final     Chloride   Date Value Ref Range Status   11/23/2020 107 95 - 110 mmol/L Final     CO2   Date Value Ref Range Status   11/23/2020 26 23 - 29 mmol/L Final     Glucose   Date Value Ref Range Status   11/23/2020 123 (H) 70 - 110 mg/dL Final     BUN   Date Value Ref Range Status   11/23/2020 19 6 - 20 mg/dL Final     Creatinine   Date Value Ref Range Status   11/23/2020 1.0 0.5 - 1.4 mg/dL Final     Calcium   Date " Value Ref Range Status   11/23/2020 8.3 (L) 8.7 - 10.5 mg/dL Final     Total Protein   Date Value Ref Range Status   11/23/2020 6.2 6.0 - 8.4 g/dL Final     Albumin   Date Value Ref Range Status   11/23/2020 3.5 3.5 - 5.2 g/dL Final     Total Bilirubin   Date Value Ref Range Status   11/23/2020 0.9 0.1 - 1.0 mg/dL Final     Comment:     For infants and newborns, interpretation of results should be based  on gestational age, weight and in agreement with clinical  observations.  Premature Infant recommended reference ranges:  Up to 24 hours.............<8.0 mg/dL  Up to 48 hours............<12.0 mg/dL  3-5 days..................<15.0 mg/dL  6-29 days.................<15.0 mg/dL       Alkaline Phosphatase   Date Value Ref Range Status   11/23/2020 100 55 - 135 U/L Final     AST   Date Value Ref Range Status   11/23/2020 10 10 - 40 U/L Final     ALT   Date Value Ref Range Status   11/23/2020 10 10 - 44 U/L Final     Anion Gap   Date Value Ref Range Status   11/23/2020 5 (L) 8 - 16 mmol/L Final     eGFR if    Date Value Ref Range Status   11/23/2020 >60.0 >60 mL/min/1.73 m^2 Final     eGFR if non    Date Value Ref Range Status   11/23/2020 >60.0 >60 mL/min/1.73 m^2 Final     Comment:     Calculation used to obtain the estimated glomerular filtration  rate (eGFR) is the CKD-EPI equation.           Assessment:     1. Stem cell transplant candidate    2. Acute myeloid leukemia not having achieved remission    3. Pancytopenia        Plan:     Acute myeloid leukemia not having achieved remission  -high risk AML with complex CG including T(10;11); flt3 pcr negative; normal NGS panel;  diagnosed sept 2020; sp 7+3 with residual disease  -transitioned to venetoclax/dacogen on 10/26/20; C1D1 dacogen 10/26/20; started venetoclax 11/2/20  -tolerating therapy well  -proceed with restaging bone marrow biopsy today to assess response  -typing multiple sibs now for planned alloSCT pending remission,  adequate pre transplant eval, and donor        recurrent  pleural effusion  - Sp thora x 2  - clear bilateral breath sounds today  - monitor     Pancytopenia due to antineoplastic chemotherapy and AML  -continue mon/thurs labs and prn transfusions  -for ease Thursday labs are in [place] and Monday labs appt at [facility] [place]  -transfuse for hgb <7, plt <10 ; 1 unit prbc today   -continue acyc, levaquin, vori ppx      Type 2 diabetes mellitus without complication, without long-term current use of insulin  - continue glucophage     Essential hypertension  - home meds: losartan 25 mg daily, amlodipine 5 mg daily and terazosin qhs  - stopped amlodipine 10/26/20 due to low normal BPs and dizziness     Permanent atrial fibrillation  - status post pacemaker placement, currently rate controlled  - home regimen: Apixaban, Digoxin, and Metoprolol  - holding Apixaban due to thrombocytopenia; will resume when appropriate  - keeping Mg > 2 and K > 4  - a follow up echocardiogram is recommended at 6 months post treatment  - will obtain weekly Digoxin level per pharmacy recs due to interaction with voriconazole. Knows to take dig in AMs and hold until labs performed in AMs (on Mondays). Venetoclax to be take 6 hours post dig  - Taking dig in the morning and continuing venetoclax in the evenings.  - he follows with local cardiologist and plans on making an appt    SEBASTIAN/hypotension  - stopped amlodipine 10/26/20  -Cr normal   - continue to encouraged 2L po  Intake daily     Blurry vision  - right eye  - referred to ophthalmology on 11/19/20; no concern for active leukemia     FU:      m/w/f labs this week with his daily dacogen; prn transfusions  Provider appt 11/30/20 to review marrow results

## 2020-11-23 NOTE — PROCEDURES
Patient with AML presented at BMT clinic for restaging bone marrow biopsy following C1 Venetoclax /Decitabine. Tolerated procedure well. Not in any distress. See Dr. Fields's note on 11/23/2020 for full history. Reviewed medications, allergies and labs.     Lilliana Frazier NP  Hematology/Oncology/BMT

## 2020-11-23 NOTE — NURSING
1015 pt arrived for lab collect from central line and sterile dressing change. Pt ambulatory to chair, NAD noted. Sterile dressing change performed:lumens flushed, +blood return. Labs obtained. Sent down for evaluation after pt ID Verification. Lumens hep locked. Pt ambulatory back to lobby for MD visit and future treatment.

## 2020-11-23 NOTE — PROCEDURES
Bone marrow    Date/Time: 11/23/2020 2:00 PM  Performed by: Lilliana Frazier NP  Authorized by: Fanny Allen NP     Aspiration?: Yes    Biopsy?: Yes      PROCEDURE NOTE:  Date of Procedure: 11/23/2020  Bone Marrow Biopsy and Aspiration  Indication: AML restaging  Consent: Informed consent was obtained from patient.  Timeout: Done and documented.  Position: Prone  Site: Left posterior illiac crest.  Prep: Betadine.  Needle used: 11 gauge Jamshidi needle.  Anesthetic: 2% lidocaine 5 cc.  Biopsy: The biopsy needle was introduced into the marrow cavity and an aspirate was obtained without complications and sent for flow cytometry,AML FISH, DNA/RNA HOLD and cytogenetics. Core biopsy obtained without difficulty and sent for routine histologic examination.  Complications: None.  Disposition: Left patient with primary nurse,instructed to lay on back for 20 minutes. Advised not to take shower and keep dressing clean, dry & intact for 24 hours.  Blood loss: Minimal.     Lilliana Frazier NP  Hematology/Oncology/BMT

## 2020-11-25 NOTE — TELEPHONE ENCOUNTER
Specialty Pharmacy - Refill Coordination    Specialty Medication Orders Linked to Encounter      Most Recent Value   Medication #1  venetoclax (VENCLEXTA) 100 mg Tab (Order#[id number], Rx#[id number])          Refill Questions - Documented Responses      Most Recent Value   Relationship to patient of person spoken to?  Self   HIPAA/medical authority confirmed?  Yes   Any changes in contact preferences or allowed representatives?  No   Has the patient had any insurance changes?  No   Has the patient had any changes to specialty medication, dose, or instructions?  No   Has the patient started taking any new medications, herbals, or supplements?  No   Has the patient been diagnosed with any new medical conditions?  No   Does the patient have any new allergies to medications or foods?  No   Does the patient have any concerns about side effects?  No   Can the patient store medication/sharps container properly (at the correct temperature, away from children/pets, etc.)?  Yes   Can the patient call emergency services (671) in the event of an emergency?  Yes   Does the patient have any concerns or questions about taking or administering this medication as prescribed?  No   How many doses did the patient miss in the past 4 weeks or since the last fill?  0   How many doses does the patient have on hand?  5   How many days does the patient report on hand quantity will last?  5   How will the patient receive the medication?  Pickup   When does the patient need to receive the medication?  11/30/20   Expected Copay ($)  5   Is the patient able to afford the medication copay?  Yes   Payment Method  at pickup   Days supply of Refill  28   Would patient like to speak to a pharmacist?  No   Do you want to trigger an intervention?  No   Do you want to trigger an additional referral task?  No   Refill activity completed?  Yes   Refill activity plan  Refill scheduled   Shipment/Pickup Date:  11/30/20          Current Outpatient  Medications   Medication Sig    acyclovir (ZOVIRAX) 200 MG capsule Take 2 capsules (400 mg total) by mouth 2 (two) times daily.    apixaban (ELIQUIS) 5 mg Tab Take 1 tablet (5 mg total) by mouth 2 (two) times daily. HOLD until okayed to take by oncologist. (Patient not taking: Reported on 11/9/2020)    atorvastatin (LIPITOR) 40 MG tablet Take 40 mg by mouth once daily.    digoxin (LANOXIN) 250 mcg tablet Take 250 mcg by mouth once daily.    levoFLOXacin (LEVAQUIN) 500 MG tablet Take 1 tablet (500 mg total) by mouth once daily.    losartan (COZAAR) 25 MG tablet Take 1 tablet (25 mg total) by mouth once daily.    magnesium oxide (MAG-OX) 400 mg (241.3 mg magnesium) tablet Take 2 tablets (800 mg total) by mouth once daily.    metFORMIN (GLUCOPHAGE) 500 MG tablet Take 500 mg by mouth 2 (two) times daily with meals.    metoprolol succinate (TOPROL-XL) 50 MG 24 hr tablet Take 50 mg by mouth once daily.    potassium chloride SA (K-DUR,KLOR-CON) 20 MEQ tablet Take 2 tablets (40 mEq total) by mouth once daily.    terazosin (HYTRIN) 5 MG capsule Take 5 mg by mouth every evening.    venetoclax (VENCLEXTA) 100 mg Tab Take 100 mg by mouth once daily.    voriconazole (VFEND) 200 MG Tab Take 1 tablet (200 mg total) by mouth 2 (two) times daily.    voriconazole (VFEND) 50 MG Tab Take 2 tablets (100 mg total) by mouth 2 (two) times daily. Pt taking 1 tablet once daily   Last reviewed on 11/24/2020  1:08 PM by Deborah Sorensen RN    Review of patient's allergies indicates:  No Known Allergies Last reviewed on  11/25/2020 9:04 AM by Kamari Gray      Tasks added this encounter   12/18/2020 - Refill Call (Auto Added)  12/1/2020 - Pickup Reminder   Tasks due within next 3 months   1/24/2021 - Clinical - Follow Up Assesement (90 day)     [facility]  [address] [place] [id number]  Phone: [phone]  Fax: [phone]

## 2020-11-25 NOTE — NURSING
Pt tolerated Labs drawn today. NAD. CVC distal lumen flushed + blood return present, flushed. Hep locked clamped and capped. declined AVS. Uses my Ochnser. Discharged next appt. Ambulated independently.

## 2020-11-25 NOTE — PLAN OF CARE
Pt admitted for C2D3 Dacogen. Labs reviewed and side effects and self care tips discussed. Pt tolerating treatment well. Pt also received 1 unit PRBC based on lab results per Dr. Fields's  order. Plan of care reviewed and pt instructed to contact MD with further concerns. Pt discharged @ 14:45

## 2020-11-27 NOTE — NURSING
1030 pt here for dsg change and lab draw, specimen sent to lab, site clear, no distress noted upon d/c to home

## 2020-11-27 NOTE — PLAN OF CARE
Pt admitted for C2D4 Dacogen. Labs reviewed and assessment performed, fatigue addressed. Pt tolerated treatment well. Central Line flushed and capped. Plan of care reviewed and Pt discharged @ 13:00, to RTC tomorrow.

## 2020-11-30 PROBLEM — C92.01 ACUTE MYELOID LEUKEMIA IN REMISSION: Status: ACTIVE | Noted: 2020-01-01

## 2020-11-30 PROBLEM — Z76.82 STEM CELL TRANSPLANT CANDIDATE: Status: ACTIVE | Noted: 2020-01-01

## 2020-11-30 NOTE — PLAN OF CARE
Problem: Adult Inpatient Plan of Care  Goal: Optimal Comfort and Wellbeing  Intervention: Provide Person-Centered Care  Flowsheets (Taken 11/30/2020 7713)  Trust Relationship/Rapport:   choices provided   reassurance provided   questions encouraged   care explained   emotional support provided   thoughts/feelings acknowledged   questions answered   empathic listening provided

## 2020-11-30 NOTE — PLAN OF CARE
Pt tolerated 1unit PRBCs and Dacogen  today. NAD. CVC distal lumen flushed + blood return present, flushed. Hep lock, clamped and capped. declined AVS. Uses my Ochnser. Discharged home. Ambulated independently.

## 2020-11-30 NOTE — PROGRESS NOTES
Hematology Oncology  Follow Up Visit Note    Patient: Shell Diaz  MRN: [id number]  Date: 11/30/2020    Chief Complaint: weekly follow up    Oncologic History: pt's primary oncologist is Dr. 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  - thoracentesis done revealing malignant pleural effusion.   - started 7+3 on 9/25/2020   - restaging bone marrow biopsy at day 14 revealed persistent AML.  - discharged with plan for Decitabine/Venetoclax outpatient  - 10/26/20 C1D1 Decitabine/Venetoclax    History of Afib (pacemaker in place and on eliquis), cardiomyopathy, HTN  Subjective:     Interval History: Mr. Diaz is a 55 y.o. male with high risk complex CG aml. He is s/p 7+3 without having achieved remission. He was subsequently transitioned to italo/dacogen salvage. He started dacogen (day 1-5 of 28 day cycle) on 10/26/20. He started Venetoclax 11/2/20. Tolerating treatment well thus far. He presents today for labs, NP appt, BMBx results, and C2D5 of dacogen. Comes to clinic with his wife.  Denies fever, chills, nightsweats, bleeding, brusing, lymphadenopathy, signs/symptoms of splenomegaly.        Past Medical History:   Past Medical History:   Diagnosis Date    Atrial fibrillation     Hypertension      Past Surgical HIstory:   Past Surgical History:   Procedure Laterality Date    INSERTION OF STAUFFER CATHETER Right 9/21/2020    Procedure: INSERTION, CATHETER, CENTRAL VENOUS, STAUFFER;  Surgeon: Ti Starkey MD;  Location: [facility] OR [address] [place] [address];  Service: General;  Laterality: Right;     Family History:   Family History   Problem Relation Age of Onset    Heart disease Mother      Cancer Father     Heart disease Father      Social History:  reports that he has quit smoking. He does not have any smokeless tobacco history on file.    Medications:  Current Outpatient Medications   Medication Sig Dispense Refill    acyclovir (ZOVIRAX) 200 MG capsule Take 2 capsules (400 mg total) by mouth 2 (two) times daily. 60 capsule 1    atorvastatin (LIPITOR) 40 MG tablet Take 40 mg by mouth once daily.      digoxin (LANOXIN) 250 mcg tablet Take 250 mcg by mouth once daily.      levoFLOXacin (LEVAQUIN) 500 MG tablet Take 1 tablet (500 mg total) by mouth once daily. 30 tablet 1    losartan (COZAAR) 25 MG tablet Take 1 tablet (25 mg total) by mouth once daily. 30 tablet 1    magnesium oxide (MAG-OX) 400 mg (241.3 mg magnesium) tablet Take 2 tablets (800 mg total) by mouth once daily. 60 tablet 1    metFORMIN (GLUCOPHAGE) 500 MG tablet Take 500 mg by mouth 2 (two) times daily with meals.      metoprolol succinate (TOPROL-XL) 50 MG 24 hr tablet Take 50 mg by mouth once daily.      potassium chloride SA (K-DUR,KLOR-CON) 20 MEQ tablet Take 2 tablets (40 mEq total) by mouth once daily. 60 tablet 1    terazosin (HYTRIN) 5 MG capsule Take 5 mg by mouth every evening.      venetoclax (VENCLEXTA) 100 mg Tab Take 100 mg by mouth once daily. 28 tablet 0    voriconazole (VFEND) 50 MG Tab Take 2 tablets (100 mg total) by mouth 2 (two) times daily. Pt taking 1 tablet once daily 60 tablet 3    apixaban (ELIQUIS) 5 mg Tab Take 1 tablet (5 mg total) by mouth 2 (two) times daily. HOLD until okayed to take by oncologist.      voriconazole (VFEND) 200 MG Tab Take 1 tablet (200 mg total) by mouth 2 (two) times daily. 60 tablet 0     Current Facility-Administered Medications   Medication Dose Route Frequency Provider Last Rate Last Dose    0.9%  NaCl infusion (for blood administration)   Intravenous Once Mike Andrew MD   Stopped at 11/13/20 3643    heparin, porcine (PF) 100 unit/mL injection flush 500 Units   500 Units Intravenous PRN Jm Fields MD   500 Units at 11/13/20 1602     Facility-Administered Medications Ordered in Other Visits   Medication Dose Route Frequency Provider Last Rate Last Dose    alteplase injection 2 mg  2 mg Intra-Catheter PRN Jm Fields MD        heparin, porcine (PF) 100 unit/mL injection flush 500 Units  500 Units Intravenous PRN Jm Fields MD   500 Units at 11/30/20 1600    sodium chloride 0.9% flush 10 mL  10 mL Intravenous PRN Jm Fields MD   10 mL at 11/30/20 1600     Review of Systems   Constitutional: Positive for fatigue. Negative for activity change, appetite change, chills, diaphoresis, fever and unexpected weight change.   HENT: Negative for congestion, dental problem, mouth sores, nosebleeds, postnasal drip, rhinorrhea, sinus pressure, sore throat and trouble swallowing.    Eyes: Negative for photophobia and visual disturbance.   Respiratory: Positive for shortness of breath. Negative for cough.         Mild on exertion     Cardiovascular: Negative for chest pain, palpitations and leg swelling.   Gastrointestinal: Negative for abdominal distention, abdominal pain, blood in stool, constipation, diarrhea, nausea and vomiting.   Genitourinary: Negative for dysuria, frequency, hematuria and urgency.   Musculoskeletal: Negative for arthralgias, back pain and myalgias.   Skin: Negative for pallor and rash.   Allergic/Immunologic: Negative for immunocompromised state.   Neurological: Positive for weakness. Negative for syncope, numbness and headaches.   Hematological: Negative for adenopathy. Does not bruise/bleed easily.   Psychiatric/Behavioral: Negative for confusion. The patient is not nervous/anxious.      ECOG Score:  1    Objective:     Vitals:    11/30/20 1130   BP: (!) 143/89   BP Location: Left arm   Patient Position: Sitting   BP Method: Large (Automatic)   Pulse: 68   Resp: 16   Temp: 98.1 °F (36.7 °C)   TempSrc: Oral   SpO2: 99%  "  Weight: 126.2 kg (278 lb 5.3 oz)   Height: 6' 2" (1.88 m)       BMI: Body mass index is 35.74 kg/m².     Physical Exam  Constitutional:       Appearance: Normal appearance. He is not diaphoretic.   HENT:      Head: Normocephalic and atraumatic.   Eyes:      General: Lids are normal.   Neck:      Musculoskeletal: Normal range of motion.      Trachea: Trachea normal.   Cardiovascular:      Rate and Rhythm: Normal rate. Rhythm irregular.      Heart sounds: Normal heart sounds.   Pulmonary:      Effort: Pulmonary effort is normal.      Breath sounds: Normal breath sounds.   Abdominal:      General: Bowel sounds are normal.   Musculoskeletal: Normal range of motion.   Skin:     General: Skin is dry.      Coloration: Skin is not pale.      Comments: Lopes to right chest wall is c/d/i with no sign of infection noted.   Neurological:      Mental Status: He is alert and oriented to person, place, and time.      Coordination: Coordination normal.      Gait: Gait normal.   Psychiatric:         Speech: Speech normal.         Behavior: Behavior normal. Behavior is cooperative.         Thought Content: Thought content normal.         Judgment: Judgment normal.       Laboratory Data:  Lab Results   Component Value Date    WBC 0.70 (LL) 11/30/2020    HGB 6.9 (L) 11/30/2020    HCT 20.3 (L) 11/30/2020    MCV 88 11/30/2020    PLT 54 (L) 11/30/2020       CMP  Sodium   Date Value Ref Range Status   11/30/2020 140 136 - 145 mmol/L Final     Potassium   Date Value Ref Range Status   11/30/2020 4.5 3.5 - 5.1 mmol/L Final     Chloride   Date Value Ref Range Status   11/30/2020 107 95 - 110 mmol/L Final     CO2   Date Value Ref Range Status   11/30/2020 26 23 - 29 mmol/L Final     Glucose   Date Value Ref Range Status   11/30/2020 122 (H) 70 - 110 mg/dL Final     BUN   Date Value Ref Range Status   11/30/2020 21 (H) 6 - 20 mg/dL Final     Creatinine   Date Value Ref Range Status   11/30/2020 0.8 0.5 - 1.4 mg/dL Final     Calcium   Date " Value Ref Range Status   11/30/2020 8.1 (L) 8.7 - 10.5 mg/dL Final     Total Protein   Date Value Ref Range Status   11/30/2020 6.2 6.0 - 8.4 g/dL Final     Albumin   Date Value Ref Range Status   11/30/2020 3.5 3.5 - 5.2 g/dL Final     Total Bilirubin   Date Value Ref Range Status   11/30/2020 0.7 0.1 - 1.0 mg/dL Final     Comment:     For infants and newborns, interpretation of results should be based  on gestational age, weight and in agreement with clinical  observations.  Premature Infant recommended reference ranges:  Up to 24 hours.............<8.0 mg/dL  Up to 48 hours............<12.0 mg/dL  3-5 days..................<15.0 mg/dL  6-29 days.................<15.0 mg/dL       Alkaline Phosphatase   Date Value Ref Range Status   11/30/2020 92 55 - 135 U/L Final     AST   Date Value Ref Range Status   11/30/2020 9 (L) 10 - 40 U/L Final     ALT   Date Value Ref Range Status   11/30/2020 10 10 - 44 U/L Final     Anion Gap   Date Value Ref Range Status   11/30/2020 7 (L) 8 - 16 mmol/L Final     eGFR if    Date Value Ref Range Status   11/30/2020 >60.0 >60 mL/min/1.73 m^2 Final     eGFR if non    Date Value Ref Range Status   11/30/2020 >60.0 >60 mL/min/1.73 m^2 Final     Comment:     Calculation used to obtain the estimated glomerular filtration  rate (eGFR) is the CKD-EPI equation.           Assessment:     1. Pancytopenia due to antineoplastic chemotherapy    2. Acute myeloid leukemia in remission    3. Stem cell transplant candidate    4. Neoplastic pleural effusion    5. Type 2 diabetes mellitus without complication, without long-term current use of insulin    6. Essential hypertension    7. Permanent atrial fibrillation    8. SEBASTIAN (acute kidney injury)    9. 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;11); flt3 pcr negative; normal NGS panel;  diagnosed sept 2020; sp 7+3 with  residual disease  -transitioned to venetoclax/dacogen on 10/26/20; C1D1 dacogen 10/26/20; started venetoclax 11/2/20  -tolerating therapy well  -proceed with restaging bone marrow biopsy today to assess response  -typing multiple sibs now for planned alloSCT pending remission, adequate pre transplant eval, and donor  - BMBx from 11/23/20 showing no evidence of residual AML      recurrent  pleural effusion  - Sp thora x 2  - clear bilateral breath sounds today  - monitor     Pancytopenia due to antineoplastic chemotherapy and AML  -continue mon/thurs labs and prn transfusions  -for ease Thursday labs are in [place] and Monday labs app[place] [facility] [place]  -transfuse for hgb <7, plt <10 ; 1 unit prbc today   -continue acyc, levaquin, vori ppx      Type 2 diabetes mellitus without complication, without long-term current use of insulin  - continue glucophage     Essential hypertension  - home meds: losartan 25 mg daily, amlodipine 5 mg daily and terazosin qhs  - stopped amlodipine 10/26/20 due to low normal BPs and dizziness  - BP stable today     Permanent atrial fibrillation  - status post pacemaker placement, currently rate controlled  - home regimen: Apixaban, Digoxin, and Metoprolol  - holding Apixaban due to thrombocytopenia; will resume when appropriate  - keeping Mg > 2 and K > 4. Mag and phos wnl at this time. Can stop taking supplements to decrease pill burden. Will monitor with 3x/wk lab draws and can resume if needed.  - a follow up echocardiogram is recommended at 6 months post treatment  - will obtain weekly Digoxin level per pharmacy recs due to interaction with voriconazole. Knows to take dig in AMs and hold until labs performed in AMs (on Mondays). Venetoclax to be take 6 hours post dig  - Taking dig in the morning and continuing venetoclax in the evenings.  - he follows with local cardiologist and plans on making an appt    SEBASTIAN/hypotension  - stopped amlodipine 10/26/20  -Cr wnl   - continue to encouraged 2L po   Intake daily     Blurry vision  - right eye  - referred to ophthalmology on 11/19/20; no concern for active leukemia     Hypocalemia  - Ca+ 8.1  - patient to start OTC Ca+ supplement    FU:     Wednesday and Friday cbc, cmp, mag, phos, digoxin, uric acid, and t&s in [place] through 12/21/20. Mondays same labs and appts with Dr. Lawton or NP through 12/21/20. Infusion chair for dacogen on 12/21, 12/22, 12/23, 12/24, and 12/28.    Jeni Holt, FNP  Hematology/Oncology/Bone Marrow Transplant

## 2020-11-30 NOTE — Clinical Note
Wednesday and Friday cbc, cmp, mag, phos, digoxin, uric acid, and t&s in [place] through 12/21/20. Mondays same labs and appts with Dr. Lawton or NP through 12/21/20. Infusion chair for dacogen on 12/21, 12/22, 12/23, 12/24, and 12/28.

## 2020-12-04 NOTE — TELEPHONE ENCOUNTER
"----- Message from Ursula Akers sent at 12/4/2020 11:31 AM CST -----   Consult/Advisory:    Name Of Caller: Junior jamison/Pa[employer]  Contact Preference?: [phone]    Does patient feel the need to be seen today? No     What is the nature of the call?:  -request a callback regarding patient assistance form for venetoclax (VENCLEXTA) 100 mg Tab which was faxed on 11/3.  Please complete and return via fax to [phone]      Additional Notes:  "Thank you for all that you do for our patients'"       "

## 2020-12-07 NOTE — PROGRESS NOTES
Hematology Oncology  Follow Up Visit Note    Patient: Shell Diaz  MRN: [id number]  Date: 12/7/2020    Chief Complaint: No chief complaint on file.    Oncologic History: pt's primary oncologist is Dr. 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  - thoracentesis done revealing malignant pleural effusion.   - started 7+3 on 9/25/2020   - restaging bone marrow biopsy at day 14 revealed persistent AML.  - discharged with plan for Decitabine/Venetoclax outpatient  - 10/26/20 C1D1 Decitabine/Venetoclax    History of Afib (pacemaker in place and on eliquis), cardiomyopathy, HTN  Subjective:     Interval History: Mr. Diaz is a 55 y.o. male with high risk complex CG aml. He is s/p 7+3 without having achieved remission. He was subsequently transitioned to venetoclax/dacogen salvage. He started dacogen (day 1-5 of 28 day cycle) on 10/26/20. He started Venetoclax 11/2/20. Tolerating treatment well thus far.    He presents today for labs and routine weekly follow up appointment. He is s/p cycle 2 of dacogen (C2D1 11/23/20). He is scheduled to start cycle 3 on 12/21. Comes to clinic with his wife.  Denies fever, chills, nightsweats, bleeding, brusing, lymphadenopathy, signs/symptoms of splenomegaly. Denies nausea, vomiting, diarrhea, constipation.  Intermittent headaches; takes PRN tylenol       Past Medical History:   Past Medical History:   Diagnosis Date    Atrial fibrillation     Hypertension      Past Surgical HIstory:   Past Surgical History:   Procedure Laterality Date    INSERTION OF STAUFFER CATHETER Right 9/21/2020    Procedure: INSERTION, CATHETER, CENTRAL VENOUS,  EH;  Surgeon: Ti Starkey MD;  Location: [facility] OR [address] [place] [address];  Service: General;  Laterality: Right;     Family History:   Family History   Problem Relation Age of Onset    Heart disease Mother     Cancer Father     Heart disease Father      Social History:  reports that he has quit smoking. He does not have any smokeless tobacco history on file.    Medications:  Current Outpatient Medications   Medication Sig Dispense Refill    acyclovir (ZOVIRAX) 200 MG capsule Take 2 capsules (400 mg total) by mouth 2 (two) times daily. 60 capsule 1    apixaban (ELIQUIS) 5 mg Tab Take 1 tablet (5 mg total) by mouth 2 (two) times daily. HOLD until okayed to take by oncologist.      atorvastatin (LIPITOR) 40 MG tablet Take 40 mg by mouth once daily.      digoxin (LANOXIN) 250 mcg tablet Take 250 mcg by mouth once daily.      levoFLOXacin (LEVAQUIN) 500 MG tablet Take 1 tablet (500 mg total) by mouth once daily. 30 tablet 1    losartan (COZAAR) 25 MG tablet Take 1 tablet (25 mg total) by mouth once daily. 30 tablet 1    magnesium oxide (MAG-OX) 400 mg (241.3 mg magnesium) tablet Take 2 tablets (800 mg total) by mouth once daily. 60 tablet 1    metFORMIN (GLUCOPHAGE) 500 MG tablet Take 500 mg by mouth 2 (two) times daily with meals.      metoprolol succinate (TOPROL-XL) 50 MG 24 hr tablet Take 50 mg by mouth once daily.      potassium chloride SA (K-DUR,KLOR-CON) 20 MEQ tablet Take 2 tablets (40 mEq total) by mouth once daily. 60 tablet 1    terazosin (HYTRIN) 5 MG capsule Take 5 mg by mouth every evening.      venetoclax (VENCLEXTA) 100 mg Tab Take 100 mg by mouth once daily. 28 tablet 0    voriconazole (VFEND) 200 MG Tab Take 1 tablet (200 mg total) by mouth 2 (two) times daily. 60 tablet 0    voriconazole (VFEND) 50 MG Tab Take 2 tablets (100 mg total) by mouth 2 (two) times daily. Pt taking 1 tablet once daily 60 tablet 3     Current Facility-Administered Medications   Medication Dose Route Frequency  Provider Last Rate Last Dose    0.9%  NaCl infusion (for blood administration)   Intravenous Once Mike Andrew MD   Stopped at 11/13/20 1557    heparin, porcine (PF) 100 unit/mL injection flush 500 Units  500 Units Intravenous PRN Jm Fields MD   500 Units at 11/13/20 1602     Facility-Administered Medications Ordered in Other Visits   Medication Dose Route Frequency Provider Last Rate Last Dose    heparin, porcine (PF) 100 unit/mL injection flush 500 Units  500 Units Intravenous PRN Jm Fields MD   500 Units at 12/07/20 0843    sodium chloride 0.9% flush 10 mL  10 mL Intravenous PRN Jm Fields MD   10 mL at 12/07/20 0843     Review of Systems   Constitutional: Positive for fatigue. Negative for activity change, appetite change, chills, diaphoresis, fever and unexpected weight change.   HENT: Negative for congestion, dental problem, mouth sores, nosebleeds, postnasal drip, rhinorrhea, sinus pressure, sore throat and trouble swallowing.    Eyes: Negative for photophobia and visual disturbance.   Respiratory: Positive for shortness of breath. Negative for cough.         Mild on exertion     Cardiovascular: Negative for chest pain, palpitations and leg swelling.   Gastrointestinal: Negative for abdominal distention, abdominal pain, blood in stool, constipation, diarrhea, nausea and vomiting.   Genitourinary: Negative for dysuria, frequency, hematuria and urgency.   Musculoskeletal: Negative for arthralgias, back pain and myalgias.   Skin: Negative for pallor and rash.   Allergic/Immunologic: Negative for immunocompromised state.   Neurological: Positive for weakness. Negative for syncope, numbness and headaches.   Hematological: Negative for adenopathy. Does not bruise/bleed easily.   Psychiatric/Behavioral: Negative for confusion. The patient is not nervous/anxious.      ECOG Score:  1    Objective:     Vitals:    12/07/20 0904   BP: 132/77   Pulse: 76   Resp: 16   Temp: 98.2 °F (36.8  "°C)   TempSrc: Oral   SpO2: 99%   Weight: 125.7 kg (277 lb 1.9 oz)   Height: 6' 3" (1.905 m)       BMI: Body mass index is 34.64 kg/m².     Physical Exam  Constitutional:       Appearance: Normal appearance. He is not diaphoretic.   HENT:      Head: Normocephalic and atraumatic.   Eyes:      General: Lids are normal.   Neck:      Musculoskeletal: Normal range of motion.      Trachea: Trachea normal.   Cardiovascular:      Rate and Rhythm: Normal rate. Rhythm irregular.      Heart sounds: Normal heart sounds.   Pulmonary:      Effort: Pulmonary effort is normal.      Breath sounds: Normal breath sounds.   Abdominal:      General: Bowel sounds are normal.   Musculoskeletal: Normal range of motion.   Skin:     General: Skin is dry.      Coloration: Skin is not pale.      Comments: Lopes to right chest wall is c/d/i with no sign of infection noted.   Neurological:      Mental Status: He is alert and oriented to person, place, and time.      Coordination: Coordination normal.      Gait: Gait normal.   Psychiatric:         Speech: Speech normal.         Behavior: Behavior normal. Behavior is cooperative.         Thought Content: Thought content normal.         Judgment: Judgment normal.       Laboratory Data:  Lab Results   Component Value Date    WBC 0.68 (LL) 12/03/2020    HGB 7.6 (L) 12/03/2020    HCT 23.0 (L) 12/03/2020    MCV 91 12/03/2020    PLT 42 (L) 12/03/2020       CMP  Sodium   Date Value Ref Range Status   12/03/2020 140 136 - 145 mmol/L Final     Potassium   Date Value Ref Range Status   12/03/2020 3.9 3.5 - 5.1 mmol/L Final     Chloride   Date Value Ref Range Status   12/03/2020 105 95 - 110 mmol/L Final     CO2   Date Value Ref Range Status   12/03/2020 26 23 - 29 mmol/L Final     Glucose   Date Value Ref Range Status   12/03/2020 135 (H) 70 - 110 mg/dL Final     BUN   Date Value Ref Range Status   12/03/2020 22 (H) 6 - 20 mg/dL Final     Creatinine   Date Value Ref Range Status   12/03/2020 1.0 0.5 - 1.4 " mg/dL Final     Calcium   Date Value Ref Range Status   12/03/2020 8.3 (L) 8.7 - 10.5 mg/dL Final     Total Protein   Date Value Ref Range Status   12/03/2020 6.3 6.0 - 8.4 g/dL Final     Albumin   Date Value Ref Range Status   12/03/2020 3.7 3.5 - 5.2 g/dL Final     Total Bilirubin   Date Value Ref Range Status   12/03/2020 1.2 (H) 0.1 - 1.0 mg/dL Final     Comment:     For infants and newborns, interpretation of results should be based  on gestational age, weight and in agreement with clinical  observations.  Premature Infant recommended reference ranges:  Up to 24 hours.............<8.0 mg/dL  Up to 48 hours............<12.0 mg/dL  3-5 days..................<15.0 mg/dL  6-29 days.................<15.0 mg/dL       Alkaline Phosphatase   Date Value Ref Range Status   12/03/2020 94 55 - 135 U/L Final     AST   Date Value Ref Range Status   12/03/2020 14 10 - 40 U/L Final     ALT   Date Value Ref Range Status   12/03/2020 14 10 - 44 U/L Final     Anion Gap   Date Value Ref Range Status   12/03/2020 9 8 - 16 mmol/L Final     eGFR if    Date Value Ref Range Status   12/03/2020 >60.0 >60 mL/min/1.73 m^2 Final     eGFR if non    Date Value Ref Range Status   12/03/2020 >60.0 >60 mL/min/1.73 m^2 Final     Comment:     Calculation used to obtain the estimated glomerular filtration  rate (eGFR) is the CKD-EPI equation.           Assessment:     1. Acute myeloid leukemia in remission    2. Neoplastic pleural effusion    3. Pancytopenia due to antineoplastic chemotherapy    4. Type 2 diabetes mellitus without complication, without long-term current use of insulin    5. Essential hypertension    6. Permanent atrial fibrillation        Plan:     Acute myeloid leukemia  -high risk AML with complex CG including T(10;11); flt3 pcr negative; normal NGS panel; diagnosed sept 2020; sp 7+3 with residual disease  -transitioned to venetoclax/dacogen on 10/26/20; C1D1 dacogen 10/26/20; started venetoclax  11/2/20  -tolerating therapy well  -typing multiple sibs now for planned alloSCT pending remission, adequate pre transplant eval, and donor  -BMBx from 11/23/20 showing no evidence of residual AML      Recurrent pleural effusion  -S/p thora x 2  -clear bilateral breath sounds today  -monitor     Pancytopenia due to antineoplastic chemotherapy and AML  -continue mon/thurs labs and prn transfusions  -for ease Thursday labs are in [place] and Monday labs appt at [facility]  -transfuse for hgb <7, plt <10  -will get 1 unit RBC today  -continue acyc, levaquin, vori ppx  -patient is to continue to hold venetoclax (per instructions patient was given by Dr. Fields) with worsening WBC     Type 2 diabetes mellitus without complication, without long-term current use of insulin  -continue glucophage     Essential hypertension/HLD  -home meds: losartan 25 mg daily, amlodipine 5 mg daily and terazosin qhs  -stopped amlodipine 10/26/20 due to low normal BPs and dizziness  -BP stable today  -continue atorvastatin     Permanent atrial fibrillation  -status post pacemaker placement, currently rate controlled  -home regimen: Apixaban, Digoxin, and Metoprolol  -holding Apixaban due to thrombocytopenia; will resume when appropriate  -keeping Mg > 2 and K > 4. Mag and phos wnl at this time. Can stop taking supplements to decrease pill burden. Will monitor with 3x/wk lab draws and can resume if needed.  -a follow up echocardiogram is recommended at 6 months post treatment  -will obtain weekly Digoxin level per pharmacy recs due to interaction with voriconazole. Knows to take dig in AMs and hold until labs performed in AMs (on Mondays). Venetoclax to be take 6 hours post digoxin  -Taking dig in the morning and continuing venetoclax in the evenings.  -he follows with local cardiologist and plans on making an appt    Hypocalemia  -Ca+ 8.5  -continue OTC Ca+ supplement      FU:     As scheduled; digoxin levels added to weekly appointments at this  had not been done    Tonia Gutierrez NP  Hematology/Oncology/BMT

## 2020-12-07 NOTE — PLAN OF CARE
Pt admitted for 1 unit PRBC. Labs reviewed and Pt received transfusion, tolerated well. Pt discharged @ 13:45

## 2020-12-07 NOTE — ASSESSMENT & PLAN NOTE
- worsening pleural effusion seen on CXR from 9/23  - pulm consulted and performed thora at bedside today (9/24)  - bloody pleural fluid removed and sent for cytology, flow, micro, etc.   Due to short notice and no access to [employer]. I provided patient verbal instructions for procedure.

## 2020-12-09 NOTE — TELEPHONE ENCOUNTER
"----- Message from Ursula Akers sent at 12/9/2020  3:10 PM CST -----   Consult/Advisory:    Name Of Caller: Shell  Contact Preference?: [phone]    Provider Name:   Does patient feel the need to be seen today?    What is the nature of the call?:  -pt request a callback regarding diverticulitis and possible prescription for treatment.     Additional Notes:  "Thank you for all that you do for our patients'"       "

## 2020-12-11 NOTE — TELEPHONE ENCOUNTER
"----- Message from Ursula Oswald sent at 12/11/2020 11:11 AM CST -----   Consult/Advisory:    Name Of Caller: Junior jamison/Paid Health   Contact Preference?: [phone]    Does patient feel the need to be seen today? No     What is the nature of the call?:  -request a callback to regarding to confirm patient assistance application for venetoclax (VENCLEXTA) 100 mg Tab fax sent on 12/4/20 to [phone] has been received     -please complete and return via fax to [phone]    Additional Notes:  "Thank you for all that you do for our patients'"    "

## 2020-12-14 NOTE — PROGRESS NOTES
Hematology Oncology  Follow Up Visit Note    Patient: Shell Diaz  MRN: [id number]  Date: 12/15/2020    Chief Complaint: No chief complaint on file.    Oncologic History: pt's primary oncologist is Dr. 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  - thoracentesis done revealing malignant pleural effusion.   - started 7+3 on 9/25/2020   - restaging bone marrow biopsy at day 14 revealed persistent AML.  - discharged with plan for Decitabine/Venetoclax outpatient  - 10/26/20 C1D1 Decitabine/Venetoclax    History of Afib (pacemaker in place and on eliquis), cardiomyopathy, HTN  Subjective:     Interval History: Mr. Diaz is a 55 y.o. 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   He is s/p 7+3 without having achieved remission. He was subsequently transitioned to venetoclax/dacogen salvage. He started dacogen (day 1-5 of 28 day cycle) on 10/26/20. He started Venetoclax 11/2/20.   An 11/23/20 marrow showed CR by morphology, flow, CG/FISH.  He was instructed last week to hold his venetoclax due to severe neutropenia.    We have typing back on some of his siblings. 2 sisters are haplo's; 1 sister is not a match; his 2 full brothers still have not been contacted per pt.     He is fatigued today with HESTER but otherwise feels well.   Denies fever, chills, nightsweats, bleeding, brusing, lymphadenopathy, signs/symptoms of splenomegaly.    Comes to clinic with his wife.            Past Medical History:   Past Medical History:   Diagnosis Date    Atrial fibrillation     Hypertension      Past Surgical HIstory:   Past Surgical History:   Procedure Laterality Date    INSERTION  OF STAUFFER CATHETER Right 9/21/2020    Procedure: INSERTION, CATHETER, CENTRAL VENOUS, STAUFFER;  Surgeon: Ti Starkey MD;  Location: [facility] OR [address] [place] [address];  Service: General;  Laterality: Right;     Family History:   Family History   Problem Relation Age of Onset    Heart disease Mother     Cancer Father     Heart disease Father      Social History:  reports that he has quit smoking. He does not have any smokeless tobacco history on file.    Medications:  Current Outpatient Medications   Medication Sig Dispense Refill    acyclovir (ZOVIRAX) 200 MG capsule Take 2 capsules (400 mg total) by mouth 2 (two) times daily. 60 capsule 1    atorvastatin (LIPITOR) 40 MG tablet Take 40 mg by mouth once daily.      benzonatate (TESSALON) 100 MG capsule benzonatate 100 mg capsule   TK 1 C PO TID FOR 10 DAYS PRN      brimonidine-timoloL (COMBIGAN) 0.2-0.5 % Drop Combigan 0.2 %-0.5 % eye drops   INT 1 GTT IN OD BID UTD      digoxin (LANOXIN) 250 mcg tablet Take 250 mcg by mouth once daily.      levoFLOXacin (LEVAQUIN) 500 MG tablet Take 1 tablet (500 mg total) by mouth once daily. 30 tablet 1    losartan (COZAAR) 25 MG tablet Take 1 tablet (25 mg total) by mouth once daily. 30 tablet 1    losartan (COZAAR) 50 MG tablet losartan 50 mg tablet   TK 1 T PO BID      magnesium oxide (MAG-OX) 400 mg (241.3 mg magnesium) tablet Take 2 tablets (800 mg total) by mouth once daily. 60 tablet 1    metFORMIN (GLUCOPHAGE) 500 MG tablet Take 500 mg by mouth 2 (two) times daily with meals.      methocarbamoL (ROBAXIN) 500 MG Tab methocarbamol 500 mg tablet   TK 1 T PO TID      metoprolol succinate (TOPROL-XL) 50 MG 24 hr tablet Take 50 mg by mouth once daily.      metoprolol tartrate (LOPRESSOR) 50 MG tablet metoprolol tartrate 50 mg tablet   TK 1 T PO BID      metroNIDAZOLE (FLAGYL) 500 MG tablet Take 1 tablet (500 mg total) by mouth every 8 (eight) hours. for 7 days 21 tablet 0    potassium chloride SA (K-DUR,KLOR-CON) 20 MEQ  tablet Take 2 tablets (40 mEq total) by mouth once daily. 60 tablet 1    terazosin (HYTRIN) 5 MG capsule Take 5 mg by mouth every evening.      venetoclax (VENCLEXTA) 100 mg Tab Take 100 mg by mouth once daily. 28 tablet 0    voriconazole (VFEND) 200 MG Tab Take 1 tablet (200 mg total) by mouth 2 (two) times daily. 60 tablet 0    voriconazole (VFEND) 50 MG Tab Take 2 tablets (100 mg total) by mouth 2 (two) times daily. Pt taking 1 tablet once daily 60 tablet 3    apixaban (ELIQUIS) 5 mg Tab Take 1 tablet (5 mg total) by mouth 2 (two) times daily. HOLD until okayed to take by oncologist.      tbo-filgrastim (GRANIX) 480 mcg/0.8 mL Syrg Inject 0.8 mLs (480 mcg total) into the skin once daily. for 10 days 8 mL 0     Current Facility-Administered Medications   Medication Dose Route Frequency Provider Last Rate Last Dose    0.9%  NaCl infusion (for blood administration)   Intravenous Once Mike Andrew MD   Stopped at 11/13/20 1557    heparin, porcine (PF) 100 unit/mL injection flush 500 Units  500 Units Intravenous PRN Jm Fields MD   500 Units at 11/13/20 1602     Review of Systems   Constitutional: Positive for fatigue. Negative for activity change, appetite change, chills, diaphoresis, fever and unexpected weight change.   HENT: Negative for congestion, dental problem, mouth sores, nosebleeds, postnasal drip, rhinorrhea, sinus pressure, sore throat and trouble swallowing.    Eyes: Negative for photophobia and visual disturbance.   Respiratory: Positive for shortness of breath. Negative for cough.         Mild on exertion     Cardiovascular: Negative for chest pain, palpitations and leg swelling.   Gastrointestinal: Negative for abdominal distention, abdominal pain, blood in stool, constipation, diarrhea, nausea and vomiting.   Genitourinary: Negative for dysuria, frequency, hematuria and urgency.   Musculoskeletal: Negative for arthralgias, back pain and myalgias.   Skin: Negative for pallor and rash.  "  Allergic/Immunologic: Negative for immunocompromised state.   Neurological: Positive for weakness. Negative for syncope, numbness and headaches.   Hematological: Negative for adenopathy. Does not bruise/bleed easily.   Psychiatric/Behavioral: Negative for confusion. The patient is not nervous/anxious.      ECOG Score:  1    Objective:     Vitals:    12/14/20 1130   BP: 137/77   Pulse: 93   Resp: 16   Temp: 98.4 °F (36.9 °C)   TempSrc: Oral   SpO2: 100%   Weight: 122 kg (268 lb 13.6 oz)   Height: 6' 3" (1.905 m)       BMI: Body mass index is 33.6 kg/m².     Physical Exam  Constitutional:       Appearance: Normal appearance. He is not diaphoretic.   HENT:      Head: Normocephalic and atraumatic.   Eyes:      General: Lids are normal.   Neck:      Musculoskeletal: Normal range of motion.      Trachea: Trachea normal.   Cardiovascular:      Rate and Rhythm: Normal rate. Rhythm irregular.      Heart sounds: Normal heart sounds.   Pulmonary:      Effort: Pulmonary effort is normal.      Breath sounds: Normal breath sounds.   Abdominal:      General: Bowel sounds are normal.   Musculoskeletal: Normal range of motion.   Skin:     General: Skin is dry.      Coloration: Skin is not pale.      Comments: Lopes to right chest wall is c/d/i with no sign of infection noted.   Neurological:      Mental Status: He is alert and oriented to person, place, and time.      Coordination: Coordination normal.      Gait: Gait normal.   Psychiatric:         Speech: Speech normal.         Behavior: Behavior normal. Behavior is cooperative.         Thought Content: Thought content normal.         Judgment: Judgment normal.       Laboratory Data:  Lab Results   Component Value Date    WBC 0.38 (LL) 12/14/2020    HGB 6.8 (L) 12/14/2020    HCT 20.4 (L) 12/14/2020    MCV 92 12/14/2020    PLT 92 (L) 12/14/2020       CMP  Sodium   Date Value Ref Range Status   12/14/2020 140 136 - 145 mmol/L Final     Potassium   Date Value Ref Range Status "   12/14/2020 3.5 3.5 - 5.1 mmol/L Final     Chloride   Date Value Ref Range Status   12/14/2020 106 95 - 110 mmol/L Final     CO2   Date Value Ref Range Status   12/14/2020 26 23 - 29 mmol/L Final     Glucose   Date Value Ref Range Status   12/14/2020 221 (H) 70 - 110 mg/dL Final     BUN   Date Value Ref Range Status   12/14/2020 20 6 - 20 mg/dL Final     Creatinine   Date Value Ref Range Status   12/14/2020 0.9 0.5 - 1.4 mg/dL Final     Calcium   Date Value Ref Range Status   12/14/2020 8.6 (L) 8.7 - 10.5 mg/dL Final     Total Protein   Date Value Ref Range Status   12/14/2020 6.2 6.0 - 8.4 g/dL Final     Albumin   Date Value Ref Range Status   12/14/2020 3.4 (L) 3.5 - 5.2 g/dL Final     Total Bilirubin   Date Value Ref Range Status   12/14/2020 0.9 0.1 - 1.0 mg/dL Final     Comment:     For infants and newborns, interpretation of results should be based  on gestational age, weight and in agreement with clinical  observations.  Premature Infant recommended reference ranges:  Up to 24 hours.............<8.0 mg/dL  Up to 48 hours............<12.0 mg/dL  3-5 days..................<15.0 mg/dL  6-29 days.................<15.0 mg/dL       Alkaline Phosphatase   Date Value Ref Range Status   12/14/2020 86 55 - 135 U/L Final     AST   Date Value Ref Range Status   12/14/2020 14 10 - 40 U/L Final     ALT   Date Value Ref Range Status   12/14/2020 12 10 - 44 U/L Final     Anion Gap   Date Value Ref Range Status   12/14/2020 8 8 - 16 mmol/L Final     eGFR if    Date Value Ref Range Status   12/14/2020 >60.0 >60 mL/min/1.73 m^2 Final     eGFR if non    Date Value Ref Range Status   12/14/2020 >60.0 >60 mL/min/1.73 m^2 Final     Comment:     Calculation used to obtain the estimated glomerular filtration  rate (eGFR) is the CKD-EPI equation.           Assessment:     1. Acute myeloid leukemia in remission    2. Anemia in neoplastic disease    3. Stem cell transplant candidate    4.  Chemotherapy-induced neutropenia        Plan:     Acute myeloid leukemia  -high risk AML with complex CG including T(10;11); flt3 pcr negative; normal NGS panel; diagnosed sept 2020; sp 7+3 with residual disease  -transitioned to venetoclax/dacogen on 10/26/20; C1D1 dacogen 10/26/20; started venetoclax 11/2/20  -tolerating therapy well  -typing multiple sibs now for planned alloSCT pending remission, adequate pre transplant eval, and donor  -BMBx from 11/23/20 showing CR by morphology, flow, CG/FISH; patient is ready for transplant once donor identified and eval completed   -2 haplo sisters, 1 sister not a match; 2 other brothers typing pending; will obtain MUD search as well ; have asked coordinators to urgently fu on typing of 2 brothers as patient states they have not been contacted       Recurrent pleural effusion  -S/p thora x 2; suspect were malignant related to AML  -clear bilateral breath sounds today and no evidence of recurrence today   -monitor     Pancytopenia due to antineoplastic chemotherapy and AML  -continue mon/thurs labs and prn transfusions  -for ease Thursday labs are in [place] and Monday labs appt at [facility]  -transfuse for hgb <7, plt <10  -will get 1 unit RBC today  -continue acyc, levaquin, vori ppx  -patient is to continue to hold venetoclax (per instructions patient was given by Dr. Fields) with worsening WBC  -recommend 3 days granix 480mcg daily to augment recovery so can stay on track with dacogen next appt; sent to speciality pharmacy today   -plan to resume venetoclax on Monday if anc >500 but on 3 weeks on 1 week off schedule      Type 2 diabetes mellitus without complication, without long-term current use of insulin  -continue glucophage     Essential hypertension/HLD  -home meds: losartan 25 mg daily, amlodipine 5 mg daily and terazosin qhs  -stopped amlodipine 10/26/20 due to low normal BPs and dizziness  -BP stable today  -continue atorvastatin     Permanent atrial  fibrillation  -status post pacemaker placement, currently rate controlled  -home regimen: Apixaban, Digoxin, and Metoprolol  -holding Apixaban due to thrombocytopenia; will resume when appropriate  -keeping Mg > 2 and K > 4. Mag and phos wnl at this time. Can stop taking supplements to decrease pill burden. Will monitor with 3x/wk lab draws and can resume if needed.  -a follow up echocardiogram is recommended at 6 months post treatment  -will obtain weekly Digoxin level per pharmacy recs due to interaction with voriconazole. Knows to take dig in AMs and hold until labs performed in AMs (on Mondays). Venetoclax to be take 6 hours post digoxin  -Taking dig in the morning and continuing venetoclax in the evenings.  -he follows with local cardiologist and plans on making an appt    Hypocalemia     -continue OTC Ca+ supplement      FU:     Cbc, cmp, T+S at [place] lab on 12/17/20  Same labs, provider appt and 5 days of dacogen on 12/21/20

## 2020-12-14 NOTE — PLAN OF CARE
Pt received 1 unit PRBCs today and tolerated well, without complications. Educated patient about PRBCs (indications, side effects, possible reactions) and verbalized understanding.  VSS. Red port positive for blood return, saline flushed, Heparin flush instilled to dwell, blue port undisturbed, green capped both. Pt DC with no distress noted, ambulated off of unit w/o event, via self, no SS reactions, pleased.

## 2020-12-17 NOTE — TELEPHONE ENCOUNTER
Submitted was submitted for Neupogen instead as preferred by patient's insurance. PA is approved from 12/16/2020 - 4/15/2021.

## 2020-12-17 NOTE — TELEPHONE ENCOUNTER
Specialty Pharmacy - Initial Clinical Assessment    Specialty Medication Orders Linked to Encounter      Most Recent Value   Medication #1  filgrastim (NEUPOGEN) 480 mcg/0.8 mL Syrg (Order#[id number], Rx#[id number])        Contacted patient for his Neupogen initial consult. Spoke to the patient's wife. She states she will be administering the patient's injections.    Subjective    Shell Diaz is a 55 y.o. male, who is followed by the specialty pharmacy service for management and education.    Recent Encounters     Date Type Provider Description    12/17/2020 Specialty Pharmacy Wyatt Duran PharmD Initial Clinical Assessment    12/17/2020 Specialty Pharmacy Wyatt Duran PharmD Referral Authorization    11/25/2020 Specialty Pharmacy Aracelis Law Refill Coordination    11/10/2020 Specialty Pharmacy Wyatt Duran PharmD Clinical Intervention    11/02/2020 Specialty Pharmacy Wyatt Duran PharmD Initial Clinical Assessment        Clinical call attempts since last clinical assessment   No call attempts found.     Today he received education before his first fill with [facility].    Current Outpatient Medications   Medication Sig Note    atorvastatin (LIPITOR) 40 MG tablet Take 40 mg by mouth once daily.     digoxin (LANOXIN) 250 mcg tablet Take 250 mcg by mouth once daily.     filgrastim (NEUPOGEN) 480 mcg/0.8 mL Syrg Inject 480 mcg into the skin once daily. for 10 days     levoFLOXacin (LEVAQUIN) 500 MG tablet Take 1 tablet (500 mg total) by mouth once daily.     losartan (COZAAR) 25 MG tablet Take 1 tablet (25 mg total) by mouth once daily.     metFORMIN (GLUCOPHAGE) 500 MG tablet Take 500 mg by mouth 2 (two) times daily with meals.     metoprolol succinate (TOPROL-XL) 50 MG 24 hr tablet Take 50 mg by mouth once daily.     potassium chloride SA (K-DUR,KLOR-CON) 20 MEQ tablet Take 2 tablets (40 mEq total) by mouth once daily.     terazosin (HYTRIN) 5 MG capsule Take 5 mg by  mouth every evening.     venetoclax (VENCLEXTA) 100 mg Tab Take 100 mg by mouth once daily. 12/17/2020: Currently on hold due to neutropenia    voriconazole (VFEND) 50 MG Tab Take 2 tablets (100 mg total) by mouth 2 (two) times daily. Pt taking 1 tablet once daily     acyclovir (ZOVIRAX) 200 MG capsule Take 2 capsules (400 mg total) by mouth 2 (two) times daily.     apixaban (ELIQUIS) 5 mg Tab Take 1 tablet (5 mg total) by mouth 2 (two) times daily. HOLD until okayed to take by oncologist.    Last reviewed on 12/17/2020  4:17 PM by Wyatt Duran, PharmD    Review of patient's allergies indicates:  No Known AllergiesLast reviewed on  12/17/2020 4:12 PM by Wyatt Duran    Drug Interactions    Drug interactions evaluated: yes  Clinically relevant drug interactions identified: no  Provided the patient with educational material regarding drug interactions: not applicable         Adverse Effects    Abdominal pain: Pos  *All other systems reviewed and are negative       Assessment Questions - Documented Responses      Most Recent Value   Assessment   Medication Reconciliation completed for patient  Yes   During the past 4 weeks, has patient missed any activities due to condition or medication?  No   During the past 4 weeks, did patient have any of the following urgent care visits?  None   Goals of Therapy Status  Discussed (new start)   Welcome packet contents reviewed and discussed with patient?  -- [N/A - patient is NOT new to [facility]]   Assesment completed?  Yes   Plan  Therapy being initiated   Do you need to open a clinical intervention (i-vent)?  No   Do you want to schedule first shipment?  Yes   Medication #1 Assessment Info   Patient status  New medication, Exisiting to [facility]   Is this medication appropriate for the patient?  Yes   Is this medication effective?  Not yet started        Refill Questions - Documented Responses      Most Recent Value   Relationship to patient of person spoken to?   "Spouse/Significant Other   HIPAA/medical authority confirmed?  Yes   Can the patient store medication/sharps container properly (at the correct temperature, away from children/pets, etc.)?  Yes   Can the patient call emergency services (911) in the event of an emergency?  Yes   Does the patient have any concerns or questions about taking or administering this medication as prescribed?  No   How many doses does the patient have on hand?  0   How many days does the patient report on hand quantity will last?  0   During the past 4 weeks, has patient missed any activities due to condition or medication?  No   During the past 4 weeks, did patient have any of the following urgent care visits?  None   How will the patient receive the medication?  Mail   When does the patient need to receive the medication?  12/18/20   Shipping Address  Home   Address in [facility] confirmed and updated if neccessary?  Yes   Expected Copay ($)  0   Is the patient able to afford the medication copay?  Yes   Payment Method  zero copay   Days supply of Refill  10   Refill activity completed?  Yes   Refill activity plan  Refill scheduled   Shipment/Pickup Date:  12/17/20        Objective    He has a past medical history of Atrial fibrillation and Hypertension.    Tried/failed medications: None    BP Readings from Last 4 Encounters:   12/14/20 (!) 143/79   12/14/20 137/77   12/07/20 (!) 148/92   12/07/20 132/77     Ht Readings from Last 4 Encounters:   12/14/20 6' 3" (1.905 m)   12/14/20 6' 3" (1.905 m)   12/07/20 6' 3" (1.905 m)   11/30/20 6' 2" (1.88 m)     Wt Readings from Last 4 Encounters:   12/14/20 122 kg (268 lb 13.6 oz)   12/14/20 122 kg (268 lb 13.6 oz)   12/07/20 125.7 kg (277 lb 1.9 oz)   11/30/20 126.2 kg (278 lb 5.3 oz)     Recent Labs   Lab Result Units 12/14/20  1038 12/10/20  1014 12/07/20  0832 12/03/20  1029   RBC M/uL 2.21 L 2.55 L 2.25 L 2.54 L   Hemoglobin g/dL 6.8 L 8.0 L 6.8 L 7.6 L   Hematocrit % 20.4 L 22.0 L " 20.1 L 23.0 L   WBC K/uL 0.38 LL 0.59 LL 0.44 LL 0.68 LL   Gran # (ANC) K/uL 0.0 L 0.1 L 0.1 L 0.1 L   Gran % % 10.5 L 10.2 L 11.4 L 17.6 L   Platelets K/uL 92 L 46 L 30 LL 42 L   Sodium mmol/L 140 138 140 140   Potassium mmol/L 3.5 3.4 L 3.6 3.9   Chloride mmol/L 106 104 107 105   Glucose mg/dL 221 H 148 H 164 H 135 H   BUN mg/dL 20 18 20 22 H   Creatinine mg/dL 0.9 0.9 0.9 1.0   Calcium mg/dL 8.6 L 8.4 L 8.5 L 8.3 L   Total Protein g/dL 6.2 6.7 6.1 6.3   Albumin g/dL 3.4 L 3.8 3.5 3.7   Total Bilirubin mg/dL 0.9 1.9 H 1.1 H 1.2 H   Alkaline Phosphatase U/L 86 110 107 94   AST U/L 14 10 11 14   ALT U/L 12 10 13 14     The goals of prescribed drug therapy management include:  · Supporting patient to meet the prescriber's medical treatment objectives  · Improving or maintaining quality of life  · Maintaining optimal therapy adherence  · Minimizing and managing side effects    The goals of cancer treatment include:  · Achieving remission of cancer, if possible  · Reducing tumor size and spread of cancer, if remission is not possible  · Minimizing pain and symptoms of the cancer  · Preventing infection and other complications of treatment  · Promoting adequate nutrition  · Encouraging proper hydration  · Improving or maintaining quality of life  · Maintaining optimal therapy adherence  · Minimizing and managing side effects    Goals of Therapy Status: Discussed (new start)    Assessment/Plan  Patient plans to start therapy on 12/18/20    Indication, dosage, appropriateness, effectiveness, safety and convenience of his specialty medication(s) were reviewed today.     Patient Counseling    Counseled the patient on the following: doses and administration discussed, safe handling, storage, and disposal discussed, possible adverse effects and management discussed, possible drug and prescription drug interactions discussed, possible drug and OTC drug and food interactions discussed, lab monitoring and follow-up discussed,  therapeutic rationale discussed, cost of medications and cost implications discussed, adherence and missed doses discussed, pharmacy contact information discussed       Initial Neupogen consult completed on . Zarxio will be shipped on  to arrive at patient's home on  via [employer]. $0.00 copay. Patient plans to start Neupogen on . Address confirmed. Confirmed 2 patient identifiers - name and . Therapy Appropriate.    Patient is NOT new to [facility] - wife declined a review of pharmacy information.     Discussed that medication is to be stored in the refrigerator on the shelf - do NOT store in the door or drawers.    Counseled patient on administration directions:  -Syringe should be taken out of the fridge at least 30-60 minutes prior to injection.  -Wash hands before and after injection.  -Wipe down site of injection site with the alcohol pad and allow to dry.   -Gently squeeze the area of the cleaned skin and hold it firmly.   -Inject 1 mL (480 mcg total) into the skin once daily for 3 days. Patient is to administer first injection tomorrow,  once received (discussed allowing medication 30 minutes to reach room temperature prior to administering). The second injection will be administered on Saturday, . Patient's 3rd and final injection is to be administered on  at 6 AM to allow >24 hours prior to administering of IV chemo (Dacogen) that will begin on  at 10:30 AM.   -Discussed with wife that patient will have an extra 7 syringes on hand in case the medication is needed in the future, so it should be stored appropriately as discussed earlier.   -Sites of injection should be rotated. These include: abdomen (at least 2 inches away from the belly button), upper outer buttocks, upper outer arm, and middle of thighs.   -When injecting, needle should be inserted at a 45 degrees into the raised area of skin that is being squeezed, then push down on the plunger and inject  "until all liquid has been injected.  -After all liquid has been injected, remove needle and release squeezed skin. Syringe should be disposed into provided sharps container. Once the sharps container is 3/4 full, it should be securely locked and placed into the trash.   -Patient will also be provided bandaids, gauze, and alcohol swabs.     Labs reviewed from 12/17/2020. CBC is very low as a result of chemotherapy-induced neutropenia. Patient gets blood transfusions as needed. CMP is stable. Renal and hepatic function are stable. No action needed. 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 Do not administer earlier than 24 hours after or in the 24 hours prior to cytotoxic chemotherapy. Dose = 5 mcg/kg/day x 122 kg = 610 mcg > rounded to 480 mcg (nearest syringe size).    Counseled on common SEs: Injection site reactions (redness, itching, pain, and swelling at site of injection); muscle/bone/joint pain or headaches (OTC Tylenol), cough (dry water and OTC throat lozenges); flu-like symptoms; fever; rash (OTC hydrocortisone cream for red, itchy bumps - do NOT apply to open cuts/wounds or broken skin).    Wife states 's appetite and energy are stable. She reports that he is have a "flair" of abdominal pain, but notes this isn't frequent and resolves itself.    Wife had a great understanding of everything discussed. All questions were answered to her satisfaction. She is aware to contact [facility] if she needs anything.    Tasks added this encounter   No tasks added.   Tasks due within next 3 months   1/24/2021 - Clinical - Follow Up Assesement (90 day)  12/18/2020 - Refill Call (Auto Added)     Wyatt Duran, Aneesh  [facility]  [address] [place] [id number]  Phone: [phone]  Fax: [phone]    "

## 2020-12-18 NOTE — TELEPHONE ENCOUNTER
----- Message from Eloise Sandoval sent at 12/18/2020 11:24 AM CST -----  Contact: [email or web address]  Patient Advice/Staff Message     Caller name/title: Junior w/ [employer]    Reason for call: Calling to confirm that a fax that was sent on 12/11 was received by the office.        Communication Preference: [phone]    Additional Information:

## 2020-12-21 NOTE — NURSING
Pt arrived for labs from cvc and dsg change.  Both lines with blood return, labs sent.  Dressing and end caps changed.  Pt now going to next appt.

## 2020-12-21 NOTE — TELEPHONE ENCOUNTER
Patient informed of Neupogen approval and gave consent for [facility] to obtain co-pay card.    FOR DOCUMENTATION ONLY:  Financial Assistance for Neupogen is approved as of 12/17/20  Source: Neupogen Co-pay Card  BIN: [id number]  Id: [id number]  GRP: [id number]

## 2020-12-21 NOTE — Clinical Note
Please cancel all chemo appointment for this week and schedule CBC, CMP, T&S, mag and phos on 12/28 and 12/31 at infusion, appt with NP on 12/28 and decitabine on 12/28, 12/29, 12/30, 12/31, and 1/4

## 2020-12-21 NOTE — PROGRESS NOTES
Hematology Oncology  Follow Up Visit Note    Patient: Shell Diaz  MRN: [id number]  Date: 12/21/2020    Chief Complaint: Follow-up and Leukemia    Oncologic History: pt's primary oncologist is Dr. Jm Fields  - presented to OSH with fever, fatigue, weakness and hemoptysis Noted to have a WBC >100k therefore transferred to [facility] [place] for suspected leukemia  - BM biopsy done 9/17/2020 revealing AML, Non M3, 46,XY,+X,edi(X;10)(p10;q10),+1,edi(1;10)(q10;q10),del(11)(q21q23)[20} AML FISH (BM) Interpretation: The result is abnormal and indicates MLLT10/MLL(KMT2A) fusion in 90.8% of nuclei.  - thoracentesis done revealing malignant pleural effusion.   - started 7+3 on 9/25/2020   - restaging bone marrow biopsy at day 14 revealed persistent AML.  - discharged with plan for Decitabine/Venetoclax outpatient  - 10/26/20 C1D1 Decitabine/Venetoclax  - 11/23/20 marrow showed CR by morphology, flow, CG/FISH.    History of Afib (pacemaker in place and on eliquis), cardiomyopathy, HTN  Subjective:     Interval History: Mr. Diaz is a 55 y.o. male  presents today with wife for weekly follow-up and for cycle 3 of Decitabine. He was instructed 2 weeks to hold his venetoclax due to severe neutropenia. He completed 3 days of Granix yesterday. he reports some fatigue and weakness. States he was having abdominal pain from diverticulitis but its now resolved. He denies fevers, sob, nightsweats, bleeding, brusing, lymphadenopathy, signs/symptoms of splenomegaly.      Past Medical History:   Past Medical History:   Diagnosis Date    Atrial fibrillation     Hypertension      Past Surgical HIstory:   Past Surgical History:   Procedure Laterality Date    INSERTION OF STAUFFER CATHETER Right 9/21/2020    Procedure: INSERTION, CATHETER, CENTRAL VENOUS, STAUFFER;  Surgeon: Ti Starkey MD;  Location: [facility] OR [address] [place] [address];  Service: General;  Laterality: Right;     Family History:   Family History   Problem Relation Age of Onset    Heart disease  Mother     Cancer Father     Heart disease Father      Social History:  reports that he has quit smoking. He does not have any smokeless tobacco history on file.    Medications:  Current Outpatient Medications   Medication Sig Dispense Refill    acyclovir (ZOVIRAX) 200 MG capsule Take 2 capsules (400 mg total) by mouth 2 (two) times daily. 60 capsule 5    apixaban (ELIQUIS) 5 mg Tab Take 1 tablet (5 mg total) by mouth 2 (two) times daily. HOLD until okayed to take by oncologist.      atorvastatin (LIPITOR) 40 MG tablet Take 40 mg by mouth once daily.      digoxin (LANOXIN) 250 mcg tablet Take 250 mcg by mouth once daily.      filgrastim (NEUPOGEN) 480 mcg/0.8 mL Syrg Inject 480 mcg into the skin once daily. for 10 days 8 mL 0    levoFLOXacin (LEVAQUIN) 500 MG tablet Take 1 tablet (500 mg total) by mouth once daily. 30 tablet 1    losartan (COZAAR) 25 MG tablet Take 1 tablet (25 mg total) by mouth once daily. 30 tablet 1    metFORMIN (GLUCOPHAGE) 500 MG tablet Take 500 mg by mouth 2 (two) times daily with meals.      metoprolol succinate (TOPROL-XL) 50 MG 24 hr tablet Take 50 mg by mouth once daily.      potassium chloride SA (K-DUR,KLOR-CON) 20 MEQ tablet Take 2 tablets (40 mEq total) by mouth once daily. 60 tablet 1    terazosin (HYTRIN) 5 MG capsule Take 5 mg by mouth every evening.      venetoclax (VENCLEXTA) 100 mg Tab Take 100 mg by mouth once daily. 28 tablet 0    voriconazole (VFEND) 200 MG Tab Take 200 mg by mouth 2 (two) times daily.      voriconazole (VFEND) 50 MG Tab Take 2 tablets (100 mg total) by mouth 2 (two) times daily. Pt taking 1 tablet once daily 60 tablet 3     Current Facility-Administered Medications   Medication Dose Route Frequency Provider Last Rate Last Dose    0.9%  NaCl infusion (for blood administration)   Intravenous Once Mike Andrew MD   Stopped at 11/13/20 155    heparin, porcine (PF) 100 unit/mL injection flush 500 Units  500 Units Intravenous PRDANYELL DELEON  "MD Diamond   500 Units at 11/13/20 1602     Review of Systems   Constitutional: Positive for fatigue. Negative for activity change, appetite change, chills, diaphoresis, fever and unexpected weight change.   HENT: Negative for congestion, dental problem, mouth sores, nosebleeds, postnasal drip, rhinorrhea, sinus pressure, sore throat and trouble swallowing.    Eyes: Negative for photophobia and visual disturbance.   Respiratory: Positive for shortness of breath. Negative for cough.         Mild on exertion     Cardiovascular: Negative for chest pain, palpitations and leg swelling.   Gastrointestinal: Negative for abdominal distention, abdominal pain, blood in stool, constipation, diarrhea, nausea and vomiting.   Genitourinary: Negative for dysuria, frequency, hematuria and urgency.   Musculoskeletal: Negative for arthralgias, back pain and myalgias.   Skin: Negative for pallor and rash.   Allergic/Immunologic: Negative for immunocompromised state.   Neurological: Positive for weakness. Negative for syncope, numbness and headaches.   Hematological: Negative for adenopathy. Does not bruise/bleed easily.   Psychiatric/Behavioral: Negative for confusion. The patient is not nervous/anxious.      ECOG Score:  1    Objective:     Vitals:    12/21/20 1135   BP: 113/68   Pulse: 78   Resp: 17   Temp: 98.3 °F (36.8 °C)   SpO2: 99%   Weight: 123.3 kg (271 lb 13.2 oz)   Height: 6' 3" (1.905 m)       BMI: Body mass index is 33.98 kg/m².     Physical Exam  Constitutional:       Appearance: Normal appearance. He is not diaphoretic.   HENT:      Head: Normocephalic and atraumatic.   Eyes:      General: Lids are normal.   Neck:      Musculoskeletal: Normal range of motion.      Trachea: Trachea normal.   Cardiovascular:      Rate and Rhythm: Normal rate. Rhythm irregular.      Heart sounds: Normal heart sounds.   Pulmonary:      Effort: Pulmonary effort is normal.      Breath sounds: Normal breath sounds.   Abdominal:      " General: Bowel sounds are normal.   Musculoskeletal: Normal range of motion.   Skin:     General: Skin is dry.      Coloration: Skin is not pale.      Comments: Lopes to right chest wall is c/d/i with no sign of infection noted.   Neurological:      Mental Status: He is alert and oriented to person, place, and time.      Coordination: Coordination normal.      Gait: Gait normal.   Psychiatric:         Speech: Speech normal.         Behavior: Behavior normal. Behavior is cooperative.         Thought Content: Thought content normal.         Judgment: Judgment normal.       Laboratory Data:  Lab Results   Component Value Date    WBC 0.65 (LL) 12/21/2020    HGB 7.5 (L) 12/21/2020    HCT 23.8 (L) 12/21/2020    MCV 96 12/21/2020     (L) 12/21/2020       CMP  Sodium   Date Value Ref Range Status   12/21/2020 140 136 - 145 mmol/L Final     Potassium   Date Value Ref Range Status   12/21/2020 4.1 3.5 - 5.1 mmol/L Final     Chloride   Date Value Ref Range Status   12/21/2020 105 95 - 110 mmol/L Final     CO2   Date Value Ref Range Status   12/21/2020 28 23 - 29 mmol/L Final     Glucose   Date Value Ref Range Status   12/21/2020 210 (H) 70 - 110 mg/dL Final     BUN   Date Value Ref Range Status   12/21/2020 16 6 - 20 mg/dL Final     Creatinine   Date Value Ref Range Status   12/21/2020 0.9 0.5 - 1.4 mg/dL Final     Calcium   Date Value Ref Range Status   12/21/2020 8.4 (L) 8.7 - 10.5 mg/dL Final     Total Protein   Date Value Ref Range Status   12/21/2020 6.3 6.0 - 8.4 g/dL Final     Albumin   Date Value Ref Range Status   12/21/2020 3.5 3.5 - 5.2 g/dL Final     Total Bilirubin   Date Value Ref Range Status   12/21/2020 1.0 0.1 - 1.0 mg/dL Final     Comment:     For infants and newborns, interpretation of results should be based  on gestational age, weight and in agreement with clinical  observations.  Premature Infant recommended reference ranges:  Up to 24 hours.............<8.0 mg/dL  Up to 48  hours............<12.0 mg/dL  3-5 days..................<15.0 mg/dL  6-29 days.................<15.0 mg/dL       Alkaline Phosphatase   Date Value Ref Range Status   12/21/2020 98 55 - 135 U/L Final     AST   Date Value Ref Range Status   12/21/2020 9 (L) 10 - 40 U/L Final     ALT   Date Value Ref Range Status   12/21/2020 10 10 - 44 U/L Final     Anion Gap   Date Value Ref Range Status   12/21/2020 7 (L) 8 - 16 mmol/L Final     eGFR if    Date Value Ref Range Status   12/21/2020 >60.0 >60 mL/min/1.73 m^2 Final     eGFR if non    Date Value Ref Range Status   12/21/2020 >60.0 >60 mL/min/1.73 m^2 Final     Comment:     Calculation used to obtain the estimated glomerular filtration  rate (eGFR) is the CKD-EPI equation.           Assessment:     1. Acute myeloid leukemia in remission    2. Pancytopenia due to antineoplastic chemotherapy    3. Permanent atrial fibrillation    4. Essential hypertension    5. Type 2 diabetes mellitus without complication, without long-term current use of insulin        Plan:     Acute myeloid leukemia  -high risk AML with complex CG including T(10;11); flt3 pcr negative; normal NGS panel; diagnosed sept 2020; sp 7+3 with residual disease  -transitioned to venetoclax/dacogen on 10/26/20; C1D1 dacogen 10/26/20; started venetoclax 11/2/20  -tolerating therapy well  -BMBx from 11/23/20 showing CR by morphology, flow, CG/FISH; patient is ready for transplant once donor identified and eval completed   -2 haplo sisters, 1 sister not a match; 2 other brothers typing pending; having HLA typing drawn today  - C3 Dacogen and Venetoclax today, Venetoclax has been on hold. Will delay therapy x 1 week per Dr. Fields due to persistent neutropenia.   - will give 2 additional days of Granix, completed 3 days of Granix on yesterday    Pancytopenia due to antineoplastic chemotherapy   -continue mon/thurs labs and prn transfusions  -for ease Thursday labs are in [place] and  Monday labs appt at [facility] [place]  -transfuse for hgb <7, plt <10  -continue acyc, levaquin, vori ppx  -patient is to continue to hold venetoclax (per instructions patient was given by Dr. Fields) with worsening WBC and delaying C3 Decitabine til next week as above  -given 3 days granix 480 mcg daily to augment recovery so can stay on track with dacogen; finished Granix yesterday, will do additional 2 days of Granix  -will hold therapy today as above     Type 2 diabetes mellitus without complication, without long-term current use of insulin  -continue glucophage     Essential hypertension/HLD  -home meds: losartan 25 mg daily, amlodipine 5 mg daily and terazosin qhs  -stopped amlodipine 10/26/20 due to low normal BPs and dizziness  -BP wnl today  -continue atorvastatin     Permanent atrial fibrillation  -status post pacemaker placement, currently rate controlled  -home regimen: Apixaban, Digoxin, and Metoprolol  -Apixaban was being held due to thrombocytopenia; will restart Eliquis today as platelets consistently >50k  -keeping Mg > 2 and K > 4. Mag and phos wnl at this time.   -a follow up echocardiogram is recommended at 6 months post treatment  -will obtain weekly Digoxin level per pharmacy recs due to interaction with voriconazole. Knows to take dig in AMs and hold until labs performed in AMs (on Mondays). Venetoclax to be take 6 hours post digoxin  -Taking dig in the morning and continuing venetoclax in the evenings.  -he follows with local cardiologist     FU:     CBC, CMP, mag, phos and T&S on 12/28 and 12/31 with clinic visit on 12/28 and Dacogen x 5 days starting 12/28    Cecily Larson NP  Hematology/BMT

## 2020-12-22 NOTE — TELEPHONE ENCOUNTER
----- Message from Shannon Toussaint sent at 12/22/2020 10:15 AM CST -----  Regarding: lab order  phos

## 2020-12-24 NOTE — TELEPHONE ENCOUNTER
Patient has been holding Venclexta for about 3 weeks now due to neutropenia. Patient is to return to clinic on Monday, 12/28 with repeat labs to determine whether it would be appropriate for patient to resume Venclexta therapy at that time. Contacted patient to discuss. He confirmed that he is still holding therapy at this time. Inquired how much medication he has on hand to ensure he has enough should he be instructed to resume. He is unsure how much medication he has on hand as he is unable to check at this time, but is certain he has more than a week on hand. Informed patient that I will follow up with him after his appointment to determine the status of his therapy at that time and get accurate dose count to proceed with refill is needed. Patient verbalized understanding. He has no questions or concerns at this time. He is aware to contact [facility] if he needs anything.

## 2020-12-27 NOTE — PROGRESS NOTES
Hematology Oncology  Follow Up Visit Note    Patient: Shell Diaz  MRN: [id number]  Date: 12/28/2020    Chief Complaint: No chief complaint on file.    Oncologic History: pt's primary oncologist is Dr. Jm Fields  - presented to OSH with fever, fatigue, weakness and hemoptysis Noted to have a WBC >100k therefore transferred to [facility] for suspected leukemia  - BM biopsy done 9/17/2020 revealing AML, Non M3, 46,XY,+X,edi(X;10)(p10;q10),+1,edi(1;10)(q10;q10),del(11)(q21q23)[20} AML FISH (BM) Interpretation: The result is abnormal and indicates MLLT10/MLL(KMT2A) fusion in 90.8% of nuclei.  - thoracentesis done revealing malignant pleural effusion.   - started 7+3 on 9/25/2020   - restaging bone marrow biopsy at day 14 revealed persistent AML.  - discharged with plan for Decitabine/Venetoclax outpatient  - 10/26/20 C1D1 Decitabine/Venetoclax  - 11/23/20 marrow showed CR by morphology, flow, CG/FISH.    History of Afib (pacemaker in place and on eliquis), cardiomyopathy, HTN  Subjective:     Interval History: Mr. Diaz is a 55 y.o. male  presents today with wife for weekly follow-up and for cycle 3 of Decitabine. He was instructed 2 weeks ago to hold his venetoclax due to severe neutropenia. He completed 5 days of Granix 12/22 for severe neutropenia. WBC improved today but still very low, . He reports some fatigue and weakness but states he feels well. He denies fevers, sob, nightsweats, bleeding, brusing, lymphadenopathy, signs/symptoms of splenomegaly, abdominal pain, n/v/d/c.      Past Medical History:   Past Medical History:   Diagnosis Date    Atrial fibrillation     Hypertension      Past Surgical HIstory:   Past Surgical History:   Procedure Laterality Date    INSERTION OF STAUFFER CATHETER Right 9/21/2020    Procedure: INSERTION, CATHETER, CENTRAL VENOUS, STAUFFER;  Surgeon: Ti Starkey MD;  Location: [facility] OR [address] [place] [address];  Service: General;  Laterality: Right;     Family History:   Family History    Problem Relation Age of Onset    Heart disease Mother     Cancer Father     Heart disease Father      Social History:  reports that he has quit smoking. He does not have any smokeless tobacco history on file.    Medications:  Current Outpatient Medications   Medication Sig Dispense Refill    acyclovir (ZOVIRAX) 200 MG capsule Take 2 capsules (400 mg total) by mouth 2 (two) times daily. 60 capsule 5    apixaban (ELIQUIS) 5 mg Tab Take 1 tablet (5 mg total) by mouth 2 (two) times daily. HOLD until okayed to take by oncologist.      atorvastatin (LIPITOR) 40 MG tablet Take 40 mg by mouth once daily.      digoxin (LANOXIN) 250 mcg tablet Take 250 mcg by mouth once daily.      levoFLOXacin (LEVAQUIN) 500 MG tablet Take 1 tablet (500 mg total) by mouth once daily. 30 tablet 1    losartan (COZAAR) 25 MG tablet Take 1 tablet (25 mg total) by mouth once daily. 30 tablet 1    metFORMIN (GLUCOPHAGE) 500 MG tablet Take 500 mg by mouth 2 (two) times daily with meals.      metoprolol succinate (TOPROL-XL) 50 MG 24 hr tablet Take 50 mg by mouth once daily.      potassium chloride SA (K-DUR,KLOR-CON) 20 MEQ tablet Take 2 tablets (40 mEq total) by mouth once daily. 60 tablet 1    terazosin (HYTRIN) 5 MG capsule Take 5 mg by mouth every evening.      venetoclax (VENCLEXTA) 100 mg Tab Take 100 mg by mouth once daily. 28 tablet 0    voriconazole (VFEND) 200 MG Tab Take 200 mg by mouth 2 (two) times daily.      voriconazole (VFEND) 50 MG Tab Take 2 tablets (100 mg total) by mouth 2 (two) times daily. Pt taking 1 tablet once daily 60 tablet 3     Current Facility-Administered Medications   Medication Dose Route Frequency Provider Last Rate Last Dose    0.9%  NaCl infusion (for blood administration)   Intravenous Once Mike Andrew MD   Stopped at 11/13/20 7435    heparin, porcine (PF) 100 unit/mL injection flush 500 Units  500 Units Intravenous PRN Jm Fields MD   500 Units at 11/13/20 1602     Review of  "Systems   Constitutional: Positive for fatigue. Negative for activity change, appetite change, chills, diaphoresis, fever and unexpected weight change.   HENT: Negative for congestion, dental problem, mouth sores, nosebleeds, postnasal drip, rhinorrhea, sinus pressure, sore throat and trouble swallowing.    Eyes: Negative for photophobia and visual disturbance.   Respiratory: Positive for shortness of breath (on exertion). Negative for cough.         Mild on exertion     Cardiovascular: Negative for chest pain, palpitations and leg swelling.   Gastrointestinal: Negative for abdominal distention, abdominal pain, blood in stool, constipation, diarrhea, nausea and vomiting.   Genitourinary: Negative for dysuria, frequency, hematuria and urgency.   Musculoskeletal: Negative for arthralgias, back pain and myalgias.   Skin: Negative for pallor and rash.   Allergic/Immunologic: Negative for immunocompromised state.   Neurological: Negative for syncope, weakness, numbness and headaches.   Hematological: Negative for adenopathy. Does not bruise/bleed easily.   Psychiatric/Behavioral: Negative for confusion. The patient is not nervous/anxious.      ECOG Score:  1    Objective:     Vitals:    12/28/20 1027   BP: (!) 145/85   Pulse: 82   Resp: 16   SpO2: 100%   Weight: 125.7 kg (277 lb 1.9 oz)   Height: 6' 3" (1.905 m)       BMI: Body mass index is 34.64 kg/m².     Physical Exam  Constitutional:       Appearance: Normal appearance. He is not diaphoretic.   HENT:      Head: Normocephalic and atraumatic.   Eyes:      General: Lids are normal.   Neck:      Musculoskeletal: Normal range of motion.      Trachea: Trachea normal.   Cardiovascular:      Rate and Rhythm: Normal rate. Rhythm irregular.      Heart sounds: Normal heart sounds.   Pulmonary:      Effort: Pulmonary effort is normal.      Breath sounds: Normal breath sounds.   Abdominal:      General: Bowel sounds are normal.   Musculoskeletal: Normal range of motion. "   Skin:     General: Skin is dry.      Coloration: Skin is not pale.      Comments: Lopes to right chest wall is c/d/i with no sign of infection noted.   Neurological:      Mental Status: He is alert and oriented to person, place, and time.      Coordination: Coordination normal.      Gait: Gait normal.   Psychiatric:         Speech: Speech normal.         Behavior: Behavior normal. Behavior is cooperative.         Thought Content: Thought content normal.         Judgment: Judgment normal.       Laboratory Data:  Lab Results   Component Value Date    WBC 1.15 (LL) 12/28/2020    HGB 7.2 (L) 12/28/2020    HCT 22.6 (L) 12/28/2020    MCV 99 (H) 12/28/2020    PLT 93 (L) 12/28/2020       CMP  Sodium   Date Value Ref Range Status   12/28/2020 141 136 - 145 mmol/L Final     Potassium   Date Value Ref Range Status   12/28/2020 4.0 3.5 - 5.1 mmol/L Final     Chloride   Date Value Ref Range Status   12/28/2020 107 95 - 110 mmol/L Final     CO2   Date Value Ref Range Status   12/28/2020 25 23 - 29 mmol/L Final     Glucose   Date Value Ref Range Status   12/28/2020 134 (H) 70 - 110 mg/dL Final     BUN   Date Value Ref Range Status   12/28/2020 18 6 - 20 mg/dL Final     Creatinine   Date Value Ref Range Status   12/28/2020 0.9 0.5 - 1.4 mg/dL Final     Calcium   Date Value Ref Range Status   12/28/2020 8.4 (L) 8.7 - 10.5 mg/dL Final     Total Protein   Date Value Ref Range Status   12/28/2020 6.0 6.0 - 8.4 g/dL Final     Albumin   Date Value Ref Range Status   12/28/2020 3.5 3.5 - 5.2 g/dL Final     Total Bilirubin   Date Value Ref Range Status   12/28/2020 0.7 0.1 - 1.0 mg/dL Final     Comment:     For infants and newborns, interpretation of results should be based  on gestational age, weight and in agreement with clinical  observations.  Premature Infant recommended reference ranges:  Up to 24 hours.............<8.0 mg/dL  Up to 48 hours............<12.0 mg/dL  3-5 days..................<15.0 mg/dL  6-29  days.................<15.0 mg/dL       Alkaline Phosphatase   Date Value Ref Range Status   12/28/2020 89 55 - 135 U/L Final     AST   Date Value Ref Range Status   12/28/2020 11 10 - 40 U/L Final     ALT   Date Value Ref Range Status   12/28/2020 13 10 - 44 U/L Final     Anion Gap   Date Value Ref Range Status   12/28/2020 9 8 - 16 mmol/L Final     eGFR if    Date Value Ref Range Status   12/28/2020 >60.0 >60 mL/min/1.73 m^2 Final     eGFR if non    Date Value Ref Range Status   12/28/2020 >60.0 >60 mL/min/1.73 m^2 Final     Comment:     Calculation used to obtain the estimated glomerular filtration  rate (eGFR) is the CKD-EPI equation.           Assessment:     1. Acute myeloid leukemia in remission    2. Pancytopenia due to antineoplastic chemotherapy    3. Permanent atrial fibrillation    4. Essential hypertension    5. Type 2 diabetes mellitus without complication, without long-term current use of insulin    6. Hyperlipidemia, unspecified hyperlipidemia type        Plan:     Acute myeloid leukemia  -high risk AML with complex CG including T(10;11); flt3 pcr negative; normal NGS panel; diagnosed sept 2020; sp 7+3 with residual disease  -transitioned to venetoclax/dacogen on 10/26/20; C1D1 dacogen 10/26/20; started venetoclax 11/2/20  -tolerating therapy well  -BMBx from 11/23/20 showing CR by morphology, flow, CG/FISH; patient is ready for transplant once donor identified and eval completed   -2 haplo sisters, 1 sister not a match; 2 other brothers typing pending  -has completed 5 days days of Granix (12/23); will have patient complete another 5 days worth starting today 12/28  -C3 Dacogen and Venetoclax originally last week but held due to persistent neutropenia. Venetoclax has been on hold; continue to hold this week; will reschedule for next Monday 1/4    Pancytopenia due to antineoplastic chemotherapy   -continue mon/thurs labs and prn transfusions  -for ease Thursday labs are  in [place] and Monday labs app[place] [facility]  -transfuse for hgb <7, plt <10  -continue acyc, levaquin, vori ppx  -patient is to continue to hold venetoclax (per instructions patient was given by Dr. Fields) with worsening WBC and delaying C3 Decitabine til next week as above  -given5 days granix 480 mcg daily to augment recovery so can stay on track with dacogen; finished 5 days on 12/23; to complete another 5 days starting today 12/28  -will hold therapy today for another week as above     Type 2 diabetes mellitus without complication, without long-term current use of insulin  -continue glucophage     Essential hypertension/HLD  -home meds: losartan 25 mg daily, amlodipine 5 mg daily and terazosin qhs  -stopped amlodipine 10/26/20 due to low normal BPs and dizziness  -BP wnl today  -continue atorvastatin     Permanent atrial fibrillation  -status post pacemaker placement, currently rate controlled  -home regimen: Apixaban, Digoxin, and Metoprolol  -Apixaban was being held due to thrombocytopenia; has restarted Eliquis as platelets consistently >50k  -keeping Mg > 2 and K > 4   -a follow up echocardiogram is recommended at 6 months post treatment  -will obtain weekly Digoxin level per pharmacy recs due to interaction with voriconazole. Knows to take dig in AMs and hold until labs performed in AMs (on Mondays). Venetoclax to be take 6 hours post digoxin  -Taking dig in the morning and continuing venetoclax in the evenings.  -he follows with local cardiologist       FU:     CBC, CMP, mag, phos and T&S in [place] 12/31  Same labs including digoxin at [facility] [place], visit with Dr. Fields or NP, and chair for 5 days of dacogen on 1/4/21      Tonia Gutierrez NP  Hematology/Oncology/BMT

## 2020-12-28 NOTE — Clinical Note
Cancel chemotherapy for today and the rest of this week    CBC, CMP, mag, phos and T&S in [place] 12/31  [place] [facility] [place]. Fields or NP, and chair for 5 days of dacogen on 1/4/21

## 2020-12-30 NOTE — TELEPHONE ENCOUNTER
"----- Message from Ursula Akers sent at 12/30/2020  8:02 AM CST -----   Consult/Advisory:    Name Of Caller: Linda jamison/[email or web address]   Contact Preference?: [phone] ext [phone]    Does patient feel the need to be seen today? No     What is the nature of the call?:  -request a callback- following up regarding request for assistance for Venclexta     Additional Notes:  "Thank you for all that you do for our patients'"    "

## 2021-01-01 ENCOUNTER — TELEPHONE (OUTPATIENT)
Dept: HEMATOLOGY/ONCOLOGY | Facility: CLINIC | Age: 56
End: 2021-01-01

## 2021-01-01 ENCOUNTER — OFFICE VISIT (OUTPATIENT)
Dept: HEMATOLOGY/ONCOLOGY | Facility: CLINIC | Age: 56
End: 2021-01-01
Payer: COMMERCIAL

## 2021-01-01 ENCOUNTER — RESEARCH ENCOUNTER (OUTPATIENT)
Dept: RESEARCH | Facility: HOSPITAL | Age: 56
End: 2021-01-01

## 2021-01-01 ENCOUNTER — INFUSION (OUTPATIENT)
Dept: INFUSION THERAPY | Facility: HOSPITAL | Age: 56
End: 2021-01-01
Payer: COMMERCIAL

## 2021-01-01 ENCOUNTER — TELEPHONE (OUTPATIENT)
Dept: PHARMACY | Facility: CLINIC | Age: 56
End: 2021-01-01

## 2021-01-01 ENCOUNTER — HOSPITAL ENCOUNTER (OUTPATIENT)
Dept: RADIOLOGY | Facility: HOSPITAL | Age: 56
Discharge: HOME | End: 2021-01-27
Attending: INTERNAL MEDICINE
Payer: COMMERCIAL

## 2021-01-01 ENCOUNTER — PATIENT MESSAGE (OUTPATIENT)
Dept: HEMATOLOGY/ONCOLOGY | Facility: CLINIC | Age: 56
End: 2021-01-01

## 2021-01-01 ENCOUNTER — LAB VISIT (OUTPATIENT)
Dept: LAB | Facility: HOSPITAL | Age: 56
End: 2021-01-01
Attending: INTERNAL MEDICINE
Payer: COMMERCIAL

## 2021-01-01 ENCOUNTER — HOSPITAL ENCOUNTER (OUTPATIENT)
Dept: RADIOLOGY | Facility: HOSPITAL | Age: 56
Discharge: HOME | End: 2021-02-12
Attending: INTERNAL MEDICINE
Payer: COMMERCIAL

## 2021-01-01 ENCOUNTER — DOCUMENTATION ONLY (OUTPATIENT)
Dept: HEMATOLOGY/ONCOLOGY | Facility: CLINIC | Age: 56
End: 2021-01-01

## 2021-01-01 ENCOUNTER — INFUSION (OUTPATIENT)
Dept: INFUSION THERAPY | Facility: HOSPITAL | Age: 56
End: 2021-01-01
Attending: INTERNAL MEDICINE
Payer: COMMERCIAL

## 2021-01-01 ENCOUNTER — PROCEDURE VISIT (OUTPATIENT)
Dept: HEMATOLOGY/ONCOLOGY | Facility: CLINIC | Age: 56
End: 2021-01-01
Payer: COMMERCIAL

## 2021-01-01 ENCOUNTER — OFFICE VISIT (OUTPATIENT)
Dept: OPHTHALMOLOGY | Facility: CLINIC | Age: 56
End: 2021-01-01
Payer: COMMERCIAL

## 2021-01-01 ENCOUNTER — DOCUMENTATION ONLY (OUTPATIENT)
Dept: PHARMACY | Facility: HOSPITAL | Age: 56
End: 2021-01-01

## 2021-01-01 ENCOUNTER — HOSPITAL ENCOUNTER (INPATIENT)
Facility: HOSPITAL | Age: 56
LOS: 2 days | DRG: 835 | End: 2021-04-08
Attending: EMERGENCY MEDICINE | Admitting: INTERNAL MEDICINE
Payer: COMMERCIAL

## 2021-01-01 ENCOUNTER — SPECIALTY PHARMACY (OUTPATIENT)
Dept: PHARMACY | Facility: CLINIC | Age: 56
End: 2021-01-01

## 2021-01-01 ENCOUNTER — LAB VISIT (OUTPATIENT)
Dept: LAB | Facility: HOSPITAL | Age: 56
End: 2021-01-01
Payer: COMMERCIAL

## 2021-01-01 VITALS
DIASTOLIC BLOOD PRESSURE: 68 MMHG | HEART RATE: 68 BPM | TEMPERATURE: 98 F | HEIGHT: 76 IN | SYSTOLIC BLOOD PRESSURE: 149 MMHG | WEIGHT: 265 LBS | BODY MASS INDEX: 32.27 KG/M2 | RESPIRATION RATE: 18 BRPM | SYSTOLIC BLOOD PRESSURE: 130 MMHG | RESPIRATION RATE: 18 BRPM | TEMPERATURE: 98 F | HEART RATE: 69 BPM | DIASTOLIC BLOOD PRESSURE: 86 MMHG

## 2021-01-01 VITALS
BODY MASS INDEX: 34.77 KG/M2 | RESPIRATION RATE: 14 BRPM | DIASTOLIC BLOOD PRESSURE: 70 MMHG | HEART RATE: 80 BPM | WEIGHT: 279.63 LBS | OXYGEN SATURATION: 99 % | DIASTOLIC BLOOD PRESSURE: 89 MMHG | RESPIRATION RATE: 18 BRPM | SYSTOLIC BLOOD PRESSURE: 130 MMHG | TEMPERATURE: 98 F | HEART RATE: 70 BPM | SYSTOLIC BLOOD PRESSURE: 161 MMHG | TEMPERATURE: 99 F | OXYGEN SATURATION: 98 % | HEIGHT: 75 IN

## 2021-01-01 VITALS
BODY MASS INDEX: 31.96 KG/M2 | OXYGEN SATURATION: 100 % | HEIGHT: 76 IN | HEART RATE: 70 BPM | DIASTOLIC BLOOD PRESSURE: 64 MMHG | TEMPERATURE: 98 F | RESPIRATION RATE: 16 BRPM | WEIGHT: 262.44 LBS | SYSTOLIC BLOOD PRESSURE: 110 MMHG

## 2021-01-01 VITALS
HEIGHT: 76 IN | WEIGHT: 264.13 LBS | BODY MASS INDEX: 32.16 KG/M2 | RESPIRATION RATE: 41 BRPM | HEART RATE: 86 BPM | SYSTOLIC BLOOD PRESSURE: 118 MMHG | TEMPERATURE: 98 F | OXYGEN SATURATION: 92 % | DIASTOLIC BLOOD PRESSURE: 65 MMHG

## 2021-01-01 VITALS
WEIGHT: 276.25 LBS | TEMPERATURE: 98 F | OXYGEN SATURATION: 100 % | RESPIRATION RATE: 18 BRPM | BODY MASS INDEX: 33.64 KG/M2 | RESPIRATION RATE: 18 BRPM | DIASTOLIC BLOOD PRESSURE: 81 MMHG | DIASTOLIC BLOOD PRESSURE: 80 MMHG | HEART RATE: 73 BPM | TEMPERATURE: 98 F | OXYGEN SATURATION: 100 % | HEIGHT: 76 IN | HEART RATE: 70 BPM | HEIGHT: 76 IN | WEIGHT: 281.88 LBS | BODY MASS INDEX: 34.33 KG/M2 | SYSTOLIC BLOOD PRESSURE: 147 MMHG | SYSTOLIC BLOOD PRESSURE: 153 MMHG

## 2021-01-01 VITALS
HEART RATE: 76 BPM | RESPIRATION RATE: 17 BRPM | DIASTOLIC BLOOD PRESSURE: 75 MMHG | OXYGEN SATURATION: 100 % | HEIGHT: 76 IN | SYSTOLIC BLOOD PRESSURE: 142 MMHG | TEMPERATURE: 98 F | WEIGHT: 265 LBS | BODY MASS INDEX: 32.27 KG/M2

## 2021-01-01 VITALS
HEART RATE: 86 BPM | RESPIRATION RATE: 16 BRPM | DIASTOLIC BLOOD PRESSURE: 82 MMHG | DIASTOLIC BLOOD PRESSURE: 68 MMHG | RESPIRATION RATE: 18 BRPM | HEART RATE: 85 BPM | SYSTOLIC BLOOD PRESSURE: 120 MMHG | SYSTOLIC BLOOD PRESSURE: 154 MMHG | OXYGEN SATURATION: 100 % | TEMPERATURE: 99 F

## 2021-01-01 VITALS
RESPIRATION RATE: 17 BRPM | HEART RATE: 69 BPM | HEART RATE: 82 BPM | HEIGHT: 76 IN | OXYGEN SATURATION: 99 % | BODY MASS INDEX: 33.68 KG/M2 | DIASTOLIC BLOOD PRESSURE: 75 MMHG | WEIGHT: 276.56 LBS | SYSTOLIC BLOOD PRESSURE: 135 MMHG | DIASTOLIC BLOOD PRESSURE: 50 MMHG | SYSTOLIC BLOOD PRESSURE: 144 MMHG | HEART RATE: 77 BPM | RESPIRATION RATE: 18 BRPM | TEMPERATURE: 98 F | TEMPERATURE: 98 F | DIASTOLIC BLOOD PRESSURE: 81 MMHG | TEMPERATURE: 98 F | RESPIRATION RATE: 18 BRPM | SYSTOLIC BLOOD PRESSURE: 139 MMHG

## 2021-01-01 VITALS
HEART RATE: 66 BPM | SYSTOLIC BLOOD PRESSURE: 112 MMHG | TEMPERATURE: 99 F | DIASTOLIC BLOOD PRESSURE: 71 MMHG | RESPIRATION RATE: 14 BRPM | OXYGEN SATURATION: 99 %

## 2021-01-01 VITALS
RESPIRATION RATE: 16 BRPM | HEART RATE: 77 BPM | SYSTOLIC BLOOD PRESSURE: 131 MMHG | OXYGEN SATURATION: 100 % | TEMPERATURE: 99 F | DIASTOLIC BLOOD PRESSURE: 73 MMHG

## 2021-01-01 VITALS
OXYGEN SATURATION: 100 % | TEMPERATURE: 98 F | HEART RATE: 65 BPM | RESPIRATION RATE: 18 BRPM | SYSTOLIC BLOOD PRESSURE: 144 MMHG | DIASTOLIC BLOOD PRESSURE: 92 MMHG

## 2021-01-01 VITALS
OXYGEN SATURATION: 100 % | BODY MASS INDEX: 33.97 KG/M2 | WEIGHT: 279 LBS | DIASTOLIC BLOOD PRESSURE: 73 MMHG | HEART RATE: 78 BPM | HEIGHT: 76 IN | SYSTOLIC BLOOD PRESSURE: 125 MMHG | RESPIRATION RATE: 16 BRPM

## 2021-01-01 VITALS
HEART RATE: 62 BPM | RESPIRATION RATE: 16 BRPM | TEMPERATURE: 98 F | DIASTOLIC BLOOD PRESSURE: 77 MMHG | SYSTOLIC BLOOD PRESSURE: 128 MMHG

## 2021-01-01 VITALS
TEMPERATURE: 98 F | SYSTOLIC BLOOD PRESSURE: 157 MMHG | HEART RATE: 64 BPM | OXYGEN SATURATION: 100 % | DIASTOLIC BLOOD PRESSURE: 98 MMHG | RESPIRATION RATE: 18 BRPM

## 2021-01-01 VITALS
SYSTOLIC BLOOD PRESSURE: 134 MMHG | DIASTOLIC BLOOD PRESSURE: 81 MMHG | HEART RATE: 90 BPM | TEMPERATURE: 99 F | RESPIRATION RATE: 18 BRPM | OXYGEN SATURATION: 99 %

## 2021-01-01 VITALS — SYSTOLIC BLOOD PRESSURE: 135 MMHG | HEART RATE: 62 BPM | DIASTOLIC BLOOD PRESSURE: 72 MMHG | RESPIRATION RATE: 18 BRPM

## 2021-01-01 VITALS
SYSTOLIC BLOOD PRESSURE: 166 MMHG | DIASTOLIC BLOOD PRESSURE: 81 MMHG | TEMPERATURE: 98 F | HEART RATE: 60 BPM | RESPIRATION RATE: 18 BRPM | OXYGEN SATURATION: 98 %

## 2021-01-01 VITALS
TEMPERATURE: 99 F | SYSTOLIC BLOOD PRESSURE: 149 MMHG | DIASTOLIC BLOOD PRESSURE: 76 MMHG | HEART RATE: 84 BPM | RESPIRATION RATE: 18 BRPM

## 2021-01-01 DIAGNOSIS — H35.61 RETINAL HEMORRHAGE, RIGHT: ICD-10-CM

## 2021-01-01 DIAGNOSIS — C92.00 ACUTE MYELOSIS: Primary | ICD-10-CM

## 2021-01-01 DIAGNOSIS — C92.00 ACUTE MYELOID LEUKEMIA NOT HAVING ACHIEVED REMISSION: ICD-10-CM

## 2021-01-01 DIAGNOSIS — C92.01 ACUTE MYELOID LEUKEMIA IN REMISSION: Primary | ICD-10-CM

## 2021-01-01 DIAGNOSIS — Z00.6 CLINICAL TRIAL PARTICIPANT: ICD-10-CM

## 2021-01-01 DIAGNOSIS — Z76.82 BONE MARROW TRANSPLANT CANDIDATE: ICD-10-CM

## 2021-01-01 DIAGNOSIS — C92.01 ACUTE MYELOID LEUKEMIA IN REMISSION: ICD-10-CM

## 2021-01-01 DIAGNOSIS — C92.02 ACUTE MYELOID LEUKEMIA IN RELAPSE: Primary | ICD-10-CM

## 2021-01-01 DIAGNOSIS — C92.02 ACUTE MYELOID LEUKEMIA IN RELAPSE: ICD-10-CM

## 2021-01-01 DIAGNOSIS — D69.6 THROMBOCYTOPENIA: ICD-10-CM

## 2021-01-01 DIAGNOSIS — D61.810 PANCYTOPENIA DUE TO ANTINEOPLASTIC CHEMOTHERAPY: ICD-10-CM

## 2021-01-01 DIAGNOSIS — Z00.6 EXAMINATION OF PARTICIPANT IN CLINICAL TRIAL: ICD-10-CM

## 2021-01-01 DIAGNOSIS — Z76.82 STEM CELL TRANSPLANT CANDIDATE: ICD-10-CM

## 2021-01-01 DIAGNOSIS — E11.9 TYPE 2 DIABETES MELLITUS WITHOUT COMPLICATION, WITHOUT LONG-TERM CURRENT USE OF INSULIN: ICD-10-CM

## 2021-01-01 DIAGNOSIS — T45.1X5A PANCYTOPENIA DUE TO ANTINEOPLASTIC CHEMOTHERAPY: ICD-10-CM

## 2021-01-01 DIAGNOSIS — D61.818 PANCYTOPENIA: ICD-10-CM

## 2021-01-01 DIAGNOSIS — D69.6 THROMBOCYTOPENIA: Primary | ICD-10-CM

## 2021-01-01 DIAGNOSIS — Z79.01 CHRONIC ANTICOAGULATION: ICD-10-CM

## 2021-01-01 DIAGNOSIS — Z00.6 EXAMINATION OF PARTICIPANT IN CLINICAL TRIAL: Primary | ICD-10-CM

## 2021-01-01 DIAGNOSIS — R00.0 TACHYCARDIA: ICD-10-CM

## 2021-01-01 DIAGNOSIS — I48.20 ATRIAL FIBRILLATION, CHRONIC: ICD-10-CM

## 2021-01-01 DIAGNOSIS — C92.01 AML (ACUTE MYELOID LEUKEMIA) IN REMISSION: Primary | ICD-10-CM

## 2021-01-01 DIAGNOSIS — I48.91 ATRIAL FIBRILLATION, UNSPECIFIED TYPE: ICD-10-CM

## 2021-01-01 DIAGNOSIS — D61.818 PANCYTOPENIA: Primary | ICD-10-CM

## 2021-01-01 DIAGNOSIS — C92.00 ACUTE MYELOID LEUKEMIA NOT HAVING ACHIEVED REMISSION: Primary | ICD-10-CM

## 2021-01-01 DIAGNOSIS — C92.00 AML (ACUTE MYELOID LEUKEMIA): ICD-10-CM

## 2021-01-01 DIAGNOSIS — D64.9 ANEMIA, UNSPECIFIED TYPE: Primary | ICD-10-CM

## 2021-01-01 DIAGNOSIS — I48.21 PERMANENT ATRIAL FIBRILLATION: ICD-10-CM

## 2021-01-01 DIAGNOSIS — R07.9 CHEST PAIN: ICD-10-CM

## 2021-01-01 DIAGNOSIS — C92.00 AML (ACUTE MYELOID LEUKEMIA): Primary | ICD-10-CM

## 2021-01-01 DIAGNOSIS — R50.9 FEVER: ICD-10-CM

## 2021-01-01 DIAGNOSIS — D63.0 ANEMIA IN NEOPLASTIC DISEASE: ICD-10-CM

## 2021-01-01 DIAGNOSIS — D63.0 ANEMIA IN NEOPLASTIC DISEASE: Primary | ICD-10-CM

## 2021-01-01 DIAGNOSIS — I48.91 ATRIAL FIBRILLATION WITH RVR: ICD-10-CM

## 2021-01-01 DIAGNOSIS — Z76.82 STEM CELL TRANSPLANT CANDIDATE: Primary | ICD-10-CM

## 2021-01-01 DIAGNOSIS — R11.2 CINV (CHEMOTHERAPY-INDUCED NAUSEA AND VOMITING): Primary | ICD-10-CM

## 2021-01-01 DIAGNOSIS — I10 ESSENTIAL HYPERTENSION: ICD-10-CM

## 2021-01-01 DIAGNOSIS — Z76.82 BONE MARROW TRANSPLANT CANDIDATE: Primary | ICD-10-CM

## 2021-01-01 DIAGNOSIS — I48.11 LONGSTANDING PERSISTENT ATRIAL FIBRILLATION: ICD-10-CM

## 2021-01-01 DIAGNOSIS — H43.11 VITREOUS HEMORRHAGE, RIGHT: ICD-10-CM

## 2021-01-01 DIAGNOSIS — T45.1X5A CINV (CHEMOTHERAPY-INDUCED NAUSEA AND VOMITING): Primary | ICD-10-CM

## 2021-01-01 DIAGNOSIS — I47.29 NON-SUSTAINED VENTRICULAR TACHYCARDIA: ICD-10-CM

## 2021-01-01 DIAGNOSIS — I48.20 ATRIAL FIBRILLATION, CHRONIC: Primary | ICD-10-CM

## 2021-01-01 DIAGNOSIS — D72.829 LEUKOCYTOSIS, UNSPECIFIED TYPE: ICD-10-CM

## 2021-01-01 DIAGNOSIS — R51.9 NONINTRACTABLE HEADACHE, UNSPECIFIED CHRONICITY PATTERN, UNSPECIFIED HEADACHE TYPE: Primary | ICD-10-CM

## 2021-01-01 DIAGNOSIS — H35.371 EPIRETINAL MEMBRANE, RIGHT: Primary | ICD-10-CM

## 2021-01-01 DIAGNOSIS — Z00.6 CLINICAL TRIAL PARTICIPANT: Primary | ICD-10-CM

## 2021-01-01 DIAGNOSIS — E78.5 HYPERLIPIDEMIA, UNSPECIFIED HYPERLIPIDEMIA TYPE: ICD-10-CM

## 2021-01-01 DIAGNOSIS — D61.810 PANCYTOPENIA DUE TO CHEMOTHERAPY: ICD-10-CM

## 2021-01-01 DIAGNOSIS — R50.9 FEVER, UNSPECIFIED FEVER CAUSE: ICD-10-CM

## 2021-01-01 LAB
ABO + RH BLD: NORMAL
ALBUMIN SERPL BCP-MCNC: 2.6 G/DL (ref 3.5–5.2)
ALBUMIN SERPL BCP-MCNC: 2.7 G/DL (ref 3.5–5.2)
ALBUMIN SERPL BCP-MCNC: 2.8 G/DL (ref 3.5–5.2)
ALBUMIN SERPL BCP-MCNC: 2.9 G/DL (ref 3.5–5.2)
ALBUMIN SERPL BCP-MCNC: 3.5 G/DL (ref 3.5–5.2)
ALBUMIN SERPL BCP-MCNC: 3.6 G/DL (ref 3.5–5.2)
ALBUMIN SERPL BCP-MCNC: 3.7 G/DL (ref 3.5–5.2)
ALBUMIN SERPL BCP-MCNC: 3.8 G/DL (ref 3.5–5.2)
ALLENS TEST: ABNORMAL
ALP SERPL-CCNC: 100 U/L (ref 55–135)
ALP SERPL-CCNC: 102 U/L (ref 55–135)
ALP SERPL-CCNC: 288 U/L (ref 55–135)
ALP SERPL-CCNC: 289 U/L (ref 55–135)
ALP SERPL-CCNC: 293 U/L (ref 55–135)
ALP SERPL-CCNC: 352 U/L (ref 55–135)
ALP SERPL-CCNC: 79 U/L (ref 55–135)
ALP SERPL-CCNC: 86 U/L (ref 55–135)
ALP SERPL-CCNC: 88 U/L (ref 55–135)
ALP SERPL-CCNC: 88 U/L (ref 55–135)
ALP SERPL-CCNC: 89 U/L (ref 55–135)
ALP SERPL-CCNC: 90 U/L (ref 55–135)
ALP SERPL-CCNC: 90 U/L (ref 55–135)
ALP SERPL-CCNC: 93 U/L (ref 55–135)
ALP SERPL-CCNC: 94 U/L (ref 55–135)
ALP SERPL-CCNC: 95 U/L (ref 55–135)
ALP SERPL-CCNC: 96 U/L (ref 55–135)
ALP SERPL-CCNC: 96 U/L (ref 55–135)
ALP SERPL-CCNC: 97 U/L (ref 55–135)
ALP SERPL-CCNC: 97 U/L (ref 55–135)
ALP SERPL-CCNC: 98 U/L (ref 55–135)
ALT SERPL W/O P-5'-P-CCNC: 13 U/L (ref 10–44)
ALT SERPL W/O P-5'-P-CCNC: 14 U/L (ref 10–44)
ALT SERPL W/O P-5'-P-CCNC: 141 U/L (ref 10–44)
ALT SERPL W/O P-5'-P-CCNC: 149 U/L (ref 10–44)
ALT SERPL W/O P-5'-P-CCNC: 15 U/L (ref 10–44)
ALT SERPL W/O P-5'-P-CCNC: 15 U/L (ref 10–44)
ALT SERPL W/O P-5'-P-CCNC: 16 U/L (ref 10–44)
ALT SERPL W/O P-5'-P-CCNC: 17 U/L (ref 10–44)
ALT SERPL W/O P-5'-P-CCNC: 17 U/L (ref 10–44)
ALT SERPL W/O P-5'-P-CCNC: 18 U/L (ref 10–44)
ALT SERPL W/O P-5'-P-CCNC: 19 U/L (ref 10–44)
ALT SERPL W/O P-5'-P-CCNC: 190 U/L (ref 10–44)
ALT SERPL W/O P-5'-P-CCNC: 21 U/L (ref 10–44)
ALT SERPL W/O P-5'-P-CCNC: 217 U/L (ref 10–44)
ALT SERPL W/O P-5'-P-CCNC: 26 U/L (ref 10–44)
ALT SERPL W/O P-5'-P-CCNC: 9 U/L (ref 10–44)
AML FISH ADDITIONAL INFORMATION (BM): NORMAL
AML FISH ADDITIONAL INFORMATION (BM): NORMAL
AML FISH DISCLAIMER (BM): NORMAL
AML FISH DISCLAIMER (BM): NORMAL
AML FISH REASON FOR REFERRAL (BM): NORMAL
AML FISH REASON FOR REFERRAL (BM): NORMAL
AML FISH RELEASED BY (BM): NORMAL
AML FISH RELEASED BY (BM): NORMAL
AML FISH RESULT (BM): NORMAL
AML FISH RESULT (BM): NORMAL
AML FISH RESULT SUMMARY (BM): NORMAL
AML FISH RESULT SUMMARY (BM): NORMAL
AML FISH RESULT TABLE (BM): NORMAL
AML FISH RESULT TABLE (BM): NORMAL
ANION GAP SERPL CALC-SCNC: 10 MMOL/L (ref 8–16)
ANION GAP SERPL CALC-SCNC: 12 MMOL/L (ref 8–16)
ANION GAP SERPL CALC-SCNC: 13 MMOL/L (ref 8–16)
ANION GAP SERPL CALC-SCNC: 20 MMOL/L (ref 8–16)
ANION GAP SERPL CALC-SCNC: 5 MMOL/L (ref 8–16)
ANION GAP SERPL CALC-SCNC: 5 MMOL/L (ref 8–16)
ANION GAP SERPL CALC-SCNC: 6 MMOL/L (ref 8–16)
ANION GAP SERPL CALC-SCNC: 7 MMOL/L (ref 8–16)
ANION GAP SERPL CALC-SCNC: 8 MMOL/L (ref 8–16)
ANION GAP SERPL CALC-SCNC: 9 MMOL/L (ref 8–16)
ANISOCYTOSIS BLD QL SMEAR: ABNORMAL
ANISOCYTOSIS BLD QL SMEAR: SLIGHT
ANNOTATION COMMENT IMP: NORMAL
ANNOTATION COMMENT IMP: NORMAL
APTT BLDCRRT: 24 SEC (ref 21–32)
APTT BLDCRRT: 24.5 SEC (ref 21–32)
APTT BLDCRRT: 25.2 SEC (ref 21–32)
APTT BLDCRRT: 29.5 SEC (ref 21–32)
AST SERPL-CCNC: 12 U/L (ref 10–40)
AST SERPL-CCNC: 122 U/L (ref 10–40)
AST SERPL-CCNC: 13 U/L (ref 10–40)
AST SERPL-CCNC: 135 U/L (ref 10–40)
AST SERPL-CCNC: 14 U/L (ref 10–40)
AST SERPL-CCNC: 14 U/L (ref 10–40)
AST SERPL-CCNC: 15 U/L (ref 10–40)
AST SERPL-CCNC: 15 U/L (ref 10–40)
AST SERPL-CCNC: 16 U/L (ref 10–40)
AST SERPL-CCNC: 16 U/L (ref 10–40)
AST SERPL-CCNC: 161 U/L (ref 10–40)
AST SERPL-CCNC: 17 U/L (ref 10–40)
AST SERPL-CCNC: 18 U/L (ref 10–40)
AST SERPL-CCNC: 18 U/L (ref 10–40)
AST SERPL-CCNC: 20 U/L (ref 10–40)
AST SERPL-CCNC: 22 U/L (ref 10–40)
AST SERPL-CCNC: 250 U/L (ref 10–40)
AST SERPL-CCNC: 29 U/L (ref 10–40)
BACTERIA #/AREA URNS AUTO: ABNORMAL /HPF
BACTERIA #/AREA URNS AUTO: ABNORMAL /HPF
BACTERIA UR CULT: NO GROWTH
BASOPHILS # BLD AUTO: 0 K/UL (ref 0–0.2)
BASOPHILS # BLD AUTO: 0.01 K/UL (ref 0–0.2)
BASOPHILS # BLD AUTO: ABNORMAL K/UL (ref 0–0.2)
BASOPHILS NFR BLD: 0 % (ref 0–1.9)
BASOPHILS NFR BLD: 0.1 % (ref 0–1.9)
BASOPHILS NFR BLD: 1 % (ref 0–1.9)
BASOPHILS NFR BLD: 1 % (ref 0–1.9)
BILIRUB SERPL-MCNC: 0.5 MG/DL (ref 0.1–1)
BILIRUB SERPL-MCNC: 0.7 MG/DL (ref 0.1–1)
BILIRUB SERPL-MCNC: 0.7 MG/DL (ref 0.1–1)
BILIRUB SERPL-MCNC: 0.8 MG/DL (ref 0.1–1)
BILIRUB SERPL-MCNC: 1 MG/DL (ref 0.1–1)
BILIRUB SERPL-MCNC: 1.2 MG/DL (ref 0.1–1)
BILIRUB SERPL-MCNC: 1.2 MG/DL (ref 0.1–1)
BILIRUB SERPL-MCNC: 1.3 MG/DL (ref 0.1–1)
BILIRUB SERPL-MCNC: 1.3 MG/DL (ref 0.1–1)
BILIRUB SERPL-MCNC: 1.4 MG/DL (ref 0.1–1)
BILIRUB SERPL-MCNC: 3 MG/DL (ref 0.1–1)
BILIRUB SERPL-MCNC: 4.4 MG/DL (ref 0.1–1)
BILIRUB UR QL STRIP: ABNORMAL
BILIRUB UR QL STRIP: NEGATIVE
BLASTS NFR BLD MANUAL: 13 %
BLASTS NFR BLD MANUAL: 15 %
BLASTS NFR BLD MANUAL: 52 %
BLASTS NFR BLD MANUAL: 7 %
BLASTS NFR BLD MANUAL: 91 %
BLASTS NFR BLD MANUAL: 91 %
BLASTS NFR BLD MANUAL: 94 %
BLASTS NFR BLD MANUAL: 97 %
BLASTS NFR BLD MANUAL: 97 %
BLD GP AB SCN CELLS X3 SERPL QL: NORMAL
BLD PROD TYP BPU: NORMAL
BLOOD UNIT EXPIRATION DATE: NORMAL
BLOOD UNIT TYPE CODE: 5100
BLOOD UNIT TYPE CODE: 600
BLOOD UNIT TYPE CODE: 6200
BLOOD UNIT TYPE CODE: 9500
BLOOD UNIT TYPE: NORMAL
BODY SITE - BONE MARROW: NORMAL
BODY SITE - BONE MARROW: NORMAL
BUN SERPL-MCNC: 13 MG/DL (ref 6–20)
BUN SERPL-MCNC: 13 MG/DL (ref 6–20)
BUN SERPL-MCNC: 15 MG/DL (ref 6–20)
BUN SERPL-MCNC: 15 MG/DL (ref 6–20)
BUN SERPL-MCNC: 16 MG/DL (ref 6–20)
BUN SERPL-MCNC: 17 MG/DL (ref 6–20)
BUN SERPL-MCNC: 17 MG/DL (ref 6–20)
BUN SERPL-MCNC: 18 MG/DL (ref 6–20)
BUN SERPL-MCNC: 19 MG/DL (ref 6–20)
BUN SERPL-MCNC: 20 MG/DL (ref 6–20)
BUN SERPL-MCNC: 21 MG/DL (ref 6–20)
BUN SERPL-MCNC: 23 MG/DL (ref 6–20)
BUN SERPL-MCNC: 23 MG/DL (ref 6–20)
BUN SERPL-MCNC: 24 MG/DL (ref 6–20)
BUN SERPL-MCNC: 25 MG/DL (ref 6–20)
BUN SERPL-MCNC: 26 MG/DL (ref 6–20)
BUN SERPL-MCNC: 29 MG/DL (ref 6–20)
BUN SERPL-MCNC: 33 MG/DL (ref 6–20)
BUN SERPL-MCNC: 38 MG/DL (ref 6–20)
CALCIUM SERPL-MCNC: 7.7 MG/DL (ref 8.7–10.5)
CALCIUM SERPL-MCNC: 7.7 MG/DL (ref 8.7–10.5)
CALCIUM SERPL-MCNC: 7.8 MG/DL (ref 8.7–10.5)
CALCIUM SERPL-MCNC: 8 MG/DL (ref 8.7–10.5)
CALCIUM SERPL-MCNC: 8.1 MG/DL (ref 8.7–10.5)
CALCIUM SERPL-MCNC: 8.3 MG/DL (ref 8.7–10.5)
CALCIUM SERPL-MCNC: 8.3 MG/DL (ref 8.7–10.5)
CALCIUM SERPL-MCNC: 8.4 MG/DL (ref 8.7–10.5)
CALCIUM SERPL-MCNC: 8.5 MG/DL (ref 8.7–10.5)
CALCIUM SERPL-MCNC: 8.5 MG/DL (ref 8.7–10.5)
CALCIUM SERPL-MCNC: 8.6 MG/DL (ref 8.7–10.5)
CALCIUM SERPL-MCNC: 8.7 MG/DL (ref 8.7–10.5)
CALCIUM SERPL-MCNC: 8.8 MG/DL (ref 8.7–10.5)
CALCIUM SERPL-MCNC: 8.9 MG/DL (ref 8.7–10.5)
CALCIUM SERPL-MCNC: 8.9 MG/DL (ref 8.7–10.5)
CALCIUM SERPL-MCNC: 9 MG/DL (ref 8.7–10.5)
CALCIUM SERPL-MCNC: 9.3 MG/DL (ref 8.7–10.5)
CAOX CRY UR QL COMP ASSIST: ABNORMAL
CHLORIDE SERPL-SCNC: 100 MMOL/L (ref 95–110)
CHLORIDE SERPL-SCNC: 100 MMOL/L (ref 95–110)
CHLORIDE SERPL-SCNC: 101 MMOL/L (ref 95–110)
CHLORIDE SERPL-SCNC: 101 MMOL/L (ref 95–110)
CHLORIDE SERPL-SCNC: 102 MMOL/L (ref 95–110)
CHLORIDE SERPL-SCNC: 104 MMOL/L (ref 95–110)
CHLORIDE SERPL-SCNC: 104 MMOL/L (ref 95–110)
CHLORIDE SERPL-SCNC: 105 MMOL/L (ref 95–110)
CHLORIDE SERPL-SCNC: 106 MMOL/L (ref 95–110)
CHLORIDE SERPL-SCNC: 107 MMOL/L (ref 95–110)
CHLORIDE SERPL-SCNC: 108 MMOL/L (ref 95–110)
CHLORIDE SERPL-SCNC: 108 MMOL/L (ref 95–110)
CHLORIDE SERPL-SCNC: 109 MMOL/L (ref 95–110)
CHROM BANDING METHOD: NORMAL
CHROM BANDING METHOD: NORMAL
CHROMOSOME ANALYSIS BM ADDITIONAL INFORMATION: NORMAL
CHROMOSOME ANALYSIS BM ADDITIONAL INFORMATION: NORMAL
CHROMOSOME ANALYSIS BM RELEASED BY: NORMAL
CHROMOSOME ANALYSIS BM RELEASED BY: NORMAL
CHROMOSOME ANALYSIS BM RESULT SUMMARY: NORMAL
CHROMOSOME ANALYSIS BM RESULT SUMMARY: NORMAL
CLARITY UR REFRACT.AUTO: ABNORMAL
CLINICAL CYTOGENETICIST REVIEW: NORMAL
CLINICAL DIAGNOSIS - BONE MARROW: NORMAL
CLINICAL DIAGNOSIS - BONE MARROW: NORMAL
CO2 SERPL-SCNC: 13 MMOL/L (ref 23–29)
CO2 SERPL-SCNC: 21 MMOL/L (ref 23–29)
CO2 SERPL-SCNC: 22 MMOL/L (ref 23–29)
CO2 SERPL-SCNC: 23 MMOL/L (ref 23–29)
CO2 SERPL-SCNC: 24 MMOL/L (ref 23–29)
CO2 SERPL-SCNC: 25 MMOL/L (ref 23–29)
CO2 SERPL-SCNC: 25 MMOL/L (ref 23–29)
CO2 SERPL-SCNC: 26 MMOL/L (ref 23–29)
CO2 SERPL-SCNC: 27 MMOL/L (ref 23–29)
CODING SYSTEM: NORMAL
COLOR UR AUTO: ABNORMAL
COLOR UR AUTO: YELLOW
COMMENT: NORMAL
COMMENT: NORMAL
CREAT SERPL-MCNC: 0.6 MG/DL (ref 0.5–1.4)
CREAT SERPL-MCNC: 0.8 MG/DL (ref 0.5–1.4)
CREAT SERPL-MCNC: 0.9 MG/DL (ref 0.5–1.4)
CREAT SERPL-MCNC: 1 MG/DL (ref 0.5–1.4)
CREAT SERPL-MCNC: 1.1 MG/DL (ref 0.5–1.4)
CREAT SERPL-MCNC: 1.2 MG/DL (ref 0.5–1.4)
CREAT SERPL-MCNC: 1.4 MG/DL (ref 0.5–1.4)
CREAT SERPL-MCNC: 2.1 MG/DL (ref 0.5–1.4)
CTP QC/QA: YES
CTP QC/QA: YES
D DIMER PPP IA.FEU-MCNC: 5.32 MG/L FEU
DACRYOCYTES BLD QL SMEAR: ABNORMAL
DELSYS: ABNORMAL
DIFFERENTIAL METHOD: ABNORMAL
DIGOXIN SERPL-MCNC: 0.4 NG/ML (ref 0.8–2)
DIGOXIN SERPL-MCNC: 0.6 NG/ML (ref 0.8–2)
DIGOXIN SERPL-MCNC: 0.7 NG/ML (ref 0.8–2)
DIGOXIN SERPL-MCNC: 0.8 NG/ML (ref 0.8–2)
DIGOXIN SERPL-MCNC: 0.9 NG/ML (ref 0.8–2)
DISPENSE STATUS: NORMAL
EOSINOPHIL # BLD AUTO: 0 K/UL (ref 0–0.5)
EOSINOPHIL # BLD AUTO: ABNORMAL K/UL (ref 0–0.5)
EOSINOPHIL NFR BLD: 0 % (ref 0–8)
EOSINOPHIL NFR BLD: 1 % (ref 0–8)
ERYTHROCYTE [DISTWIDTH] IN BLOOD BY AUTOMATED COUNT: 16.3 % (ref 11.5–14.5)
ERYTHROCYTE [DISTWIDTH] IN BLOOD BY AUTOMATED COUNT: 16.9 % (ref 11.5–14.5)
ERYTHROCYTE [DISTWIDTH] IN BLOOD BY AUTOMATED COUNT: 17.3 % (ref 11.5–14.5)
ERYTHROCYTE [DISTWIDTH] IN BLOOD BY AUTOMATED COUNT: 17.4 % (ref 11.5–14.5)
ERYTHROCYTE [DISTWIDTH] IN BLOOD BY AUTOMATED COUNT: 17.4 % (ref 11.5–14.5)
ERYTHROCYTE [DISTWIDTH] IN BLOOD BY AUTOMATED COUNT: 18 % (ref 11.5–14.5)
ERYTHROCYTE [DISTWIDTH] IN BLOOD BY AUTOMATED COUNT: 18.6 % (ref 11.5–14.5)
ERYTHROCYTE [DISTWIDTH] IN BLOOD BY AUTOMATED COUNT: 18.8 % (ref 11.5–14.5)
ERYTHROCYTE [DISTWIDTH] IN BLOOD BY AUTOMATED COUNT: 19.5 % (ref 11.5–14.5)
ERYTHROCYTE [DISTWIDTH] IN BLOOD BY AUTOMATED COUNT: 19.7 % (ref 11.5–14.5)
ERYTHROCYTE [DISTWIDTH] IN BLOOD BY AUTOMATED COUNT: 19.9 % (ref 11.5–14.5)
ERYTHROCYTE [DISTWIDTH] IN BLOOD BY AUTOMATED COUNT: 20 % (ref 11.5–14.5)
ERYTHROCYTE [DISTWIDTH] IN BLOOD BY AUTOMATED COUNT: 20.7 % (ref 11.5–14.5)
ERYTHROCYTE [DISTWIDTH] IN BLOOD BY AUTOMATED COUNT: 20.7 % (ref 11.5–14.5)
ERYTHROCYTE [DISTWIDTH] IN BLOOD BY AUTOMATED COUNT: 21 % (ref 11.5–14.5)
ERYTHROCYTE [DISTWIDTH] IN BLOOD BY AUTOMATED COUNT: 21.2 % (ref 11.5–14.5)
ERYTHROCYTE [DISTWIDTH] IN BLOOD BY AUTOMATED COUNT: 21.7 % (ref 11.5–14.5)
ERYTHROCYTE [DISTWIDTH] IN BLOOD BY AUTOMATED COUNT: 22.1 % (ref 11.5–14.5)
ERYTHROCYTE [DISTWIDTH] IN BLOOD BY AUTOMATED COUNT: 22.2 % (ref 11.5–14.5)
ERYTHROCYTE [DISTWIDTH] IN BLOOD BY AUTOMATED COUNT: 22.6 % (ref 11.5–14.5)
ERYTHROCYTE [DISTWIDTH] IN BLOOD BY AUTOMATED COUNT: 24.1 % (ref 11.5–14.5)
ERYTHROCYTE [DISTWIDTH] IN BLOOD BY AUTOMATED COUNT: 24.7 % (ref 11.5–14.5)
ERYTHROCYTE [DISTWIDTH] IN BLOOD BY AUTOMATED COUNT: 25.1 % (ref 11.5–14.5)
ERYTHROCYTE [DISTWIDTH] IN BLOOD BY AUTOMATED COUNT: 25.1 % (ref 11.5–14.5)
EST. GFR  (AFRICAN AMERICAN): 39.5 ML/MIN/1.73 M^2
EST. GFR  (AFRICAN AMERICAN): >60 ML/MIN/1.73 M^2
EST. GFR  (NON AFRICAN AMERICAN): 34.2 ML/MIN/1.73 M^2
EST. GFR  (NON AFRICAN AMERICAN): 55.8 ML/MIN/1.73 M^2
EST. GFR  (NON AFRICAN AMERICAN): >60 ML/MIN/1.73 M^2
ESTIMATED AVG GLUCOSE: 100 MG/DL (ref 68–131)
FAMLB SPECIMEN: NORMAL
FAMLB SPECIMEN: NORMAL
FIBRINOGEN PPP-MCNC: 289 MG/DL (ref 182–366)
FIBRINOGEN PPP-MCNC: 292 MG/DL (ref 182–366)
FIBRINOGEN PPP-MCNC: 393 MG/DL (ref 182–366)
FIBRINOGEN PPP-MCNC: 440 MG/DL (ref 182–366)
FINAL PATHOLOGIC DIAGNOSIS: NORMAL
FINAL PATHOLOGIC DIAGNOSIS: NORMAL
FLOW CYTOMETRY ANTIBODIES ANALYZED - BONE MARROW: NORMAL
FLOW CYTOMETRY ANTIBODIES ANALYZED - BONE MARROW: NORMAL
FLOW CYTOMETRY COMMENT - BONE MARROW: NORMAL
FLOW CYTOMETRY COMMENT - BONE MARROW: NORMAL
FLOW CYTOMETRY INTERPRETATION - BONE MARROW: NORMAL
FLOW CYTOMETRY INTERPRETATION - BONE MARROW: NORMAL
FLT3 RESULT: NORMAL
GLUCOSE SERPL-MCNC: 112 MG/DL (ref 70–110)
GLUCOSE SERPL-MCNC: 115 MG/DL (ref 70–110)
GLUCOSE SERPL-MCNC: 128 MG/DL (ref 70–110)
GLUCOSE SERPL-MCNC: 129 MG/DL (ref 70–110)
GLUCOSE SERPL-MCNC: 131 MG/DL (ref 70–110)
GLUCOSE SERPL-MCNC: 131 MG/DL (ref 70–110)
GLUCOSE SERPL-MCNC: 135 MG/DL (ref 70–110)
GLUCOSE SERPL-MCNC: 137 MG/DL (ref 70–110)
GLUCOSE SERPL-MCNC: 137 MG/DL (ref 70–110)
GLUCOSE SERPL-MCNC: 140 MG/DL (ref 70–110)
GLUCOSE SERPL-MCNC: 140 MG/DL (ref 70–110)
GLUCOSE SERPL-MCNC: 141 MG/DL (ref 70–110)
GLUCOSE SERPL-MCNC: 142 MG/DL (ref 70–110)
GLUCOSE SERPL-MCNC: 147 MG/DL (ref 70–110)
GLUCOSE SERPL-MCNC: 149 MG/DL (ref 70–110)
GLUCOSE SERPL-MCNC: 149 MG/DL (ref 70–110)
GLUCOSE SERPL-MCNC: 153 MG/DL (ref 70–110)
GLUCOSE SERPL-MCNC: 155 MG/DL (ref 70–110)
GLUCOSE SERPL-MCNC: 168 MG/DL (ref 70–110)
GLUCOSE SERPL-MCNC: 169 MG/DL (ref 70–110)
GLUCOSE SERPL-MCNC: 176 MG/DL (ref 70–110)
GLUCOSE SERPL-MCNC: 184 MG/DL (ref 70–110)
GLUCOSE SERPL-MCNC: 185 MG/DL (ref 70–110)
GLUCOSE SERPL-MCNC: 186 MG/DL (ref 70–110)
GLUCOSE UR QL STRIP: NEGATIVE
GROSS: NORMAL
GROSS: NORMAL
HAPTOGLOB SERPL-MCNC: 71 MG/DL (ref 30–250)
HBA1C MFR BLD: 5.1 % (ref 4–5.6)
HCO3 UR-SCNC: 11.6 MMOL/L (ref 24–28)
HCT VFR BLD AUTO: 17.8 % (ref 40–54)
HCT VFR BLD AUTO: 18.8 % (ref 40–54)
HCT VFR BLD AUTO: 19.3 % (ref 40–54)
HCT VFR BLD AUTO: 19.4 % (ref 40–54)
HCT VFR BLD AUTO: 19.5 % (ref 40–54)
HCT VFR BLD AUTO: 20 % (ref 40–54)
HCT VFR BLD AUTO: 20 % (ref 40–54)
HCT VFR BLD AUTO: 20.5 % (ref 40–54)
HCT VFR BLD AUTO: 20.6 % (ref 40–54)
HCT VFR BLD AUTO: 20.7 % (ref 40–54)
HCT VFR BLD AUTO: 20.9 % (ref 40–54)
HCT VFR BLD AUTO: 21.3 % (ref 40–54)
HCT VFR BLD AUTO: 21.6 % (ref 40–54)
HCT VFR BLD AUTO: 21.9 % (ref 40–54)
HCT VFR BLD AUTO: 22 % (ref 40–54)
HCT VFR BLD AUTO: 22.1 % (ref 40–54)
HCT VFR BLD AUTO: 22.2 % (ref 40–54)
HCT VFR BLD AUTO: 23 % (ref 40–54)
HCT VFR BLD AUTO: 23.2 % (ref 40–54)
HCT VFR BLD AUTO: 23.6 % (ref 40–54)
HCT VFR BLD AUTO: 24 % (ref 40–54)
HCT VFR BLD AUTO: 24.5 % (ref 40–54)
HCT VFR BLD AUTO: 25 % (ref 40–54)
HCT VFR BLD AUTO: 25.8 % (ref 40–54)
HCT VFR BLD CALC: 27 %PCV (ref 36–54)
HCV AB SERPL QL IA: NEGATIVE
HGB BLD-MCNC: 5.9 G/DL (ref 14–18)
HGB BLD-MCNC: 6 G/DL (ref 14–18)
HGB BLD-MCNC: 6.3 G/DL (ref 14–18)
HGB BLD-MCNC: 6.6 G/DL (ref 14–18)
HGB BLD-MCNC: 6.6 G/DL (ref 14–18)
HGB BLD-MCNC: 6.7 G/DL (ref 14–18)
HGB BLD-MCNC: 6.8 G/DL (ref 14–18)
HGB BLD-MCNC: 6.9 G/DL (ref 14–18)
HGB BLD-MCNC: 7 G/DL (ref 14–18)
HGB BLD-MCNC: 7.2 G/DL (ref 14–18)
HGB BLD-MCNC: 7.2 G/DL (ref 14–18)
HGB BLD-MCNC: 7.3 G/DL (ref 14–18)
HGB BLD-MCNC: 7.3 G/DL (ref 14–18)
HGB BLD-MCNC: 7.4 G/DL (ref 14–18)
HGB BLD-MCNC: 7.5 G/DL (ref 14–18)
HGB BLD-MCNC: 7.6 G/DL (ref 14–18)
HGB BLD-MCNC: 7.7 G/DL (ref 14–18)
HGB BLD-MCNC: 7.8 G/DL (ref 14–18)
HGB BLD-MCNC: 7.8 G/DL (ref 14–18)
HGB BLD-MCNC: 7.9 G/DL (ref 14–18)
HGB BLD-MCNC: 8 G/DL (ref 14–18)
HGB BLD-MCNC: 8.1 G/DL (ref 14–18)
HGB BLD-MCNC: 8.3 G/DL (ref 14–18)
HGB BLD-MCNC: 8.3 G/DL (ref 14–18)
HGB UR QL STRIP: ABNORMAL
HGB UR QL STRIP: NEGATIVE
HIV 1+2 AB+HIV1 P24 AG SERPL QL IA: NEGATIVE
HYALINE CASTS UR QL AUTO: 0 /LPF
HYALINE CASTS UR QL AUTO: 3 /LPF
HYPOCHROMIA BLD QL SMEAR: ABNORMAL
IMM GRANULOCYTES # BLD AUTO: 0 K/UL (ref 0–0.04)
IMM GRANULOCYTES # BLD AUTO: 0.09 K/UL (ref 0–0.04)
IMM GRANULOCYTES # BLD AUTO: ABNORMAL K/UL (ref 0–0.04)
IMM GRANULOCYTES NFR BLD AUTO: 0 % (ref 0–0.5)
IMM GRANULOCYTES NFR BLD AUTO: 0.5 % (ref 0–0.5)
IMM GRANULOCYTES NFR BLD AUTO: ABNORMAL % (ref 0–0.5)
INR PPP: 1 (ref 0.8–1.2)
INR PPP: 1.1 (ref 0.8–1.2)
INR PPP: 1.1 (ref 0.8–1.2)
INR PPP: 1.2 (ref 0.8–1.2)
KARYOTYP MAR: NORMAL
KARYOTYP MAR: NORMAL
KETONES UR QL STRIP: NEGATIVE
LACTATE SERPL-SCNC: 2 MMOL/L (ref 0.5–2.2)
LDH SERPL L TO P-CCNC: 191 U/L (ref 110–260)
LDH SERPL L TO P-CCNC: 196 U/L (ref 110–260)
LDH SERPL L TO P-CCNC: 202 U/L (ref 110–260)
LDH SERPL L TO P-CCNC: 208 U/L (ref 110–260)
LDH SERPL L TO P-CCNC: 212 U/L (ref 110–260)
LDH SERPL L TO P-CCNC: 214 U/L (ref 110–260)
LDH SERPL L TO P-CCNC: 222 U/L (ref 110–260)
LDH SERPL L TO P-CCNC: 222 U/L (ref 110–260)
LDH SERPL L TO P-CCNC: 227 U/L (ref 110–260)
LDH SERPL L TO P-CCNC: 239 U/L (ref 110–260)
LDH SERPL L TO P-CCNC: 444 U/L (ref 110–260)
LEUKOCYTE ESTERASE UR QL STRIP: ABNORMAL
LEUKOCYTE ESTERASE UR QL STRIP: NEGATIVE
LYMPHOCYTES # BLD AUTO: 0.2 K/UL (ref 1–4.8)
LYMPHOCYTES # BLD AUTO: 0.2 K/UL (ref 1–4.8)
LYMPHOCYTES # BLD AUTO: 0.4 K/UL (ref 1–4.8)
LYMPHOCYTES # BLD AUTO: 0.4 K/UL (ref 1–4.8)
LYMPHOCYTES # BLD AUTO: 0.5 K/UL (ref 1–4.8)
LYMPHOCYTES # BLD AUTO: 0.9 K/UL (ref 1–4.8)
LYMPHOCYTES # BLD AUTO: ABNORMAL K/UL (ref 1–4.8)
LYMPHOCYTES NFR BLD: 0 % (ref 18–48)
LYMPHOCYTES NFR BLD: 0 % (ref 18–48)
LYMPHOCYTES NFR BLD: 10 % (ref 18–48)
LYMPHOCYTES NFR BLD: 2 % (ref 18–48)
LYMPHOCYTES NFR BLD: 2 % (ref 18–48)
LYMPHOCYTES NFR BLD: 23 % (ref 18–48)
LYMPHOCYTES NFR BLD: 28 % (ref 18–48)
LYMPHOCYTES NFR BLD: 3 % (ref 18–48)
LYMPHOCYTES NFR BLD: 3 % (ref 18–48)
LYMPHOCYTES NFR BLD: 4.5 % (ref 18–48)
LYMPHOCYTES NFR BLD: 45 % (ref 18–48)
LYMPHOCYTES NFR BLD: 5 % (ref 18–48)
LYMPHOCYTES NFR BLD: 51.1 % (ref 18–48)
LYMPHOCYTES NFR BLD: 54.5 % (ref 18–48)
LYMPHOCYTES NFR BLD: 55 % (ref 18–48)
LYMPHOCYTES NFR BLD: 63.9 % (ref 18–48)
LYMPHOCYTES NFR BLD: 64.9 % (ref 18–48)
LYMPHOCYTES NFR BLD: 69 % (ref 18–48)
LYMPHOCYTES NFR BLD: 70 % (ref 18–48)
LYMPHOCYTES NFR BLD: 73.3 % (ref 18–48)
LYMPHOCYTES NFR BLD: 82 % (ref 18–48)
LYMPHOCYTES NFR BLD: 89.8 % (ref 18–48)
LYMPHOCYTES NFR BLD: 90.7 % (ref 18–48)
LYMPHOCYTES NFR BLD: 91 % (ref 18–48)
MAGNESIUM SERPL-MCNC: 1.6 MG/DL (ref 1.6–2.6)
MAGNESIUM SERPL-MCNC: 1.6 MG/DL (ref 1.6–2.6)
MAGNESIUM SERPL-MCNC: 1.7 MG/DL (ref 1.6–2.6)
MAGNESIUM SERPL-MCNC: 1.8 MG/DL (ref 1.6–2.6)
MAGNESIUM SERPL-MCNC: 1.9 MG/DL (ref 1.6–2.6)
MAGNESIUM SERPL-MCNC: 2 MG/DL (ref 1.6–2.6)
MAGNESIUM SERPL-MCNC: 2.1 MG/DL (ref 1.6–2.6)
MAGNESIUM SERPL-MCNC: 2.1 MG/DL (ref 1.6–2.6)
MAGNESIUM SERPL-MCNC: 2.3 MG/DL (ref 1.6–2.6)
MCH RBC QN AUTO: 30.8 PG (ref 27–31)
MCH RBC QN AUTO: 30.8 PG (ref 27–31)
MCH RBC QN AUTO: 31.2 PG (ref 27–31)
MCH RBC QN AUTO: 31.4 PG (ref 27–31)
MCH RBC QN AUTO: 32.4 PG (ref 27–31)
MCH RBC QN AUTO: 33.2 PG (ref 27–31)
MCH RBC QN AUTO: 33.5 PG (ref 27–31)
MCH RBC QN AUTO: 34 PG (ref 27–31)
MCH RBC QN AUTO: 34.1 PG (ref 27–31)
MCH RBC QN AUTO: 34.1 PG (ref 27–31)
MCH RBC QN AUTO: 34.2 PG (ref 27–31)
MCH RBC QN AUTO: 34.5 PG (ref 27–31)
MCH RBC QN AUTO: 34.5 PG (ref 27–31)
MCH RBC QN AUTO: 34.6 PG (ref 27–31)
MCH RBC QN AUTO: 34.7 PG (ref 27–31)
MCH RBC QN AUTO: 34.8 PG (ref 27–31)
MCH RBC QN AUTO: 34.9 PG (ref 27–31)
MCH RBC QN AUTO: 35 PG (ref 27–31)
MCH RBC QN AUTO: 35.2 PG (ref 27–31)
MCH RBC QN AUTO: 35.5 PG (ref 27–31)
MCH RBC QN AUTO: 35.9 PG (ref 27–31)
MCH RBC QN AUTO: 36.4 PG (ref 27–31)
MCH RBC QN AUTO: 36.4 PG (ref 27–31)
MCH RBC QN AUTO: 36.5 PG (ref 27–31)
MCHC RBC AUTO-ENTMCNC: 31.2 G/DL (ref 32–36)
MCHC RBC AUTO-ENTMCNC: 31.9 G/DL (ref 32–36)
MCHC RBC AUTO-ENTMCNC: 32.2 G/DL (ref 32–36)
MCHC RBC AUTO-ENTMCNC: 32.6 G/DL (ref 32–36)
MCHC RBC AUTO-ENTMCNC: 32.6 G/DL (ref 32–36)
MCHC RBC AUTO-ENTMCNC: 32.9 G/DL (ref 32–36)
MCHC RBC AUTO-ENTMCNC: 33 G/DL (ref 32–36)
MCHC RBC AUTO-ENTMCNC: 33.1 G/DL (ref 32–36)
MCHC RBC AUTO-ENTMCNC: 33.1 G/DL (ref 32–36)
MCHC RBC AUTO-ENTMCNC: 33.3 G/DL (ref 32–36)
MCHC RBC AUTO-ENTMCNC: 33.3 G/DL (ref 32–36)
MCHC RBC AUTO-ENTMCNC: 33.8 G/DL (ref 32–36)
MCHC RBC AUTO-ENTMCNC: 34.1 G/DL (ref 32–36)
MCHC RBC AUTO-ENTMCNC: 34.2 G/DL (ref 32–36)
MCHC RBC AUTO-ENTMCNC: 34.4 G/DL (ref 32–36)
MCHC RBC AUTO-ENTMCNC: 34.5 G/DL (ref 32–36)
MCHC RBC AUTO-ENTMCNC: 34.5 G/DL (ref 32–36)
MCHC RBC AUTO-ENTMCNC: 34.6 G/DL (ref 32–36)
MCHC RBC AUTO-ENTMCNC: 35.3 G/DL (ref 32–36)
MCHC RBC AUTO-ENTMCNC: 35.3 G/DL (ref 32–36)
MCHC RBC AUTO-ENTMCNC: 35.6 G/DL (ref 32–36)
MCHC RBC AUTO-ENTMCNC: 36.4 G/DL (ref 32–36)
MCV RBC AUTO: 101 FL (ref 82–98)
MCV RBC AUTO: 102 FL (ref 82–98)
MCV RBC AUTO: 103 FL (ref 82–98)
MCV RBC AUTO: 104 FL (ref 82–98)
MCV RBC AUTO: 105 FL (ref 82–98)
MCV RBC AUTO: 106 FL (ref 82–98)
MCV RBC AUTO: 108 FL (ref 82–98)
MCV RBC AUTO: 108 FL (ref 82–98)
MCV RBC AUTO: 86 FL (ref 82–98)
MCV RBC AUTO: 93 FL (ref 82–98)
MCV RBC AUTO: 94 FL (ref 82–98)
MCV RBC AUTO: 97 FL (ref 82–98)
MCV RBC AUTO: 98 FL (ref 82–98)
MCV RBC AUTO: 99 FL (ref 82–98)
METAMYELOCYTES NFR BLD MANUAL: 1 %
MICROSCOPIC COMMENT: ABNORMAL
MICROSCOPIC COMMENT: ABNORMAL
MICROSCOPIC EXAM: NORMAL
MICROSCOPIC EXAM: NORMAL
MONOCYTES # BLD AUTO: 0 K/UL (ref 0.3–1)
MONOCYTES # BLD AUTO: 0.1 K/UL (ref 0.3–1)
MONOCYTES # BLD AUTO: 18.7 K/UL (ref 0.3–1)
MONOCYTES # BLD AUTO: ABNORMAL K/UL (ref 0.3–1)
MONOCYTES NFR BLD: 0 % (ref 4–15)
MONOCYTES NFR BLD: 1 % (ref 4–15)
MONOCYTES NFR BLD: 1 % (ref 4–15)
MONOCYTES NFR BLD: 10 % (ref 4–15)
MONOCYTES NFR BLD: 2 % (ref 4–15)
MONOCYTES NFR BLD: 2 % (ref 4–15)
MONOCYTES NFR BLD: 2.3 % (ref 4–15)
MONOCYTES NFR BLD: 20 % (ref 4–15)
MONOCYTES NFR BLD: 23 % (ref 4–15)
MONOCYTES NFR BLD: 3 % (ref 4–15)
MONOCYTES NFR BLD: 36.4 % (ref 4–15)
MONOCYTES NFR BLD: 4 % (ref 4–15)
MONOCYTES NFR BLD: 4 % (ref 4–15)
MONOCYTES NFR BLD: 7 % (ref 4–15)
MONOCYTES NFR BLD: 8 % (ref 4–15)
MONOCYTES NFR BLD: 8.3 % (ref 4–15)
MONOCYTES NFR BLD: 94.7 % (ref 4–15)
NEUTROPHILS # BLD AUTO: 0 K/UL (ref 1.8–7.7)
NEUTROPHILS # BLD AUTO: 0 K/UL (ref 1.8–7.7)
NEUTROPHILS # BLD AUTO: 0.1 K/UL (ref 1.8–7.7)
NEUTROPHILS # BLD AUTO: 0.2 K/UL (ref 1.8–7.7)
NEUTROPHILS NFR BLD: 0 % (ref 38–73)
NEUTROPHILS NFR BLD: 0.2 % (ref 38–73)
NEUTROPHILS NFR BLD: 1 % (ref 38–73)
NEUTROPHILS NFR BLD: 1 % (ref 38–73)
NEUTROPHILS NFR BLD: 10 % (ref 38–73)
NEUTROPHILS NFR BLD: 10.2 % (ref 38–73)
NEUTROPHILS NFR BLD: 12 % (ref 38–73)
NEUTROPHILS NFR BLD: 16.7 % (ref 38–73)
NEUTROPHILS NFR BLD: 2 % (ref 38–73)
NEUTROPHILS NFR BLD: 27.8 % (ref 38–73)
NEUTROPHILS NFR BLD: 28.1 % (ref 38–73)
NEUTROPHILS NFR BLD: 4 % (ref 38–73)
NEUTROPHILS NFR BLD: 40 % (ref 38–73)
NEUTROPHILS NFR BLD: 51 % (ref 38–73)
NEUTROPHILS NFR BLD: 52 % (ref 38–73)
NEUTROPHILS NFR BLD: 7 % (ref 38–73)
NEUTROPHILS NFR BLD: 9 % (ref 38–73)
NEUTROPHILS NFR BLD: 9.1 % (ref 38–73)
NEUTS BAND NFR BLD MANUAL: 1 %
NGS CLINCIAL TRIALS: NORMAL
NGS CLINCIAL TRIALS: NORMAL
NGS INDICATION OF TEST: NORMAL
NGS INDICATION OF TEST: NORMAL
NGS INTERPRETATION: NORMAL
NGS INTERPRETATION: NORMAL
NGS ONCOHEME PANEL GENE LIST: NORMAL
NGS ONCOHEME PANEL GENE LIST: NORMAL
NGS PATHOGENIC MUTATIONS DETECTED: NORMAL
NGS PATHOGENIC MUTATIONS DETECTED: NORMAL
NGS REVIEWED BY:: NORMAL
NGS REVIEWED BY:: NORMAL
NGS VARIANTS OF UNKNOWN SIGNIFICANCE: NORMAL
NGS VARIANTS OF UNKNOWN SIGNIFICANCE: NORMAL
NGSHM RESULT, BONE MARROW: NORMAL
NGSHM RESULT, BONE MARROW: NORMAL
NITRITE UR QL STRIP: NEGATIVE
NRBC BLD-RTO: 0 /100 WBC
NRBC BLD-RTO: 1 /100 WBC
NRBC BLD-RTO: 2 /100 WBC
NRBC BLD-RTO: 3 /100 WBC
NRBC BLD-RTO: 5 /100 WBC
NUM UNITS TRANS PACKED RBC: NORMAL
NUM UNITS TRANS WBC-POOR PLATPHERESIS: NORMAL
NUM UNITS TRANS WBC-POOR PLATPHERESIS: NORMAL
OVALOCYTES BLD QL SMEAR: ABNORMAL
PATH REPORT.FINAL DX SPEC: NORMAL
PATH REV BLD -IMP: NORMAL
PCO2 BLDA: 58 MMHG (ref 35–45)
PH SMN: 6.91 [PH] (ref 7.35–7.45)
PH UR STRIP: 5 [PH] (ref 5–8)
PHOSPHATE SERPL-MCNC: 2.8 MG/DL (ref 2.7–4.5)
PHOSPHATE SERPL-MCNC: 3 MG/DL (ref 2.7–4.5)
PHOSPHATE SERPL-MCNC: 3.1 MG/DL (ref 2.7–4.5)
PHOSPHATE SERPL-MCNC: 3.2 MG/DL (ref 2.7–4.5)
PHOSPHATE SERPL-MCNC: 3.2 MG/DL (ref 2.7–4.5)
PHOSPHATE SERPL-MCNC: 3.3 MG/DL (ref 2.7–4.5)
PHOSPHATE SERPL-MCNC: 3.4 MG/DL (ref 2.7–4.5)
PHOSPHATE SERPL-MCNC: 3.4 MG/DL (ref 2.7–4.5)
PHOSPHATE SERPL-MCNC: 3.5 MG/DL (ref 2.7–4.5)
PHOSPHATE SERPL-MCNC: 3.7 MG/DL (ref 2.7–4.5)
PHOSPHATE SERPL-MCNC: 3.8 MG/DL (ref 2.7–4.5)
PHOSPHATE SERPL-MCNC: 4 MG/DL (ref 2.7–4.5)
PHOSPHATE SERPL-MCNC: 4.3 MG/DL (ref 2.7–4.5)
PHOSPHATE SERPL-MCNC: 4.6 MG/DL (ref 2.7–4.5)
PHOSPHATE SERPL-MCNC: 4.7 MG/DL (ref 2.7–4.5)
PLATELET # BLD AUTO: 10 K/UL (ref 150–350)
PLATELET # BLD AUTO: 10 K/UL (ref 150–350)
PLATELET # BLD AUTO: 100 K/UL (ref 150–350)
PLATELET # BLD AUTO: 11 K/UL (ref 150–450)
PLATELET # BLD AUTO: 13 K/UL (ref 150–450)
PLATELET # BLD AUTO: 15 K/UL (ref 150–350)
PLATELET # BLD AUTO: 16 K/UL (ref 150–350)
PLATELET # BLD AUTO: 17 K/UL (ref 150–350)
PLATELET # BLD AUTO: 19 K/UL (ref 150–350)
PLATELET # BLD AUTO: 19 K/UL (ref 150–450)
PLATELET # BLD AUTO: 30 K/UL (ref 150–450)
PLATELET # BLD AUTO: 34 K/UL (ref 150–350)
PLATELET # BLD AUTO: 4 K/UL (ref 150–450)
PLATELET # BLD AUTO: 40 K/UL (ref 150–350)
PLATELET # BLD AUTO: 41 K/UL (ref 150–450)
PLATELET # BLD AUTO: 46 K/UL (ref 150–350)
PLATELET # BLD AUTO: 54 K/UL (ref 150–350)
PLATELET # BLD AUTO: 60 K/UL (ref 150–350)
PLATELET # BLD AUTO: 63 K/UL (ref 150–350)
PLATELET # BLD AUTO: 63 K/UL (ref 150–350)
PLATELET # BLD AUTO: 66 K/UL (ref 150–350)
PLATELET # BLD AUTO: 7 K/UL (ref 150–450)
PLATELET # BLD AUTO: 79 K/UL (ref 150–350)
PLATELET # BLD AUTO: 9 K/UL (ref 150–450)
PLATELET BLD QL SMEAR: ABNORMAL
PMV BLD AUTO: 10 FL (ref 9.2–12.9)
PMV BLD AUTO: 10 FL (ref 9.2–12.9)
PMV BLD AUTO: 10.3 FL (ref 9.2–12.9)
PMV BLD AUTO: 10.4 FL (ref 9.2–12.9)
PMV BLD AUTO: 10.6 FL (ref 9.2–12.9)
PMV BLD AUTO: 11.2 FL (ref 9.2–12.9)
PMV BLD AUTO: 11.5 FL (ref 9.2–12.9)
PMV BLD AUTO: 12.4 FL (ref 9.2–12.9)
PMV BLD AUTO: 7.8 FL (ref 9.2–12.9)
PMV BLD AUTO: 8.4 FL (ref 9.2–12.9)
PMV BLD AUTO: 8.6 FL (ref 9.2–12.9)
PMV BLD AUTO: 8.7 FL (ref 9.2–12.9)
PMV BLD AUTO: 8.8 FL (ref 9.2–12.9)
PMV BLD AUTO: 8.9 FL (ref 9.2–12.9)
PMV BLD AUTO: 9.3 FL (ref 9.2–12.9)
PMV BLD AUTO: 9.7 FL (ref 9.2–12.9)
PMV BLD AUTO: 9.7 FL (ref 9.2–12.9)
PMV BLD AUTO: 9.9 FL (ref 9.2–12.9)
PMV BLD AUTO: ABNORMAL FL (ref 9.2–12.9)
PO2 BLDA: 14 MMHG (ref 40–60)
POC BE: -21 MMOL/L
POC IONIZED CALCIUM: 1.34 MMOL/L (ref 1.06–1.42)
POC MOLECULAR INFLUENZA A AGN: NEGATIVE
POC MOLECULAR INFLUENZA B AGN: NEGATIVE
POC SATURATED O2: 7 % (ref 95–100)
POC TCO2: 13 MMOL/L (ref 24–29)
POCT GLUCOSE: 129 MG/DL (ref 70–110)
POCT GLUCOSE: 131 MG/DL (ref 70–110)
POCT GLUCOSE: 139 MG/DL (ref 70–110)
POCT GLUCOSE: 144 MG/DL (ref 70–110)
POCT GLUCOSE: 146 MG/DL (ref 70–110)
POCT GLUCOSE: 155 MG/DL (ref 70–110)
POCT GLUCOSE: 52 MG/DL (ref 70–110)
POIKILOCYTOSIS BLD QL SMEAR: ABNORMAL
POIKILOCYTOSIS BLD QL SMEAR: SLIGHT
POLYCHROMASIA BLD QL SMEAR: ABNORMAL
POTASSIUM BLD-SCNC: 6.4 MMOL/L (ref 3.5–5.1)
POTASSIUM SERPL-SCNC: 3.3 MMOL/L (ref 3.5–5.1)
POTASSIUM SERPL-SCNC: 3.6 MMOL/L (ref 3.5–5.1)
POTASSIUM SERPL-SCNC: 3.7 MMOL/L (ref 3.5–5.1)
POTASSIUM SERPL-SCNC: 3.7 MMOL/L (ref 3.5–5.1)
POTASSIUM SERPL-SCNC: 3.8 MMOL/L (ref 3.5–5.1)
POTASSIUM SERPL-SCNC: 3.9 MMOL/L (ref 3.5–5.1)
POTASSIUM SERPL-SCNC: 4 MMOL/L (ref 3.5–5.1)
POTASSIUM SERPL-SCNC: 4.1 MMOL/L (ref 3.5–5.1)
POTASSIUM SERPL-SCNC: 4.1 MMOL/L (ref 3.5–5.1)
POTASSIUM SERPL-SCNC: 4.2 MMOL/L (ref 3.5–5.1)
POTASSIUM SERPL-SCNC: 4.3 MMOL/L (ref 3.5–5.1)
POTASSIUM SERPL-SCNC: 4.4 MMOL/L (ref 3.5–5.1)
POTASSIUM SERPL-SCNC: 4.5 MMOL/L (ref 3.5–5.1)
PROCALCITONIN SERPL IA-MCNC: 0.52 NG/ML
PROT SERPL-MCNC: 5 G/DL (ref 6–8.4)
PROT SERPL-MCNC: 5.2 G/DL (ref 6–8.4)
PROT SERPL-MCNC: 5.3 G/DL (ref 6–8.4)
PROT SERPL-MCNC: 5.8 G/DL (ref 6–8.4)
PROT SERPL-MCNC: 5.9 G/DL (ref 6–8.4)
PROT SERPL-MCNC: 6 G/DL (ref 6–8.4)
PROT SERPL-MCNC: 6.1 G/DL (ref 6–8.4)
PROT SERPL-MCNC: 6.2 G/DL (ref 6–8.4)
PROT SERPL-MCNC: 6.3 G/DL (ref 6–8.4)
PROT SERPL-MCNC: 6.4 G/DL (ref 6–8.4)
PROT UR QL STRIP: ABNORMAL
PROT UR QL STRIP: NEGATIVE
PROTHROMBIN TIME: 10.7 SEC (ref 9–12.5)
PROTHROMBIN TIME: 11.6 SEC (ref 9–12.5)
PROTHROMBIN TIME: 12.3 SEC (ref 9–12.5)
PROTHROMBIN TIME: 12.8 SEC (ref 9–12.5)
RBC # BLD AUTO: 1.71 M/UL (ref 4.6–6.2)
RBC # BLD AUTO: 1.84 M/UL (ref 4.6–6.2)
RBC # BLD AUTO: 1.85 M/UL (ref 4.6–6.2)
RBC # BLD AUTO: 1.92 M/UL (ref 4.6–6.2)
RBC # BLD AUTO: 1.94 M/UL (ref 4.6–6.2)
RBC # BLD AUTO: 1.97 M/UL (ref 4.6–6.2)
RBC # BLD AUTO: 2 M/UL (ref 4.6–6.2)
RBC # BLD AUTO: 2.06 M/UL (ref 4.6–6.2)
RBC # BLD AUTO: 2.08 M/UL (ref 4.6–6.2)
RBC # BLD AUTO: 2.1 M/UL (ref 4.6–6.2)
RBC # BLD AUTO: 2.11 M/UL (ref 4.6–6.2)
RBC # BLD AUTO: 2.14 M/UL (ref 4.6–6.2)
RBC # BLD AUTO: 2.17 M/UL (ref 4.6–6.2)
RBC # BLD AUTO: 2.19 M/UL (ref 4.6–6.2)
RBC # BLD AUTO: 2.2 M/UL (ref 4.6–6.2)
RBC # BLD AUTO: 2.25 M/UL (ref 4.6–6.2)
RBC # BLD AUTO: 2.27 M/UL (ref 4.6–6.2)
RBC # BLD AUTO: 2.28 M/UL (ref 4.6–6.2)
RBC # BLD AUTO: 2.35 M/UL (ref 4.6–6.2)
RBC # BLD AUTO: 2.55 M/UL (ref 4.6–6.2)
RBC # BLD AUTO: 2.63 M/UL (ref 4.6–6.2)
RBC # BLD AUTO: 2.66 M/UL (ref 4.6–6.2)
RBC #/AREA URNS AUTO: 1 /HPF (ref 0–4)
RBC #/AREA URNS AUTO: 4 /HPF (ref 0–4)
REASON FOR REFERRAL (NARRATIVE): NORMAL
REASON FOR REFERRAL (NARRATIVE): NORMAL
REF LAB TEST METHOD: NORMAL
SAMPLE: ABNORMAL
SARS-COV-2 RDRP RESP QL NAA+PROBE: NEGATIVE
SCHISTOCYTES BLD QL SMEAR: ABNORMAL
SITE: ABNORMAL
SMUDGE CELLS BLD QL SMEAR: PRESENT
SODIUM BLD-SCNC: 134 MMOL/L (ref 136–145)
SODIUM SERPL-SCNC: 131 MMOL/L (ref 136–145)
SODIUM SERPL-SCNC: 132 MMOL/L (ref 136–145)
SODIUM SERPL-SCNC: 133 MMOL/L (ref 136–145)
SODIUM SERPL-SCNC: 133 MMOL/L (ref 136–145)
SODIUM SERPL-SCNC: 135 MMOL/L (ref 136–145)
SODIUM SERPL-SCNC: 137 MMOL/L (ref 136–145)
SODIUM SERPL-SCNC: 137 MMOL/L (ref 136–145)
SODIUM SERPL-SCNC: 138 MMOL/L (ref 136–145)
SODIUM SERPL-SCNC: 138 MMOL/L (ref 136–145)
SODIUM SERPL-SCNC: 139 MMOL/L (ref 136–145)
SODIUM SERPL-SCNC: 140 MMOL/L (ref 136–145)
SODIUM SERPL-SCNC: 141 MMOL/L (ref 136–145)
SODIUM SERPL-SCNC: 141 MMOL/L (ref 136–145)
SODIUM SERPL-SCNC: 142 MMOL/L (ref 136–145)
SODIUM SERPL-SCNC: 143 MMOL/L (ref 136–145)
SP GR UR STRIP: 1.02 (ref 1–1.03)
SP GR UR STRIP: >=1.03 (ref 1–1.03)
SPECIMEN SOURCE: NORMAL
SPECIMEN TYPE: NORMAL
SPECIMEN: NORMAL
SPECIMEN: NORMAL
SPHEROCYTES BLD QL SMEAR: ABNORMAL
SPHEROCYTES BLD QL SMEAR: ABNORMAL
SQUAMOUS #/AREA URNS AUTO: 1 /HPF
SUPPLEMENTAL DIAGNOSIS: NORMAL
SUPPLEMENTAL DIAGNOSIS: NORMAL
T4 SERPL-MCNC: 8.4 UG/DL (ref 4.5–11.5)
TEST PERFORMANCE INFO SPEC: NORMAL
TEST PERFORMANCE INFO SPEC: NORMAL
TROPONIN I SERPL DL<=0.01 NG/ML-MCNC: 0.01 NG/ML (ref 0–0.03)
TROPONIN I SERPL DL<=0.01 NG/ML-MCNC: 0.01 NG/ML (ref 0–0.03)
TROPONIN I SERPL DL<=0.01 NG/ML-MCNC: <0.006 NG/ML (ref 0–0.03)
TSH SERPL DL<=0.005 MIU/L-ACNC: 1.38 UIU/ML (ref 0.4–4)
UNIT NUMBER: NORMAL
UNIT NUMBER: NORMAL
URATE SERPL-MCNC: 4.1 MG/DL (ref 3.4–7)
URATE SERPL-MCNC: 4.3 MG/DL (ref 3.4–7)
URATE SERPL-MCNC: 4.5 MG/DL (ref 3.4–7)
URATE SERPL-MCNC: 4.6 MG/DL (ref 3.4–7)
URATE SERPL-MCNC: 5 MG/DL (ref 3.4–7)
URATE SERPL-MCNC: 5 MG/DL (ref 3.4–7)
URATE SERPL-MCNC: 5.2 MG/DL (ref 3.4–7)
URATE SERPL-MCNC: 5.6 MG/DL (ref 3.4–7)
URATE SERPL-MCNC: 5.9 MG/DL (ref 3.4–7)
URATE SERPL-MCNC: 6.1 MG/DL (ref 3.4–7)
URN SPEC COLLECT METH UR: ABNORMAL
VANCOMYCIN TROUGH SERPL-MCNC: 14.4 UG/ML (ref 10–22)
WBC # BLD AUTO: 0.24 K/UL (ref 3.9–12.7)
WBC # BLD AUTO: 0.24 K/UL (ref 3.9–12.7)
WBC # BLD AUTO: 0.33 K/UL (ref 3.9–12.7)
WBC # BLD AUTO: 0.36 K/UL (ref 3.9–12.7)
WBC # BLD AUTO: 0.4 K/UL (ref 3.9–12.7)
WBC # BLD AUTO: 0.43 K/UL (ref 3.9–12.7)
WBC # BLD AUTO: 0.56 K/UL (ref 3.9–12.7)
WBC # BLD AUTO: 0.57 K/UL (ref 3.9–12.7)
WBC # BLD AUTO: 0.59 K/UL (ref 3.9–12.7)
WBC # BLD AUTO: 0.66 K/UL (ref 3.9–12.7)
WBC # BLD AUTO: 0.89 K/UL (ref 3.9–12.7)
WBC # BLD AUTO: 0.95 K/UL (ref 3.9–12.7)
WBC # BLD AUTO: 1.05 K/UL (ref 3.9–12.7)
WBC # BLD AUTO: 1.55 K/UL (ref 3.9–12.7)
WBC # BLD AUTO: 100.8 K/UL (ref 3.9–12.7)
WBC # BLD AUTO: 102.6 K/UL (ref 3.9–12.7)
WBC # BLD AUTO: 104.1 K/UL (ref 3.9–12.7)
WBC # BLD AUTO: 155.5 K/UL (ref 3.9–12.7)
WBC # BLD AUTO: 19.77 K/UL (ref 3.9–12.7)
WBC # BLD AUTO: 3.15 K/UL (ref 3.9–12.7)
WBC # BLD AUTO: 33.67 K/UL (ref 3.9–12.7)
WBC # BLD AUTO: 6.36 K/UL (ref 3.9–12.7)
WBC # BLD AUTO: 82.77 K/UL (ref 3.9–12.7)
WBC # BLD AUTO: 88.78 K/UL (ref 3.9–12.7)
WBC #/AREA URNS AUTO: 1 /HPF (ref 0–5)
WBC #/AREA URNS AUTO: 5 /HPF (ref 0–5)
WBC OTHER NFR BLD MANUAL: 18 %
WBC OTHER NFR BLD MANUAL: 20 %
WBC OTHER NFR BLD MANUAL: 84 %
WBC OTHER NFR BLD MANUAL: 9 %
WBC OTHER NFR BLD MANUAL: 97 %
WBC OTHER NFR BLD MANUAL: 99 %

## 2021-01-01 PROCEDURE — 81245 FLT3 GENE: CPT

## 2021-01-01 PROCEDURE — 80069 RENAL FUNCTION PANEL: CPT | Performed by: STUDENT IN AN ORGANIZED HEALTH CARE EDUCATION/TRAINING PROGRAM

## 2021-01-01 PROCEDURE — 83615 LACTATE (LD) (LDH) ENZYME: CPT

## 2021-01-01 PROCEDURE — 96413 CHEMO IV INFUSION 1 HR: CPT

## 2021-01-01 PROCEDURE — 85027 COMPLETE CBC AUTOMATED: CPT | Mod: 91 | Performed by: STUDENT IN AN ORGANIZED HEALTH CARE EDUCATION/TRAINING PROGRAM

## 2021-01-01 PROCEDURE — 86920 COMPATIBILITY TEST SPIN: CPT | Performed by: INTERNAL MEDICINE

## 2021-01-01 PROCEDURE — 84100 ASSAY OF PHOSPHORUS: CPT | Performed by: STUDENT IN AN ORGANIZED HEALTH CARE EDUCATION/TRAINING PROGRAM

## 2021-01-01 PROCEDURE — 86920 COMPATIBILITY TEST SPIN: CPT | Performed by: STUDENT IN AN ORGANIZED HEALTH CARE EDUCATION/TRAINING PROGRAM

## 2021-01-01 PROCEDURE — 63600175 PHARM REV CODE 636 W HCPCS: Performed by: INTERNAL MEDICINE

## 2021-01-01 PROCEDURE — 80053 COMPREHEN METABOLIC PANEL: CPT

## 2021-01-01 PROCEDURE — 99999 PR PBB SHADOW E&M-EST. PATIENT-LVL IV: CPT | Mod: PBBFAC,,, | Performed by: INTERNAL MEDICINE

## 2021-01-01 PROCEDURE — 88313 PR  SPECIAL STAINS,GROUP II: ICD-10-PCS | Mod: 26,,, | Performed by: PATHOLOGY

## 2021-01-01 PROCEDURE — 63600175 PHARM REV CODE 636 W HCPCS: Mod: JG | Performed by: INTERNAL MEDICINE

## 2021-01-01 PROCEDURE — 88189 FLOWCYTOMETRY/READ 16 & >: CPT | Mod: ,,, | Performed by: PATHOLOGY

## 2021-01-01 PROCEDURE — 80162 ASSAY OF DIGOXIN TOTAL: CPT

## 2021-01-01 PROCEDURE — 63600175 PHARM REV CODE 636 W HCPCS: Performed by: NURSE PRACTITIONER

## 2021-01-01 PROCEDURE — 80053 COMPREHEN METABOLIC PANEL: CPT | Performed by: STUDENT IN AN ORGANIZED HEALTH CARE EDUCATION/TRAINING PROGRAM

## 2021-01-01 PROCEDURE — 85384 FIBRINOGEN ACTIVITY: CPT | Performed by: INTERNAL MEDICINE

## 2021-01-01 PROCEDURE — 25000003 PHARM REV CODE 250: Performed by: EMERGENCY MEDICINE

## 2021-01-01 PROCEDURE — 85610 PROTHROMBIN TIME: CPT | Performed by: STUDENT IN AN ORGANIZED HEALTH CARE EDUCATION/TRAINING PROGRAM

## 2021-01-01 PROCEDURE — 99999 PR PBB SHADOW E&M-EST. PATIENT-LVL III: ICD-10-PCS | Mod: PBBFAC,,, | Performed by: INTERNAL MEDICINE

## 2021-01-01 PROCEDURE — 25000003 PHARM REV CODE 250: Performed by: STUDENT IN AN ORGANIZED HEALTH CARE EDUCATION/TRAINING PROGRAM

## 2021-01-01 PROCEDURE — 25000003 PHARM REV CODE 250: Performed by: INTERNAL MEDICINE

## 2021-01-01 PROCEDURE — P9037 PLATE PHERES LEUKOREDU IRRAD: HCPCS | Performed by: NURSE PRACTITIONER

## 2021-01-01 PROCEDURE — 80053 COMPREHEN METABOLIC PANEL: CPT | Performed by: INTERNAL MEDICINE

## 2021-01-01 PROCEDURE — 88305 TISSUE EXAM BY PATHOLOGIST: CPT | Mod: 26,,, | Performed by: PATHOLOGY

## 2021-01-01 PROCEDURE — A4216 STERILE WATER/SALINE, 10 ML: HCPCS | Performed by: INTERNAL MEDICINE

## 2021-01-01 PROCEDURE — 83735 ASSAY OF MAGNESIUM: CPT

## 2021-01-01 PROCEDURE — 99215 OFFICE O/P EST HI 40 MIN: CPT | Mod: S$GLB,,, | Performed by: INTERNAL MEDICINE

## 2021-01-01 PROCEDURE — 84550 ASSAY OF BLOOD/URIC ACID: CPT

## 2021-01-01 PROCEDURE — 36592 COLLECT BLOOD FROM PICC: CPT

## 2021-01-01 PROCEDURE — 36415 COLL VENOUS BLD VENIPUNCTURE: CPT | Mod: PO

## 2021-01-01 PROCEDURE — P9011 BLOOD SPLIT UNIT: HCPCS | Performed by: INTERNAL MEDICINE

## 2021-01-01 PROCEDURE — 99215 PR OFFICE/OUTPT VISIT, EST, LEVL V, 40-54 MIN: ICD-10-PCS | Mod: S$GLB,,, | Performed by: INTERNAL MEDICINE

## 2021-01-01 PROCEDURE — 85060 BLOOD SMEAR INTERPRETATION: CPT | Mod: ,,, | Performed by: PATHOLOGY

## 2021-01-01 PROCEDURE — 36415 COLL VENOUS BLD VENIPUNCTURE: CPT | Mod: PO | Performed by: INTERNAL MEDICINE

## 2021-01-01 PROCEDURE — 99999 PR PBB SHADOW E&M-EST. PATIENT-LVL IV: ICD-10-PCS | Mod: PBBFAC,,, | Performed by: INTERNAL MEDICINE

## 2021-01-01 PROCEDURE — 88342 IMHCHEM/IMCYTCHM 1ST ANTB: CPT | Performed by: PATHOLOGY

## 2021-01-01 PROCEDURE — 99499 UNLISTED E&M SERVICE: CPT | Mod: S$GLB,,, | Performed by: NURSE PRACTITIONER

## 2021-01-01 PROCEDURE — G0378 HOSPITAL OBSERVATION PER HR: HCPCS

## 2021-01-01 PROCEDURE — 96361 HYDRATE IV INFUSION ADD-ON: CPT

## 2021-01-01 PROCEDURE — 79101 NUCLEAR RX IV ADMIN: CPT | Mod: 26,,, | Performed by: RADIOLOGY

## 2021-01-01 PROCEDURE — 83735 ASSAY OF MAGNESIUM: CPT | Performed by: STUDENT IN AN ORGANIZED HEALTH CARE EDUCATION/TRAINING PROGRAM

## 2021-01-01 PROCEDURE — 36415 COLL VENOUS BLD VENIPUNCTURE: CPT

## 2021-01-01 PROCEDURE — 99233 PR SUBSEQUENT HOSPITAL CARE,LEVL III: ICD-10-PCS | Mod: ,,, | Performed by: INTERNAL MEDICINE

## 2021-01-01 PROCEDURE — 85027 COMPLETE CBC AUTOMATED: CPT | Mod: PO

## 2021-01-01 PROCEDURE — 86900 BLOOD TYPING SEROLOGIC ABO: CPT | Mod: 91

## 2021-01-01 PROCEDURE — 86644 CMV ANTIBODY: CPT | Performed by: INTERNAL MEDICINE

## 2021-01-01 PROCEDURE — 82330 ASSAY OF CALCIUM: CPT

## 2021-01-01 PROCEDURE — 88184 FLOWCYTOMETRY/ TC 1 MARKER: CPT | Performed by: PATHOLOGY

## 2021-01-01 PROCEDURE — 85007 BL SMEAR W/DIFF WBC COUNT: CPT | Mod: PO | Performed by: INTERNAL MEDICINE

## 2021-01-01 PROCEDURE — 80202 ASSAY OF VANCOMYCIN: CPT | Performed by: STUDENT IN AN ORGANIZED HEALTH CARE EDUCATION/TRAINING PROGRAM

## 2021-01-01 PROCEDURE — 88189 PR  FLOWCYTOMETRY/READ, 16 & > MARKERS: ICD-10-PCS | Mod: ,,, | Performed by: PATHOLOGY

## 2021-01-01 PROCEDURE — 85025 COMPLETE CBC W/AUTO DIFF WBC: CPT | Performed by: INTERNAL MEDICINE

## 2021-01-01 PROCEDURE — 93010 EKG 12-LEAD: ICD-10-PCS | Mod: ,,, | Performed by: INTERNAL MEDICINE

## 2021-01-01 PROCEDURE — 85060 PATHOLOGIST REVIEW: ICD-10-PCS | Mod: ,,, | Performed by: PATHOLOGY

## 2021-01-01 PROCEDURE — P9040 RBC LEUKOREDUCED IRRADIATED: HCPCS | Performed by: INTERNAL MEDICINE

## 2021-01-01 PROCEDURE — 20600001 HC STEP DOWN PRIVATE ROOM

## 2021-01-01 PROCEDURE — 87086 URINE CULTURE/COLONY COUNT: CPT | Performed by: PHYSICIAN ASSISTANT

## 2021-01-01 PROCEDURE — 99239 HOSP IP/OBS DSCHRG MGMT >30: CPT | Mod: ,,, | Performed by: INTERNAL MEDICINE

## 2021-01-01 PROCEDURE — 80162 ASSAY OF DIGOXIN TOTAL: CPT | Performed by: NURSE PRACTITIONER

## 2021-01-01 PROCEDURE — 85007 BL SMEAR W/DIFF WBC COUNT: CPT

## 2021-01-01 PROCEDURE — 82565 ASSAY OF CREATININE: CPT

## 2021-01-01 PROCEDURE — 86900 BLOOD TYPING SEROLOGIC ABO: CPT

## 2021-01-01 PROCEDURE — 88313 SPECIAL STAINS GROUP 2: CPT | Mod: 59 | Performed by: PATHOLOGY

## 2021-01-01 PROCEDURE — 85384 FIBRINOGEN ACTIVITY: CPT | Performed by: PHYSICIAN ASSISTANT

## 2021-01-01 PROCEDURE — 85730 THROMBOPLASTIN TIME PARTIAL: CPT | Performed by: STUDENT IN AN ORGANIZED HEALTH CARE EDUCATION/TRAINING PROGRAM

## 2021-01-01 PROCEDURE — U0002 COVID-19 LAB TEST NON-CDC: HCPCS | Performed by: PHYSICIAN ASSISTANT

## 2021-01-01 PROCEDURE — 88275 CYTOGENETICS 100-300: CPT

## 2021-01-01 PROCEDURE — 85007 BL SMEAR W/DIFF WBC COUNT: CPT | Mod: 91 | Performed by: STUDENT IN AN ORGANIZED HEALTH CARE EDUCATION/TRAINING PROGRAM

## 2021-01-01 PROCEDURE — 83735 ASSAY OF MAGNESIUM: CPT | Performed by: INTERNAL MEDICINE

## 2021-01-01 PROCEDURE — 84100 ASSAY OF PHOSPHORUS: CPT

## 2021-01-01 PROCEDURE — 92014 PR EYE EXAM, EST PATIENT,COMPREHESV: ICD-10-PCS | Mod: S$GLB,,, | Performed by: OPHTHALMOLOGY

## 2021-01-01 PROCEDURE — 88237 TISSUE CULTURE BONE MARROW: CPT

## 2021-01-01 PROCEDURE — 88271 CYTOGENETICS DNA PROBE: CPT | Mod: 59

## 2021-01-01 PROCEDURE — 93010 ELECTROCARDIOGRAM REPORT: CPT | Mod: S$GLB,,, | Performed by: INTERNAL MEDICINE

## 2021-01-01 PROCEDURE — 84550 ASSAY OF BLOOD/URIC ACID: CPT | Performed by: INTERNAL MEDICINE

## 2021-01-01 PROCEDURE — 27000221 HC OXYGEN, UP TO 24 HOURS

## 2021-01-01 PROCEDURE — 87040 BLOOD CULTURE FOR BACTERIA: CPT | Performed by: NURSE PRACTITIONER

## 2021-01-01 PROCEDURE — 86644 CMV ANTIBODY: CPT

## 2021-01-01 PROCEDURE — 85027 COMPLETE CBC AUTOMATED: CPT | Mod: PO | Performed by: INTERNAL MEDICINE

## 2021-01-01 PROCEDURE — 88342 CHG IMMUNOCYTOCHEMISTRY: ICD-10-PCS | Mod: 26,59,, | Performed by: PATHOLOGY

## 2021-01-01 PROCEDURE — 84100 ASSAY OF PHOSPHORUS: CPT | Performed by: INTERNAL MEDICINE

## 2021-01-01 PROCEDURE — 36430 TRANSFUSION BLD/BLD COMPNT: CPT

## 2021-01-01 PROCEDURE — 86900 BLOOD TYPING SEROLOGIC ABO: CPT | Performed by: INTERNAL MEDICINE

## 2021-01-01 PROCEDURE — 85097 BONE MARROW INTERPRETATION: CPT | Mod: ,,, | Performed by: PATHOLOGY

## 2021-01-01 PROCEDURE — 88311 DECALCIFY TISSUE: CPT | Mod: 26,,, | Performed by: PATHOLOGY

## 2021-01-01 PROCEDURE — 85007 BL SMEAR W/DIFF WBC COUNT: CPT | Mod: PO,NCS

## 2021-01-01 PROCEDURE — 88237 TISSUE CULTURE BONE MARROW: CPT | Performed by: INTERNAL MEDICINE

## 2021-01-01 PROCEDURE — 93005 ELECTROCARDIOGRAM TRACING: CPT

## 2021-01-01 PROCEDURE — 99285 EMERGENCY DEPT VISIT HI MDM: CPT | Mod: CR,CS,, | Performed by: EMERGENCY MEDICINE

## 2021-01-01 PROCEDURE — 25000003 PHARM REV CODE 250: Performed by: NURSE PRACTITIONER

## 2021-01-01 PROCEDURE — 85384 FIBRINOGEN ACTIVITY: CPT

## 2021-01-01 PROCEDURE — 99214 OFFICE O/P EST MOD 30 MIN: CPT | Mod: 95,,, | Performed by: NURSE PRACTITIONER

## 2021-01-01 PROCEDURE — P9040 RBC LEUKOREDUCED IRRADIATED: HCPCS

## 2021-01-01 PROCEDURE — 86920 COMPATIBILITY TEST SPIN: CPT

## 2021-01-01 PROCEDURE — 85097 PR  BONE MARROW,SMEAR INTERPRETATION: ICD-10-PCS | Mod: ,,, | Performed by: PATHOLOGY

## 2021-01-01 PROCEDURE — 79101 NM THERAPY BY IV ADMINISTRATION: ICD-10-PCS | Mod: 26,,, | Performed by: RADIOLOGY

## 2021-01-01 PROCEDURE — 84436 ASSAY OF TOTAL THYROXINE: CPT

## 2021-01-01 PROCEDURE — 85730 THROMBOPLASTIN TIME PARTIAL: CPT | Performed by: INTERNAL MEDICINE

## 2021-01-01 PROCEDURE — 83615 LACTATE (LD) (LDH) ENZYME: CPT | Performed by: STUDENT IN AN ORGANIZED HEALTH CARE EDUCATION/TRAINING PROGRAM

## 2021-01-01 PROCEDURE — 99214 PR OFFICE/OUTPT VISIT, EST, LEVL IV, 30-39 MIN: ICD-10-PCS | Mod: S$GLB,,, | Performed by: INTERNAL MEDICINE

## 2021-01-01 PROCEDURE — 96365 THER/PROPH/DIAG IV INF INIT: CPT

## 2021-01-01 PROCEDURE — 80162 ASSAY OF DIGOXIN TOTAL: CPT | Performed by: INTERNAL MEDICINE

## 2021-01-01 PROCEDURE — 85027 COMPLETE CBC AUTOMATED: CPT | Performed by: NURSE PRACTITIONER

## 2021-01-01 PROCEDURE — 99233 SBSQ HOSP IP/OBS HIGH 50: CPT | Mod: ,,, | Performed by: INTERNAL MEDICINE

## 2021-01-01 PROCEDURE — 71046 X-RAY EXAM CHEST 2 VIEWS: CPT | Mod: 26,,, | Performed by: RADIOLOGY

## 2021-01-01 PROCEDURE — 38220 BONE MARROW: ICD-10-PCS | Mod: RT,S$GLB,, | Performed by: NURSE PRACTITIONER

## 2021-01-01 PROCEDURE — 92202 PR OPHTHALMOSCOPY, EXT, W/DRAW OPTIC NERVE/MACULA, I&R, UNI/BI: ICD-10-PCS | Mod: S$GLB,,, | Performed by: OPHTHALMOLOGY

## 2021-01-01 PROCEDURE — 85007 BL SMEAR W/DIFF WBC COUNT: CPT | Performed by: INTERNAL MEDICINE

## 2021-01-01 PROCEDURE — 84484 ASSAY OF TROPONIN QUANT: CPT | Performed by: PHYSICIAN ASSISTANT

## 2021-01-01 PROCEDURE — 96374 THER/PROPH/DIAG INJ IV PUSH: CPT

## 2021-01-01 PROCEDURE — 85027 COMPLETE CBC AUTOMATED: CPT

## 2021-01-01 PROCEDURE — 99223 1ST HOSP IP/OBS HIGH 75: CPT | Mod: ,,, | Performed by: INTERNAL MEDICINE

## 2021-01-01 PROCEDURE — 99213 OFFICE O/P EST LOW 20 MIN: CPT | Mod: 95,,, | Performed by: INTERNAL MEDICINE

## 2021-01-01 PROCEDURE — 99223 PR INITIAL HOSPITAL CARE,LEVL III: ICD-10-PCS | Mod: ,,, | Performed by: INTERNAL MEDICINE

## 2021-01-01 PROCEDURE — P9040 RBC LEUKOREDUCED IRRADIATED: HCPCS | Performed by: NURSE PRACTITIONER

## 2021-01-01 PROCEDURE — 85007 BL SMEAR W/DIFF WBC COUNT: CPT | Performed by: PHYSICIAN ASSISTANT

## 2021-01-01 PROCEDURE — 85730 THROMBOPLASTIN TIME PARTIAL: CPT

## 2021-01-01 PROCEDURE — 99214 PR OFFICE/OUTPT VISIT, EST, LEVL IV, 30-39 MIN: ICD-10-PCS | Mod: 95,,, | Performed by: NURSE PRACTITIONER

## 2021-01-01 PROCEDURE — 85007 BL SMEAR W/DIFF WBC COUNT: CPT | Performed by: NURSE PRACTITIONER

## 2021-01-01 PROCEDURE — 85384 FIBRINOGEN ACTIVITY: CPT | Performed by: STUDENT IN AN ORGANIZED HEALTH CARE EDUCATION/TRAINING PROGRAM

## 2021-01-01 PROCEDURE — 82962 GLUCOSE BLOOD TEST: CPT

## 2021-01-01 PROCEDURE — 88311 PR  DECALCIFY TISSUE: ICD-10-PCS | Mod: 26,,, | Performed by: PATHOLOGY

## 2021-01-01 PROCEDURE — 88185 FLOWCYTOMETRY/TC ADD-ON: CPT | Mod: 59 | Performed by: PATHOLOGY

## 2021-01-01 PROCEDURE — 85007 BL SMEAR W/DIFF WBC COUNT: CPT | Performed by: STUDENT IN AN ORGANIZED HEALTH CARE EDUCATION/TRAINING PROGRAM

## 2021-01-01 PROCEDURE — 83735 ASSAY OF MAGNESIUM: CPT | Performed by: PHYSICIAN ASSISTANT

## 2021-01-01 PROCEDURE — P9040 RBC LEUKOREDUCED IRRADIATED: HCPCS | Performed by: STUDENT IN AN ORGANIZED HEALTH CARE EDUCATION/TRAINING PROGRAM

## 2021-01-01 PROCEDURE — 99211 OFF/OP EST MAY X REQ PHY/QHP: CPT | Mod: 25

## 2021-01-01 PROCEDURE — 96375 TX/PRO/DX INJ NEW DRUG ADDON: CPT

## 2021-01-01 PROCEDURE — 82803 BLOOD GASES ANY COMBINATION: CPT

## 2021-01-01 PROCEDURE — 99215 OFFICE O/P EST HI 40 MIN: CPT | Mod: 95,,, | Performed by: NURSE PRACTITIONER

## 2021-01-01 PROCEDURE — 36591 DRAW BLOOD OFF VENOUS DEVICE: CPT

## 2021-01-01 PROCEDURE — 81001 URINALYSIS AUTO W/SCOPE: CPT | Performed by: INTERNAL MEDICINE

## 2021-01-01 PROCEDURE — 88305 TISSUE EXAM BY PATHOLOGIST: CPT | Mod: 59 | Performed by: PATHOLOGY

## 2021-01-01 PROCEDURE — 84550 ASSAY OF BLOOD/URIC ACID: CPT | Performed by: STUDENT IN AN ORGANIZED HEALTH CARE EDUCATION/TRAINING PROGRAM

## 2021-01-01 PROCEDURE — 85027 COMPLETE CBC AUTOMATED: CPT | Performed by: INTERNAL MEDICINE

## 2021-01-01 PROCEDURE — 86900 BLOOD TYPING SEROLOGIC ABO: CPT | Performed by: PHYSICIAN ASSISTANT

## 2021-01-01 PROCEDURE — 99239 PR HOSPITAL DISCHARGE DAY,>30 MIN: ICD-10-PCS | Mod: ,,, | Performed by: INTERNAL MEDICINE

## 2021-01-01 PROCEDURE — 81003 URINALYSIS AUTO W/O SCOPE: CPT

## 2021-01-01 PROCEDURE — 63600175 PHARM REV CODE 636 W HCPCS: Performed by: STUDENT IN AN ORGANIZED HEALTH CARE EDUCATION/TRAINING PROGRAM

## 2021-01-01 PROCEDURE — 83615 LACTATE (LD) (LDH) ENZYME: CPT | Performed by: INTERNAL MEDICINE

## 2021-01-01 PROCEDURE — 99215 PR OFFICE/OUTPT VISIT, EST, LEVL V, 40-54 MIN: ICD-10-PCS | Mod: 95,,, | Performed by: NURSE PRACTITIONER

## 2021-01-01 PROCEDURE — 88341 IMHCHEM/IMCYTCHM EA ADD ANTB: CPT | Mod: 59 | Performed by: PATHOLOGY

## 2021-01-01 PROCEDURE — 81001 URINALYSIS AUTO W/SCOPE: CPT | Performed by: PHYSICIAN ASSISTANT

## 2021-01-01 PROCEDURE — 88313 SPECIAL STAINS GROUP 2: CPT | Mod: 26,,, | Performed by: PATHOLOGY

## 2021-01-01 PROCEDURE — 83735 ASSAY OF MAGNESIUM: CPT | Mod: 91 | Performed by: STUDENT IN AN ORGANIZED HEALTH CARE EDUCATION/TRAINING PROGRAM

## 2021-01-01 PROCEDURE — 84100 ASSAY OF PHOSPHORUS: CPT | Performed by: PHYSICIAN ASSISTANT

## 2021-01-01 PROCEDURE — 99285 PR EMERGENCY DEPT VISIT,LEVEL V: ICD-10-PCS | Mod: CR,CS,, | Performed by: EMERGENCY MEDICINE

## 2021-01-01 PROCEDURE — 85027 COMPLETE CBC AUTOMATED: CPT | Performed by: PHYSICIAN ASSISTANT

## 2021-01-01 PROCEDURE — 81450 HL NEO GSAP 5-50DNA/DNA&RNA: CPT

## 2021-01-01 PROCEDURE — 88311 DECALCIFY TISSUE: CPT | Performed by: PATHOLOGY

## 2021-01-01 PROCEDURE — 92134 CPTRZ OPH DX IMG PST SGM RTA: CPT | Mod: S$GLB,,, | Performed by: OPHTHALMOLOGY

## 2021-01-01 PROCEDURE — 85007 BL SMEAR W/DIFF WBC COUNT: CPT | Mod: PO

## 2021-01-01 PROCEDURE — 85610 PROTHROMBIN TIME: CPT | Performed by: PHYSICIAN ASSISTANT

## 2021-01-01 PROCEDURE — 86703 HIV-1/HIV-2 1 RESULT ANTBDY: CPT | Performed by: EMERGENCY MEDICINE

## 2021-01-01 PROCEDURE — 25000003 PHARM REV CODE 250

## 2021-01-01 PROCEDURE — 85007 BL SMEAR W/DIFF WBC COUNT: CPT | Mod: NCS

## 2021-01-01 PROCEDURE — 93010 ELECTROCARDIOGRAM REPORT: CPT | Mod: ,,, | Performed by: INTERNAL MEDICINE

## 2021-01-01 PROCEDURE — 92014 COMPRE OPH EXAM EST PT 1/>: CPT | Mod: S$GLB,,, | Performed by: OPHTHALMOLOGY

## 2021-01-01 PROCEDURE — 38222 PR BONE MARROW BIOPSY(IES) W/ASPIRATION(S); DIAGNOSTIC: ICD-10-PCS | Mod: LT,S$GLB,, | Performed by: NURSE PRACTITIONER

## 2021-01-01 PROCEDURE — 71046 XR CHEST PA AND LATERAL: ICD-10-PCS | Mod: 26,,, | Performed by: RADIOLOGY

## 2021-01-01 PROCEDURE — 88313 SPECIAL STAINS GROUP 2: CPT | Performed by: PATHOLOGY

## 2021-01-01 PROCEDURE — 99900035 HC TECH TIME PER 15 MIN (STAT)

## 2021-01-01 PROCEDURE — 83036 HEMOGLOBIN GLYCOSYLATED A1C: CPT | Performed by: STUDENT IN AN ORGANIZED HEALTH CARE EDUCATION/TRAINING PROGRAM

## 2021-01-01 PROCEDURE — 38220 DX BONE MARROW ASPIRATIONS: CPT | Mod: RT,S$GLB,, | Performed by: NURSE PRACTITIONER

## 2021-01-01 PROCEDURE — 99214 OFFICE O/P EST MOD 30 MIN: CPT | Mod: S$GLB,,, | Performed by: INTERNAL MEDICINE

## 2021-01-01 PROCEDURE — 99213 PR OFFICE/OUTPT VISIT, EST, LEVL III, 20-29 MIN: ICD-10-PCS | Mod: 95,,, | Performed by: INTERNAL MEDICINE

## 2021-01-01 PROCEDURE — 85025 COMPLETE CBC W/AUTO DIFF WBC: CPT

## 2021-01-01 PROCEDURE — 84295 ASSAY OF SERUM SODIUM: CPT

## 2021-01-01 PROCEDURE — 86985 SPLIT BLOOD OR PRODUCTS: CPT | Performed by: INTERNAL MEDICINE

## 2021-01-01 PROCEDURE — 99442 PR PHYSICIAN TELEPHONE EVALUATION 11-20 MIN: ICD-10-PCS | Mod: 95,,, | Performed by: NURSE PRACTITIONER

## 2021-01-01 PROCEDURE — 99285 EMERGENCY DEPT VISIT HI MDM: CPT | Mod: 25

## 2021-01-01 PROCEDURE — 99442 PR PHYSICIAN TELEPHONE EVALUATION 11-20 MIN: CPT | Mod: 95,,, | Performed by: NURSE PRACTITIONER

## 2021-01-01 PROCEDURE — 79101 NUCLEAR RX IV ADMIN: CPT | Mod: TC

## 2021-01-01 PROCEDURE — 92134 POSTERIOR SEGMENT OCT RETINA (OCULAR COHERENCE TOMOGRAPHY)-BOTH EYES: ICD-10-PCS | Mod: S$GLB,,, | Performed by: OPHTHALMOLOGY

## 2021-01-01 PROCEDURE — 88341 PR IHC OR ICC EACH ADD'L SINGLE ANTIBODY  STAINPR: ICD-10-PCS | Mod: 26,,, | Performed by: PATHOLOGY

## 2021-01-01 PROCEDURE — 84145 PROCALCITONIN (PCT): CPT | Performed by: PHYSICIAN ASSISTANT

## 2021-01-01 PROCEDURE — 93005 EKG 12-LEAD: ICD-10-PCS | Mod: S$GLB,,, | Performed by: INTERNAL MEDICINE

## 2021-01-01 PROCEDURE — 85025 COMPLETE CBC W/AUTO DIFF WBC: CPT | Mod: PO

## 2021-01-01 PROCEDURE — 86803 HEPATITIS C AB TEST: CPT | Performed by: EMERGENCY MEDICINE

## 2021-01-01 PROCEDURE — 88271 CYTOGENETICS DNA PROBE: CPT | Performed by: INTERNAL MEDICINE

## 2021-01-01 PROCEDURE — 84132 ASSAY OF SERUM POTASSIUM: CPT

## 2021-01-01 PROCEDURE — 85379 FIBRIN DEGRADATION QUANT: CPT | Performed by: PHYSICIAN ASSISTANT

## 2021-01-01 PROCEDURE — 96376 TX/PRO/DX INJ SAME DRUG ADON: CPT

## 2021-01-01 PROCEDURE — 88341 IMHCHEM/IMCYTCHM EA ADD ANTB: CPT | Mod: 26,,, | Performed by: PATHOLOGY

## 2021-01-01 PROCEDURE — 93005 ELECTROCARDIOGRAM TRACING: CPT | Mod: S$GLB,,, | Performed by: INTERNAL MEDICINE

## 2021-01-01 PROCEDURE — 84550 ASSAY OF BLOOD/URIC ACID: CPT | Performed by: PHYSICIAN ASSISTANT

## 2021-01-01 PROCEDURE — 38222 DX BONE MARROW BX & ASPIR: CPT | Mod: LT,S$GLB,, | Performed by: NURSE PRACTITIONER

## 2021-01-01 PROCEDURE — 88305 TISSUE EXAM BY PATHOLOGIST: ICD-10-PCS | Mod: 26,,, | Performed by: PATHOLOGY

## 2021-01-01 PROCEDURE — 86920 COMPATIBILITY TEST SPIN: CPT | Performed by: NURSE PRACTITIONER

## 2021-01-01 PROCEDURE — 93010 EKG 12-LEAD: ICD-10-PCS | Mod: S$GLB,,, | Performed by: INTERNAL MEDICINE

## 2021-01-01 PROCEDURE — 85730 THROMBOPLASTIN TIME PARTIAL: CPT | Performed by: PHYSICIAN ASSISTANT

## 2021-01-01 PROCEDURE — 92202 OPSCPY EXTND ON/MAC DRAW: CPT | Mod: S$GLB,,, | Performed by: OPHTHALMOLOGY

## 2021-01-01 PROCEDURE — 36415 COLL VENOUS BLD VENIPUNCTURE: CPT | Performed by: INTERNAL MEDICINE

## 2021-01-01 PROCEDURE — 71046 X-RAY EXAM CHEST 2 VIEWS: CPT | Mod: TC

## 2021-01-01 PROCEDURE — 36415 COLL VENOUS BLD VENIPUNCTURE: CPT | Performed by: NURSE PRACTITIONER

## 2021-01-01 PROCEDURE — 92950 HEART/LUNG RESUSCITATION CPR: CPT

## 2021-01-01 PROCEDURE — 88264 CHROMOSOME ANALYSIS 20-25: CPT

## 2021-01-01 PROCEDURE — 80053 COMPREHEN METABOLIC PANEL: CPT | Performed by: PHYSICIAN ASSISTANT

## 2021-01-01 PROCEDURE — 99499 NO LOS: ICD-10-PCS | Mod: S$GLB,,, | Performed by: NURSE PRACTITIONER

## 2021-01-01 PROCEDURE — 99999 PR PBB SHADOW E&M-EST. PATIENT-LVL IV: ICD-10-PCS | Mod: PBBFAC,,, | Performed by: OPHTHALMOLOGY

## 2021-01-01 PROCEDURE — 87040 BLOOD CULTURE FOR BACTERIA: CPT | Performed by: PHYSICIAN ASSISTANT

## 2021-01-01 PROCEDURE — 85610 PROTHROMBIN TIME: CPT | Performed by: INTERNAL MEDICINE

## 2021-01-01 PROCEDURE — 99999 PR PBB SHADOW E&M-EST. PATIENT-LVL IV: CPT | Mod: PBBFAC,,, | Performed by: OPHTHALMOLOGY

## 2021-01-01 PROCEDURE — 85027 COMPLETE CBC AUTOMATED: CPT | Performed by: STUDENT IN AN ORGANIZED HEALTH CARE EDUCATION/TRAINING PROGRAM

## 2021-01-01 PROCEDURE — 86900 BLOOD TYPING SEROLOGIC ABO: CPT | Performed by: STUDENT IN AN ORGANIZED HEALTH CARE EDUCATION/TRAINING PROGRAM

## 2021-01-01 PROCEDURE — 81450 HL NEO GSAP 5-50DNA/DNA&RNA: CPT | Performed by: INTERNAL MEDICINE

## 2021-01-01 PROCEDURE — 83010 ASSAY OF HAPTOGLOBIN QUANT: CPT | Performed by: STUDENT IN AN ORGANIZED HEALTH CARE EDUCATION/TRAINING PROGRAM

## 2021-01-01 PROCEDURE — P9073 PLATELETS PHERESIS PATH REDU: HCPCS | Performed by: INTERNAL MEDICINE

## 2021-01-01 PROCEDURE — 83605 ASSAY OF LACTIC ACID: CPT | Performed by: PHYSICIAN ASSISTANT

## 2021-01-01 PROCEDURE — 25000003 PHARM REV CODE 250: Performed by: PHYSICIAN ASSISTANT

## 2021-01-01 PROCEDURE — 31500 INSERT EMERGENCY AIRWAY: CPT

## 2021-01-01 PROCEDURE — 84443 ASSAY THYROID STIM HORMONE: CPT

## 2021-01-01 PROCEDURE — 99999 PR PBB SHADOW E&M-EST. PATIENT-LVL III: CPT | Mod: PBBFAC,,, | Performed by: INTERNAL MEDICINE

## 2021-01-01 PROCEDURE — 63600175 PHARM REV CODE 636 W HCPCS: Performed by: PHYSICIAN ASSISTANT

## 2021-01-01 PROCEDURE — 63600175 PHARM REV CODE 636 W HCPCS: Performed by: EMERGENCY MEDICINE

## 2021-01-01 PROCEDURE — 36415 COLL VENOUS BLD VENIPUNCTURE: CPT | Performed by: STUDENT IN AN ORGANIZED HEALTH CARE EDUCATION/TRAINING PROGRAM

## 2021-01-01 PROCEDURE — 84484 ASSAY OF TROPONIN QUANT: CPT | Performed by: NURSE PRACTITIONER

## 2021-01-01 PROCEDURE — 88342 IMHCHEM/IMCYTCHM 1ST ANTB: CPT | Mod: 26,59,, | Performed by: PATHOLOGY

## 2021-01-01 PROCEDURE — 85610 PROTHROMBIN TIME: CPT

## 2021-01-01 PROCEDURE — 99211 OFF/OP EST MAY X REQ PHY/QHP: CPT

## 2021-01-01 RX ORDER — LANOLIN ALCOHOL/MO/W.PET/CERES
800 CREAM (GRAM) TOPICAL EVERY 4 HOURS PRN
Status: DISCONTINUED | OUTPATIENT
Start: 2021-01-01 | End: 2021-01-01 | Stop reason: HOSPADM

## 2021-01-01 RX ORDER — HYDROXYUREA 500 MG/1
1000 CAPSULE ORAL DAILY
Status: DISCONTINUED | OUTPATIENT
Start: 2021-01-01 | End: 2021-01-01 | Stop reason: HOSPADM

## 2021-01-01 RX ORDER — METOPROLOL SUCCINATE 50 MG/1
50 TABLET, EXTENDED RELEASE ORAL DAILY
Status: DISCONTINUED | OUTPATIENT
Start: 2021-01-01 | End: 2021-01-01

## 2021-01-01 RX ORDER — HEPARIN SODIUM 1000 [USP'U]/ML
3100 INJECTION, SOLUTION INTRAVENOUS; SUBCUTANEOUS ONCE
Status: COMPLETED | OUTPATIENT
Start: 2021-01-01 | End: 2021-01-01

## 2021-01-01 RX ORDER — SODIUM CHLORIDE 0.9 % (FLUSH) 0.9 %
10 SYRINGE (ML) INJECTION
Status: DISCONTINUED | OUTPATIENT
Start: 2021-01-01 | End: 2021-01-01 | Stop reason: HOSPADM

## 2021-01-01 RX ORDER — POTASSIUM CHLORIDE 20 MEQ/1
20 TABLET, EXTENDED RELEASE ORAL
Status: DISCONTINUED | OUTPATIENT
Start: 2021-01-01 | End: 2021-01-01 | Stop reason: HOSPADM

## 2021-01-01 RX ORDER — SODIUM CHLORIDE 0.9 % (FLUSH) 0.9 %
10 SYRINGE (ML) INJECTION
Status: CANCELLED | OUTPATIENT
Start: 2021-01-01

## 2021-01-01 RX ORDER — DEXAMETHASONE SODIUM PHOSPHATE 1 MG/ML
1 SOLUTION/ DROPS OPHTHALMIC EVERY 6 HOURS
Status: DISCONTINUED | OUTPATIENT
Start: 2021-01-01 | End: 2021-01-01 | Stop reason: HOSPADM

## 2021-01-01 RX ORDER — DIPHENHYDRAMINE HYDROCHLORIDE 50 MG/ML
25 INJECTION INTRAMUSCULAR; INTRAVENOUS
Status: CANCELLED | OUTPATIENT
Start: 2021-01-01

## 2021-01-01 RX ORDER — AMOXICILLIN 250 MG
1 CAPSULE ORAL 2 TIMES DAILY PRN
Status: DISCONTINUED | OUTPATIENT
Start: 2021-01-01 | End: 2021-01-01 | Stop reason: HOSPADM

## 2021-01-01 RX ORDER — ATORVASTATIN CALCIUM 20 MG/1
40 TABLET, FILM COATED ORAL DAILY
Status: DISCONTINUED | OUTPATIENT
Start: 2021-01-01 | End: 2021-01-01

## 2021-01-01 RX ORDER — LANOLIN ALCOHOL/MO/W.PET/CERES
400 CREAM (GRAM) TOPICAL EVERY 4 HOURS PRN
Status: DISCONTINUED | OUTPATIENT
Start: 2021-01-01 | End: 2021-01-01 | Stop reason: HOSPADM

## 2021-01-01 RX ORDER — HEPARIN 100 UNIT/ML
500 SYRINGE INTRAVENOUS
Status: DISCONTINUED | OUTPATIENT
Start: 2021-01-01 | End: 2021-01-01 | Stop reason: HOSPADM

## 2021-01-01 RX ORDER — HYDROCODONE BITARTRATE AND ACETAMINOPHEN 500; 5 MG/1; MG/1
TABLET ORAL ONCE
Status: COMPLETED | OUTPATIENT
Start: 2021-01-01 | End: 2021-01-01

## 2021-01-01 RX ORDER — DIPHENHYDRAMINE HYDROCHLORIDE 50 MG/ML
25 INJECTION INTRAMUSCULAR; INTRAVENOUS
Status: COMPLETED | OUTPATIENT
Start: 2021-01-01 | End: 2021-01-01

## 2021-01-01 RX ORDER — LIDOCAINE HYDROCHLORIDE 20 MG/ML
20 INJECTION, SOLUTION EPIDURAL; INFILTRATION; INTRACAUDAL; PERINEURAL ONCE
Status: COMPLETED | OUTPATIENT
Start: 2021-01-01 | End: 2021-01-01

## 2021-01-01 RX ORDER — ALLOPURINOL 100 MG/1
300 TABLET ORAL DAILY
Status: DISCONTINUED | OUTPATIENT
Start: 2021-01-01 | End: 2021-01-01 | Stop reason: HOSPADM

## 2021-01-01 RX ORDER — HYDROCODONE BITARTRATE AND ACETAMINOPHEN 500; 5 MG/1; MG/1
TABLET ORAL ONCE
Status: ACTIVE | OUTPATIENT
Start: 2021-01-01

## 2021-01-01 RX ORDER — ACETAMINOPHEN 325 MG/1
650 TABLET ORAL
Status: CANCELLED | OUTPATIENT
Start: 2021-01-01

## 2021-01-01 RX ORDER — HYDROCODONE BITARTRATE AND ACETAMINOPHEN 500; 5 MG/1; MG/1
TABLET ORAL
Status: DISCONTINUED | OUTPATIENT
Start: 2021-01-01 | End: 2021-01-01

## 2021-01-01 RX ORDER — HEPARIN 100 UNIT/ML
500 SYRINGE INTRAVENOUS
Status: CANCELLED | OUTPATIENT
Start: 2021-01-01

## 2021-01-01 RX ORDER — SODIUM BICARBONATE 1 MEQ/ML
SYRINGE (ML) INTRAVENOUS CODE/TRAUMA/SEDATION MEDICATION
Status: COMPLETED | OUTPATIENT
Start: 2021-01-01 | End: 2021-01-01

## 2021-01-01 RX ORDER — LOSARTAN POTASSIUM 25 MG/1
25 TABLET ORAL DAILY
Status: DISCONTINUED | OUTPATIENT
Start: 2021-01-01 | End: 2021-01-01

## 2021-01-01 RX ORDER — IBUPROFEN 600 MG/1
600 TABLET ORAL EVERY 6 HOURS PRN
Status: DISCONTINUED | OUTPATIENT
Start: 2021-01-01 | End: 2021-01-01

## 2021-01-01 RX ORDER — HYDROCODONE BITARTRATE AND ACETAMINOPHEN 500; 5 MG/1; MG/1
TABLET ORAL ONCE
Status: CANCELLED | OUTPATIENT
Start: 2021-01-01 | End: 2021-01-01

## 2021-01-01 RX ORDER — SODIUM,POTASSIUM PHOSPHATES 280-250MG
2 POWDER IN PACKET (EA) ORAL EVERY 4 HOURS PRN
Status: DISCONTINUED | OUTPATIENT
Start: 2021-01-01 | End: 2021-01-01 | Stop reason: HOSPADM

## 2021-01-01 RX ORDER — CEFEPIME HYDROCHLORIDE 2 G/1
2 INJECTION, POWDER, FOR SOLUTION INTRAVENOUS
Status: DISCONTINUED | OUTPATIENT
Start: 2021-01-01 | End: 2021-01-01 | Stop reason: HOSPADM

## 2021-01-01 RX ORDER — ACETAMINOPHEN 325 MG/1
650 TABLET ORAL
Status: COMPLETED | OUTPATIENT
Start: 2021-01-01 | End: 2021-01-01

## 2021-01-01 RX ORDER — DIPHENHYDRAMINE HCL 25 MG
25 CAPSULE ORAL
Status: ACTIVE | OUTPATIENT
Start: 2021-01-01

## 2021-01-01 RX ORDER — AMINOCAPROIC ACID 0.25 G/ML
1 SYRUP ORAL EVERY 6 HOURS SCHEDULED
Status: DISCONTINUED | OUTPATIENT
Start: 2021-01-01 | End: 2021-01-01 | Stop reason: HOSPADM

## 2021-01-01 RX ORDER — VORICONAZOLE 50 MG/1
100 TABLET, FILM COATED ORAL 2 TIMES DAILY
Status: DISCONTINUED | OUTPATIENT
Start: 2021-01-01 | End: 2021-01-01 | Stop reason: HOSPADM

## 2021-01-01 RX ORDER — HYDROCODONE BITARTRATE AND ACETAMINOPHEN 500; 5 MG/1; MG/1
TABLET ORAL ONCE
Status: DISCONTINUED | OUTPATIENT
Start: 2021-01-01 | End: 2021-01-01

## 2021-01-01 RX ORDER — IBUPROFEN 200 MG
24 TABLET ORAL
Status: DISCONTINUED | OUTPATIENT
Start: 2021-01-01 | End: 2021-01-01 | Stop reason: HOSPADM

## 2021-01-01 RX ORDER — CALCIUM CHLORIDE INJECTION 100 MG/ML
INJECTION, SOLUTION INTRAVENOUS CODE/TRAUMA/SEDATION MEDICATION
Status: COMPLETED | OUTPATIENT
Start: 2021-01-01 | End: 2021-01-01

## 2021-01-01 RX ORDER — DIPHENHYDRAMINE HCL 25 MG
25 CAPSULE ORAL
Status: CANCELLED | OUTPATIENT
Start: 2021-01-01

## 2021-01-01 RX ORDER — ONDANSETRON 8 MG/1
8 TABLET, ORALLY DISINTEGRATING ORAL EVERY 8 HOURS PRN
Status: DISCONTINUED | OUTPATIENT
Start: 2021-01-01 | End: 2021-01-01 | Stop reason: HOSPADM

## 2021-01-01 RX ORDER — SODIUM CHLORIDE 9 MG/ML
INJECTION, SOLUTION INTRAVENOUS CONTINUOUS
Status: DISCONTINUED | OUTPATIENT
Start: 2021-01-01 | End: 2021-01-01 | Stop reason: HOSPADM

## 2021-01-01 RX ORDER — AMIODARONE HYDROCHLORIDE 150 MG/3ML
INJECTION, SOLUTION INTRAVENOUS CODE/TRAUMA/SEDATION MEDICATION
Status: COMPLETED | OUTPATIENT
Start: 2021-01-01 | End: 2021-01-01

## 2021-01-01 RX ORDER — HEPARIN 100 UNIT/ML
500 SYRINGE INTRAVENOUS
Status: COMPLETED | OUTPATIENT
Start: 2021-01-01 | End: 2021-01-01

## 2021-01-01 RX ORDER — ONDANSETRON 8 MG/1
8 TABLET, ORALLY DISINTEGRATING ORAL EVERY 8 HOURS PRN
Status: DISCONTINUED | OUTPATIENT
Start: 2021-01-01 | End: 2021-01-01 | Stop reason: SDUPTHER

## 2021-01-01 RX ORDER — ONDANSETRON HYDROCHLORIDE 8 MG/1
8 TABLET, FILM COATED ORAL EVERY 12 HOURS PRN
Qty: 30 TABLET | Refills: 2 | Status: SHIPPED | OUTPATIENT
Start: 2021-01-01 | End: 2022-03-08

## 2021-01-01 RX ORDER — HYDROXYUREA 500 MG/1
1000 CAPSULE ORAL DAILY
Qty: 60 CAPSULE | Refills: 1 | Status: SHIPPED | OUTPATIENT
Start: 2021-01-01 | End: 2022-04-01

## 2021-01-01 RX ORDER — GLUCAGON 1 MG
1 KIT INJECTION
Status: DISCONTINUED | OUTPATIENT
Start: 2021-01-01 | End: 2021-01-01 | Stop reason: HOSPADM

## 2021-01-01 RX ORDER — HEPARIN SODIUM 1000 [USP'U]/ML
1600 INJECTION, SOLUTION INTRAVENOUS; SUBCUTANEOUS
Status: DISCONTINUED | OUTPATIENT
Start: 2021-01-01 | End: 2021-01-01 | Stop reason: HOSPADM

## 2021-01-01 RX ORDER — LIDOCAINE HYDROCHLORIDE 20 MG/ML
20 INJECTION, SOLUTION EPIDURAL; INFILTRATION; INTRACAUDAL; PERINEURAL ONCE
Status: DISCONTINUED | OUTPATIENT
Start: 2021-01-01 | End: 2021-01-01

## 2021-01-01 RX ORDER — DIPHENHYDRAMINE HCL 25 MG
25 CAPSULE ORAL
Status: COMPLETED | OUTPATIENT
Start: 2021-01-01 | End: 2021-01-01

## 2021-01-01 RX ORDER — EPINEPHRINE 0.1 MG/ML
INJECTION INTRAVENOUS CODE/TRAUMA/SEDATION MEDICATION
Status: COMPLETED | OUTPATIENT
Start: 2021-01-01 | End: 2021-01-01

## 2021-01-01 RX ORDER — TALC
6 POWDER (GRAM) TOPICAL NIGHTLY PRN
Status: DISCONTINUED | OUTPATIENT
Start: 2021-01-01 | End: 2021-01-01 | Stop reason: HOSPADM

## 2021-01-01 RX ORDER — METOPROLOL TARTRATE 1 MG/ML
INJECTION, SOLUTION INTRAVENOUS
Status: COMPLETED
Start: 2021-01-01 | End: 2021-01-01

## 2021-01-01 RX ORDER — ACETAMINOPHEN 500 MG
1000 TABLET ORAL
Status: COMPLETED | OUTPATIENT
Start: 2021-01-01 | End: 2021-01-01

## 2021-01-01 RX ORDER — DIPHENHYDRAMINE HCL 25 MG
25 CAPSULE ORAL ONCE
Status: COMPLETED | OUTPATIENT
Start: 2021-01-01 | End: 2021-01-01

## 2021-01-01 RX ORDER — LEVOFLOXACIN 500 MG/1
500 TABLET, FILM COATED ORAL DAILY
Status: DISCONTINUED | OUTPATIENT
Start: 2021-01-01 | End: 2021-01-01

## 2021-01-01 RX ORDER — LOSARTAN POTASSIUM 25 MG/1
25 TABLET ORAL DAILY
Qty: 30 TABLET | Refills: 1 | Status: SHIPPED | OUTPATIENT
Start: 2021-01-01 | End: 2021-01-01

## 2021-01-01 RX ORDER — HEPARIN 100 UNIT/ML
500 SYRINGE INTRAVENOUS
Status: DISCONTINUED | OUTPATIENT
Start: 2021-01-01 | End: 2021-01-01 | Stop reason: ALTCHOICE

## 2021-01-01 RX ORDER — ACETAMINOPHEN 325 MG/1
650 TABLET ORAL ONCE AS NEEDED
Status: COMPLETED | OUTPATIENT
Start: 2021-01-01 | End: 2021-01-01

## 2021-01-01 RX ORDER — ACETAMINOPHEN 325 MG/1
650 TABLET ORAL EVERY 4 HOURS PRN
Status: DISCONTINUED | OUTPATIENT
Start: 2021-01-01 | End: 2021-01-01 | Stop reason: HOSPADM

## 2021-01-01 RX ORDER — TRAMADOL HYDROCHLORIDE 50 MG/1
50 TABLET ORAL EVERY 12 HOURS PRN
Qty: 30 TABLET | Refills: 0 | Status: SHIPPED | OUTPATIENT
Start: 2021-01-01

## 2021-01-01 RX ORDER — PROCHLORPERAZINE EDISYLATE 5 MG/ML
10 INJECTION INTRAMUSCULAR; INTRAVENOUS EVERY 6 HOURS PRN
Status: DISCONTINUED | OUTPATIENT
Start: 2021-01-01 | End: 2021-01-01 | Stop reason: HOSPADM

## 2021-01-01 RX ORDER — OXYCODONE HYDROCHLORIDE 5 MG/1
5 TABLET ORAL EVERY 6 HOURS PRN
Status: DISCONTINUED | OUTPATIENT
Start: 2021-01-01 | End: 2021-01-01 | Stop reason: HOSPADM

## 2021-01-01 RX ORDER — CEFEPIME HYDROCHLORIDE 2 G/1
2 INJECTION, POWDER, FOR SOLUTION INTRAVENOUS
Status: COMPLETED | OUTPATIENT
Start: 2021-01-01 | End: 2021-01-01

## 2021-01-01 RX ORDER — LEVOFLOXACIN 500 MG/1
500 TABLET, FILM COATED ORAL DAILY
Qty: 30 TABLET | Refills: 1 | Status: SHIPPED | OUTPATIENT
Start: 2021-01-01 | End: 2021-01-01

## 2021-01-01 RX ORDER — METOPROLOL TARTRATE 1 MG/ML
5 INJECTION, SOLUTION INTRAVENOUS EVERY 5 MIN PRN
Status: DISCONTINUED | OUTPATIENT
Start: 2021-01-01 | End: 2021-01-01

## 2021-01-01 RX ORDER — TRAMADOL HYDROCHLORIDE 50 MG/1
50 TABLET ORAL EVERY 6 HOURS PRN
Status: DISCONTINUED | OUTPATIENT
Start: 2021-01-01 | End: 2021-01-01 | Stop reason: HOSPADM

## 2021-01-01 RX ORDER — METOPROLOL TARTRATE 1 MG/ML
5 INJECTION, SOLUTION INTRAVENOUS EVERY 5 MIN PRN
Status: DISCONTINUED | OUTPATIENT
Start: 2021-01-01 | End: 2021-01-01 | Stop reason: HOSPADM

## 2021-01-01 RX ORDER — HEPARIN 100 UNIT/ML
300 SYRINGE INTRAVENOUS
Status: DISCONTINUED | OUTPATIENT
Start: 2021-01-01 | End: 2021-01-01

## 2021-01-01 RX ORDER — IBUPROFEN 200 MG
16 TABLET ORAL
Status: DISCONTINUED | OUTPATIENT
Start: 2021-01-01 | End: 2021-01-01 | Stop reason: HOSPADM

## 2021-01-01 RX ORDER — ACETAMINOPHEN 325 MG/1
650 TABLET ORAL ONCE
Status: COMPLETED | OUTPATIENT
Start: 2021-01-01 | End: 2021-01-01

## 2021-01-01 RX ORDER — VORICONAZOLE 50 MG/1
100 TABLET, FILM COATED ORAL 2 TIMES DAILY
Qty: 60 TABLET | Refills: 3 | Status: SHIPPED | OUTPATIENT
Start: 2021-01-01 | End: 2021-01-01

## 2021-01-01 RX ORDER — METOPROLOL TARTRATE 1 MG/ML
5 INJECTION, SOLUTION INTRAVENOUS EVERY 5 MIN PRN
Status: COMPLETED | OUTPATIENT
Start: 2021-01-01 | End: 2021-01-01

## 2021-01-01 RX ORDER — DIGOXIN 250 MCG
250 TABLET ORAL DAILY
Status: DISCONTINUED | OUTPATIENT
Start: 2021-01-01 | End: 2021-01-01 | Stop reason: HOSPADM

## 2021-01-01 RX ORDER — ACETAMINOPHEN 325 MG/1
650 TABLET ORAL
Status: ACTIVE | OUTPATIENT
Start: 2021-01-01

## 2021-01-01 RX ORDER — VORICONAZOLE 50 MG/1
TABLET, FILM COATED ORAL
Qty: 120 TABLET | Refills: 3 | Status: SHIPPED | OUTPATIENT
Start: 2021-01-01

## 2021-01-01 RX ORDER — DIPHENHYDRAMINE HYDROCHLORIDE 50 MG/ML
25 INJECTION INTRAMUSCULAR; INTRAVENOUS
Status: DISCONTINUED | OUTPATIENT
Start: 2021-01-01 | End: 2021-01-01

## 2021-01-01 RX ORDER — INSULIN ASPART 100 [IU]/ML
0-5 INJECTION, SOLUTION INTRAVENOUS; SUBCUTANEOUS
Status: DISCONTINUED | OUTPATIENT
Start: 2021-01-01 | End: 2021-01-01 | Stop reason: HOSPADM

## 2021-01-01 RX ORDER — ALLOPURINOL 300 MG/1
300 TABLET ORAL DAILY
Qty: 10 TABLET | Refills: 0 | Status: SHIPPED | OUTPATIENT
Start: 2021-01-01 | End: 2021-01-01

## 2021-01-01 RX ORDER — MAGNESIUM SULFATE HEPTAHYDRATE 500 MG/ML
INJECTION, SOLUTION INTRAMUSCULAR; INTRAVENOUS CODE/TRAUMA/SEDATION MEDICATION
Status: COMPLETED | OUTPATIENT
Start: 2021-01-01 | End: 2021-01-01

## 2021-01-01 RX ORDER — SPIRONOLACTONE 25 MG/1
25 TABLET ORAL DAILY
Status: DISCONTINUED | OUTPATIENT
Start: 2021-01-01 | End: 2021-01-01 | Stop reason: HOSPADM

## 2021-01-01 RX ORDER — ACETAMINOPHEN 325 MG/1
650 TABLET ORAL EVERY 4 HOURS PRN
Status: DISCONTINUED | OUTPATIENT
Start: 2021-01-01 | End: 2021-01-01

## 2021-01-01 RX ORDER — SPIRONOLACTONE 25 MG/1
25 TABLET ORAL DAILY
Qty: 30 TABLET | Refills: 11 | Status: SHIPPED | OUTPATIENT
Start: 2021-01-01 | End: 2022-02-22

## 2021-01-01 RX ORDER — TERAZOSIN 5 MG/1
5 CAPSULE ORAL NIGHTLY
Status: DISCONTINUED | OUTPATIENT
Start: 2021-01-01 | End: 2021-01-01 | Stop reason: HOSPADM

## 2021-01-01 RX ORDER — ACETAMINOPHEN 325 MG/1
650 TABLET ORAL
Status: DISCONTINUED | OUTPATIENT
Start: 2021-01-01 | End: 2021-01-01

## 2021-01-01 RX ORDER — SODIUM,POTASSIUM PHOSPHATES 280-250MG
1 POWDER IN PACKET (EA) ORAL EVERY 4 HOURS PRN
Status: DISCONTINUED | OUTPATIENT
Start: 2021-01-01 | End: 2021-01-01 | Stop reason: HOSPADM

## 2021-01-01 RX ORDER — HEPARIN 100 UNIT/ML
300 SYRINGE INTRAVENOUS
Status: DISCONTINUED | OUTPATIENT
Start: 2021-01-01 | End: 2021-01-01 | Stop reason: HOSPADM

## 2021-01-01 RX ORDER — DIPHENHYDRAMINE HCL 25 MG
25 CAPSULE ORAL ONCE AS NEEDED
Status: COMPLETED | OUTPATIENT
Start: 2021-01-01 | End: 2021-01-01

## 2021-01-01 RX ORDER — METOPROLOL SUCCINATE 100 MG/1
100 TABLET, EXTENDED RELEASE ORAL DAILY
Status: DISCONTINUED | OUTPATIENT
Start: 2021-01-01 | End: 2021-01-01 | Stop reason: HOSPADM

## 2021-01-01 RX ORDER — ACYCLOVIR 200 MG/1
400 CAPSULE ORAL 2 TIMES DAILY
Status: DISCONTINUED | OUTPATIENT
Start: 2021-01-01 | End: 2021-01-01 | Stop reason: HOSPADM

## 2021-01-01 RX ORDER — MUPIROCIN 20 MG/G
OINTMENT TOPICAL 2 TIMES DAILY
Status: DISCONTINUED | OUTPATIENT
Start: 2021-01-01 | End: 2021-01-01 | Stop reason: HOSPADM

## 2021-01-01 RX ORDER — LIDOCAINE HYDROCHLORIDE 20 MG/ML
10 INJECTION, SOLUTION EPIDURAL; INFILTRATION; INTRACAUDAL; PERINEURAL ONCE
Status: COMPLETED | OUTPATIENT
Start: 2021-01-01 | End: 2021-01-01

## 2021-01-01 RX ORDER — PROCHLORPERAZINE EDISYLATE 5 MG/ML
5 INJECTION INTRAMUSCULAR; INTRAVENOUS EVERY 6 HOURS PRN
Status: DISCONTINUED | OUTPATIENT
Start: 2021-01-01 | End: 2021-01-01 | Stop reason: SDUPTHER

## 2021-01-01 RX ORDER — ONDANSETRON 2 MG/ML
8 INJECTION INTRAMUSCULAR; INTRAVENOUS
Status: DISCONTINUED | OUTPATIENT
Start: 2021-01-01 | End: 2021-01-01 | Stop reason: HOSPADM

## 2021-01-01 RX ADMIN — ATORVASTATIN CALCIUM 40 MG: 20 TABLET, FILM COATED ORAL at 09:04

## 2021-01-01 RX ADMIN — HEPARIN 500 UNITS: 100 SYRINGE at 05:03

## 2021-01-01 RX ADMIN — AMINOCAPROIC ACID 1 G: 0.25 SOLUTION ORAL at 05:04

## 2021-01-01 RX ADMIN — VANCOMYCIN HYDROCHLORIDE 2000 MG: 5 INJECTION, POWDER, LYOPHILIZED, FOR SOLUTION INTRAVENOUS at 05:04

## 2021-01-01 RX ADMIN — DIPHENHYDRAMINE HYDROCHLORIDE 25 MG: 25 CAPSULE ORAL at 01:03

## 2021-01-01 RX ADMIN — METOROPROLOL TARTRATE 5 MG: 5 INJECTION, SOLUTION INTRAVENOUS at 01:04

## 2021-01-01 RX ADMIN — ALUMINUM HYDROXIDE, MAGNESIUM HYDROXIDE, AND DIMETHICONE 10 ML: 400; 400; 40 SUSPENSION ORAL at 09:04

## 2021-01-01 RX ADMIN — MELATONIN TAB 3 MG 6 MG: 3 TAB at 09:04

## 2021-01-01 RX ADMIN — ACYCLOVIR 400 MG: 200 CAPSULE ORAL at 09:04

## 2021-01-01 RX ADMIN — HEPARIN 500 UNITS: 100 SYRINGE at 10:02

## 2021-01-01 RX ADMIN — HEPARIN 500 UNITS: 100 SYRINGE at 12:01

## 2021-01-01 RX ADMIN — Medication 10 ML: at 01:03

## 2021-01-01 RX ADMIN — HEPARIN 500 UNITS: 100 SYRINGE at 03:01

## 2021-01-01 RX ADMIN — DIPHENHYDRAMINE HYDROCHLORIDE 25 MG: 50 INJECTION INTRAMUSCULAR; INTRAVENOUS at 08:01

## 2021-01-01 RX ADMIN — ACETAMINOPHEN 650 MG: 325 TABLET ORAL at 09:04

## 2021-01-01 RX ADMIN — CEFEPIME 2 G: 2 INJECTION, POWDER, FOR SOLUTION INTRAVENOUS at 05:04

## 2021-01-01 RX ADMIN — MAGNESIUM OXIDE TAB 400 MG (241.3 MG ELEMENTAL MG) 400 MG: 400 (241.3 MG) TAB at 09:04

## 2021-01-01 RX ADMIN — TRAMADOL HYDROCHLORIDE 50 MG: 50 TABLET, COATED ORAL at 05:04

## 2021-01-01 RX ADMIN — HEPARIN SODIUM 3100 UNITS: 1000 INJECTION, SOLUTION INTRAVENOUS; SUBCUTANEOUS at 03:01

## 2021-01-01 RX ADMIN — DIPHENHYDRAMINE HYDROCHLORIDE 25 MG: 25 CAPSULE ORAL at 09:04

## 2021-01-01 RX ADMIN — Medication 10 ML: at 03:01

## 2021-01-01 RX ADMIN — DEXTROSE MONOHYDRATE 25 G: 25 INJECTION, SOLUTION INTRAVENOUS at 06:04

## 2021-01-01 RX ADMIN — Medication 500 UNITS: at 10:04

## 2021-01-01 RX ADMIN — METOROPROLOL TARTRATE 5 MG: 5 INJECTION, SOLUTION INTRAVENOUS at 12:04

## 2021-01-01 RX ADMIN — CEFEPIME 2 G: 2 INJECTION, POWDER, FOR SOLUTION INTRAVENOUS at 01:04

## 2021-01-01 RX ADMIN — DECITABINE 50 MG: 50 INJECTION, POWDER, LYOPHILIZED, FOR SOLUTION INTRAVENOUS at 09:01

## 2021-01-01 RX ADMIN — SPIRONOLACTONE 25 MG: 25 TABLET ORAL at 11:04

## 2021-01-01 RX ADMIN — HYDROXYUREA 1000 MG: 500 CAPSULE ORAL at 09:04

## 2021-01-01 RX ADMIN — ALUMINUM HYDROXIDE, MAGNESIUM HYDROXIDE, AND DIMETHICONE 10 ML: 400; 400; 40 SUSPENSION ORAL at 05:04

## 2021-01-01 RX ADMIN — AMINOCAPROIC ACID 1 G: 0.25 SOLUTION ORAL at 12:04

## 2021-01-01 RX ADMIN — ACETAMINOPHEN 650 MG: 325 TABLET ORAL at 03:03

## 2021-01-01 RX ADMIN — SODIUM BICARBONATE 25 MEQ: 84 INJECTION, SOLUTION INTRAVENOUS at 06:04

## 2021-01-01 RX ADMIN — ACETAMINOPHEN 650 MG: 325 TABLET, FILM COATED ORAL at 08:01

## 2021-01-01 RX ADMIN — SODIUM CHLORIDE 750 ML: 0.9 INJECTION, SOLUTION INTRAVENOUS at 08:02

## 2021-01-01 RX ADMIN — CALCIUM CHLORIDE 1 G: 100 INJECTION, SOLUTION INTRAVENOUS at 06:04

## 2021-01-01 RX ADMIN — OXYCODONE 5 MG: 5 TABLET ORAL at 09:04

## 2021-01-01 RX ADMIN — VORICONAZOLE 100 MG: 50 TABLET ORAL at 09:04

## 2021-01-01 RX ADMIN — Medication 10 ML: at 02:01

## 2021-01-01 RX ADMIN — DECITABINE 50 MG: 50 INJECTION, POWDER, LYOPHILIZED, FOR SOLUTION INTRAVENOUS at 02:01

## 2021-01-01 RX ADMIN — HEPARIN 500 UNITS: 100 SYRINGE at 01:03

## 2021-01-01 RX ADMIN — HEPARIN 500 UNITS: 100 SYRINGE at 12:02

## 2021-01-01 RX ADMIN — LIDOCAINE HYDROCHLORIDE 5 ML: 20 INJECTION, SOLUTION EPIDURAL; INFILTRATION; INTRACAUDAL; PERINEURAL at 03:03

## 2021-01-01 RX ADMIN — ALUMINUM HYDROXIDE, MAGNESIUM HYDROXIDE, AND DIMETHICONE 10 ML: 400; 400; 40 SUSPENSION ORAL at 12:04

## 2021-01-01 RX ADMIN — ACETAMINOPHEN 650 MG: 325 TABLET ORAL at 10:02

## 2021-01-01 RX ADMIN — SODIUM CHLORIDE 1000 ML: 0.9 INJECTION, SOLUTION INTRAVENOUS at 05:04

## 2021-01-01 RX ADMIN — DIGOXIN 250 MCG: 250 TABLET ORAL at 09:04

## 2021-01-01 RX ADMIN — ACETAMINOPHEN 650 MG: 325 TABLET, FILM COATED ORAL at 01:03

## 2021-01-01 RX ADMIN — SODIUM CHLORIDE: 9 INJECTION, SOLUTION INTRAVENOUS at 03:03

## 2021-01-01 RX ADMIN — HEPARIN 500 UNITS: 100 SYRINGE at 05:01

## 2021-01-01 RX ADMIN — MUPIROCIN: 20 OINTMENT TOPICAL at 09:04

## 2021-01-01 RX ADMIN — ONDANSETRON 8 MG: 8 TABLET, ORALLY DISINTEGRATING ORAL at 03:04

## 2021-01-01 RX ADMIN — EPINEPHRINE 1 MG: 0.1 INJECTION, SOLUTION ENDOTRACHEAL; INTRACARDIAC; INTRAVENOUS at 06:04

## 2021-01-01 RX ADMIN — HEPARIN 500 UNITS: 100 SYRINGE at 11:02

## 2021-01-01 RX ADMIN — Medication 10 ML: at 10:03

## 2021-01-01 RX ADMIN — VANCOMYCIN HYDROCHLORIDE 1750 MG: 5 INJECTION, POWDER, LYOPHILIZED, FOR SOLUTION INTRAVENOUS at 05:04

## 2021-01-01 RX ADMIN — VORICONAZOLE 100 MG: 50 TABLET ORAL at 12:04

## 2021-01-01 RX ADMIN — POTASSIUM CHLORIDE 20 MEQ: 1500 TABLET, EXTENDED RELEASE ORAL at 09:04

## 2021-01-01 RX ADMIN — Medication 10 ML: at 03:03

## 2021-01-01 RX ADMIN — HEPARIN SODIUM 3100 UNITS: 1000 INJECTION, SOLUTION INTRAVENOUS; SUBCUTANEOUS at 02:03

## 2021-01-01 RX ADMIN — ALLOPURINOL 300 MG: 100 TABLET ORAL at 09:04

## 2021-01-01 RX ADMIN — METOPROLOL SUCCINATE 50 MG: 50 TABLET, EXTENDED RELEASE ORAL at 09:04

## 2021-01-01 RX ADMIN — HEPARIN 500 UNITS: 100 SYRINGE at 10:03

## 2021-01-01 RX ADMIN — ACETAMINOPHEN 650 MG: 325 TABLET ORAL at 01:03

## 2021-01-01 RX ADMIN — MAGNESIUM SULFATE HEPTAHYDRATE 2 G: 500 INJECTION, SOLUTION INTRAMUSCULAR; INTRAVENOUS at 06:04

## 2021-01-01 RX ADMIN — ALUMINUM HYDROXIDE, MAGNESIUM HYDROXIDE, AND DIMETHICONE 10 ML: 400; 400; 40 SUSPENSION ORAL at 01:04

## 2021-01-01 RX ADMIN — TERAZOSIN HYDROCHLORIDE 5 MG: 5 CAPSULE ORAL at 09:04

## 2021-01-01 RX ADMIN — ACETAMINOPHEN 650 MG: 325 TABLET ORAL at 12:04

## 2021-01-01 RX ADMIN — SPIRONOLACTONE 25 MG: 25 TABLET ORAL at 09:04

## 2021-01-01 RX ADMIN — Medication 10 ML: at 12:02

## 2021-01-01 RX ADMIN — LIDOCAINE HYDROCHLORIDE 7 ML: 20 INJECTION, SOLUTION EPIDURAL; INFILTRATION; INTRACAUDAL; PERINEURAL at 03:01

## 2021-01-01 RX ADMIN — TERAZOSIN HYDROCHLORIDE 5 MG: 5 CAPSULE ORAL at 12:04

## 2021-01-01 RX ADMIN — FILGRASTIM-SNDZ 480 MCG: 480 INJECTION, SOLUTION INTRAVENOUS; SUBCUTANEOUS at 05:04

## 2021-01-01 RX ADMIN — DECITABINE 50 MG: 50 INJECTION, POWDER, LYOPHILIZED, FOR SOLUTION INTRAVENOUS at 12:01

## 2021-01-01 RX ADMIN — HEPARIN SODIUM 1600 UNITS: 1000 INJECTION, SOLUTION INTRAVENOUS; SUBCUTANEOUS at 10:04

## 2021-01-01 RX ADMIN — ACETAMINOPHEN 1000 MG: 500 TABLET ORAL at 04:04

## 2021-01-01 RX ADMIN — METOROPROLOL TARTRATE 5 MG: 5 INJECTION, SOLUTION INTRAVENOUS at 07:04

## 2021-01-01 RX ADMIN — LIDOCAINE HYDROCHLORIDE 5 ML: 20 INJECTION, SOLUTION EPIDURAL; INFILTRATION; INTRACAUDAL; PERINEURAL at 03:01

## 2021-01-01 RX ADMIN — DIPHENHYDRAMINE HYDROCHLORIDE 50 MG: 50 INJECTION, SOLUTION INTRAMUSCULAR; INTRAVENOUS at 10:02

## 2021-01-01 RX ADMIN — CEFEPIME 2 G: 2 INJECTION, POWDER, FOR SOLUTION INTRAVENOUS at 09:04

## 2021-01-01 RX ADMIN — SODIUM CHLORIDE 500 ML: 0.9 INJECTION, SOLUTION INTRAVENOUS at 12:04

## 2021-01-01 RX ADMIN — SODIUM CHLORIDE: 0.9 INJECTION, SOLUTION INTRAVENOUS at 01:03

## 2021-01-01 RX ADMIN — MAGNESIUM OXIDE TAB 400 MG (241.3 MG ELEMENTAL MG) 400 MG: 400 (241.3 MG) TAB at 01:04

## 2021-01-01 RX ADMIN — Medication 10 ML: at 05:01

## 2021-01-01 RX ADMIN — SODIUM CHLORIDE 500 ML: 0.9 INJECTION, SOLUTION INTRAVENOUS at 12:02

## 2021-01-01 RX ADMIN — ACYCLOVIR 400 MG: 200 CAPSULE ORAL at 12:04

## 2021-01-01 RX ADMIN — POTASSIUM CHLORIDE 20 MEQ: 1500 TABLET, EXTENDED RELEASE ORAL at 01:04

## 2021-01-01 RX ADMIN — Medication 10 ML: at 10:02

## 2021-01-01 RX ADMIN — SODIUM CHLORIDE: 9 INJECTION, SOLUTION INTRAVENOUS at 01:03

## 2021-01-01 RX ADMIN — METOROPROLOL TARTRATE 5 MG: 5 INJECTION, SOLUTION INTRAVENOUS at 06:04

## 2021-01-01 RX ADMIN — Medication 1 DOSE: at 09:04

## 2021-01-01 RX ADMIN — DECITABINE 50 MG: 50 INJECTION, POWDER, LYOPHILIZED, FOR SOLUTION INTRAVENOUS at 01:01

## 2021-01-01 RX ADMIN — HEPARIN 500 UNITS: 100 SYRINGE at 03:03

## 2021-01-01 RX ADMIN — Medication 10 ML: at 11:02

## 2021-01-01 RX ADMIN — AMIODARONE HYDROCHLORIDE 300 MG: 50 INJECTION, SOLUTION INTRAVENOUS at 06:04

## 2021-01-01 RX ADMIN — DIPHENHYDRAMINE HYDROCHLORIDE 25 MG: 50 INJECTION INTRAMUSCULAR; INTRAVENOUS at 03:03

## 2021-01-01 RX ADMIN — DIPHENHYDRAMINE HYDROCHLORIDE 25 MG: 50 INJECTION INTRAMUSCULAR; INTRAVENOUS at 01:03

## 2021-01-01 RX ADMIN — DIPHENHYDRAMINE HYDROCHLORIDE 25 MG: 25 CAPSULE ORAL at 08:04

## 2021-01-01 RX ADMIN — HEPARIN 500 UNITS: 100 SYRINGE at 11:03

## 2021-01-01 RX ADMIN — SODIUM CHLORIDE, SODIUM LACTATE, POTASSIUM CHLORIDE, AND CALCIUM CHLORIDE 2000 ML: .6; .31; .03; .02 INJECTION, SOLUTION INTRAVENOUS at 06:04

## 2021-01-01 RX ADMIN — SODIUM CHLORIDE: 0.9 INJECTION, SOLUTION INTRAVENOUS at 07:04

## 2021-01-01 RX ADMIN — METOPROLOL SUCCINATE 100 MG: 100 TABLET, EXTENDED RELEASE ORAL at 09:04

## 2021-01-01 RX ADMIN — METOPROLOL TARTRATE 5 MG: 5 INJECTION INTRAVENOUS at 01:04

## 2021-01-01 RX ADMIN — HEPARIN 500 UNITS: 100 SYRINGE at 02:01

## 2021-01-01 RX ADMIN — HEPARIN 500 UNITS: 100 SYRINGE at 01:01

## 2021-01-01 RX ADMIN — AMINOCAPROIC ACID 1 G: 0.25 SOLUTION ORAL at 11:04

## 2021-01-01 RX ADMIN — Medication 10 ML: at 11:03

## 2021-01-01 RX ADMIN — ACETAMINOPHEN 650 MG: 325 TABLET ORAL at 08:04

## 2021-01-01 RX ADMIN — DECITABINE 50 MG: 50 INJECTION, POWDER, LYOPHILIZED, FOR SOLUTION INTRAVENOUS at 04:01

## 2021-02-08 PROBLEM — C92.02 ACUTE MYELOID LEUKEMIA IN RELAPSE: Status: ACTIVE | Noted: 2021-01-01

## 2021-04-05 PROBLEM — R50.9 FEVER: Status: ACTIVE | Noted: 2021-01-01

## 2021-04-05 PROBLEM — C92.01 AML (ACUTE MYELOID LEUKEMIA) IN REMISSION: Status: ACTIVE | Noted: 2021-01-01

## 2021-04-05 PROBLEM — R74.01 TRANSAMINITIS: Status: ACTIVE | Noted: 2021-01-01

## 2021-04-05 PROBLEM — D69.6 THROMBOCYTOPENIA: Status: ACTIVE | Noted: 2021-01-01

## 2021-04-05 PROBLEM — E78.5 HLD (HYPERLIPIDEMIA): Status: ACTIVE | Noted: 2021-01-01

## 2021-04-06 PROBLEM — C92.01 AML (ACUTE MYELOID LEUKEMIA) IN REMISSION: Status: RESOLVED | Noted: 2021-01-01 | Resolved: 2021-01-01

## 2021-04-06 PROBLEM — R50.9 FEVER: Status: RESOLVED | Noted: 2021-01-01 | Resolved: 2021-01-01

## 2021-04-06 PROBLEM — I48.91 ATRIAL FIBRILLATION WITH RVR: Status: ACTIVE | Noted: 2021-01-01

## 2021-04-06 PROBLEM — D61.818 PANCYTOPENIA: Status: ACTIVE | Noted: 2021-01-01

## 2021-04-10 LAB
BACTERIA BLD CULT: NORMAL
BACTERIA BLD CULT: NORMAL

## 2021-04-12 LAB
BACTERIA BLD CULT: NORMAL
BACTERIA BLD CULT: NORMAL

## 2021-09-30 NOTE — PROGRESS NOTES
"[facility]  Adult Nutrition  Progress Note    SUMMARY       Recommendations    1. Continue with diabetic diet (2800 scott) plus Boost Glucose Control TID   2. PO encouragement   3. RD to follow    Goals: Pt meets 75% of EEN and EPN by RD follow up  Nutrition Goal Status: progressing towards goal  Communication of RD Recs: other (comment)    Reason for Assessment    Reason For Assessment: RD follow-up  Diagnosis: cancer diagnosis/related complications(AML)  Relevant Medical History: DM2, HTN, Afib s/p pacemaker  Interdisciplinary Rounds: did not attend  General Information Comments: Pt states that his appetite is low. He states that the smell of food makes him sick. He does drink all three of his Boost Glucose Control supplements. Pt does report that n/v has gone away. Pt is at 273lbs now. That is a wt loss of 27 lbs in one month. Pt is happy with wt loss but does not want to loss any more. RD to continue to follow wt and po intake.  Nutrition Discharge Planning: Discharge on carb consistent diet.    Nutrition Risk Screen    Nutrition Risk Screen: no indicators present    Nutrition/Diet History    Spiritual, Cultural Beliefs, Worship Practices, Values that Affect Care: no    Anthropometrics    Temp: 98.7 °F (37.1 °C)  Height: 6' 3" (190.5 cm)  Height (inches): 75 in  Weight Method: Standard Scale  Weight: 124 kg (273 lb 5.9 oz)  Weight (lb): 273.37 lb  Ideal Body Weight (IBW), Male: 196 lb  % Ideal Body Weight, Male (lb): 140.82 %  BMI (Calculated): 34.2  BMI Grade: 35 - 39.9 - obesity - grade II       Lab/Procedures/Meds    Pertinent Labs Reviewed: reviewed  Pertinent Labs Comments: K 3.4, Alb 2.9, WBC 0.36  Pertinent Medications Reviewed: reviewed  Pertinent Medications Comments: Insulin, metoprolol, ondansetron    Physical Findings/Assessment         Estimated/Assessed Needs    Weight Used For Calorie Calculations: 126.5 kg (278 lb 14.1 oz)  Energy Calorie Requirements (kcal): 2622(1.2 activity " factor)  Energy Need Method: [place]  Protein Requirements: 126-152 g/ day  Weight Used For Protein Calculations: 126.5 kg (278 lb 14.1 oz)     Estimated Fluid Requirement Method: RDA Method  RDA Method (mL): 2622  CHO Requirement: 327      Nutrition Prescription Ordered    Current Diet Order: Diabetic 2800 kcal  Oral Nutrition Supplement: Boost Glucose Control TID    Evaluation of Received Nutrient/Fluid Intake    I/O: 1.6 L since admit  Comments: LBM 10/14  Tolerance: not tolerating  % Intake of Estimated Energy Needs: 50 - 75 %  % Meal Intake: 25 - 50 %    Nutrition Risk    Level of Risk/Frequency of Follow-up: low     Assessment and Plan  Nutrition Problem  Altered nutrition-related lab values     Related to (etiology):   Blood glucose     Signs and Symptoms (as evidenced by):   A1C 8%, Glu 111      Interventions(treatment strategy):  Collaboration of care with providers.  Modified diet: Carbohydrates  Diet education  Commercial beverage- Boost Glucose Control     Nutrition Diagnosis Status:   Resolved        Monitor and Evaluation    Food and Nutrient Intake: energy intake, food and beverage intake  Food and Nutrient Adminstration: diet order  Knowledge/Beliefs/Attitudes: food and nutrition knowledge/skill  Anthropometric Measurements: weight, weight change, body mass index  Biochemical Data, Medical Tests and Procedures: electrolyte and renal panel, gastrointestinal profile, glucose/endocrine profile, inflammatory profile, lipid profile  Nutrition-Focused Physical Findings: overall appearance     Malnutrition Assessment                     Nutrition Follow-Up    RD Follow-up?: Yes     none

## 2022-10-17 NOTE — ASSESSMENT & PLAN NOTE
-Developed secondary to SEBASTIAN and treated with SEVELAMER from 09/28 to 10/01 with improvement.     Resolved     Pt seen for scheduled dressing change and assessment. Continue plan of care and follow up as scheduled.      Problem: Non-Pressure Injury Wound  Goal: # No deterioration in wound  Outcome: Outcome Met, Continue evaluating goal progress toward completion     Problem: Non-Pressure Injury Wound  Goal: # Verbalizes understanding of wound and wound care  Description: If abnormality is a skin tear, avoid using tape on skin including transparent dressings. Document education using the patient education activity.  Outcome: Outcome Met, Continue evaluating goal progress toward completion     Problem: Non-Pressure Injury Wound  Goal: Participates in wound care activities  Outcome: Outcome Met, Continue evaluating goal progress toward completion

## 2025-04-23 NOTE — TELEPHONE ENCOUNTER
Pt wife reporting pt is having diverticulitis episode currently. Wife asking if    Adequate: hears normal conversation without difficulty

## (undated) DEVICE — SYR ONLY LUER LOCK 20CC

## (undated) DEVICE — SUT VICRYL PLUS 3-0 SH 18IN

## (undated) DEVICE — DRAPE THYROID WITH ARMBOARD

## (undated) DEVICE — DRAPE C ARM 42 X 120 10/BX

## (undated) DEVICE — SUT ETHILON 2-0 PSLX 30IN

## (undated) DEVICE — BLADE SURG CARBON STEEL SZ11

## (undated) DEVICE — SEE MEDLINE ITEM 157131

## (undated) DEVICE — SEE MEDLINE ITEM 157117

## (undated) DEVICE — GOWN SURGICAL X-LARGE

## (undated) DEVICE — SYR SLIP TIP 5CC

## (undated) DEVICE — SUT PROLENE 2-0 30 SH

## (undated) DEVICE — KIT PROBE COVER WITH GEL

## (undated) DEVICE — SEE MEDLINE ITEM 146417

## (undated) DEVICE — SUT MCRYL PLUS 4-0 PS2 27IN

## (undated) DEVICE — APPLICATOR CHLORAPREP ORN 26ML

## (undated) DEVICE — ELECTRODE REM PLYHSV RETURN 9

## (undated) DEVICE — TRAY MINOR GEN SURG

## (undated) DEVICE — SUT VICRYL 3-0 27 SH

## (undated) DEVICE — ADHESIVE DERMABOND ADVANCED

## (undated) DEVICE — SOL NACL 0.9% INJ PF/50151

## (undated) DEVICE — SET DECANTER MEDICHOICE

## (undated) DEVICE — DRESSING TRANS 4X4 TEGADERM